# Patient Record
Sex: FEMALE | Race: BLACK OR AFRICAN AMERICAN | NOT HISPANIC OR LATINO
[De-identification: names, ages, dates, MRNs, and addresses within clinical notes are randomized per-mention and may not be internally consistent; named-entity substitution may affect disease eponyms.]

---

## 2014-10-13 RX ORDER — LISINOPRIL 2.5 MG/1
1 TABLET ORAL
Qty: 0 | Refills: 0 | COMMUNITY
Start: 2014-10-13

## 2014-10-13 RX ORDER — CARVEDILOL PHOSPHATE 80 MG/1
2 CAPSULE, EXTENDED RELEASE ORAL
Qty: 0 | Refills: 0 | COMMUNITY
Start: 2014-10-13

## 2017-01-09 ENCOUNTER — APPOINTMENT (OUTPATIENT)
Dept: CARDIOLOGY | Facility: CLINIC | Age: 67
End: 2017-01-09

## 2017-01-09 ENCOUNTER — NON-APPOINTMENT (OUTPATIENT)
Age: 67
End: 2017-01-09

## 2017-01-09 VITALS
HEART RATE: 88 BPM | SYSTOLIC BLOOD PRESSURE: 158 MMHG | OXYGEN SATURATION: 96 % | DIASTOLIC BLOOD PRESSURE: 97 MMHG | HEIGHT: 62 IN | BODY MASS INDEX: 15.83 KG/M2 | WEIGHT: 86 LBS

## 2017-01-10 LAB
ALBUMIN SERPL ELPH-MCNC: 4 G/DL
ALP BLD-CCNC: 62 U/L
ALT SERPL-CCNC: 16 U/L
ANION GAP SERPL CALC-SCNC: 21 MMOL/L
AST SERPL-CCNC: 25 U/L
BILIRUB SERPL-MCNC: 0.6 MG/DL
BUN SERPL-MCNC: 20 MG/DL
CALCIUM SERPL-MCNC: 9.5 MG/DL
CHLORIDE SERPL-SCNC: 100 MMOL/L
CHOLEST SERPL-MCNC: 178 MG/DL
CHOLEST/HDLC SERPL: 3.5 RATIO
CO2 SERPL-SCNC: 28 MMOL/L
CREAT SERPL-MCNC: 1.06 MG/DL
GLUCOSE SERPL-MCNC: 24 MG/DL
HDLC SERPL-MCNC: 51 MG/DL
LDLC SERPL CALC-MCNC: 93 MG/DL
POTASSIUM SERPL-SCNC: 3.4 MMOL/L
PROT SERPL-MCNC: 7.2 G/DL
SODIUM SERPL-SCNC: 149 MMOL/L
TRIGL SERPL-MCNC: 170 MG/DL

## 2017-01-25 ENCOUNTER — OUTPATIENT (OUTPATIENT)
Dept: OUTPATIENT SERVICES | Facility: HOSPITAL | Age: 67
LOS: 1 days | End: 2017-01-25

## 2017-01-25 ENCOUNTER — APPOINTMENT (OUTPATIENT)
Dept: CV DIAGNOSITCS | Facility: HOSPITAL | Age: 67
End: 2017-01-25

## 2017-01-25 DIAGNOSIS — I50.22 CHRONIC SYSTOLIC (CONGESTIVE) HEART FAILURE: ICD-10-CM

## 2017-01-25 DIAGNOSIS — Z95.5 PRESENCE OF CORONARY ANGIOPLASTY IMPLANT AND GRAFT: Chronic | ICD-10-CM

## 2017-01-25 DIAGNOSIS — I34.0 NONRHEUMATIC MITRAL (VALVE) INSUFFICIENCY: ICD-10-CM

## 2017-02-21 ENCOUNTER — RX RENEWAL (OUTPATIENT)
Age: 67
End: 2017-02-21

## 2017-05-08 ENCOUNTER — APPOINTMENT (OUTPATIENT)
Dept: CARDIOLOGY | Facility: CLINIC | Age: 67
End: 2017-05-08

## 2017-08-08 ENCOUNTER — APPOINTMENT (OUTPATIENT)
Dept: CARDIOLOGY | Facility: CLINIC | Age: 67
End: 2017-08-08

## 2017-08-18 ENCOUNTER — RX RENEWAL (OUTPATIENT)
Age: 67
End: 2017-08-18

## 2017-08-25 ENCOUNTER — NON-APPOINTMENT (OUTPATIENT)
Age: 67
End: 2017-08-25

## 2017-08-25 ENCOUNTER — APPOINTMENT (OUTPATIENT)
Dept: CARDIOLOGY | Facility: CLINIC | Age: 67
End: 2017-08-25
Payer: MEDICARE

## 2017-08-25 VITALS
HEIGHT: 62 IN | WEIGHT: 90 LBS | DIASTOLIC BLOOD PRESSURE: 101 MMHG | HEART RATE: 81 BPM | OXYGEN SATURATION: 96 % | RESPIRATION RATE: 14 BRPM | SYSTOLIC BLOOD PRESSURE: 150 MMHG | BODY MASS INDEX: 16.56 KG/M2

## 2017-08-25 VITALS — DIASTOLIC BLOOD PRESSURE: 88 MMHG | SYSTOLIC BLOOD PRESSURE: 140 MMHG

## 2017-08-25 PROCEDURE — 93000 ELECTROCARDIOGRAM COMPLETE: CPT

## 2017-08-25 PROCEDURE — 99215 OFFICE O/P EST HI 40 MIN: CPT

## 2017-08-26 LAB
ALBUMIN SERPL ELPH-MCNC: 4.5 G/DL
ALP BLD-CCNC: 61 U/L
ALT SERPL-CCNC: 26 U/L
ANION GAP SERPL CALC-SCNC: 20 MMOL/L
AST SERPL-CCNC: 27 U/L
BILIRUB SERPL-MCNC: 0.9 MG/DL
BUN SERPL-MCNC: 18 MG/DL
CALCIUM SERPL-MCNC: 10.1 MG/DL
CHLORIDE SERPL-SCNC: 102 MMOL/L
CHOLEST SERPL-MCNC: 169 MG/DL
CHOLEST/HDLC SERPL: 2.7 RATIO
CO2 SERPL-SCNC: 25 MMOL/L
CREAT SERPL-MCNC: 1.32 MG/DL
GLUCOSE SERPL-MCNC: 55 MG/DL
HBA1C MFR BLD HPLC: 5.7 %
HDLC SERPL-MCNC: 62 MG/DL
LDLC SERPL CALC-MCNC: 84 MG/DL
POTASSIUM SERPL-SCNC: 3.7 MMOL/L
PROT SERPL-MCNC: 7.6 G/DL
SODIUM SERPL-SCNC: 147 MMOL/L
TRIGL SERPL-MCNC: 117 MG/DL

## 2017-09-18 ENCOUNTER — RX RENEWAL (OUTPATIENT)
Age: 67
End: 2017-09-18

## 2017-09-19 ENCOUNTER — RX RENEWAL (OUTPATIENT)
Age: 67
End: 2017-09-19

## 2017-09-19 DIAGNOSIS — G47.30 SLEEP APNEA, UNSPECIFIED: ICD-10-CM

## 2017-09-19 DIAGNOSIS — Z82.49 FAMILY HISTORY OF ISCHEMIC HEART DISEASE AND OTHER DISEASES OF THE CIRCULATORY SYSTEM: ICD-10-CM

## 2017-09-19 DIAGNOSIS — Z87.891 PERSONAL HISTORY OF NICOTINE DEPENDENCE: ICD-10-CM

## 2017-09-19 DIAGNOSIS — J45.901 CHRONIC OBSTRUCTIVE PULMONARY DISEASE WITH (ACUTE) EXACERBATION: ICD-10-CM

## 2017-09-19 DIAGNOSIS — J44.1 CHRONIC OBSTRUCTIVE PULMONARY DISEASE WITH (ACUTE) EXACERBATION: ICD-10-CM

## 2017-09-24 ENCOUNTER — RX RENEWAL (OUTPATIENT)
Age: 67
End: 2017-09-24

## 2017-10-11 ENCOUNTER — MEDICATION RENEWAL (OUTPATIENT)
Age: 67
End: 2017-10-11

## 2017-10-11 ENCOUNTER — RX RENEWAL (OUTPATIENT)
Age: 67
End: 2017-10-11

## 2017-10-17 ENCOUNTER — RX RENEWAL (OUTPATIENT)
Age: 67
End: 2017-10-17

## 2017-11-16 ENCOUNTER — APPOINTMENT (OUTPATIENT)
Dept: PULMONOLOGY | Facility: CLINIC | Age: 67
End: 2017-11-16
Payer: MEDICARE

## 2017-11-16 VITALS
SYSTOLIC BLOOD PRESSURE: 112 MMHG | BODY MASS INDEX: 14.72 KG/M2 | OXYGEN SATURATION: 100 % | WEIGHT: 80 LBS | HEIGHT: 62 IN | HEART RATE: 86 BPM | DIASTOLIC BLOOD PRESSURE: 73 MMHG

## 2017-11-16 DIAGNOSIS — R53.83 OTHER FATIGUE: ICD-10-CM

## 2017-11-16 PROCEDURE — 90686 IIV4 VACC NO PRSV 0.5 ML IM: CPT

## 2017-11-16 PROCEDURE — 99214 OFFICE O/P EST MOD 30 MIN: CPT | Mod: 25

## 2017-11-16 PROCEDURE — G0008: CPT

## 2017-11-20 ENCOUNTER — NON-APPOINTMENT (OUTPATIENT)
Age: 67
End: 2017-11-20

## 2017-11-20 ENCOUNTER — APPOINTMENT (OUTPATIENT)
Dept: CARDIOLOGY | Facility: CLINIC | Age: 67
End: 2017-11-20
Payer: MEDICARE

## 2017-11-20 VITALS
WEIGHT: 82 LBS | HEIGHT: 62 IN | BODY MASS INDEX: 15.09 KG/M2 | RESPIRATION RATE: 14 BRPM | DIASTOLIC BLOOD PRESSURE: 67 MMHG | HEART RATE: 104 BPM | SYSTOLIC BLOOD PRESSURE: 89 MMHG | OXYGEN SATURATION: 98 %

## 2017-11-20 PROCEDURE — 99214 OFFICE O/P EST MOD 30 MIN: CPT

## 2017-11-20 PROCEDURE — 93000 ELECTROCARDIOGRAM COMPLETE: CPT

## 2018-01-16 ENCOUNTER — RX RENEWAL (OUTPATIENT)
Age: 68
End: 2018-01-16

## 2018-02-13 ENCOUNTER — RX RENEWAL (OUTPATIENT)
Age: 68
End: 2018-02-13

## 2018-02-22 ENCOUNTER — APPOINTMENT (OUTPATIENT)
Dept: PULMONOLOGY | Facility: CLINIC | Age: 68
End: 2018-02-22
Payer: MEDICARE

## 2018-02-22 VITALS
OXYGEN SATURATION: 97 % | BODY MASS INDEX: 14.17 KG/M2 | HEIGHT: 62 IN | SYSTOLIC BLOOD PRESSURE: 101 MMHG | WEIGHT: 77 LBS | HEART RATE: 92 BPM | DIASTOLIC BLOOD PRESSURE: 68 MMHG

## 2018-02-22 DIAGNOSIS — F32.9 MAJOR DEPRESSIVE DISORDER, SINGLE EPISODE, UNSPECIFIED: ICD-10-CM

## 2018-02-22 PROCEDURE — 99214 OFFICE O/P EST MOD 30 MIN: CPT

## 2018-03-26 ENCOUNTER — NON-APPOINTMENT (OUTPATIENT)
Age: 68
End: 2018-03-26

## 2018-03-26 ENCOUNTER — APPOINTMENT (OUTPATIENT)
Dept: CARDIOLOGY | Facility: CLINIC | Age: 68
End: 2018-03-26
Payer: MEDICARE

## 2018-03-26 VITALS
BODY MASS INDEX: 14.91 KG/M2 | WEIGHT: 81 LBS | HEIGHT: 62 IN | DIASTOLIC BLOOD PRESSURE: 78 MMHG | OXYGEN SATURATION: 96 % | HEART RATE: 92 BPM | SYSTOLIC BLOOD PRESSURE: 114 MMHG

## 2018-03-26 PROCEDURE — 93000 ELECTROCARDIOGRAM COMPLETE: CPT

## 2018-03-26 PROCEDURE — 99214 OFFICE O/P EST MOD 30 MIN: CPT

## 2018-04-11 ENCOUNTER — APPOINTMENT (OUTPATIENT)
Dept: PULMONOLOGY | Facility: CLINIC | Age: 68
End: 2018-04-11

## 2018-04-11 ENCOUNTER — APPOINTMENT (OUTPATIENT)
Dept: FAMILY MEDICINE | Facility: CLINIC | Age: 68
End: 2018-04-11
Payer: MEDICARE

## 2018-04-11 VITALS
WEIGHT: 85 LBS | SYSTOLIC BLOOD PRESSURE: 136 MMHG | HEART RATE: 98 BPM | HEIGHT: 62 IN | DIASTOLIC BLOOD PRESSURE: 91 MMHG | OXYGEN SATURATION: 96 % | BODY MASS INDEX: 15.64 KG/M2 | TEMPERATURE: 98.8 F

## 2018-04-11 DIAGNOSIS — F19.90 OTHER PSYCHOACTIVE SUBSTANCE USE, UNSPECIFIED, UNCOMPLICATED: ICD-10-CM

## 2018-04-11 DIAGNOSIS — Z12.4 ENCOUNTER FOR SCREENING FOR MALIGNANT NEOPLASM OF CERVIX: ICD-10-CM

## 2018-04-11 DIAGNOSIS — Z80.0 FAMILY HISTORY OF MALIGNANT NEOPLASM OF DIGESTIVE ORGANS: ICD-10-CM

## 2018-04-11 DIAGNOSIS — Z12.11 ENCOUNTER FOR SCREENING FOR MALIGNANT NEOPLASM OF COLON: ICD-10-CM

## 2018-04-11 DIAGNOSIS — N28.9 DISORDER OF KIDNEY AND URETER, UNSPECIFIED: ICD-10-CM

## 2018-04-11 DIAGNOSIS — Z11.59 ENCOUNTER FOR SCREENING FOR OTHER VIRAL DISEASES: ICD-10-CM

## 2018-04-11 PROCEDURE — G0009: CPT

## 2018-04-11 PROCEDURE — G0438: CPT

## 2018-04-11 PROCEDURE — 90670 PCV13 VACCINE IM: CPT

## 2018-05-09 ENCOUNTER — RX RENEWAL (OUTPATIENT)
Age: 68
End: 2018-05-09

## 2018-05-22 ENCOUNTER — APPOINTMENT (OUTPATIENT)
Dept: PULMONOLOGY | Facility: CLINIC | Age: 68
End: 2018-05-22
Payer: MEDICARE

## 2018-05-22 ENCOUNTER — RX RENEWAL (OUTPATIENT)
Age: 68
End: 2018-05-22

## 2018-05-22 VITALS
RESPIRATION RATE: 98 BRPM | WEIGHT: 81 LBS | HEIGHT: 62 IN | DIASTOLIC BLOOD PRESSURE: 85 MMHG | BODY MASS INDEX: 14.91 KG/M2 | HEART RATE: 97 BPM | SYSTOLIC BLOOD PRESSURE: 129 MMHG

## 2018-05-22 PROCEDURE — 99214 OFFICE O/P EST MOD 30 MIN: CPT

## 2018-06-13 ENCOUNTER — APPOINTMENT (OUTPATIENT)
Dept: PULMONOLOGY | Facility: CLINIC | Age: 68
End: 2018-06-13
Payer: MEDICARE

## 2018-06-13 PROCEDURE — 95806 SLEEP STUDY UNATT&RESP EFFT: CPT

## 2018-06-18 ENCOUNTER — RESULT REVIEW (OUTPATIENT)
Age: 68
End: 2018-06-18

## 2018-06-22 LAB — HEMOCCULT STL QL IA: NEGATIVE

## 2018-07-30 ENCOUNTER — APPOINTMENT (OUTPATIENT)
Dept: CARDIOLOGY | Facility: CLINIC | Age: 68
End: 2018-07-30

## 2018-07-30 ENCOUNTER — INBOUND DOCUMENT (OUTPATIENT)
Age: 68
End: 2018-07-30

## 2018-08-02 ENCOUNTER — OTHER (OUTPATIENT)
Age: 68
End: 2018-08-02

## 2018-08-07 ENCOUNTER — APPOINTMENT (OUTPATIENT)
Dept: PULMONOLOGY | Facility: CLINIC | Age: 68
End: 2018-08-07
Payer: MEDICARE

## 2018-08-07 VITALS
HEIGHT: 62 IN | HEART RATE: 89 BPM | SYSTOLIC BLOOD PRESSURE: 122 MMHG | WEIGHT: 81 LBS | OXYGEN SATURATION: 98 % | DIASTOLIC BLOOD PRESSURE: 86 MMHG | BODY MASS INDEX: 14.91 KG/M2

## 2018-08-07 DIAGNOSIS — R63.4 ABNORMAL WEIGHT LOSS: ICD-10-CM

## 2018-08-07 PROCEDURE — 99213 OFFICE O/P EST LOW 20 MIN: CPT | Mod: 25

## 2018-08-07 PROCEDURE — 94010 BREATHING CAPACITY TEST: CPT

## 2018-08-07 PROCEDURE — 94729 DIFFUSING CAPACITY: CPT

## 2018-08-07 RX ORDER — IPRATROPIUM BROMIDE AND ALBUTEROL SULFATE 2.5; .5 MG/3ML; MG/3ML
0.5-2.5 (3) SOLUTION RESPIRATORY (INHALATION) 4 TIMES DAILY
Qty: 360 | Refills: 1 | Status: DISCONTINUED | COMMUNITY
Start: 2017-09-19 | End: 2018-08-07

## 2018-08-10 ENCOUNTER — APPOINTMENT (OUTPATIENT)
Dept: CARDIOLOGY | Facility: CLINIC | Age: 68
End: 2018-08-10
Payer: MEDICARE

## 2018-08-10 VITALS
WEIGHT: 82 LBS | OXYGEN SATURATION: 96 % | DIASTOLIC BLOOD PRESSURE: 88 MMHG | HEART RATE: 94 BPM | HEIGHT: 62 IN | BODY MASS INDEX: 15.09 KG/M2 | SYSTOLIC BLOOD PRESSURE: 135 MMHG

## 2018-08-10 PROCEDURE — 93000 ELECTROCARDIOGRAM COMPLETE: CPT

## 2018-08-10 PROCEDURE — 99215 OFFICE O/P EST HI 40 MIN: CPT

## 2018-08-13 ENCOUNTER — APPOINTMENT (OUTPATIENT)
Dept: FAMILY MEDICINE | Facility: CLINIC | Age: 68
End: 2018-08-13
Payer: MEDICARE

## 2018-08-13 VITALS — DIASTOLIC BLOOD PRESSURE: 95 MMHG | SYSTOLIC BLOOD PRESSURE: 130 MMHG

## 2018-08-13 VITALS
DIASTOLIC BLOOD PRESSURE: 103 MMHG | HEART RATE: 102 BPM | HEIGHT: 62 IN | SYSTOLIC BLOOD PRESSURE: 153 MMHG | OXYGEN SATURATION: 96 % | WEIGHT: 82 LBS | BODY MASS INDEX: 15.09 KG/M2

## 2018-08-13 DIAGNOSIS — E55.9 VITAMIN D DEFICIENCY, UNSPECIFIED: ICD-10-CM

## 2018-08-13 PROCEDURE — 99214 OFFICE O/P EST MOD 30 MIN: CPT | Mod: 25

## 2018-08-13 PROCEDURE — 36415 COLL VENOUS BLD VENIPUNCTURE: CPT

## 2018-08-13 NOTE — HISTORY OF PRESENT ILLNESS
[FreeTextEntry1] : hospital f/u, check up [de-identified] : 69 yo F with hx CHF, COPD had a recent CHF and COPD exacerbation. She had f/u with pulm and cardiology. She will get ICD placement. So far she has been doing ok since discharge. Discharge paperwork mentioned pancreatic duct and CBD dilation with GI f/u and MRCP as well as hydronephrosis and full bladder, to f/u with renal ultrasound. Pt denies any abdominal pain/RUQ pain, N/V, or feeling bloated.\par Pt has been on lasix 20mg, causing her to urinate frequently. She does check her weight daily and make sure her legs don't swell up too much.  \par \par Pt reports home BP this morning was 134/78.

## 2018-08-13 NOTE — PHYSICAL EXAM
[No Acute Distress] : no acute distress [Cachectic] : cachexia was observed [No Respiratory Distress] : no respiratory distress  [Clear to Auscultation] : lungs were clear to auscultation bilaterally [Regular Rhythm] : with a regular rhythm [Normal S1, S2] : normal S1 and S2 [Soft] : abdomen soft [Non Tender] : non-tender [Normal Gait] : normal gait [Normal Affect] : the affect was normal [Normal Insight/Judgement] : insight and judgment were intact [de-identified] : tachycardia

## 2018-08-14 LAB
25(OH)D3 SERPL-MCNC: 23.5 NG/ML
ALBUMIN SERPL ELPH-MCNC: 4.3 G/DL
ALP BLD-CCNC: 73 U/L
ALT SERPL-CCNC: 33 U/L
ANION GAP SERPL CALC-SCNC: 17 MMOL/L
AST SERPL-CCNC: 23 U/L
BILIRUB DIRECT SERPL-MCNC: 0.3 MG/DL
BILIRUB INDIRECT SERPL-MCNC: 0.8 MG/DL
BILIRUB SERPL-MCNC: 1.1 MG/DL
BUN SERPL-MCNC: 21 MG/DL
CALCIUM SERPL-MCNC: 9.6 MG/DL
CHLORIDE SERPL-SCNC: 100 MMOL/L
CHOLEST SERPL-MCNC: 187 MG/DL
CHOLEST/HDLC SERPL: 2.5 RATIO
CO2 SERPL-SCNC: 28 MMOL/L
CREAT SERPL-MCNC: 1.2 MG/DL
GLUCOSE SERPL-MCNC: 87 MG/DL
HBA1C MFR BLD HPLC: 6.1 %
HDLC SERPL-MCNC: 76 MG/DL
LDLC SERPL CALC-MCNC: 92 MG/DL
POTASSIUM SERPL-SCNC: 4.2 MMOL/L
PROT SERPL-MCNC: 6.8 G/DL
SODIUM SERPL-SCNC: 145 MMOL/L
TRIGL SERPL-MCNC: 96 MG/DL
TSH SERPL-ACNC: 3.95 UIU/ML

## 2018-08-20 ENCOUNTER — APPOINTMENT (OUTPATIENT)
Dept: ELECTROPHYSIOLOGY | Facility: CLINIC | Age: 68
End: 2018-08-20
Payer: MEDICARE

## 2018-08-20 VITALS
HEART RATE: 96 BPM | RESPIRATION RATE: 20 BRPM | BODY MASS INDEX: 14.54 KG/M2 | SYSTOLIC BLOOD PRESSURE: 124 MMHG | DIASTOLIC BLOOD PRESSURE: 90 MMHG | HEIGHT: 62 IN | WEIGHT: 79 LBS | OXYGEN SATURATION: 99 % | TEMPERATURE: 97.8 F

## 2018-08-20 DIAGNOSIS — I42.8 OTHER CARDIOMYOPATHIES: ICD-10-CM

## 2018-08-20 DIAGNOSIS — I44.7 LEFT BUNDLE-BRANCH BLOCK, UNSPECIFIED: ICD-10-CM

## 2018-08-20 PROCEDURE — 99205 OFFICE O/P NEW HI 60 MIN: CPT

## 2018-08-20 PROCEDURE — 93000 ELECTROCARDIOGRAM COMPLETE: CPT

## 2018-08-20 RX ORDER — MIRTAZAPINE 15 MG/1
15 TABLET, FILM COATED ORAL
Qty: 90 | Refills: 0 | Status: DISCONTINUED | COMMUNITY
Start: 2018-02-22 | End: 2018-08-20

## 2018-08-24 ENCOUNTER — APPOINTMENT (OUTPATIENT)
Dept: CV DIAGNOSITCS | Facility: HOSPITAL | Age: 68
End: 2018-08-24
Payer: MEDICARE

## 2018-08-24 ENCOUNTER — OUTPATIENT (OUTPATIENT)
Dept: OUTPATIENT SERVICES | Facility: HOSPITAL | Age: 68
LOS: 1 days | End: 2018-08-24

## 2018-08-24 DIAGNOSIS — Z95.5 PRESENCE OF CORONARY ANGIOPLASTY IMPLANT AND GRAFT: Chronic | ICD-10-CM

## 2018-08-24 DIAGNOSIS — I50.22 CHRONIC SYSTOLIC (CONGESTIVE) HEART FAILURE: ICD-10-CM

## 2018-08-24 PROCEDURE — 93306 TTE W/DOPPLER COMPLETE: CPT | Mod: 26

## 2018-08-28 ENCOUNTER — NON-APPOINTMENT (OUTPATIENT)
Age: 68
End: 2018-08-28

## 2018-09-17 ENCOUNTER — MESSAGE (OUTPATIENT)
Age: 68
End: 2018-09-17

## 2018-09-17 ENCOUNTER — APPOINTMENT (OUTPATIENT)
Dept: ELECTROPHYSIOLOGY | Facility: CLINIC | Age: 68
End: 2018-09-17
Payer: MEDICARE

## 2018-09-17 PROCEDURE — 36415 COLL VENOUS BLD VENIPUNCTURE: CPT

## 2018-09-18 LAB
ALBUMIN SERPL ELPH-MCNC: 4.3 G/DL
ALP BLD-CCNC: 68 U/L
ALT SERPL-CCNC: 55 U/L
ANION GAP SERPL CALC-SCNC: 16 MMOL/L
AST SERPL-CCNC: 60 U/L
BASOPHILS # BLD AUTO: 0.02 K/UL
BASOPHILS NFR BLD AUTO: 0.5 %
BILIRUB SERPL-MCNC: 0.8 MG/DL
BUN SERPL-MCNC: 16 MG/DL
CALCIUM SERPL-MCNC: 9.7 MG/DL
CHLORIDE SERPL-SCNC: 103 MMOL/L
CO2 SERPL-SCNC: 28 MMOL/L
CREAT SERPL-MCNC: 1.19 MG/DL
EOSINOPHIL # BLD AUTO: 0.05 K/UL
EOSINOPHIL NFR BLD AUTO: 1.3 %
HCT VFR BLD CALC: 43.8 %
HGB BLD-MCNC: 13.8 G/DL
IMM GRANULOCYTES NFR BLD AUTO: 0 %
LYMPHOCYTES # BLD AUTO: 1.6 K/UL
LYMPHOCYTES NFR BLD AUTO: 40 %
MAN DIFF?: NORMAL
MCHC RBC-ENTMCNC: 30.7 PG
MCHC RBC-ENTMCNC: 31.5 GM/DL
MCV RBC AUTO: 97.3 FL
MONOCYTES # BLD AUTO: 0.2 K/UL
MONOCYTES NFR BLD AUTO: 5 %
NEUTROPHILS # BLD AUTO: 2.13 K/UL
NEUTROPHILS NFR BLD AUTO: 53.2 %
PLATELET # BLD AUTO: 239 K/UL
POTASSIUM SERPL-SCNC: 4.5 MMOL/L
PROT SERPL-MCNC: 6.9 G/DL
RBC # BLD: 4.5 M/UL
RBC # FLD: 14.2 %
SODIUM SERPL-SCNC: 148 MMOL/L
WBC # FLD AUTO: 4 K/UL

## 2018-09-25 ENCOUNTER — INPATIENT (INPATIENT)
Facility: HOSPITAL | Age: 68
LOS: 1 days | Discharge: HOME HEALTH | End: 2018-09-27
Attending: STUDENT IN AN ORGANIZED HEALTH CARE EDUCATION/TRAINING PROGRAM | Admitting: STUDENT IN AN ORGANIZED HEALTH CARE EDUCATION/TRAINING PROGRAM
Payer: MEDICARE

## 2018-09-25 VITALS
DIASTOLIC BLOOD PRESSURE: 107 MMHG | SYSTOLIC BLOOD PRESSURE: 159 MMHG | RESPIRATION RATE: 18 BRPM | HEART RATE: 108 BPM | OXYGEN SATURATION: 98 % | TEMPERATURE: 100 F

## 2018-09-25 DIAGNOSIS — I42.8 OTHER CARDIOMYOPATHIES: ICD-10-CM

## 2018-09-25 PROCEDURE — 33225 L VENTRIC PACING LEAD ADD-ON: CPT

## 2018-09-25 PROCEDURE — 71045 X-RAY EXAM CHEST 1 VIEW: CPT | Mod: 26

## 2018-09-25 PROCEDURE — 93010 ELECTROCARDIOGRAM REPORT: CPT | Mod: 76

## 2018-09-25 PROCEDURE — 33249 INSJ/RPLCMT DEFIB W/LEAD(S): CPT

## 2018-09-25 RX ORDER — ACETAMINOPHEN 500 MG
500 TABLET ORAL ONCE
Qty: 0 | Refills: 0 | Status: COMPLETED | OUTPATIENT
Start: 2018-09-25 | End: 2018-09-25

## 2018-09-25 RX ORDER — CARVEDILOL PHOSPHATE 80 MG/1
6.25 CAPSULE, EXTENDED RELEASE ORAL EVERY 12 HOURS
Qty: 0 | Refills: 0 | Status: DISCONTINUED | OUTPATIENT
Start: 2018-09-25 | End: 2018-09-27

## 2018-09-25 RX ORDER — FENTANYL CITRATE 50 UG/ML
25 INJECTION INTRAVENOUS ONCE
Qty: 0 | Refills: 0 | Status: DISCONTINUED | OUTPATIENT
Start: 2018-09-25 | End: 2018-09-25

## 2018-09-25 RX ORDER — LISINOPRIL 2.5 MG/1
20 TABLET ORAL DAILY
Qty: 0 | Refills: 0 | Status: DISCONTINUED | OUTPATIENT
Start: 2018-09-25 | End: 2018-09-27

## 2018-09-25 RX ORDER — ASPIRIN/CALCIUM CARB/MAGNESIUM 324 MG
81 TABLET ORAL DAILY
Qty: 0 | Refills: 0 | Status: DISCONTINUED | OUTPATIENT
Start: 2018-09-25 | End: 2018-09-27

## 2018-09-25 RX ORDER — BUDESONIDE AND FORMOTEROL FUMARATE DIHYDRATE 160; 4.5 UG/1; UG/1
2 AEROSOL RESPIRATORY (INHALATION)
Qty: 0 | Refills: 0 | Status: DISCONTINUED | OUTPATIENT
Start: 2018-09-25 | End: 2018-09-27

## 2018-09-25 RX ORDER — SIMVASTATIN 20 MG/1
20 TABLET, FILM COATED ORAL AT BEDTIME
Qty: 0 | Refills: 0 | Status: DISCONTINUED | OUTPATIENT
Start: 2018-09-25 | End: 2018-09-27

## 2018-09-25 RX ORDER — CEFAZOLIN SODIUM 1 G
1000 VIAL (EA) INJECTION EVERY 8 HOURS
Qty: 0 | Refills: 0 | Status: COMPLETED | OUTPATIENT
Start: 2018-09-25 | End: 2018-09-26

## 2018-09-25 RX ORDER — SODIUM CHLORIDE 9 MG/ML
3 INJECTION INTRAMUSCULAR; INTRAVENOUS; SUBCUTANEOUS EVERY 8 HOURS
Qty: 0 | Refills: 0 | Status: DISCONTINUED | OUTPATIENT
Start: 2018-09-25 | End: 2018-09-27

## 2018-09-25 RX ORDER — ALBUTEROL 90 UG/1
2 AEROSOL, METERED ORAL EVERY 6 HOURS
Qty: 0 | Refills: 0 | Status: DISCONTINUED | OUTPATIENT
Start: 2018-09-25 | End: 2018-09-27

## 2018-09-25 RX ORDER — TIOTROPIUM BROMIDE 18 UG/1
1 CAPSULE ORAL; RESPIRATORY (INHALATION) DAILY
Qty: 0 | Refills: 0 | Status: DISCONTINUED | OUTPATIENT
Start: 2018-09-25 | End: 2018-09-27

## 2018-09-25 RX ORDER — FUROSEMIDE 40 MG
20 TABLET ORAL DAILY
Qty: 0 | Refills: 0 | Status: DISCONTINUED | OUTPATIENT
Start: 2018-09-25 | End: 2018-09-27

## 2018-09-25 RX ORDER — ACETAMINOPHEN 500 MG
650 TABLET ORAL ONCE
Qty: 0 | Refills: 0 | Status: COMPLETED | OUTPATIENT
Start: 2018-09-25 | End: 2018-09-25

## 2018-09-25 RX ADMIN — Medication 500 MILLIGRAM(S): at 20:25

## 2018-09-25 RX ADMIN — Medication 650 MILLIGRAM(S): at 17:09

## 2018-09-25 RX ADMIN — SODIUM CHLORIDE 3 MILLILITER(S): 9 INJECTION INTRAMUSCULAR; INTRAVENOUS; SUBCUTANEOUS at 22:10

## 2018-09-25 RX ADMIN — Medication 100 MILLIGRAM(S): at 17:14

## 2018-09-25 RX ADMIN — LISINOPRIL 20 MILLIGRAM(S): 2.5 TABLET ORAL at 17:12

## 2018-09-25 RX ADMIN — SIMVASTATIN 20 MILLIGRAM(S): 20 TABLET, FILM COATED ORAL at 22:09

## 2018-09-25 RX ADMIN — Medication 81 MILLIGRAM(S): at 12:24

## 2018-09-25 RX ADMIN — Medication 650 MILLIGRAM(S): at 12:23

## 2018-09-25 RX ADMIN — BUDESONIDE AND FORMOTEROL FUMARATE DIHYDRATE 2 PUFF(S): 160; 4.5 AEROSOL RESPIRATORY (INHALATION) at 22:10

## 2018-09-25 RX ADMIN — SODIUM CHLORIDE 3 MILLILITER(S): 9 INJECTION INTRAMUSCULAR; INTRAVENOUS; SUBCUTANEOUS at 17:12

## 2018-09-25 RX ADMIN — FENTANYL CITRATE 25 MICROGRAM(S): 50 INJECTION INTRAVENOUS at 12:58

## 2018-09-25 RX ADMIN — CARVEDILOL PHOSPHATE 6.25 MILLIGRAM(S): 80 CAPSULE, EXTENDED RELEASE ORAL at 20:39

## 2018-09-25 RX ADMIN — FENTANYL CITRATE 25 MICROGRAM(S): 50 INJECTION INTRAVENOUS at 13:15

## 2018-09-25 RX ADMIN — Medication 200 MILLIGRAM(S): at 20:10

## 2018-09-25 NOTE — H&P CARDIOLOGY - HISTORY OF PRESENT ILLNESS
68 year old female with HTN, hyperlipidemia, CARLOTA/COPD, LBBB, chronic systolic CHF EF 22% with stable DOMÍNGUEZ with numerous hospitalizations for acute on chronic CHF exacerbations who presents for BiV ICD implant. 68 year old female with HTN, hyperlipidemia, COPD, LBBB, chronic systolic CHF EF 22% with stable DOMÍNGUEZ with numerous hospitalizations for acute on chronic CHF exacerbations who presents for BiV ICD implant.   **patient denies CARLOTA written on chart - negative sleep study per patient, denies coronary stent previously noted on chart

## 2018-09-25 NOTE — CHART NOTE - NSCHARTNOTEFT_GEN_A_CORE
patient complained of pain at ICD site and prior peripheral IV sticks in left arm. Tylenol PO with no relief, will give Fentanyl 25micrograms IV x1 and monitor, VSS, CXR my read OK with no evidence of pneumothorax. Awaiting tele admission

## 2018-09-25 NOTE — H&P CARDIOLOGY - PMH
CAD (coronary artery disease)    Chronic systolic CHF (congestive heart failure)    COPD (chronic obstructive pulmonary disease)    Heart failure, systolic    HTN (hypertension)    LBBB (left bundle branch block)    CARLOTA (obstructive sleep apnea) CAD (coronary artery disease)    Chronic systolic CHF (congestive heart failure)    COPD (chronic obstructive pulmonary disease)    Heart failure, systolic    HTN (hypertension)    LBBB (left bundle branch block)

## 2018-09-25 NOTE — CHART NOTE - NSCHARTNOTEFT_GEN_A_CORE
Type of Procedure: BiV ICD implant  Licensed independent practitioner: Jone Johnson MD  Assistant: None  Description of procedure:   After informed consent was obtained, the patient was brought to the EP laboratory in the fasting state and prepped and draped in the usual sterile manner.  Ancef 1 gm IV was administered prophylactically.   Local anesthetic was delivered to the left pectoral region and an incision was made in the left deltopectoral groove. The incision was extended inward using blunt dissection and the left cephalic vein was identified. The vein was cannulated and a retaining wire was placed in the IVC under fluoroscopy.  A 9F peel-away sheath was placed in the cephalic vein and a MDT single coil active fixation lead was advanced to the RV apex under fluoroscopic guidance. Ventricular sensing and pacing thresholds were checked and were good.  Pacing at 10V was performed from the ventricular lead without diaphragmatic or intercostal capture. Next a CS lead was advanced through a sheath and placed in the posterolateral LV. Ventricular sensing and pacing thresholds were checked and were good.  Pacing at 10V was performed from the ventricular lead without diaphragmatic or intercostal capture. Finally a MDT lead was positioned in the RAA. Atrial sensing and pacing thresholds were checked and were good.  The leads were sutured to the underlying pectoralis muscle using 2-0 Ethibond suture.  Final pacing and sensing thresholds were checked and were adequate. A subcutaneous pocket was then created using blunt dissection and it was copiously irrigated with a saline solution containing polymixin. The leads were connected to a MDT generator and the entire system was implanted into the pocket.   The subcutaneous tissues were then closed with a layer of interrupted 2-0 vicryl suture and a running 2-0 vicryl stitch.  The skin was closed with dermabond.  The wound was covered with a dry sterile dressing. The patient tolerated the procedure well and left the laboratory in good condition.  Conscious sedation was provided and appropriate monitoring was performed by members of the EP nursing staff.    Findings of procedure: None  Estimated blood loss: <10cc  Specimen removed: N/A  Preoperative Dx: Non-infarct related cardiomyopathy  Postoperative Dx: Non-infarct related cardiomyopathy  Complications: None  Anesthesia type: Conscious sedation

## 2018-09-26 LAB
BUN SERPL-MCNC: 17 MG/DL — SIGNIFICANT CHANGE UP (ref 7–23)
CALCIUM SERPL-MCNC: 8.9 MG/DL — SIGNIFICANT CHANGE UP (ref 8.4–10.5)
CHLORIDE SERPL-SCNC: 100 MMOL/L — SIGNIFICANT CHANGE UP (ref 98–107)
CO2 SERPL-SCNC: 23 MMOL/L — SIGNIFICANT CHANGE UP (ref 22–31)
CREAT SERPL-MCNC: 0.92 MG/DL — SIGNIFICANT CHANGE UP (ref 0.5–1.3)
GLUCOSE SERPL-MCNC: 69 MG/DL — LOW (ref 70–99)
HCT VFR BLD CALC: 36.9 % — SIGNIFICANT CHANGE UP (ref 34.5–45)
HGB BLD-MCNC: 12.1 G/DL — SIGNIFICANT CHANGE UP (ref 11.5–15.5)
MAGNESIUM SERPL-MCNC: 1.9 MG/DL — SIGNIFICANT CHANGE UP (ref 1.6–2.6)
MCHC RBC-ENTMCNC: 30 PG — SIGNIFICANT CHANGE UP (ref 27–34)
MCHC RBC-ENTMCNC: 32.8 % — SIGNIFICANT CHANGE UP (ref 32–36)
MCV RBC AUTO: 91.6 FL — SIGNIFICANT CHANGE UP (ref 80–100)
NRBC # FLD: 0 — SIGNIFICANT CHANGE UP
PLATELET # BLD AUTO: 177 K/UL — SIGNIFICANT CHANGE UP (ref 150–400)
PMV BLD: 9.5 FL — SIGNIFICANT CHANGE UP (ref 7–13)
POTASSIUM SERPL-MCNC: 3.7 MMOL/L — SIGNIFICANT CHANGE UP (ref 3.5–5.3)
POTASSIUM SERPL-SCNC: 3.7 MMOL/L — SIGNIFICANT CHANGE UP (ref 3.5–5.3)
RBC # BLD: 4.03 M/UL — SIGNIFICANT CHANGE UP (ref 3.8–5.2)
RBC # FLD: 13.2 % — SIGNIFICANT CHANGE UP (ref 10.3–14.5)
SODIUM SERPL-SCNC: 141 MMOL/L — SIGNIFICANT CHANGE UP (ref 135–145)
WBC # BLD: 6.56 K/UL — SIGNIFICANT CHANGE UP (ref 3.8–10.5)
WBC # FLD AUTO: 6.56 K/UL — SIGNIFICANT CHANGE UP (ref 3.8–10.5)

## 2018-09-26 RX ORDER — ACETAMINOPHEN 500 MG
650 TABLET ORAL EVERY 6 HOURS
Qty: 0 | Refills: 0 | Status: DISCONTINUED | OUTPATIENT
Start: 2018-09-26 | End: 2018-09-27

## 2018-09-26 RX ORDER — ONDANSETRON 8 MG/1
4 TABLET, FILM COATED ORAL EVERY 4 HOURS
Qty: 0 | Refills: 0 | Status: DISCONTINUED | OUTPATIENT
Start: 2018-09-26 | End: 2018-09-27

## 2018-09-26 RX ORDER — OXYCODONE AND ACETAMINOPHEN 5; 325 MG/1; MG/1
1 TABLET ORAL ONCE
Qty: 0 | Refills: 0 | Status: DISCONTINUED | OUTPATIENT
Start: 2018-09-26 | End: 2018-09-26

## 2018-09-26 RX ORDER — ACETAMINOPHEN 500 MG
500 TABLET ORAL ONCE
Qty: 0 | Refills: 0 | Status: COMPLETED | OUTPATIENT
Start: 2018-09-26 | End: 2018-09-26

## 2018-09-26 RX ADMIN — Medication 81 MILLIGRAM(S): at 12:16

## 2018-09-26 RX ADMIN — SIMVASTATIN 20 MILLIGRAM(S): 20 TABLET, FILM COATED ORAL at 21:31

## 2018-09-26 RX ADMIN — Medication 650 MILLIGRAM(S): at 20:24

## 2018-09-26 RX ADMIN — SODIUM CHLORIDE 3 MILLILITER(S): 9 INJECTION INTRAMUSCULAR; INTRAVENOUS; SUBCUTANEOUS at 06:23

## 2018-09-26 RX ADMIN — BUDESONIDE AND FORMOTEROL FUMARATE DIHYDRATE 2 PUFF(S): 160; 4.5 AEROSOL RESPIRATORY (INHALATION) at 09:15

## 2018-09-26 RX ADMIN — LISINOPRIL 20 MILLIGRAM(S): 2.5 TABLET ORAL at 06:22

## 2018-09-26 RX ADMIN — TIOTROPIUM BROMIDE 1 CAPSULE(S): 18 CAPSULE ORAL; RESPIRATORY (INHALATION) at 09:14

## 2018-09-26 RX ADMIN — SODIUM CHLORIDE 3 MILLILITER(S): 9 INJECTION INTRAMUSCULAR; INTRAVENOUS; SUBCUTANEOUS at 12:17

## 2018-09-26 RX ADMIN — Medication 20 MILLIGRAM(S): at 12:16

## 2018-09-26 RX ADMIN — CARVEDILOL PHOSPHATE 6.25 MILLIGRAM(S): 80 CAPSULE, EXTENDED RELEASE ORAL at 06:22

## 2018-09-26 RX ADMIN — Medication 100 MILLIGRAM(S): at 06:22

## 2018-09-26 RX ADMIN — BUDESONIDE AND FORMOTEROL FUMARATE DIHYDRATE 2 PUFF(S): 160; 4.5 AEROSOL RESPIRATORY (INHALATION) at 21:31

## 2018-09-26 RX ADMIN — Medication 200 MILLIGRAM(S): at 09:15

## 2018-09-26 RX ADMIN — Medication 500 MILLIGRAM(S): at 09:42

## 2018-09-26 RX ADMIN — SODIUM CHLORIDE 3 MILLILITER(S): 9 INJECTION INTRAMUSCULAR; INTRAVENOUS; SUBCUTANEOUS at 21:30

## 2018-09-26 RX ADMIN — Medication 650 MILLIGRAM(S): at 19:54

## 2018-09-26 RX ADMIN — CARVEDILOL PHOSPHATE 6.25 MILLIGRAM(S): 80 CAPSULE, EXTENDED RELEASE ORAL at 17:07

## 2018-09-26 NOTE — PROGRESS NOTE ADULT - SUBJECTIVE AND OBJECTIVE BOX
Patient is a 68y old  Female who presents with a chief complaint of   s/p BIV ICD implantation.  c/o severe wound site pain partially relieved with IV Tylenol.  Had one N/V last night.  Tolerated breakfast.  Denies SOB/CP.   PAST MEDICAL & SURGICAL HISTORY:  LBBB (left bundle branch block)  Chronic systolic CHF (congestive heart failure)  CARLOTA (obstructive sleep apnea)  Heart failure, systolic  HTN (hypertension)  COPD (chronic obstructive pulmonary disease)  CAD (coronary artery disease)  No significant past surgical history  S/P primary angioplasty with coronary stent      MEDICATIONS  (STANDING):  aspirin enteric coated 81 milliGRAM(s) Oral daily  buDESOnide 160 MICROgram(s)/formoterol 4.5 MICROgram(s) Inhaler 2 Puff(s) Inhalation two times a day  carvedilol 6.25 milliGRAM(s) Oral every 12 hours  furosemide    Tablet 20 milliGRAM(s) Oral daily  lisinopril 20 milliGRAM(s) Oral daily  simvastatin 20 milliGRAM(s) Oral at bedtime  sodium chloride 0.9% lock flush 3 milliLiter(s) IV Push every 8 hours  tiotropium 18 MICROgram(s) Capsule 1 Capsule(s) Inhalation daily    MEDICATIONS  (PRN):  ALBUTerol    90 MICROgram(s) HFA Inhaler 2 Puff(s) Inhalation every 6 hours PRN Shortness of Breath and/or Wheezing  ondansetron Injectable 4 milliGRAM(s) IV Push every 4 hours PRN Nausea and/or Vomiting            Vital Signs Last 24 Hrs  T(C): 36.7 (26 Sep 2018 06:21), Max: 37.6 (25 Sep 2018 18:17)  T(F): 98 (26 Sep 2018 06:21), Max: 99.6 (25 Sep 2018 18:17)  HR: 92 (26 Sep 2018 06:21) (90 - 108)  BP: 142/91 (26 Sep 2018 06:21) (142/91 - 159/107)  BP(mean): --  RR: 18 (26 Sep 2018 06:21) (17 - 18)  SpO2: 98% (26 Sep 2018 06:21) (98% - 98%)            INTERPRETATION OF TELEMETRY:  A sensed & BiV pacing at 60 bpm    ECG:  BiV paced         LABS:                        12.1   6.56  )-----------( 177      ( 26 Sep 2018 05:45 )             36.9     09-26    141  |  100  |  17  ----------------------------<  69<L>  3.7   |  23  |  0.92    Ca    8.9      26 Sep 2018 05:45  Mg     1.9     09-26                BNP  RADIOLOGY & ADDITIONAL STUDIES:  Impression: Defibrillator in place. No pneumothorax.      PHYSICAL EXAM:    GENERAL: In no apparent distress, thin looking.   NECK: Supple and normal thyroid.  No JVD or carotid bruit.  Carotid pulse is 2+ bilaterally.  HEART: Regular rate and rhythm; No murmurs, rubs, or gallops. Left side ACW incision site dry intact, mild-mod soft tissue swelling noted with small hematoma  +tender to touch.  PULMONARY: Clear to auscultation and perfusion.  No rales, wheezing, or rhonchi bilaterally.  ABDOMEN: Soft, Nontender, Nondistended; Bowel sounds present  EXTREMITIES:  2+ Peripheral Pulses, No clubbing, cyanosis, or edema  NEUROLOGICAL: Grossly nonfocal

## 2018-09-26 NOTE — PROGRESS NOTE ADULT - ASSESSMENT
68 F with PMH of CAD, COPD, HFrEF, HTN and LBBB admitted for elective BiV ICD implantation yesterday.  s/p BiV ICD.  Post-op patient developed severe wound site pain with mod soft tissue swelling and likely small hematoma.    -Device teaching provided, follow up on Oct 8 at 2:15pm  -pressure dressing applied for 24 hours  -Closely monitor for worsening swelling at ICD site  -Pain management with analgesia  -plan for discharge tomorrow if wound site remains stable and less pain severity

## 2018-09-27 VITALS — DIASTOLIC BLOOD PRESSURE: 82 MMHG | SYSTOLIC BLOOD PRESSURE: 117 MMHG | HEART RATE: 100 BPM

## 2018-09-27 LAB
BUN SERPL-MCNC: 21 MG/DL — SIGNIFICANT CHANGE UP (ref 7–23)
CALCIUM SERPL-MCNC: 8.4 MG/DL — SIGNIFICANT CHANGE UP (ref 8.4–10.5)
CHLORIDE SERPL-SCNC: 99 MMOL/L — SIGNIFICANT CHANGE UP (ref 98–107)
CO2 SERPL-SCNC: 22 MMOL/L — SIGNIFICANT CHANGE UP (ref 22–31)
CREAT SERPL-MCNC: 0.91 MG/DL — SIGNIFICANT CHANGE UP (ref 0.5–1.3)
GLUCOSE SERPL-MCNC: 101 MG/DL — HIGH (ref 70–99)
HCT VFR BLD CALC: 35.7 % — SIGNIFICANT CHANGE UP (ref 34.5–45)
HGB BLD-MCNC: 11.8 G/DL — SIGNIFICANT CHANGE UP (ref 11.5–15.5)
MCHC RBC-ENTMCNC: 30.2 PG — SIGNIFICANT CHANGE UP (ref 27–34)
MCHC RBC-ENTMCNC: 33.1 % — SIGNIFICANT CHANGE UP (ref 32–36)
MCV RBC AUTO: 91.3 FL — SIGNIFICANT CHANGE UP (ref 80–100)
NRBC # FLD: 0 — SIGNIFICANT CHANGE UP
PLATELET # BLD AUTO: 138 K/UL — LOW (ref 150–400)
PMV BLD: 9.4 FL — SIGNIFICANT CHANGE UP (ref 7–13)
POTASSIUM SERPL-MCNC: 3.8 MMOL/L — SIGNIFICANT CHANGE UP (ref 3.5–5.3)
POTASSIUM SERPL-SCNC: 3.8 MMOL/L — SIGNIFICANT CHANGE UP (ref 3.5–5.3)
RBC # BLD: 3.91 M/UL — SIGNIFICANT CHANGE UP (ref 3.8–5.2)
RBC # FLD: 13.2 % — SIGNIFICANT CHANGE UP (ref 10.3–14.5)
SODIUM SERPL-SCNC: 137 MMOL/L — SIGNIFICANT CHANGE UP (ref 135–145)
WBC # BLD: 5.56 K/UL — SIGNIFICANT CHANGE UP (ref 3.8–10.5)
WBC # FLD AUTO: 5.56 K/UL — SIGNIFICANT CHANGE UP (ref 3.8–10.5)

## 2018-09-27 RX ORDER — CARVEDILOL PHOSPHATE 80 MG/1
1 CAPSULE, EXTENDED RELEASE ORAL
Qty: 0 | Refills: 0 | COMMUNITY
Start: 2018-09-27

## 2018-09-27 RX ORDER — LISINOPRIL 2.5 MG/1
1 TABLET ORAL
Qty: 0 | Refills: 0 | DISCHARGE
Start: 2018-09-27

## 2018-09-27 RX ORDER — FUROSEMIDE 40 MG
1 TABLET ORAL
Qty: 0 | Refills: 0 | COMMUNITY

## 2018-09-27 RX ORDER — ACETAMINOPHEN 500 MG
2 TABLET ORAL
Qty: 0 | Refills: 0 | DISCHARGE
Start: 2018-09-27

## 2018-09-27 RX ORDER — CARVEDILOL PHOSPHATE 80 MG/1
1 CAPSULE, EXTENDED RELEASE ORAL
Qty: 0 | Refills: 0 | DISCHARGE
Start: 2018-09-27

## 2018-09-27 RX ORDER — POTASSIUM CHLORIDE 20 MEQ
40 PACKET (EA) ORAL ONCE
Qty: 0 | Refills: 0 | Status: COMPLETED | OUTPATIENT
Start: 2018-09-27 | End: 2018-09-27

## 2018-09-27 RX ORDER — FUROSEMIDE 40 MG
1 TABLET ORAL
Qty: 0 | Refills: 0 | DISCHARGE
Start: 2018-09-27

## 2018-09-27 RX ORDER — SIMVASTATIN 20 MG/1
1 TABLET, FILM COATED ORAL
Qty: 0 | Refills: 0 | COMMUNITY
Start: 2018-09-27

## 2018-09-27 RX ORDER — ASPIRIN/CALCIUM CARB/MAGNESIUM 324 MG
1 TABLET ORAL
Qty: 0 | Refills: 0 | DISCHARGE
Start: 2018-09-27

## 2018-09-27 RX ADMIN — LISINOPRIL 20 MILLIGRAM(S): 2.5 TABLET ORAL at 05:34

## 2018-09-27 RX ADMIN — Medication 20 MILLIGRAM(S): at 05:34

## 2018-09-27 RX ADMIN — SODIUM CHLORIDE 3 MILLILITER(S): 9 INJECTION INTRAMUSCULAR; INTRAVENOUS; SUBCUTANEOUS at 05:33

## 2018-09-27 RX ADMIN — Medication 650 MILLIGRAM(S): at 05:34

## 2018-09-27 RX ADMIN — TIOTROPIUM BROMIDE 1 CAPSULE(S): 18 CAPSULE ORAL; RESPIRATORY (INHALATION) at 08:30

## 2018-09-27 RX ADMIN — Medication 40 MILLIEQUIVALENT(S): at 11:16

## 2018-09-27 RX ADMIN — Medication 650 MILLIGRAM(S): at 06:04

## 2018-09-27 RX ADMIN — Medication 650 MILLIGRAM(S): at 11:16

## 2018-09-27 RX ADMIN — BUDESONIDE AND FORMOTEROL FUMARATE DIHYDRATE 2 PUFF(S): 160; 4.5 AEROSOL RESPIRATORY (INHALATION) at 08:30

## 2018-09-27 RX ADMIN — Medication 81 MILLIGRAM(S): at 11:16

## 2018-09-27 RX ADMIN — SODIUM CHLORIDE 3 MILLILITER(S): 9 INJECTION INTRAMUSCULAR; INTRAVENOUS; SUBCUTANEOUS at 11:16

## 2018-09-27 RX ADMIN — CARVEDILOL PHOSPHATE 6.25 MILLIGRAM(S): 80 CAPSULE, EXTENDED RELEASE ORAL at 17:22

## 2018-09-27 RX ADMIN — CARVEDILOL PHOSPHATE 6.25 MILLIGRAM(S): 80 CAPSULE, EXTENDED RELEASE ORAL at 05:34

## 2018-09-27 RX ADMIN — Medication 650 MILLIGRAM(S): at 11:50

## 2018-09-27 NOTE — DISCHARGE NOTE ADULT - MEDICATION SUMMARY - MEDICATIONS TO TAKE
I will START or STAY ON the medications listed below when I get home from the hospital:    acetaminophen 325 mg oral tablet  -- 2 tab(s) by mouth every 6 hours, As needed, Moderate Pain (4 - 6)  -- Indication: For pain    aspirin 81 mg oral delayed release tablet  -- 1 tab(s) by mouth once a day  -- Indication: For Cad    lisinopril 20 mg oral tablet  -- 1 tab(s) by mouth once a day  -- Indication: For Cad    simvastatin 20 mg oral tablet  -- 1 tab(s) by mouth once a day (at bedtime)  -- Indication: For Cad    carvedilol 6.25 mg oral tablet  -- 1 tab(s) by mouth every 12 hours  -- Indication: For Chf    tiotropium 18 mcg inhalation capsule  -- 1 cap(s) inhaled once a day  -- Indication: For Copd    albuterol 2.5 mg/3 mL (0.083%) inhalation solution  -- 3 milliliter(s) inhaled every 6 hours, As Needed  -- Indication: For Copd    budesonide-formoterol 160 mcg-4.5 mcg/inh inhalation aerosol  -- 160 microgram(s) inhaled 2 times a day  -- Indication: For Copd    ProAir HFA 90 mcg/inh inhalation aerosol  -- 2 puff(s) inhaled 4 times a day, As Needed  -- Indication: For Copd    furosemide 20 mg oral tablet  -- 1 tab(s) by mouth once a day  -- Indication: For Chf

## 2018-09-27 NOTE — DISCHARGE NOTE ADULT - SECONDARY DIAGNOSIS.
HTN (hypertension) COPD (chronic obstructive pulmonary disease) Chronic systolic CHF (congestive heart failure) CAD (coronary artery disease)

## 2018-09-27 NOTE — DISCHARGE NOTE ADULT - PLAN OF CARE
instructions as per EP follow up with EP ______________________________________ To maintain a normal blood pressure to prevent heart attack, stroke and renal failure. Low sodium and fat diet, continue anti-hypertensive medications, and follow up with primary care physician. continue all respiratory medication To relieve and prevent worsening symptoms associated with congestive heart failure, to improve quality of life, and to treat underlying conditions such as coronary heart disease, high blood pressure, or diabetes, and to maintain euvolemia. Low salt diet, fluid restriction to 1500 ml daily, monitor your fluid intake and weight daily, exercise as tolerated 30 minutes daily, and follow up with your physician within 1 to 2 weeks. continue all medication follow up with EP October 8 at 2:15 pm

## 2018-09-27 NOTE — DISCHARGE NOTE ADULT - CARE PLAN
Principal Discharge DX:	Biventricular ICD (implantable cardioverter-defibrillator) in place  Goal:	instructions as per EP  Assessment and plan of treatment:	follow up with EP ______________________________________  Secondary Diagnosis:	HTN (hypertension)  Goal:	To maintain a normal blood pressure to prevent heart attack, stroke and renal failure.  Assessment and plan of treatment:	Low sodium and fat diet, continue anti-hypertensive medications, and follow up with primary care physician.  Secondary Diagnosis:	COPD (chronic obstructive pulmonary disease)  Assessment and plan of treatment:	continue all respiratory medication  Secondary Diagnosis:	Chronic systolic CHF (congestive heart failure)  Goal:	To relieve and prevent worsening symptoms associated with congestive heart failure, to improve quality of life, and to treat underlying conditions such as coronary heart disease, high blood pressure, or diabetes, and to maintain euvolemia.  Assessment and plan of treatment:	Low salt diet, fluid restriction to 1500 ml daily, monitor your fluid intake and weight daily, exercise as tolerated 30 minutes daily, and follow up with your physician within 1 to 2 weeks.  Secondary Diagnosis:	CAD (coronary artery disease)  Assessment and plan of treatment:	continue all medication Principal Discharge DX:	Biventricular ICD (implantable cardioverter-defibrillator) in place  Goal:	instructions as per EP  Assessment and plan of treatment:	follow up with EP October 8 at 2:15 pm  Secondary Diagnosis:	HTN (hypertension)  Goal:	To maintain a normal blood pressure to prevent heart attack, stroke and renal failure.  Assessment and plan of treatment:	Low sodium and fat diet, continue anti-hypertensive medications, and follow up with primary care physician.  Secondary Diagnosis:	COPD (chronic obstructive pulmonary disease)  Assessment and plan of treatment:	continue all respiratory medication  Secondary Diagnosis:	Chronic systolic CHF (congestive heart failure)  Goal:	To relieve and prevent worsening symptoms associated with congestive heart failure, to improve quality of life, and to treat underlying conditions such as coronary heart disease, high blood pressure, or diabetes, and to maintain euvolemia.  Assessment and plan of treatment:	Low salt diet, fluid restriction to 1500 ml daily, monitor your fluid intake and weight daily, exercise as tolerated 30 minutes daily, and follow up with your physician within 1 to 2 weeks.  Secondary Diagnosis:	CAD (coronary artery disease)  Assessment and plan of treatment:	continue all medication

## 2018-09-27 NOTE — CHART NOTE - NSCHARTNOTEFT_GEN_A_CORE
Ms. Jalloh is a pleasant 68 F with PMH of CAD, COPD, HFrEF, HTN and LBBB underwent a successful BiV ICD. Post procedure, reported severe wound site pain along left infraclavicular pocket fossa with small hematoma. Pressure dressing was applied by the EP team and monitored closely.   This morn she appears well, reports some pain but much better since yesterday.   - Advised continue extra strength Tylenol.   - Device teaching reinforced.   -follow up on Oct 8 at 2:15pm   May d/c home today.     Thank you.     Kristie Cast,FNP-C  [id number]

## 2018-09-27 NOTE — DISCHARGE NOTE ADULT - PATIENT PORTAL LINK FT
You can access the [facility], offered by [employer], by registering with the following website: [email or web address]

## 2018-09-27 NOTE — DISCHARGE NOTE ADULT - CARE PROVIDER_API CALL
Jone Johnson), Cardiac Electrophysiology; Cardiology; Internal Medicine  [address] [place] [address]  Phone: [phone]  Fax: [phone]

## 2018-09-27 NOTE — DISCHARGE NOTE ADULT - HOSPITAL COURSE
68 year old female with HTN, hyperlipidemia, COPD, LBBB, chronic systolic CHF EF 22% with stable DOMÍNGUEZ with numerous hospitalizations for acute on chronic CHF exacerbations who presents for BiV ICD implant. 68 year old female with HTN, hyperlipidemia, COPD, LBBB, chronic systolic CHF EF 22% with stable DOMÍNGUEZ with numerous hospitalizations for acute on chronic CHF exacerbations who presents for BiV ICD implant.  Now stable for discharge home on 9/27/18 . Follow up scheduled for 10/8/18 at 2:15 pm 68 year old female with HTN, hyperlipidemia, COPD, LBBB, chronic systolic CHF EF 22% with stable DOMÍNGUEZ with numerous hospitalizations for acute on chronic CHF exacerbations who presents for BiV ICD implant.  Now stable for discharge home on 9/27/18 as discussed with EP . Follow up scheduled for 10/8/18 at 2:15 pm

## 2018-10-08 ENCOUNTER — APPOINTMENT (OUTPATIENT)
Dept: ELECTROPHYSIOLOGY | Facility: CLINIC | Age: 68
End: 2018-10-08
Payer: MEDICARE

## 2018-10-08 PROCEDURE — 99024 POSTOP FOLLOW-UP VISIT: CPT

## 2018-10-26 ENCOUNTER — RX RENEWAL (OUTPATIENT)
Age: 68
End: 2018-10-26

## 2018-11-08 ENCOUNTER — APPOINTMENT (OUTPATIENT)
Dept: PULMONOLOGY | Facility: CLINIC | Age: 68
End: 2018-11-08
Payer: MEDICARE

## 2018-11-08 ENCOUNTER — APPOINTMENT (OUTPATIENT)
Dept: FAMILY MEDICINE | Facility: CLINIC | Age: 68
End: 2018-11-08
Payer: MEDICARE

## 2018-11-08 VITALS
DIASTOLIC BLOOD PRESSURE: 95 MMHG | HEART RATE: 94 BPM | WEIGHT: 83 LBS | HEIGHT: 62 IN | BODY MASS INDEX: 15.27 KG/M2 | SYSTOLIC BLOOD PRESSURE: 156 MMHG | OXYGEN SATURATION: 97 %

## 2018-11-08 PROCEDURE — G0008: CPT

## 2018-11-08 PROCEDURE — 94010 BREATHING CAPACITY TEST: CPT

## 2018-11-08 PROCEDURE — 99214 OFFICE O/P EST MOD 30 MIN: CPT | Mod: 25

## 2018-11-08 PROCEDURE — 90688 IIV4 VACCINE SPLT 0.5 ML IM: CPT

## 2018-11-08 PROCEDURE — 99213 OFFICE O/P EST LOW 20 MIN: CPT | Mod: 25

## 2018-11-08 PROCEDURE — 94729 DIFFUSING CAPACITY: CPT

## 2018-11-08 NOTE — PHYSICAL EXAM
[No Acute Distress] : no acute distress [Cachectic] : cachexia was observed [No Respiratory Distress] : no respiratory distress  [Clear to Auscultation] : lungs were clear to auscultation bilaterally [Normal Rate] : normal rate  [Regular Rhythm] : with a regular rhythm [Normal S1, S2] : normal S1 and S2 [No Murmur] : no murmur heard [Normal Gait] : normal gait [Normal Affect] : the affect was normal [Normal Insight/Judgement] : insight and judgment were intact

## 2018-11-08 NOTE — HISTORY OF PRESENT ILLNESS
[FreeTextEntry1] : chronic conditions, flu vaccine [de-identified] : 69 yo F w/ CAD, CHF, COPD presents for follow up of chronic conditions and flu vaccine. She recently had ICD placed. She is doing well. She still gets DOMÍNGUEZ but she recovers a lot quicker and can do more during the day. She is trying to gain weight. She does eat breakfast and dinner. She does not always eat lunch. She does munch on carrots, celery sticks, nuts. \par \par She was given mammo slip few months ago, she has not had a chance to go. Since ICD placement is very recent, would like to defer until she is healed up. She has not had pap smear in 4 years, has not been keeping up since she is not sexually active anymore. She denies any abnormal bleeding/vaginal spotting. \par \par BP has been high today. She reports compliance with meds but does not always take at the same time. She does wake up later in the day, around 10 majority of the time. She has 2 BP machines at home. She reports BP always high in the morning but in afternoon, it goes down to 115/80. She also reports she was quite upset this morning due to the news of a mass shooting and politics.

## 2018-11-08 NOTE — ASSESSMENT
[FreeTextEntry1] : Defer mammogram until next appointment.\par Pap smear to be done next appointment.\par

## 2018-11-20 ENCOUNTER — RX RENEWAL (OUTPATIENT)
Age: 68
End: 2018-11-20

## 2018-12-17 ENCOUNTER — APPOINTMENT (OUTPATIENT)
Dept: ELECTROPHYSIOLOGY | Facility: CLINIC | Age: 68
End: 2018-12-17
Payer: MEDICARE

## 2018-12-17 ENCOUNTER — NON-APPOINTMENT (OUTPATIENT)
Age: 68
End: 2018-12-17

## 2018-12-17 ENCOUNTER — APPOINTMENT (OUTPATIENT)
Dept: CARDIOLOGY | Facility: CLINIC | Age: 68
End: 2018-12-17
Payer: MEDICARE

## 2018-12-17 VITALS
OXYGEN SATURATION: 95 % | SYSTOLIC BLOOD PRESSURE: 152 MMHG | HEART RATE: 97 BPM | HEIGHT: 62 IN | BODY MASS INDEX: 14.91 KG/M2 | WEIGHT: 81 LBS | DIASTOLIC BLOOD PRESSURE: 113 MMHG

## 2018-12-17 VITALS — DIASTOLIC BLOOD PRESSURE: 106 MMHG | SYSTOLIC BLOOD PRESSURE: 157 MMHG

## 2018-12-17 PROCEDURE — 93284 PRGRMG EVAL IMPLANTABLE DFB: CPT

## 2018-12-17 PROCEDURE — 99214 OFFICE O/P EST MOD 30 MIN: CPT

## 2018-12-17 PROCEDURE — 93000 ELECTROCARDIOGRAM COMPLETE: CPT

## 2018-12-17 PROCEDURE — 93290 INTERROG DEV EVAL ICPMS IP: CPT | Mod: 26

## 2018-12-17 NOTE — PHYSICAL EXAM
[General Appearance - Well Developed] : well developed [Normal Appearance] : normal appearance [Well Groomed] : well groomed [General Appearance - Well Nourished] : well nourished [No Deformities] : no deformities [General Appearance - In No Acute Distress] : no acute distress [Normal Conjunctiva] : the conjunctiva exhibited no abnormalities [Eyelids - No Xanthelasma] : the eyelids demonstrated no xanthelasmas [Normal Oral Mucosa] : normal oral mucosa [No Oral Pallor] : no oral pallor [No Oral Cyanosis] : no oral cyanosis [Normal Jugular Venous A Waves Present] : normal jugular venous A waves present [Normal Jugular Venous V Waves Present] : normal jugular venous V waves present [No Jugular Venous Lovett A Waves] : no jugular venous lovett A waves [Respiration, Rhythm And Depth] : normal respiratory rhythm and effort [Exaggerated Use Of Accessory Muscles For Inspiration] : no accessory muscle use [Auscultation Breath Sounds / Voice Sounds] : lungs were clear to auscultation bilaterally [Heart Rate And Rhythm] : heart rate and rhythm were normal [Heart Sounds] : normal S1 and S2 [Edema] : no peripheral edema present [FreeTextEntry1] : II/VI SM [Abdomen Soft] : soft [Abdomen Tenderness] : non-tender [Abdomen Mass (___ Cm)] : no abdominal mass palpated [Abnormal Walk] : normal gait [Gait - Sufficient For Exercise Testing] : the gait was sufficient for exercise testing [Nail Clubbing] : no clubbing of the fingernails [Cyanosis, Localized] : no localized cyanosis [Petechial Hemorrhages (___cm)] : no petechial hemorrhages [Skin Color & Pigmentation] : normal skin color and pigmentation [] : no rash [No Venous Stasis] : no venous stasis [Skin Lesions] : no skin lesions [No Skin Ulcers] : no skin ulcer [No Xanthoma] : no  xanthoma was observed [Oriented To Time, Place, And Person] : oriented to person, place, and time [Affect] : the affect was normal [Mood] : the mood was normal [No Anxiety] : not feeling anxious

## 2018-12-17 NOTE — DISCUSSION/SUMMARY
[FreeTextEntry1] : 68 year old woman with NICM HTN HLD here for followup. Doing well. ICD\par \par 1. Chronic systolic heart failure- Patient euvolemic on Lasix as needed. On Lisinopril 20 mg daily, Coreg 6.25 mg BID. SP ICD and doing well. Condition is stable. Continue present meds\par 2. HTN- BP stable on above regimen. Continue present meds. \par 3. HLD- On Statin therapy. Condition is stable. Continue present meds\par 4. FU in 3 months

## 2018-12-17 NOTE — HISTORY OF PRESENT ILLNESS
[FreeTextEntry1] : Here for followup. Had ICD checked this week. No other complaints.\par 1. Chronic systolic heart failure- Patient euvolemic on Lasix as needed. On Lisinopril 20 mg daily, Coreg 6.25 mg BID. SP ICD and doing well. Condition is stable. Continue present meds\par 2. HTN- BP stable on above regimen. Continue present meds. \par 3. HLD- On Statin therapy. Condition is stable. Continue present meds

## 2019-01-07 ENCOUNTER — RX RENEWAL (OUTPATIENT)
Age: 69
End: 2019-01-07

## 2019-02-19 ENCOUNTER — APPOINTMENT (OUTPATIENT)
Dept: PULMONOLOGY | Facility: CLINIC | Age: 69
End: 2019-02-19
Payer: MEDICARE

## 2019-02-19 ENCOUNTER — APPOINTMENT (OUTPATIENT)
Dept: FAMILY MEDICINE | Facility: CLINIC | Age: 69
End: 2019-02-19
Payer: MEDICARE

## 2019-02-19 VITALS
BODY MASS INDEX: 14.72 KG/M2 | WEIGHT: 80 LBS | HEART RATE: 92 BPM | OXYGEN SATURATION: 97 % | DIASTOLIC BLOOD PRESSURE: 100 MMHG | HEIGHT: 62 IN | SYSTOLIC BLOOD PRESSURE: 158 MMHG

## 2019-02-19 VITALS
HEART RATE: 95 BPM | BODY MASS INDEX: 14.72 KG/M2 | OXYGEN SATURATION: 94 % | WEIGHT: 80 LBS | HEIGHT: 62 IN | DIASTOLIC BLOOD PRESSURE: 120 MMHG | SYSTOLIC BLOOD PRESSURE: 169 MMHG

## 2019-02-19 VITALS — SYSTOLIC BLOOD PRESSURE: 150 MMHG | DIASTOLIC BLOOD PRESSURE: 110 MMHG

## 2019-02-19 PROCEDURE — 94727 GAS DIL/WSHOT DETER LNG VOL: CPT | Mod: 26

## 2019-02-19 PROCEDURE — 94010 BREATHING CAPACITY TEST: CPT | Mod: 26

## 2019-02-19 PROCEDURE — 36415 COLL VENOUS BLD VENIPUNCTURE: CPT

## 2019-02-19 PROCEDURE — 99214 OFFICE O/P EST MOD 30 MIN: CPT | Mod: 25

## 2019-02-19 PROCEDURE — 99213 OFFICE O/P EST LOW 20 MIN: CPT | Mod: 25

## 2019-02-19 PROCEDURE — 94729 DIFFUSING CAPACITY: CPT | Mod: 26

## 2019-02-19 NOTE — ASSESSMENT
[FreeTextEntry1] : The patient was advised to try to use oral appliance to treat sleep apnea. She has uncontrolled high blood pressure.\par If she does not respond we'll consider treating sleep apnea with CPAP.

## 2019-02-19 NOTE — HISTORY OF PRESENT ILLNESS
[FreeTextEntry1] : f/u HTN, HLD [de-identified] : 68 yo F w/ CHF s/p  AICD, COPD, sleep apnea. She is doing well after ICD placement. Her BP meds were recently changed by her cardiologist, carvedilol was increased to 12.5mg twice daily. She has not been taking her lasix because she does not have any swelling in her legs. She takes daily weights, does not notice large fluctuations in weight. She does note that her BP gets better in the afternoons and evening. She hasn't been sleeping well these days, she wakes up 3-4AM, cannot fall back asleep until 8. She states that in the past, when she had high BP, after taking lasix, she felt better. Sleep was better too. She was on a daily regimen previously, but now on every other day regimen. \par \par She currently denies any headaches, visual changes, CP/SOB.

## 2019-02-19 NOTE — REVIEW OF SYSTEMS
[Dyspnea] : dyspnea [Difficulty Maintaining Sleep] : difficulty maintaining sleep [Negative] : Sleep Disorder [Difficulty Initiating Sleep] : no difficulty falling asleep

## 2019-02-19 NOTE — PHYSICAL EXAM
[No Acute Distress] : no acute distress [Cachectic] : cachexia was observed [No Respiratory Distress] : no respiratory distress  [Clear to Auscultation] : lungs were clear to auscultation bilaterally [Normal Rate] : normal rate  [Regular Rhythm] : with a regular rhythm [Normal S1, S2] : normal S1 and S2 [No Murmur] : no murmur heard [No Edema] : there was no peripheral edema [Normal Gait] : normal gait [Normal Affect] : the affect was normal [Normal Insight/Judgement] : insight and judgment were intact

## 2019-02-19 NOTE — PROCEDURE
[FreeTextEntry1] : Pulmonary function testing reveals a combination of severe obstructive and restrictive ventilatory impairment with marked reduction in DLCO.

## 2019-02-19 NOTE — HISTORY OF PRESENT ILLNESS
[FreeTextEntry1] : Patient with h/o severe COPD and sleep apnea  is here for a follow up.  She has mild CARLOTA and has not been treated.  \par Her BP is consistently high probably due to CARLOTA. She says her BP improves as the day goes.\par She has systolic dysfunction ad ICD placed.\par She has trouble staying asleep. She does not feel rested in AM.

## 2019-03-18 ENCOUNTER — APPOINTMENT (OUTPATIENT)
Dept: ELECTROPHYSIOLOGY | Facility: CLINIC | Age: 69
End: 2019-03-18
Payer: MEDICARE

## 2019-03-18 PROCEDURE — 93296 REM INTERROG EVL PM/IDS: CPT

## 2019-03-18 PROCEDURE — 93295 DEV INTERROG REMOTE 1/2/MLT: CPT

## 2019-03-26 ENCOUNTER — RX RENEWAL (OUTPATIENT)
Age: 69
End: 2019-03-26

## 2019-04-01 ENCOUNTER — OUTPATIENT (OUTPATIENT)
Dept: OUTPATIENT SERVICES | Facility: HOSPITAL | Age: 69
LOS: 1 days | End: 2019-04-01
Payer: MEDICARE

## 2019-04-01 PROCEDURE — G9001: CPT

## 2019-04-18 ENCOUNTER — APPOINTMENT (OUTPATIENT)
Dept: PULMONOLOGY | Facility: CLINIC | Age: 69
End: 2019-04-18
Payer: MEDICARE

## 2019-04-18 VITALS
OXYGEN SATURATION: 95 % | HEART RATE: 94 BPM | BODY MASS INDEX: 14.72 KG/M2 | HEIGHT: 62 IN | SYSTOLIC BLOOD PRESSURE: 167 MMHG | WEIGHT: 80 LBS | DIASTOLIC BLOOD PRESSURE: 109 MMHG

## 2019-04-18 PROCEDURE — 99214 OFFICE O/P EST MOD 30 MIN: CPT | Mod: 25

## 2019-04-18 PROCEDURE — 94010 BREATHING CAPACITY TEST: CPT | Mod: 26

## 2019-04-18 NOTE — HISTORY OF PRESENT ILLNESS
[FreeTextEntry1] : The patient is a 69-year-old lady with history of severe COPD and sleep apnea here for followup. The patient has insomnia in addition to sleep apnea. The patient has been using oral appliance as she cannot tolerate CPAP. She continues to have insomnia. She has been smoking marijuana to help her sleep better.\par The patient has been taking the listed inhalers. Nebulized albuterol helps her  breath better. The patient has no cough. She has dyspnea which is stable.\par The patient has poorly controlled blood pressure for the last several visits. This might be in part related to untreated sleep apnea. The patient has maintained her weight. She is active. She takes care of her elderly mother.

## 2019-04-18 NOTE — PROCEDURE
[FreeTextEntry1] : Pulmonary function testing done revealed severe obstructive ventilatory impairment.

## 2019-04-18 NOTE — REVIEW OF SYSTEMS
[Difficulty Initiating Sleep] : difficulty falling asleep [Dyspnea] : dyspnea [Difficulty Maintaining Sleep] : difficulty maintaining sleep [Negative] : Sleep Disorder

## 2019-04-18 NOTE — ASSESSMENT
[FreeTextEntry1] : The patient has sleep apnea and severe COPD. The sleep apnea is treated with an oral appliance. The patient has uncontrolled high blood pressure. We'll start treating her with hydralazine. We'll continue the current inhalers for COPD.

## 2019-04-18 NOTE — PHYSICAL EXAM
[Normal Conjunctiva] : the conjunctiva exhibited no abnormalities [Eyelids - No Xanthelasma] : the eyelids demonstrated no xanthelasmas [Normal Oropharynx] : normal oropharynx [Neck Cervical Mass (___cm)] : no neck mass was observed [Neck Appearance] : the appearance of the neck was normal [Jugular Venous Distention Increased] : there was no jugular-venous distention [Thyroid Diffuse Enlargement] : the thyroid was not enlarged [Thyroid Nodule] : there were no palpable thyroid nodules [Murmurs] : no murmurs present [Heart Rate And Rhythm] : heart rate and rhythm were normal [Heart Sounds] : normal S1 and S2 [Abdomen Tenderness] : non-tender [Abdomen Soft] : soft [Abdomen Mass (___ Cm)] : no abdominal mass palpated [Gait - Sufficient For Exercise Testing] : the gait was sufficient for exercise testing [Abnormal Walk] : normal gait [Cyanosis, Localized] : no localized cyanosis [Nail Clubbing] : no clubbing of the fingernails [Petechial Hemorrhages (___cm)] : no petechial hemorrhages [Skin Color & Pigmentation] : normal skin color and pigmentation [] : no ischemic changes [No Skin Ulcers] : no skin ulcer [No Venous Stasis] : no venous stasis [Skin Lesions] : no skin lesions [Sensation] : the sensory exam was normal to light touch and pinprick [No Xanthoma] : no  xanthoma was observed [Deep Tendon Reflexes (DTR)] : deep tendon reflexes were 2+ and symmetric [Oriented To Time, Place, And Person] : oriented to person, place, and time [No Focal Deficits] : no focal deficits [Affect] : the affect was normal [Impaired Insight] : insight and judgment were intact

## 2019-04-19 LAB
ALBUMIN SERPL ELPH-MCNC: 4.4 G/DL
ALP BLD-CCNC: 63 U/L
ALT SERPL-CCNC: 62 U/L
ANION GAP SERPL CALC-SCNC: 12 MMOL/L
AST SERPL-CCNC: 40 U/L
BILIRUB SERPL-MCNC: 1 MG/DL
BUN SERPL-MCNC: 13 MG/DL
CALCIUM SERPL-MCNC: 9.2 MG/DL
CHLORIDE SERPL-SCNC: 102 MMOL/L
CHOLEST SERPL-MCNC: 146 MG/DL
CHOLEST/HDLC SERPL: 2.4 RATIO
CO2 SERPL-SCNC: 31 MMOL/L
CREAT SERPL-MCNC: 1 MG/DL
GLUCOSE SERPL-MCNC: 72 MG/DL
HBA1C MFR BLD HPLC: 5.9 %
HDLC SERPL-MCNC: 60 MG/DL
LDLC SERPL CALC-MCNC: 64 MG/DL
POTASSIUM SERPL-SCNC: 4.1 MMOL/L
PROT SERPL-MCNC: 6.4 G/DL
SODIUM SERPL-SCNC: 145 MMOL/L
TRIGL SERPL-MCNC: 109 MG/DL

## 2019-04-25 ENCOUNTER — INPATIENT (INPATIENT)
Facility: HOSPITAL | Age: 69
LOS: 3 days | Discharge: HOME | End: 2019-04-29
Attending: HOSPITALIST | Admitting: HOSPITALIST
Payer: MEDICARE

## 2019-04-25 VITALS
RESPIRATION RATE: 26 BRPM | TEMPERATURE: 98 F | HEART RATE: 92 BPM | OXYGEN SATURATION: 100 % | DIASTOLIC BLOOD PRESSURE: 115 MMHG | SYSTOLIC BLOOD PRESSURE: 171 MMHG

## 2019-04-25 DIAGNOSIS — J44.1 CHRONIC OBSTRUCTIVE PULMONARY DISEASE WITH (ACUTE) EXACERBATION: ICD-10-CM

## 2019-04-25 LAB
ALBUMIN SERPL ELPH-MCNC: 4.1 G/DL — SIGNIFICANT CHANGE UP (ref 3.3–5)
ALP SERPL-CCNC: 61 U/L — SIGNIFICANT CHANGE UP (ref 40–120)
ALT FLD-CCNC: 43 U/L — HIGH (ref 4–33)
ANION GAP SERPL CALC-SCNC: 13 MMO/L — SIGNIFICANT CHANGE UP (ref 7–14)
APTT BLD: 30.6 SEC — SIGNIFICANT CHANGE UP (ref 27.5–36.3)
AST SERPL-CCNC: 45 U/L — HIGH (ref 4–32)
B PERT DNA SPEC QL NAA+PROBE: NOT DETECTED — SIGNIFICANT CHANGE UP
BASE EXCESS BLDV CALC-SCNC: 6.5 MMOL/L — SIGNIFICANT CHANGE UP
BASOPHILS # BLD AUTO: 0.03 K/UL — SIGNIFICANT CHANGE UP (ref 0–0.2)
BASOPHILS NFR BLD AUTO: 0.6 % — SIGNIFICANT CHANGE UP (ref 0–2)
BILIRUB SERPL-MCNC: 1 MG/DL — SIGNIFICANT CHANGE UP (ref 0.2–1.2)
BLOOD GAS VENOUS - CREATININE: 1.2 MG/DL — SIGNIFICANT CHANGE UP (ref 0.5–1.3)
BUN SERPL-MCNC: 17 MG/DL — SIGNIFICANT CHANGE UP (ref 7–23)
C PNEUM DNA SPEC QL NAA+PROBE: NOT DETECTED — SIGNIFICANT CHANGE UP
CALCIUM SERPL-MCNC: 9.6 MG/DL — SIGNIFICANT CHANGE UP (ref 8.4–10.5)
CHLORIDE BLDV-SCNC: 107 MMOL/L — SIGNIFICANT CHANGE UP (ref 96–108)
CHLORIDE SERPL-SCNC: 104 MMOL/L — SIGNIFICANT CHANGE UP (ref 98–107)
CO2 SERPL-SCNC: 25 MMOL/L — SIGNIFICANT CHANGE UP (ref 22–31)
CREAT SERPL-MCNC: 1.33 MG/DL — HIGH (ref 0.5–1.3)
EOSINOPHIL # BLD AUTO: 0.07 K/UL — SIGNIFICANT CHANGE UP (ref 0–0.5)
EOSINOPHIL NFR BLD AUTO: 1.3 % — SIGNIFICANT CHANGE UP (ref 0–6)
FLUAV H1 2009 PAND RNA SPEC QL NAA+PROBE: NOT DETECTED — SIGNIFICANT CHANGE UP
FLUAV H1 RNA SPEC QL NAA+PROBE: NOT DETECTED — SIGNIFICANT CHANGE UP
FLUAV H3 RNA SPEC QL NAA+PROBE: NOT DETECTED — SIGNIFICANT CHANGE UP
FLUAV SUBTYP SPEC NAA+PROBE: NOT DETECTED — SIGNIFICANT CHANGE UP
FLUBV RNA SPEC QL NAA+PROBE: NOT DETECTED — SIGNIFICANT CHANGE UP
GAS PNL BLDV: 140 MMOL/L — SIGNIFICANT CHANGE UP (ref 136–146)
GLUCOSE BLDV-MCNC: 84 — SIGNIFICANT CHANGE UP (ref 70–99)
GLUCOSE SERPL-MCNC: 88 MG/DL — SIGNIFICANT CHANGE UP (ref 70–99)
HADV DNA SPEC QL NAA+PROBE: NOT DETECTED — SIGNIFICANT CHANGE UP
HCO3 BLDV-SCNC: 28 MMOL/L — HIGH (ref 20–27)
HCOV PNL SPEC NAA+PROBE: SIGNIFICANT CHANGE UP
HCT VFR BLD CALC: 43.9 % — SIGNIFICANT CHANGE UP (ref 34.5–45)
HCT VFR BLDV CALC: 44.1 % — SIGNIFICANT CHANGE UP (ref 34.5–45)
HGB BLD-MCNC: 14.1 G/DL — SIGNIFICANT CHANGE UP (ref 11.5–15.5)
HGB BLDV-MCNC: 14.4 G/DL — SIGNIFICANT CHANGE UP (ref 11.5–15.5)
HMPV RNA SPEC QL NAA+PROBE: NOT DETECTED — SIGNIFICANT CHANGE UP
HPIV1 RNA SPEC QL NAA+PROBE: NOT DETECTED — SIGNIFICANT CHANGE UP
HPIV2 RNA SPEC QL NAA+PROBE: NOT DETECTED — SIGNIFICANT CHANGE UP
HPIV3 RNA SPEC QL NAA+PROBE: NOT DETECTED — SIGNIFICANT CHANGE UP
HPIV4 RNA SPEC QL NAA+PROBE: NOT DETECTED — SIGNIFICANT CHANGE UP
IMM GRANULOCYTES NFR BLD AUTO: 0.2 % — SIGNIFICANT CHANGE UP (ref 0–1.5)
INR BLD: 0.99 — SIGNIFICANT CHANGE UP (ref 0.88–1.17)
LACTATE BLDV-MCNC: 1.5 MMOL/L — SIGNIFICANT CHANGE UP (ref 0.5–2)
LYMPHOCYTES # BLD AUTO: 2.41 K/UL — SIGNIFICANT CHANGE UP (ref 1–3.3)
LYMPHOCYTES # BLD AUTO: 44.8 % — HIGH (ref 13–44)
MCHC RBC-ENTMCNC: 30.2 PG — SIGNIFICANT CHANGE UP (ref 27–34)
MCHC RBC-ENTMCNC: 32.1 % — SIGNIFICANT CHANGE UP (ref 32–36)
MCV RBC AUTO: 94 FL — SIGNIFICANT CHANGE UP (ref 80–100)
MONOCYTES # BLD AUTO: 0.37 K/UL — SIGNIFICANT CHANGE UP (ref 0–0.9)
MONOCYTES NFR BLD AUTO: 6.9 % — SIGNIFICANT CHANGE UP (ref 2–14)
NEUTROPHILS # BLD AUTO: 2.49 K/UL — SIGNIFICANT CHANGE UP (ref 1.8–7.4)
NEUTROPHILS NFR BLD AUTO: 46.2 % — SIGNIFICANT CHANGE UP (ref 43–77)
NRBC # FLD: 0 K/UL — SIGNIFICANT CHANGE UP (ref 0–0)
PCO2 BLDV: 64 MMHG — HIGH (ref 41–51)
PH BLDV: 7.32 PH — SIGNIFICANT CHANGE UP (ref 7.32–7.43)
PLATELET # BLD AUTO: 188 K/UL — SIGNIFICANT CHANGE UP (ref 150–400)
PMV BLD: 9.7 FL — SIGNIFICANT CHANGE UP (ref 7–13)
PO2 BLDV: 44 MMHG — HIGH (ref 35–40)
POTASSIUM BLDV-SCNC: 4.3 MMOL/L — SIGNIFICANT CHANGE UP (ref 3.4–4.5)
POTASSIUM SERPL-MCNC: 4.8 MMOL/L — SIGNIFICANT CHANGE UP (ref 3.5–5.3)
POTASSIUM SERPL-SCNC: 4.8 MMOL/L — SIGNIFICANT CHANGE UP (ref 3.5–5.3)
PROT SERPL-MCNC: 6.9 G/DL — SIGNIFICANT CHANGE UP (ref 6–8.3)
PROTHROM AB SERPL-ACNC: 11.3 SEC — SIGNIFICANT CHANGE UP (ref 9.8–13.1)
RBC # BLD: 4.67 M/UL — SIGNIFICANT CHANGE UP (ref 3.8–5.2)
RBC # FLD: 13.5 % — SIGNIFICANT CHANGE UP (ref 10.3–14.5)
RSV RNA SPEC QL NAA+PROBE: NOT DETECTED — SIGNIFICANT CHANGE UP
RV+EV RNA SPEC QL NAA+PROBE: NOT DETECTED — SIGNIFICANT CHANGE UP
SAO2 % BLDV: 72.8 % — SIGNIFICANT CHANGE UP (ref 60–85)
SODIUM SERPL-SCNC: 142 MMOL/L — SIGNIFICANT CHANGE UP (ref 135–145)
TROPONIN T, HIGH SENSITIVITY: 13 NG/L — SIGNIFICANT CHANGE UP (ref ?–14)
TROPONIN T, HIGH SENSITIVITY: 14 NG/L — SIGNIFICANT CHANGE UP (ref ?–14)
WBC # BLD: 5.38 K/UL — SIGNIFICANT CHANGE UP (ref 3.8–10.5)
WBC # FLD AUTO: 5.38 K/UL — SIGNIFICANT CHANGE UP (ref 3.8–10.5)

## 2019-04-25 PROCEDURE — 99223 1ST HOSP IP/OBS HIGH 75: CPT

## 2019-04-25 PROCEDURE — 71045 X-RAY EXAM CHEST 1 VIEW: CPT | Mod: 26

## 2019-04-25 RX ORDER — IPRATROPIUM/ALBUTEROL SULFATE 18-103MCG
3 AEROSOL WITH ADAPTER (GRAM) INHALATION ONCE
Qty: 0 | Refills: 0 | Status: COMPLETED | OUTPATIENT
Start: 2019-04-25 | End: 2019-04-25

## 2019-04-25 RX ORDER — AZITHROMYCIN 500 MG/1
500 TABLET, FILM COATED ORAL ONCE
Qty: 0 | Refills: 0 | Status: COMPLETED | OUTPATIENT
Start: 2019-04-25 | End: 2019-04-25

## 2019-04-25 RX ORDER — ASPIRIN/CALCIUM CARB/MAGNESIUM 324 MG
324 TABLET ORAL ONCE
Qty: 0 | Refills: 0 | Status: COMPLETED | OUTPATIENT
Start: 2019-04-25 | End: 2019-04-25

## 2019-04-25 RX ADMIN — Medication 125 MILLIGRAM(S): at 20:15

## 2019-04-25 RX ADMIN — AZITHROMYCIN 250 MILLIGRAM(S): 500 TABLET, FILM COATED ORAL at 21:31

## 2019-04-25 RX ADMIN — Medication 3 MILLILITER(S): at 20:15

## 2019-04-25 RX ADMIN — Medication 3 MILLILITER(S): at 21:30

## 2019-04-25 RX ADMIN — Medication 324 MILLIGRAM(S): at 21:04

## 2019-04-25 NOTE — ED PROVIDER NOTE - ATTENDING CONTRIBUTION TO CARE
Dr. Del Cid: I have personally performed a face to face bedside history and physical examination of this patient. I have discussed the history, examination, review of systems, assessment and plan of management with the resident. I have reviewed the electronic medical record and amended it to reflect my history, review of systems, physical exam, assessment and plan.    Attending Exam - Dr. Del Cid: GEN: well appearing, NAD  HEENT: +PERRL, EOMI  RESP: diminished lung sounds diffusely, no wheezes, no signs of respiratory distress CV: s1s2 RRR ABD: soft/non tender/non distended  MSK: no deformities / swelling, normal range of motion, spine grossly normal NEURO: alert, non focal exam SKIN: normal color / temperature / condition.

## 2019-04-25 NOTE — ED ADULT NURSE REASSESSMENT NOTE - NS ED NURSE REASSESS COMMENT FT1
Pt on 4L NC and denies any discomfort at this time, saturation at 100%. Pt repositioned for comfort. Call bell within reach. Vital signs reassessed as noted. Pt on 4L NC and denies any discomfort at this time, saturation at 100%. Pt repositioned for comfort. Report given to Dayan on 4 North. Pt awaiting transport. Will continue to monitor.

## 2019-04-25 NOTE — ED PROVIDER NOTE - OBJECTIVE STATEMENT
69F hx of CHF, s/p ICD, HTN and COPD presenting with SOB x 2d. Pt has hx of COPD, and depression with weight loss since the death of her , presenting with increased work of breathing. Otherwise, no chest pain, back pain, cough or fever. No nausea, vomiting, no focal deficits. Also denies GI/ sxs. Pt's on Lasix as needed.

## 2019-04-25 NOTE — ED PROVIDER NOTE - NS ED MD EKG INTERPRETATION 2
3/8/2017    INTENSIVIST PROGRESS NOTE:     Patient seen and evaluated. Admitted for ORIF of right ankle fracture. He has hemophilia A and is admitted to the ICU for observation for bleeding. He currently has no complaints.     Visit Vitals    /78    Pulse 96    Temp 97.9 °F (36.6 °C)    Resp 11    Ht 5' 10\" (1.778 m)    Wt 66.6 kg (146 lb 13.2 oz)    SpO2 97%    BMI 21.07 kg/m2     GEN: NAD  CV: RRR  Lungs: CTA B    Labs reviewed    A/P:  - s/p ORIF of right ankle fracture - continue postop care  - Hemophilia A - monitor for bleeding - factor replacement per hematology  - Will assist with disposition planning when appropriate  Javi Cheung MD pacemaker: good capture, regular rhythm and appropriate intervals.

## 2019-04-25 NOTE — ED ADULT NURSE NOTE - OBJECTIVE STATEMENT
Pt received in room 4, AA&OX4. PMH of COPD, not on at home oxygen, HTN, CHF, s/p ICD in LT Chest, skin healed and intact. Pt c/o SOB which started yesterday but worsened this morning.  Pt states SOB worsens on exertion. Pt denies any CP, abdominal pain, back pain, N/V/D, chills, fever. Pt placed on 2L NC and has a saturation of 100%, breathing unlabored at this time. Lung sounds clear bilaterally.  20 G placed in pts RT forearm by MYRON Carpio. labs collected and sent. MD at bedside. Call bell within reach. Will continue to monitor. Pt received in room 4, AA&OX4. PMH of COPD, not on at home oxygen, HTN, CHF, s/p ICD in LT Chest, skin healed and intact. Pt c/o SOB which started yesterday but worsened this morning.  Pt states SOB worsens on exertion. Pt denies any CP, abdominal pain, back pain, N/V/D, chills, fever. Pt placed on 2L NC and has a saturation of 100%, No use of accessory muscles. Pt states feels more comfortable with nasal canula on. Lung sounds clear bilaterally.  20 G placed in pts RT forearm by MYRON Carpio. labs collected and sent. MD at bedside. Call bell within reach. Will continue to monitor. Pt received in room 4, AA&OX4. PMH of COPD, not on at home oxygen, HTN, CHF, s/p ICD in LT Chest, skin healed and intact. Pt c/o SOB which started yesterday but worsened this morning.  Pt states SOB worsens on exertion. Pt denies any CP, abdominal pain, back pain, N/V/D, chills, fever. Pt placed on 2L NC and has a saturation of 100%, No use of accessory muscles. Pt states feels more comfortable with nasal canula on. Lung sounds clear bilaterally.  20 G placed in pts RT forearm by MYRON Carpio. labs collected and sent. Pt placed on cardiac monitor. MD at bedside. Call bell within reach. Will continue to monitor.

## 2019-04-25 NOTE — PATIENT PROFILE ADULT - NSASFALLATTEMPTOOB_GEN_A_NUR
Patient assessed and resting comfortably with no complaints of pain or other needs voiced. Call light within reach. Will continue to monitor.     no

## 2019-04-25 NOTE — ED PROVIDER NOTE - PMH
CAD (coronary artery disease)    Chronic systolic CHF (congestive heart failure)    COPD (chronic obstructive pulmonary disease)    Heart failure, systolic    HTN (hypertension)    LBBB (left bundle branch block)

## 2019-04-25 NOTE — ED PROVIDER NOTE - PROGRESS NOTE DETAILS
Patient walked to bathroom, became acutely short of breath, repeat ECG done and patient switched from paced rhythm to sinus rhythm with RBBB, after resting, returned to original atrial paced rhythm.  Patient given aspirin, troponin repeated, will now admit on telemetry given ECG changes.
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
## 2019-04-25 NOTE — ED PROVIDER NOTE - CARE PLAN
Principal Discharge DX:	COPD exacerbation  Secondary Diagnosis:	SOB (shortness of breath)  Secondary Diagnosis:	EKG abnormalities

## 2019-04-25 NOTE — ED PROVIDER NOTE - CLINICAL SUMMARY MEDICAL DECISION MAKING FREE TEXT BOX
Hx of COPD/CHF presenting with SOB, no sx of fluid overload, but tachypenic. Sating 97% on RA, afebrile - likely COPD exacebation then CHF . will get basics, cardiac labs, CXR, steroid and nebs, reassess.

## 2019-04-25 NOTE — ED PEDIATRIC NURSE REASSESSMENT NOTE - NS ED NURSE REASSESS COMMENT FT2
Break coverage RN: Report rec'd from Primary RN & assumed care of patient.  Pt had episode of SOB while ambulating to bathroom.  SPo2 found to be 67%.  Pt tachypneic with acc muscle use.   Pt responded to 5 LNC well.  CM shows new cardiac rhythm.  EKG completed.  Sinus tach with new bundle branch.  324 asa, ECG, and troponin repeated. will monitor.

## 2019-04-25 NOTE — ED ADULT TRIAGE NOTE - CHIEF COMPLAINT QUOTE
c/o shortness of breath since yesterday. worse today. hx copd, unk abnormal HR with defibrillator. using accessory muscles to breath. denies chest pain.

## 2019-04-26 DIAGNOSIS — J44.9 CHRONIC OBSTRUCTIVE PULMONARY DISEASE, UNSPECIFIED: ICD-10-CM

## 2019-04-26 DIAGNOSIS — R94.31 ABNORMAL ELECTROCARDIOGRAM [ECG] [EKG]: ICD-10-CM

## 2019-04-26 DIAGNOSIS — I50.22 CHRONIC SYSTOLIC (CONGESTIVE) HEART FAILURE: ICD-10-CM

## 2019-04-26 DIAGNOSIS — Z95.810 PRESENCE OF AUTOMATIC (IMPLANTABLE) CARDIAC DEFIBRILLATOR: Chronic | ICD-10-CM

## 2019-04-26 DIAGNOSIS — Z29.9 ENCOUNTER FOR PROPHYLACTIC MEASURES, UNSPECIFIED: ICD-10-CM

## 2019-04-26 DIAGNOSIS — I27.20 PULMONARY HYPERTENSION, UNSPECIFIED: ICD-10-CM

## 2019-04-26 DIAGNOSIS — R06.02 SHORTNESS OF BREATH: ICD-10-CM

## 2019-04-26 DIAGNOSIS — I10 ESSENTIAL (PRIMARY) HYPERTENSION: ICD-10-CM

## 2019-04-26 DIAGNOSIS — R33.9 RETENTION OF URINE, UNSPECIFIED: ICD-10-CM

## 2019-04-26 DIAGNOSIS — J96.01 ACUTE RESPIRATORY FAILURE WITH HYPOXIA: ICD-10-CM

## 2019-04-26 PROBLEM — G47.30 SLEEP APNEA, UNSPECIFIED: Chronic | Status: ACTIVE | Noted: 2019-04-26

## 2019-04-26 LAB
ALBUMIN SERPL ELPH-MCNC: 4 G/DL — SIGNIFICANT CHANGE UP (ref 3.3–5)
ALP SERPL-CCNC: 58 U/L — SIGNIFICANT CHANGE UP (ref 40–120)
ALT FLD-CCNC: 46 U/L — HIGH (ref 4–33)
ANION GAP SERPL CALC-SCNC: 12 MMO/L — SIGNIFICANT CHANGE UP (ref 7–14)
ANION GAP SERPL CALC-SCNC: 17 MMO/L — HIGH (ref 7–14)
APPEARANCE UR: CLEAR — SIGNIFICANT CHANGE UP
AST SERPL-CCNC: 35 U/L — HIGH (ref 4–32)
BACTERIA # UR AUTO: NEGATIVE — SIGNIFICANT CHANGE UP
BASOPHILS # BLD AUTO: 0.01 K/UL — SIGNIFICANT CHANGE UP (ref 0–0.2)
BASOPHILS # BLD AUTO: 0.01 K/UL — SIGNIFICANT CHANGE UP (ref 0–0.2)
BASOPHILS NFR BLD AUTO: 0.3 % — SIGNIFICANT CHANGE UP (ref 0–2)
BASOPHILS NFR BLD AUTO: 0.3 % — SIGNIFICANT CHANGE UP (ref 0–2)
BILIRUB SERPL-MCNC: 1 MG/DL — SIGNIFICANT CHANGE UP (ref 0.2–1.2)
BILIRUB UR-MCNC: NEGATIVE — SIGNIFICANT CHANGE UP
BLOOD UR QL VISUAL: NEGATIVE — SIGNIFICANT CHANGE UP
BUN SERPL-MCNC: 15 MG/DL — SIGNIFICANT CHANGE UP (ref 7–23)
BUN SERPL-MCNC: 15 MG/DL — SIGNIFICANT CHANGE UP (ref 7–23)
CALCIUM SERPL-MCNC: 9 MG/DL — SIGNIFICANT CHANGE UP (ref 8.4–10.5)
CALCIUM SERPL-MCNC: 9.6 MG/DL — SIGNIFICANT CHANGE UP (ref 8.4–10.5)
CHLORIDE SERPL-SCNC: 101 MMOL/L — SIGNIFICANT CHANGE UP (ref 98–107)
CHLORIDE SERPL-SCNC: 104 MMOL/L — SIGNIFICANT CHANGE UP (ref 98–107)
CHOLEST SERPL-MCNC: 144 MG/DL — SIGNIFICANT CHANGE UP (ref 120–199)
CO2 SERPL-SCNC: 22 MMOL/L — SIGNIFICANT CHANGE UP (ref 22–31)
CO2 SERPL-SCNC: 29 MMOL/L — SIGNIFICANT CHANGE UP (ref 22–31)
COLOR SPEC: SIGNIFICANT CHANGE UP
CREAT ?TM UR-MCNC: 82.9 MG/DL — SIGNIFICANT CHANGE UP
CREAT SERPL-MCNC: 0.94 MG/DL — SIGNIFICANT CHANGE UP (ref 0.5–1.3)
CREAT SERPL-MCNC: 1.02 MG/DL — SIGNIFICANT CHANGE UP (ref 0.5–1.3)
D DIMER BLD IA.RAPID-MCNC: 318 NG/ML — SIGNIFICANT CHANGE UP
EOSINOPHIL # BLD AUTO: 0 K/UL — SIGNIFICANT CHANGE UP (ref 0–0.5)
EOSINOPHIL # BLD AUTO: 0 K/UL — SIGNIFICANT CHANGE UP (ref 0–0.5)
EOSINOPHIL NFR BLD AUTO: 0 % — SIGNIFICANT CHANGE UP (ref 0–6)
EOSINOPHIL NFR BLD AUTO: 0 % — SIGNIFICANT CHANGE UP (ref 0–6)
GLUCOSE SERPL-MCNC: 125 MG/DL — HIGH (ref 70–99)
GLUCOSE SERPL-MCNC: 128 MG/DL — HIGH (ref 70–99)
GLUCOSE UR-MCNC: NEGATIVE — SIGNIFICANT CHANGE UP
HBA1C BLD-MCNC: 5.7 % — HIGH (ref 4–5.6)
HCT VFR BLD CALC: 42.1 % — SIGNIFICANT CHANGE UP (ref 34.5–45)
HCT VFR BLD CALC: 42.2 % — SIGNIFICANT CHANGE UP (ref 34.5–45)
HDLC SERPL-MCNC: 61 MG/DL — SIGNIFICANT CHANGE UP (ref 45–65)
HGB BLD-MCNC: 13.7 G/DL — SIGNIFICANT CHANGE UP (ref 11.5–15.5)
HGB BLD-MCNC: 14 G/DL — SIGNIFICANT CHANGE UP (ref 11.5–15.5)
HYALINE CASTS # UR AUTO: SIGNIFICANT CHANGE UP
IMM GRANULOCYTES NFR BLD AUTO: 0.3 % — SIGNIFICANT CHANGE UP (ref 0–1.5)
IMM GRANULOCYTES NFR BLD AUTO: 0.3 % — SIGNIFICANT CHANGE UP (ref 0–1.5)
KETONES UR-MCNC: NEGATIVE — SIGNIFICANT CHANGE UP
LEUKOCYTE ESTERASE UR-ACNC: NEGATIVE — SIGNIFICANT CHANGE UP
LIPID PNL WITH DIRECT LDL SERPL: 81 MG/DL — SIGNIFICANT CHANGE UP
LYMPHOCYTES # BLD AUTO: 0.74 K/UL — LOW (ref 1–3.3)
LYMPHOCYTES # BLD AUTO: 0.96 K/UL — LOW (ref 1–3.3)
LYMPHOCYTES # BLD AUTO: 20.5 % — SIGNIFICANT CHANGE UP (ref 13–44)
LYMPHOCYTES # BLD AUTO: 26.8 % — SIGNIFICANT CHANGE UP (ref 13–44)
MAGNESIUM SERPL-MCNC: 1.8 MG/DL — SIGNIFICANT CHANGE UP (ref 1.6–2.6)
MAGNESIUM SERPL-MCNC: 1.9 MG/DL — SIGNIFICANT CHANGE UP (ref 1.6–2.6)
MCHC RBC-ENTMCNC: 30.1 PG — SIGNIFICANT CHANGE UP (ref 27–34)
MCHC RBC-ENTMCNC: 30.6 PG — SIGNIFICANT CHANGE UP (ref 27–34)
MCHC RBC-ENTMCNC: 32.5 % — SIGNIFICANT CHANGE UP (ref 32–36)
MCHC RBC-ENTMCNC: 33.2 % — SIGNIFICANT CHANGE UP (ref 32–36)
MCV RBC AUTO: 90.8 FL — SIGNIFICANT CHANGE UP (ref 80–100)
MCV RBC AUTO: 94.2 FL — SIGNIFICANT CHANGE UP (ref 80–100)
MONOCYTES # BLD AUTO: 0.03 K/UL — SIGNIFICANT CHANGE UP (ref 0–0.9)
MONOCYTES # BLD AUTO: 0.04 K/UL — SIGNIFICANT CHANGE UP (ref 0–0.9)
MONOCYTES NFR BLD AUTO: 0.8 % — LOW (ref 2–14)
MONOCYTES NFR BLD AUTO: 1.1 % — LOW (ref 2–14)
NEUTROPHILS # BLD AUTO: 2.57 K/UL — SIGNIFICANT CHANGE UP (ref 1.8–7.4)
NEUTROPHILS # BLD AUTO: 2.81 K/UL — SIGNIFICANT CHANGE UP (ref 1.8–7.4)
NEUTROPHILS NFR BLD AUTO: 71.8 % — SIGNIFICANT CHANGE UP (ref 43–77)
NEUTROPHILS NFR BLD AUTO: 77.8 % — HIGH (ref 43–77)
NITRITE UR-MCNC: NEGATIVE — SIGNIFICANT CHANGE UP
NRBC # FLD: 0 K/UL — SIGNIFICANT CHANGE UP (ref 0–0)
NRBC # FLD: 0.02 K/UL — SIGNIFICANT CHANGE UP (ref 0–0)
NT-PROBNP SERPL-SCNC: 3568 PG/ML — SIGNIFICANT CHANGE UP
PH UR: 6 — SIGNIFICANT CHANGE UP (ref 5–8)
PHOSPHATE SERPL-MCNC: 3.5 MG/DL — SIGNIFICANT CHANGE UP (ref 2.5–4.5)
PLATELET # BLD AUTO: 170 K/UL — SIGNIFICANT CHANGE UP (ref 150–400)
PLATELET # BLD AUTO: 182 K/UL — SIGNIFICANT CHANGE UP (ref 150–400)
PMV BLD: 9.3 FL — SIGNIFICANT CHANGE UP (ref 7–13)
PMV BLD: 9.5 FL — SIGNIFICANT CHANGE UP (ref 7–13)
POTASSIUM SERPL-MCNC: 4.2 MMOL/L — SIGNIFICANT CHANGE UP (ref 3.5–5.3)
POTASSIUM SERPL-MCNC: 4.3 MMOL/L — SIGNIFICANT CHANGE UP (ref 3.5–5.3)
POTASSIUM SERPL-SCNC: 4.2 MMOL/L — SIGNIFICANT CHANGE UP (ref 3.5–5.3)
POTASSIUM SERPL-SCNC: 4.3 MMOL/L — SIGNIFICANT CHANGE UP (ref 3.5–5.3)
PROT SERPL-MCNC: 6.4 G/DL — SIGNIFICANT CHANGE UP (ref 6–8.3)
PROT UR-MCNC: 20 — SIGNIFICANT CHANGE UP
RBC # BLD: 4.47 M/UL — SIGNIFICANT CHANGE UP (ref 3.8–5.2)
RBC # BLD: 4.65 M/UL — SIGNIFICANT CHANGE UP (ref 3.8–5.2)
RBC # FLD: 13.2 % — SIGNIFICANT CHANGE UP (ref 10.3–14.5)
RBC # FLD: 13.2 % — SIGNIFICANT CHANGE UP (ref 10.3–14.5)
RBC CASTS # UR COMP ASSIST: HIGH (ref 0–?)
SODIUM SERPL-SCNC: 142 MMOL/L — SIGNIFICANT CHANGE UP (ref 135–145)
SODIUM SERPL-SCNC: 143 MMOL/L — SIGNIFICANT CHANGE UP (ref 135–145)
SODIUM UR-SCNC: 132 MMOL/L — SIGNIFICANT CHANGE UP
SP GR SPEC: 1.02 — SIGNIFICANT CHANGE UP (ref 1–1.04)
SQUAMOUS # UR AUTO: SIGNIFICANT CHANGE UP
TRIGL SERPL-MCNC: 51 MG/DL — SIGNIFICANT CHANGE UP (ref 10–149)
TSH SERPL-MCNC: 0.69 UIU/ML — SIGNIFICANT CHANGE UP (ref 0.27–4.2)
UROBILINOGEN FLD QL: NORMAL — SIGNIFICANT CHANGE UP
WBC # BLD: 3.58 K/UL — LOW (ref 3.8–10.5)
WBC # BLD: 3.61 K/UL — LOW (ref 3.8–10.5)
WBC # FLD AUTO: 3.58 K/UL — LOW (ref 3.8–10.5)
WBC # FLD AUTO: 3.61 K/UL — LOW (ref 3.8–10.5)
WBC UR QL: SIGNIFICANT CHANGE UP (ref 0–?)

## 2019-04-26 PROCEDURE — 12345: CPT | Mod: NC

## 2019-04-26 PROCEDURE — 99222 1ST HOSP IP/OBS MODERATE 55: CPT

## 2019-04-26 RX ORDER — ALBUTEROL 90 UG/1
2 AEROSOL, METERED ORAL EVERY 6 HOURS
Qty: 0 | Refills: 0 | Status: DISCONTINUED | OUTPATIENT
Start: 2019-04-26 | End: 2019-04-29

## 2019-04-26 RX ORDER — LISINOPRIL 2.5 MG/1
20 TABLET ORAL DAILY
Qty: 0 | Refills: 0 | Status: DISCONTINUED | OUTPATIENT
Start: 2019-04-26 | End: 2019-04-29

## 2019-04-26 RX ORDER — HYDRALAZINE HCL 50 MG
25 TABLET ORAL THREE TIMES A DAY
Qty: 0 | Refills: 0 | Status: DISCONTINUED | OUTPATIENT
Start: 2019-04-26 | End: 2019-04-29

## 2019-04-26 RX ORDER — HYDRALAZINE HCL 50 MG
25 TABLET ORAL ONCE
Qty: 0 | Refills: 0 | Status: COMPLETED | OUTPATIENT
Start: 2019-04-26 | End: 2019-04-26

## 2019-04-26 RX ORDER — SIMVASTATIN 20 MG/1
20 TABLET, FILM COATED ORAL AT BEDTIME
Qty: 0 | Refills: 0 | Status: DISCONTINUED | OUTPATIENT
Start: 2019-04-26 | End: 2019-04-29

## 2019-04-26 RX ORDER — BUDESONIDE AND FORMOTEROL FUMARATE DIHYDRATE 160; 4.5 UG/1; UG/1
2 AEROSOL RESPIRATORY (INHALATION)
Qty: 0 | Refills: 0 | Status: DISCONTINUED | OUTPATIENT
Start: 2019-04-26 | End: 2019-04-29

## 2019-04-26 RX ORDER — TIOTROPIUM BROMIDE 18 UG/1
1 CAPSULE ORAL; RESPIRATORY (INHALATION) DAILY
Qty: 0 | Refills: 0 | Status: DISCONTINUED | OUTPATIENT
Start: 2019-04-26 | End: 2019-04-29

## 2019-04-26 RX ORDER — CARVEDILOL PHOSPHATE 80 MG/1
12.5 CAPSULE, EXTENDED RELEASE ORAL EVERY 12 HOURS
Qty: 0 | Refills: 0 | Status: DISCONTINUED | OUTPATIENT
Start: 2019-04-26 | End: 2019-04-29

## 2019-04-26 RX ORDER — ACETAMINOPHEN 500 MG
650 TABLET ORAL EVERY 6 HOURS
Qty: 0 | Refills: 0 | Status: DISCONTINUED | OUTPATIENT
Start: 2019-04-26 | End: 2019-04-29

## 2019-04-26 RX ORDER — HEPARIN SODIUM 5000 [USP'U]/ML
5000 INJECTION INTRAVENOUS; SUBCUTANEOUS EVERY 12 HOURS
Qty: 0 | Refills: 0 | Status: DISCONTINUED | OUTPATIENT
Start: 2019-04-26 | End: 2019-04-29

## 2019-04-26 RX ORDER — FUROSEMIDE 40 MG
20 TABLET ORAL DAILY
Qty: 0 | Refills: 0 | Status: DISCONTINUED | OUTPATIENT
Start: 2019-04-26 | End: 2019-04-26

## 2019-04-26 RX ORDER — ASPIRIN/CALCIUM CARB/MAGNESIUM 324 MG
81 TABLET ORAL DAILY
Qty: 0 | Refills: 0 | Status: DISCONTINUED | OUTPATIENT
Start: 2019-04-26 | End: 2019-04-29

## 2019-04-26 RX ADMIN — Medication 25 MILLIGRAM(S): at 01:25

## 2019-04-26 RX ADMIN — SIMVASTATIN 20 MILLIGRAM(S): 20 TABLET, FILM COATED ORAL at 21:48

## 2019-04-26 RX ADMIN — TIOTROPIUM BROMIDE 1 CAPSULE(S): 18 CAPSULE ORAL; RESPIRATORY (INHALATION) at 10:11

## 2019-04-26 RX ADMIN — Medication 81 MILLIGRAM(S): at 11:52

## 2019-04-26 RX ADMIN — LISINOPRIL 20 MILLIGRAM(S): 2.5 TABLET ORAL at 21:58

## 2019-04-26 RX ADMIN — Medication 40 MILLIGRAM(S): at 15:59

## 2019-04-26 RX ADMIN — BUDESONIDE AND FORMOTEROL FUMARATE DIHYDRATE 2 PUFF(S): 160; 4.5 AEROSOL RESPIRATORY (INHALATION) at 21:49

## 2019-04-26 RX ADMIN — CARVEDILOL PHOSPHATE 12.5 MILLIGRAM(S): 80 CAPSULE, EXTENDED RELEASE ORAL at 17:09

## 2019-04-26 RX ADMIN — BUDESONIDE AND FORMOTEROL FUMARATE DIHYDRATE 2 PUFF(S): 160; 4.5 AEROSOL RESPIRATORY (INHALATION) at 10:11

## 2019-04-26 RX ADMIN — HEPARIN SODIUM 5000 UNIT(S): 5000 INJECTION INTRAVENOUS; SUBCUTANEOUS at 06:46

## 2019-04-26 RX ADMIN — CARVEDILOL PHOSPHATE 12.5 MILLIGRAM(S): 80 CAPSULE, EXTENDED RELEASE ORAL at 06:46

## 2019-04-26 RX ADMIN — Medication 25 MILLIGRAM(S): at 17:09

## 2019-04-26 RX ADMIN — Medication 25 MILLIGRAM(S): at 10:11

## 2019-04-26 RX ADMIN — Medication 20 MILLIGRAM(S): at 06:46

## 2019-04-26 RX ADMIN — HEPARIN SODIUM 5000 UNIT(S): 5000 INJECTION INTRAVENOUS; SUBCUTANEOUS at 17:09

## 2019-04-26 NOTE — PROGRESS NOTE ADULT - PROBLEM SELECTOR PLAN 5
Continue home inhalers, albuterol prn  -currently on 4L NC, titrate to keep O2 sats 90-95%  -Pulm consult in AM, likely qualifies for home O2

## 2019-04-26 NOTE — H&P ADULT - ASSESSMENT
69F hx of CHF (EF 22%) s/p PPM/ICD ([employer] 9/2018), HTN, CARLOTA not able to tolerate CPAP, severe COPD (not on home O2) presenting with shortness of breath. 69F hx of CHF (EF 22%) s/p PPM/ICD ([employer] 9/2018), HTN, CARLOTA not able to tolerate CPAP, severe COPD (not on home O2) presenting with shortness of breath and acute hypoxic respiratory failure. 69F hx of CHF (EF 22% in 8/2018) s/p PPM/ICD ([employer] 9/2018), R-sided heart failure, HTN, CARLOTA not able to tolerate CPAP, severe COPD (not on home O2), moderate pHTN presenting with shortness of breath and acute hypoxic respiratory failure likely multifactorial 2/2 CHF exacerbation, progression of mod pHTN, progression of COPD.

## 2019-04-26 NOTE — PROGRESS NOTE ADULT - PROBLEM SELECTOR PLAN 6
BP uncontrolled similar to outpatient values 150-170 /   -continue hydralazine 25mg po TID  -coreg 12.5mg po TID  -lasix 20mg po daily

## 2019-04-26 NOTE — H&P ADULT - PROBLEM SELECTOR PLAN 5
BP uncontrolled similar to outpatient values 150-170 /   -continue hydralazine 25mg po TID  -coreg 12.5mg po TID  -lisinopril EF 22% s/p [employer] PPM/ICD (9/2018). Dry weight is 38.1kg  -lisinopril  -obtain standing weight Continue home inhalers, albuterol prn  -currently on 4L NC  -Pulm consult in AM, likely qualifies home O2 Continue home inhalers, albuterol prn  -currently on 4L NC, titrate to keep O2 sats 90-95%  -Pulm consult in AM, likely qualifies for home O2

## 2019-04-26 NOTE — H&P ADULT - PROBLEM SELECTOR PLAN 3
Pt developed transient axis shift in her EKG and new RBBB, trop neg after, electrolytes wnl, and pt was asymptomatic except dyspnea from exertion. Low suspicion ACS. Likely pt with multiple cardiac issues including LBBB and severe CHF  -On tele has been V-paced rate 90s, no ectopy  -monitor on tele  -Maintain K>4, Mg>2 BP uncontrolled similar to outpatient values 150-170 /   -continue hydralazine 25mg po TID  -coreg 12.5mg po TID  -lisinopril BP uncontrolled similar to outpatient values 150-170 /   -continue hydralazine 25mg po TID  -coreg 12.5mg po TID  -lasix 20mg po daily  -followup SCr in AM before starting home lisinopril 20mg po daily Pt with new urinary retention today, denies hx urinary retention. No change in medications that is associated with urinary retention.  -  -straight cath, check UA, UCx, urine lytes  -strict I/Os, monitor next PVR to see if still retaining

## 2019-04-26 NOTE — H&P ADULT - PROBLEM SELECTOR PLAN 7
Pt developed transient axis shift in her EKG and new RBBB, trop neg after, electrolytes wnl, and pt was asymptomatic except dyspnea from exertion. Low suspicion ACS. Likely pt with multiple cardiac issues including LBBB and severe CHF  -On tele has been V-paced rate 90s, no ectopy  -monitor on tele  -Maintain K>4, Mg>2

## 2019-04-26 NOTE — H&P ADULT - NSHPSOCIALHISTORY_GEN_ALL_CORE
Lives in downstairs apt from mother. Ambulates without cane/walker. Quit smoking tobacco 2016, 5 cigarettes/day since age 19. Remote cocaine, +smokes marijuana. No etoh, never IVDU. Lives in downstairs apt from mother. Ambulates without cane/walker. Quit smoking tobacco 2016, 5 cigarettes/day since age 19. Remote cocaine, +smokes marijuana. No etoh use, never IVDU.

## 2019-04-26 NOTE — H&P ADULT - PROBLEM SELECTOR PLAN 1
Pt developed acute shortness of breath and dyspnea on exertion. RVP neg, afebrile, no leukocytosis, low suspicion infectious etiology. Lung exam is clear to auscultation and pt denies cough/sputum production, do not suspect COPD exacerbation. Pt does not appear overloaded on exam and CXR clear, do not suspect CHF exacerbation.  -DDx: progression of COPD Pt developed acute shortness of breath and dyspnea on exertion. RVP neg, afebrile, no leukocytosis, low suspicion infectious etiology. Lung exam is clear to auscultation and pt denies cough/sputum production and pCO2 is at baseline of 60s, do not suspect COPD exacerbation. Pt does not appear overloaded on exam and CXR clear, do not suspect CHF exacerbation. At outpt pulm visit she had SpO2 95% on RA.  -DDx: progression of COPD  -supplemental sat to maintain SpO2 >92%  -f/u D-dimer Pt developed acute shortness of breath and dyspnea on exertion. RVP neg, afebrile, no leukocytosis, low suspicion infectious etiology. Lung exam is clear to auscultation and pt denies cough/sputum production and pCO2 is at baseline of 60s, do not suspect COPD exacerbation. Pt does not appear overloaded on exam and CXR clear, do not suspect CHF exacerbation. At outpt pulm visit she had SpO2 95% on RA.  -DDx: progression of COPD, medication adverse effect  -Hydralazine has reported adverse effect of dyspnea.  -supplemental sat to maintain SpO2 >92%  -f/u D-dimer Pt developed acute shortness of breath and dyspnea on exertion. RVP neg, afebrile, no leukocytosis, low suspicion infectious etiology. Lung exam is clear to auscultation and pt denies cough/sputum production and pCO2 is at baseline of 60s, do not suspect COPD exacerbation. At outpt pulm visit she had SpO2 95% on RA. Pt does not appear overloaded on exam and CXR clear, do not suspect CHF exacerbation. Well's score low 1.5, no sign of DVT on exam.  -DDx: CHF exacerbation, progression of COPD, medication adverse effect  -Hydralazine has reported adverse effect of dyspnea.  -supplemental sat to maintain SpO2 >92%  -PPM interrogation in AM  -f/u D-dimer Pt developed acute shortness of breath and dyspnea on exertion. RVP neg, afebrile, no leukocytosis, low suspicion infectious etiology. Lung exam is clear to auscultation and pt denies cough/sputum production and pCO2 is at baseline of 60s, do not suspect COPD exacerbation. At outpt pulm visit she had SpO2 95% on RA. Pt does not appear overloaded on exam and CXR clear, do not suspect CHF exacerbation however pBNP 3870. Well's score low 1.5, no sign of DVT on exam.  -DDx: CHF exacerbation, progression of COPD, medication adverse effect  -Hydralazine has reported adverse effect of dyspnea.  -supplemental sat to maintain SpO2 >92%  -PPM interrogation in AM  -resume pt's lasix 20mg po daily. Was not taking it for months as legs were not swollen  -f/u D-dimer Pt developed acute shortness of breath and dyspnea on exertion. RVP neg, afebrile, no leukocytosis, low suspicion infectious etiology. Lung exam is clear to auscultation and pt denies cough/sputum production and pCO2 is at baseline of 60s, do not suspect COPD exacerbation. At outpt pulm visit she had SpO2 95% on RA. Pt does not appear overloaded on exam and CXR clear, however pBNP 3870 and she is cachectic likely will not present with severe edema. Well's score low 1.5, no sign of DVT on exam.  -Likely multifactorial 2/2 CHF exacerbation due to medication noncompliance, progression of pulmonary hypertension, progression of COPD  -supplemental sat to maintain SpO2 >92%  -resume pt's lasix 20mg po daily. Was not taking it for months as legs were not swollen  -Cardiology consult in AM, will hold off on IV diuresis until cards recs  -Pulm consult in AM for whether needs home O2 and possible progression of pHTN and COPD  -f/u D-dimer Pt developed acute shortness of breath and dyspnea on exertion. RVP neg, afebrile, no leukocytosis, low suspicion infectious etiology. Lung exam is clear to auscultation and pt denies cough/sputum production and pCO2 is at baseline of 60s, do not suspect COPD exacerbation. Pt does not appear overloaded on exam and CXR clear, however pBNP 3870 and she is cachectic with R-sided heart failure, likely will not present with obvious edema edema. Well's score low 1.5, no sign of DVT on exam.  -Likely multifactorial 2/2 CHF exacerbation due to medication noncompliance, progression of pulmonary hypertension, progression of COPD  -supplemental sat to maintain SpO2 >92%  -resume pt's lasix 20mg po daily. Was not taking it for months as legs were not swollen  -Cardiology consult in AM, will hold off on IV diuresis until cards recs  -Pulm consult in AM for whether needs home O2 and possible progression of pHTN and COPD  -f/u D-dimer Pt developed acute shortness of breath and dyspnea on exertion. RVP neg, afebrile, no leukocytosis, low suspicion for infectious etiology. Lung exam is clear to auscultation and pt denies cough/sputum production and pCO2 is at baseline of 60s, do not suspect COPD exacerbation. Pt does not appear overloaded on exam and CXR clear, however pBNP 3870 and she is cachectic. Respiratory failure can be 2/2 progression of pulm HTN vs. CHF exacerbation vs. progression of COPD. Well's score low 1.5, no sign of DVT on exam.  -Likely multifactorial 2/2 CHF exacerbation due to medication noncompliance, progression of pulmonary hypertension, progression of COPD  -supplemental O2 to maintain SpO2 between 90-95%  -resume pt's lasix 20mg po daily. Was not taking it for months as legs were not swollen  -Cardiology consult in AM, will hold off on IV diuresis until cards recs  -Pulm consult in AM for whether needs home O2 and possible progression of pHTN and COPD

## 2019-04-26 NOTE — H&P ADULT - NSHPREVIEWOFSYSTEMS_GEN_ALL_CORE
CONSTITUTIONAL: No weakness, fevers or chills  EYES/ENT: No visual changes;  No dysphagia  NECK: No pain or stiffness  RESPIRATORY: No cough, wheezing, hemoptysis; No shortness of breath  CARDIOVASCULAR: No chest pain or palpitations; No lower extremity edema  GASTROINTESTINAL: No abdominal or epigastric pain. No nausea, vomiting, or hematemesis; No diarrhea or constipation. No melena or hematochezia.  GENITOURINARY: No dysuria, frequency or hematuria  NEUROLOGICAL: No numbness or weakness  HEMATOLOGY: No easy bleeding, no lymphadenopathy  SKIN: No itching, burning, rashes, or lesions   All other review of systems is negative unless indicated above. CONSTITUTIONAL: No weakness, fevers or chills  EYES/ENT: No visual changes;  No dysphagia  NECK: No pain or stiffness  RESPIRATORY: No cough, wheezing, hemoptysis; +SOB  CARDIOVASCULAR: No chest pain or palpitations; No lower extremity edema  GASTROINTESTINAL: No abdominal or epigastric pain. No nausea, vomiting, or hematemesis; No diarrhea or constipation. No melena or hematochezia.  GENITOURINARY: No dysuria, frequency or hematuria. +urinary retention  NEUROLOGICAL: No numbness or weakness  HEMATOLOGY: No easy bleeding, no lymphadenopathy  SKIN: No itching, burning, rashes, or lesions   All other review of systems is negative unless indicated above. Constitutional: No weakness, fevers, or chills  Eyes: No visual changes, double vision, or eye pain  Ears, Nose, Mouth, Throat: No runny nose, sinus pain, ear pain, tinnitus, sore throat, dysphagia, or odynophagia  Cardiovascular: No chest pain, palpitations, or LE edema  Respiratory: +SOB/DOMÍNGUEZ. No cough, wheezing, or hemoptysis.  Gastrointestinal: No abdominal pain, nausea/vomiting, diarrhea/constipation, hematemesis, BRBPR, or melena  Genitourinary: +Urinary retention. No dysuria, frequency, urgency, or hematuria.  Musculoskeletal: No joint pain, joint swelling, or decreased ROM  Skin: No pruritus or rashes  Neurologic:  No seizures, headache, paresthesias, or numbness  Psychiatric: No depression, anxiety, or agitation  Endocrine: No heat/cold intolerance, mood swings, sweats, polydipsia, or polyuria  Hematologic/lymphatic: No purpura, petechia, or prolonged or excessive bleeding after dental extraction / injury    Positives and pertinent negatives noted and all other systems negative.

## 2019-04-26 NOTE — H&P ADULT - PROBLEM SELECTOR PLAN 6
VTE ppx: HSQ  Diet: low sodium BP uncontrolled similar to outpatient values 150-170 /   -continue hydralazine 25mg po TID  -coreg 12.5mg po TID  -lasix 20mg po daily  -followup SCr in AM before starting home lisinopril 20mg po daily BP uncontrolled similar to outpatient values 150-170 /   -continue hydralazine 25mg po TID  -coreg 12.5mg po TID  -lasix 20mg po daily  -followup SCr in AM before starting home lisinopril 20mg po daily. If LU, will consult cardiology regarding startin Isordil BP uncontrolled similar to outpatient values 150-170 /   -continue hydralazine 25mg po TID  -coreg 12.5mg po TID  -lasix 20mg po daily  -followup SCr in AM before starting home lisinopril 20mg po daily. If LU, will consult Cardiology regarding starting Isordil for afterload reduction since pt already on hydralazine.

## 2019-04-26 NOTE — H&P ADULT - HISTORY OF PRESENT ILLNESS
69F hx of CHF s/p ICD ([employer] 9/2018), HTN, CARLOTA not able to tolerate CPAP, COPD presenting with SOB x 2d. 69F hx of CHF (EF 22%) s/p PPM/ICD ([employer] 9/2018), HTN, CARLOTA not able to tolerate CPAP, severe COPD (not on home O2) presenting with SOB x 2d. Pt woke up 2 days ago with increased work of breathing. Her throat felt very dry. She used an albuterol nebulizer and the feeling of dyspnea went away after a few hours. She woke up yesterday again with shortness of breath. She tried another albuterol nebulizer, but couldn't tolerate it. She is unable to explain why she couldn't finish it stating she hadn't needed the nebulizer in months. She walked to the bathroom and was very short of breath whereas her baseline is able to walk 1.5 blocks before needing to stop. Today in the emergency room, pt noticed she's been trying to urinate all day, but only a trickle comes out and she feels like her bladder is full. She takes lasix 20mg prn when her legs get swollen but she hasn't needed to take that in months. Her usual weight is 84lb. When her COPD acts up she usually has cough with white sputum, no wheezing. Denies F/C, sweats, CP, palpitations, abd pain, N/V/D, melena, hematochezia, dizziness, sore throat, rhinorrhea, dysuria, hematuria. Does not use home O2 and and states she has never been tested whether she needs home O2. Reports she's been taking all her medications. She recently followed up with her pulmonologist 4/18/2019 and was started on hydralazine 25mg TID for BP that were consistently 150-169 /  since 8/2018.    ED vitals T98.1, H100, BP max 171/115, Rmax 26, sat 100% on 4L NC. While ambulating to the bathroom pt became SOB, sat was 67% on nasal cannula (unknown how many LPM). Her repeat EKG went from atrial-sensed paced to paced with a new RBBB. She has history of LBBB. Repeat EKG switched back to paced without RBBB. Repeat hsTrop was 13 from 14 on presentation. Pt's SOB resolved and otherwise had no other symptoms. PVR bladder scan was 440cc. 69F hx of CHF (EF 22%) s/p PPM/ICD ([employer] 9/2018), HTN, CARLOTA not able to tolerate CPAP, severe COPD (not on home O2) presenting with SOB x 2d. Pt woke up 2 days ago with increased work of breathing. Her throat felt very dry. She used an albuterol nebulizer and the feeling of dyspnea went away after a few hours. She woke up yesterday again with shortness of breath. She tried another albuterol nebulizer, but couldn't tolerate it. She is unable to explain why she couldn't finish it stating she hadn't needed the nebulizer in months. She walked to the bathroom and was very short of breath whereas her baseline is able to walk 1.5 blocks before needing to stop. Today in the emergency room, pt noticed she's been trying to urinate all day, but only a trickle comes out and she feels like her bladder is full. She takes lasix 20mg prn when her legs get swollen but she hasn't needed to take that in months. Her usual weight is 84lb. When her COPD acts up she usually has cough with white sputum, no wheezing. Denies F/C, sweats, CP, palpitations, abd pain, N/V/D, melena, hematochezia, dizziness, sore throat, rhinorrhea, dysuria, hematuria. Does not use home O2 and and states she has never been tested whether she needs home O2. Reports she's been taking all her medications. She recently followed up with her pulmonologist 4/18/2019 and was started on hydralazine 25mg TID for BP that were consistently 150-169 /  since 8/2018.    ED vitals T98.1, H100, BP max 171/115, Rmax 26, sat 100% on 4L NC. While ambulating to the bathroom pt became SOB, sat was 67% on nasal cannula (unknown how many LPM). Her repeat EKG went from atrial-sensed V-paced to paced with a new RBBB. She has history of LBBB. Repeat EKG switched back to paced without RBBB. Repeat hsTrop was 13 from 14 on presentation. Pt's SOB resolved and otherwise had no other symptoms. PVR bladder scan was 440cc. 69F hx of CHF (EF 22% in 8/2018) s/p PPM/ICD ([employer] 9/2018), R-sided heart failure, HTN, CARLOTA not able to tolerate CPAP, severe COPD (not on home O2), moderate pHTN presenting with SOB x 2d. Pt woke up 2 days ago with increased work of breathing. Her throat felt very dry. She used an albuterol nebulizer and the feeling of dyspnea went away after a few hours. She woke up yesterday again with shortness of breath. She tried another albuterol nebulizer, but couldn't tolerate it. She is unable to explain why she couldn't finish it stating she hadn't needed the nebulizer in months. She walked to the bathroom and was very short of breath whereas her baseline is able to walk 1.5 blocks before needing to stop. Today in the emergency room, pt noticed she's been trying to urinate all day, but only a trickle comes out and she feels like her bladder is full. She takes lasix 20mg prn when her legs get swollen but she hasn't needed to take that in months. Her usual weight is 84lb. When her COPD acts up she usually has cough with white sputum, no wheezing. Denies F/C, sweats, CP, palpitations, abd pain, N/V/D, melena, hematochezia, dizziness, sore throat, rhinorrhea, dysuria, hematuria. Does not use home O2 and and states she has never been tested whether she needs home O2. Reports she's been taking all her medications. She recently followed up with her pulmonologist 4/18/2019 and was started on hydralazine 25mg TID for BP that were consistently 150-169 /  since 8/2018.    ED vitals T98.1, H100, BP max 171/115, Rmax 26, sat 100% on 4L NC. While ambulating to the bathroom pt became SOB, sat was 67% on nasal cannula (unknown how many LPM). Her repeat EKG went from atrial-sensed V-paced to paced with a new RBBB. She has history of LBBB. Repeat EKG switched back to paced without RBBB. Repeat hsTrop was 13 from 14 on presentation. Pt's SOB resolved and otherwise had no other symptoms. PVR bladder scan was 440cc.

## 2019-04-26 NOTE — H&P ADULT - NSHPPHYSICALEXAM_GEN_ALL_CORE
Vital Signs Last 24 Hrs  T(C): 36.7 (26 Apr 2019 02:33), Max: 36.8 (25 Apr 2019 19:20)  T(F): 98 (26 Apr 2019 02:33), Max: 98.2 (25 Apr 2019 19:20)  HR: 100 (26 Apr 2019 02:33) (89 - 100)  BP: 137/91 (26 Apr 2019 02:33) (137/91 - 171/115)  BP(mean): --  RR: 18 (26 Apr 2019 02:33) (18 - 26)  SpO2: 96% (26 Apr 2019 02:33) (96% - 100%)    GENERAL: No acute distress, +thin  HEAD:  Atraumatic, Normocephalic  ENT: PERRL, conjunctiva and sclera clear, neck supple, no JVD, moist mucosa, posterior oropharynx clear  CHEST/LUNG: Clear to auscultation bilaterally; No wheeze, equal breath sounds bilaterally, occasional respirations use accessory muscles, on 4L NC  HEART: Regular rate and rhythm; No murmurs, rubs, or gallops  ABDOMEN: Soft, nontender, nondistended; Bowel sounds present, no organomegaly, no suprapubic tenderness  BACK: no spinal tenderness, no CVA tenderness  EXTREMITIES:  No clubbing, cyanosis, or edema  PSYCH: Nl behavior, nl affect  NEUROLOGY: AAOx3, non-focal, moves all extremities spontaneously  SKIN: Normal color, No rashes or lesions Vital Signs Last 24 Hrs  T(C): 36.7 (26 Apr 2019 02:33), Max: 36.8 (25 Apr 2019 19:20)  T(F): 98 (26 Apr 2019 02:33), Max: 98.2 (25 Apr 2019 19:20)  HR: 100 (26 Apr 2019 02:33) (89 - 100)  BP: 137/91 (26 Apr 2019 02:33) (137/91 - 171/115)  BP(mean): --  RR: 18 (26 Apr 2019 02:33) (18 - 26)  SpO2: 96% (26 Apr 2019 02:33) (96% - 100%)    GENERAL: No acute distress, +thin  HEAD:  Atraumatic, Normocephalic  ENT: PERRL, conjunctiva and sclera clear, neck supple, no JVD, moist mucosa, posterior oropharynx clear  CHEST/LUNG: Clear to auscultation bilaterally; No wheeze, equal breath sounds bilaterally, occasional respirations use accessory muscles, on 4L NC, left chest PPM  HEART: Regular rate and rhythm; No murmurs, rubs, or gallops  ABDOMEN: Soft, nontender, nondistended; Bowel sounds present, no organomegaly, no suprapubic tenderness  BACK: no spinal tenderness, no CVA tenderness  EXTREMITIES:  No clubbing, cyanosis, or edema  PSYCH: Nl behavior, nl affect  NEUROLOGY: AAOx3, non-focal, moves all extremities spontaneously  SKIN: Normal color, No rashes or lesions Vital Signs Last 24 Hrs  T(C): 36.7 (26 Apr 2019 02:33), Max: 36.8 (25 Apr 2019 19:20)  T(F): 98 (26 Apr 2019 02:33), Max: 98.2 (25 Apr 2019 19:20)  HR: 100 (26 Apr 2019 02:33) (89 - 100)  BP: 137/91 (26 Apr 2019 02:33) (137/91 - 171/115)  BP(mean): --  RR: 18 (26 Apr 2019 02:33) (18 - 26)  SpO2: 96% (26 Apr 2019 02:33) (96% - 100%)    GENERAL: No acute distress, +thin appearing  HEAD: Atraumatic, Normocephalic  EYES: PERRL, conjunctiva and sclera clear  NECK: Supple, no JVD, trachea midline, no LAD  MOUTH: Moist mucosa, posterior oropharynx clear  CHEST/LUNG: Clear to auscultation bilaterally; no wheeze, equal breath sounds bilaterally, occasional respirations with use of accessory muscles, on 4L NC, left chest PPM  HEART: Regular rate and rhythm; +S1S2; No murmurs, rubs, or gallops; No LE edema b/l  ABDOMEN: Soft, nontender, nondistended; Bowel sounds present; No suprapubic tenderness  BACK: No spinal tenderness, no CVA tenderness  EXTREMITIES: Warm to touch; No clubbing, cyanosis, or edema; 2+ peripheral pulses b/l  PSYCH: Nl behavior, nl affect  NEUROLOGY: AAOx3, non-focal, 5/5 motor strength and intact tactile sensation in all extremities  SKIN: Normal color, No rashes or lesions

## 2019-04-26 NOTE — PROGRESS NOTE ADULT - SUBJECTIVE AND OBJECTIVE BOX
Patient is a 69y old  Female who presents with a chief complaint of Dyspnea (2019 13:17)      SUBJECTIVE / OVERNIGHT EVENTS: Improved SOb. NO CP    MEDICATIONS  (STANDING):  aspirin enteric coated 81 milliGRAM(s) Oral daily  buDESOnide 160 MICROgram(s)/formoterol 4.5 MICROgram(s) Inhaler 2 Puff(s) Inhalation two times a day  carvedilol 12.5 milliGRAM(s) Oral every 12 hours  furosemide    Tablet 20 milliGRAM(s) Oral daily  heparin  Injectable 5000 Unit(s) SubCutaneous every 12 hours  hydrALAZINE 25 milliGRAM(s) Oral three times a day  simvastatin 20 milliGRAM(s) Oral at bedtime  tiotropium 18 MICROgram(s) Capsule 1 Capsule(s) Inhalation daily    MEDICATIONS  (PRN):  acetaminophen   Tablet .. 650 milliGRAM(s) Oral every 6 hours PRN Mild Pain (1 - 3), Moderate Pain (4 - 6)  ALBUTerol    90 MICROgram(s) HFA Inhaler 2 Puff(s) Inhalation every 6 hours PRN Shortness of Breath and/or Wheezing      T(C): 36.7 (19 @ 09:30), Max: 36.8 (19 @ 19:20)  HR: 90 (19 @ 09:30) (89 - 100)  BP: 137/95 (19 @ 09:30) (130/90 - 171/115)  RR: 18 (19 @ 09:30) (18 - 26)  SpO2: 100% (19 @ 09:30) (96% - 100%)  CAPILLARY BLOOD GLUCOSE        I&O's Summary    2019 07:  -  2019 07:00  --------------------------------------------------------  IN: 0 mL / OUT: 300 mL / NET: -300 mL    2019 07:  -  2019 13:48  --------------------------------------------------------  IN: 0 mL / OUT: 200 mL / NET: -200 mL        PHYSICAL EXAM:  GENERAL: NAD, mild resp distress  HEAD:  Normocephalic  EYES: EOMI,  conjunctiva and sclera clear  NECK: Supple,   CHEST/LUNG: Clear to auscultation bilaterally; No wheeze  HEART:  s1 s2 + Regular rate and rhythm;   ABDOMEN: Soft, Nontender, Nondistended; Bowel sounds present  EXTREMITIES:   No clubbing, cyanosis  NEURO: AAOx3      LABS:                        14.0   3.58  )-----------( 182      ( 2019 07:55 )             42.2         142  |  101  |  15  ----------------------------<  125<H>  4.3   |  29  |  1.02    Ca    9.6      2019 07:55  Phos  3.5       Mg     1.9         TPro  6.4  /  Alb  4.0  /  TBili  1.0  /  DBili  x   /  AST  35<H>  /  ALT  46<H>  /  AlkPhos  58      PT/INR - ( 2019 19:28 )   PT: 11.3 SEC;   INR: 0.99          PTT - ( 2019 19:28 )  PTT:30.6 SEC      Urinalysis Basic - ( 2019 04:45 )    Color: LIGHT YELLOW / Appearance: CLEAR / S.016 / pH: 6.0  Gluc: NEGATIVE / Ketone: NEGATIVE  / Bili: NEGATIVE / Urobili: NORMAL   Blood: NEGATIVE / Protein: 20 / Nitrite: NEGATIVE   Leuk Esterase: NEGATIVE / RBC: 6-10 / WBC 0-2   Sq Epi: OCC / Non Sq Epi: x / Bacteria: NEGATIVE            Alex Marcus MD  Hospitalist  P/ [phone]

## 2019-04-26 NOTE — H&P ADULT - NSHPLABSRESULTS_GEN_ALL_CORE
14.1   5.38  )-----------( 188      ( 25 Apr 2019 19:28 )             43.9     Hemoglobin: 14.1 g/dL (04-25 @ 19:28)    CBC Full  -  ( 25 Apr 2019 19:28 )  WBC Count : 5.38 K/uL  RBC Count : 4.67 M/uL  Hemoglobin : 14.1 g/dL  Hematocrit : 43.9 %  Platelet Count - Automated : 188 K/uL  Mean Cell Volume : 94.0 fL  Mean Cell Hemoglobin : 30.2 pg  Mean Cell Hemoglobin Concentration : 32.1 %  Auto Neutrophil # : 2.49 K/uL  Auto Lymphocyte # : 2.41 K/uL  Auto Monocyte # : 0.37 K/uL  Auto Eosinophil # : 0.07 K/uL  Auto Basophil # : 0.03 K/uL  Auto Neutrophil % : 46.2 %  Auto Lymphocyte % : 44.8 %  Auto Monocyte % : 6.9 %  Auto Eosinophil % : 1.3 %  Auto Basophil % : 0.6 %    04-25    142  |  104  |  17  ----------------------------<  88  4.8   |  25  |  1.33<H>    Ca    9.6      25 Apr 2019 19:28    TPro  6.9  /  Alb  4.1  /  TBili  1.0  /  DBili  x   /  AST  45<H>  /  ALT  43<H>  /  AlkPhos  61  04-25    Creatinine Trend: 1.33<--  LIVER FUNCTIONS - ( 25 Apr 2019 19:28 )  Alb: 4.1 g/dL / Pro: 6.9 g/dL / ALK PHOS: 61 u/L / ALT: 43 u/L / AST: 45 u/L / GGT: x           PT/INR - ( 25 Apr 2019 19:28 )   PT: 11.3 SEC;   INR: 0.99     PTT - ( 25 Apr 2019 19:28 )  PTT:30.6 SEC    19:28 - VBG - pH: 7.32  | pCO2: 64    | pO2: 44    | Lactate: 1.5      hsTrop 14 --> 13  pBNP 3568      EKG 4/25 18:29: Atrial sensed V-paced rate 90, LBBB, QTc 526  EKG 4/25 20:48: V-paced rate 99, RBBB  EKG 4/25 20:50: V-paced rate 98, LBBB    MICROBIOLOGY:    IMAGING:  < from: Xray Chest 1 View- PORTABLE-Urgent (04.25.19 @ 19:50) >    EXAM:  XR CHEST PORTABLE URGENT 1V        PROCEDURE DATE:  Apr 25 2019   ******PRELIMINARY REPORT******    ******PRELIMINARY REPORT******        INTERPRETATION:  Clear lungs  < end of copied text >        Labs, imaging, EKG personally reviewed 14.1   5.38  )-----------( 188      ( 25 Apr 2019 19:28 )             43.9     Hemoglobin: 14.1 g/dL (04-25 @ 19:28)    CBC Full  -  ( 25 Apr 2019 19:28 )  WBC Count : 5.38 K/uL  RBC Count : 4.67 M/uL  Hemoglobin : 14.1 g/dL  Hematocrit : 43.9 %  Platelet Count - Automated : 188 K/uL  Mean Cell Volume : 94.0 fL  Mean Cell Hemoglobin : 30.2 pg  Mean Cell Hemoglobin Concentration : 32.1 %  Auto Neutrophil # : 2.49 K/uL  Auto Lymphocyte # : 2.41 K/uL  Auto Monocyte # : 0.37 K/uL  Auto Eosinophil # : 0.07 K/uL  Auto Basophil # : 0.03 K/uL  Auto Neutrophil % : 46.2 %  Auto Lymphocyte % : 44.8 %  Auto Monocyte % : 6.9 %  Auto Eosinophil % : 1.3 %  Auto Basophil % : 0.6 %    04-25    142  |  104  |  17  ----------------------------<  88  4.8   |  25  |  1.33<H>    Ca    9.6      25 Apr 2019 19:28    TPro  6.9  /  Alb  4.1  /  TBili  1.0  /  DBili  x   /  AST  45<H>  /  ALT  43<H>  /  AlkPhos  61  04-25    Creatinine Trend: 1.33<--  LIVER FUNCTIONS - ( 25 Apr 2019 19:28 )  Alb: 4.1 g/dL / Pro: 6.9 g/dL / ALK PHOS: 61 u/L / ALT: 43 u/L / AST: 45 u/L / GGT: x           PT/INR - ( 25 Apr 2019 19:28 )   PT: 11.3 SEC;   INR: 0.99     PTT - ( 25 Apr 2019 19:28 )  PTT:30.6 SEC    19:28 - VBG - pH: 7.32  | pCO2: 64    | pO2: 44    | Lactate: 1.5      hsTrop 14 --> 13  pBNP 3568      EKG 4/25 18:29: Atrial sensed V-paced rate 90, LBBB, QTc 526 unchanged from prior  EKG 4/25 20:48: V-paced rate 99, RBBB  EKG 4/25 20:50: V-paced rate 98, LBBB    MICROBIOLOGY:    IMAGING:  < from: Xray Chest 1 View- PORTABLE-Urgent (04.25.19 @ 19:50) >    EXAM:  XR CHEST PORTABLE URGENT 1V        PROCEDURE DATE:  Apr 25 2019   ******PRELIMINARY REPORT******    ******PRELIMINARY REPORT******        INTERPRETATION:  Clear lungs  < end of copied text >        Labs, imaging, EKG personally reviewed 14.1   5.38  )-----------( 188      ( 25 Apr 2019 19:28 )             43.9     Hemoglobin: 14.1 g/dL (04-25 @ 19:28)    CBC Full  -  ( 25 Apr 2019 19:28 )  WBC Count : 5.38 K/uL  RBC Count : 4.67 M/uL  Hemoglobin : 14.1 g/dL  Hematocrit : 43.9 %  Platelet Count - Automated : 188 K/uL  Mean Cell Volume : 94.0 fL  Mean Cell Hemoglobin : 30.2 pg  Mean Cell Hemoglobin Concentration : 32.1 %  Auto Neutrophil # : 2.49 K/uL  Auto Lymphocyte # : 2.41 K/uL  Auto Monocyte # : 0.37 K/uL  Auto Eosinophil # : 0.07 K/uL  Auto Basophil # : 0.03 K/uL  Auto Neutrophil % : 46.2 %  Auto Lymphocyte % : 44.8 %  Auto Monocyte % : 6.9 %  Auto Eosinophil % : 1.3 %  Auto Basophil % : 0.6 %    04-25    142  |  104  |  17  ----------------------------<  88  4.8   |  25  |  1.33<H>    Ca    9.6      25 Apr 2019 19:28    TPro  6.9  /  Alb  4.1  /  TBili  1.0  /  DBili  x   /  AST  45<H>  /  ALT  43<H>  /  AlkPhos  61  04-25    Creatinine Trend: 1.33<--  LIVER FUNCTIONS - ( 25 Apr 2019 19:28 )  Alb: 4.1 g/dL / Pro: 6.9 g/dL / ALK PHOS: 61 u/L / ALT: 43 u/L / AST: 45 u/L / GGT: x           PT/INR - ( 25 Apr 2019 19:28 )   PT: 11.3 SEC;   INR: 0.99     PTT - ( 25 Apr 2019 19:28 )  PTT:30.6 SEC    19:28 - VBG - pH: 7.32  | pCO2: 64    | pO2: 44    | Lactate: 1.5      hsTrop 14 --> 13  pBNP 3568      EKG 4/25 18:29: Atrial sensed V-paced rate 90, LBBB, QTc 526 unchanged from prior  EKG 4/25 20:48: V-paced rate 99, RBBB  EKG 4/25 20:50: V-paced rate 98, LBBB    MICROBIOLOGY:    IMAGING:  < from: Xray Chest 1 View- PORTABLE-Urgent (04.25.19 @ 19:50) >    EXAM:  XR CHEST PORTABLE URGENT 1V        PROCEDURE DATE:  Apr 25 2019   ******PRELIMINARY REPORT******    ******PRELIMINARY REPORT******        INTERPRETATION:  Clear lungs  < end of copied text >    Labs, imaging, EKG personally reviewed

## 2019-04-26 NOTE — CONSULT NOTE ADULT - SUBJECTIVE AND OBJECTIVE BOX
Patient seen and evaluated at bedside    Chief Complaint:    HPI:  69F hx of CHF (EF 22% in 2018) s/p PPM/ICD ([employer] 2018), R-sided heart failure, HTN, CARLOTA not able to tolerate CPAP, severe COPD (not on home O2), moderate pHTN presenting with SOB x 2d. Pt woke up 2 days ago with increased work of breathing. Her throat felt very dry. She used an albuterol nebulizer and the feeling of dyspnea went away after a few hours. She woke up yesterday again with shortness of breath. She tried another albuterol nebulizer, but couldn't tolerate it. She is unable to explain why she couldn't finish it stating she hadn't needed the nebulizer in months. She walked to the bathroom and was very short of breath whereas her baseline is able to walk 1.5 blocks before needing to stop. Today in the emergency room, pt noticed she's been trying to urinate all day, but only a trickle comes out and she feels like her bladder is full. She takes lasix 20mg prn when her legs get swollen but she hasn't needed to take that in months. Her usual weight is 84lb. When her COPD acts up she usually has cough with white sputum, no wheezing. Denies F/C, sweats, CP, palpitations, abd pain, N/V/D, melena, hematochezia, dizziness, sore throat, rhinorrhea, dysuria, hematuria. Does not use home O2 and and states she has never been tested whether she needs home O2. Reports she's been taking all her medications. She recently followed up with her pulmonologist 2019 and was started on hydralazine 25mg TID for BP that were consistently 150-169 /  since 2018.    ED vitals T98.1, H100, BP max 171/115, Rmax 26, sat 100% on 4L NC. While ambulating to the bathroom pt became SOB, sat was 67% on nasal cannula (unknown how many LPM). Her repeat EKG went from atrial-sensed V-paced to paced with a new RBBB. She has history of LBBB. Repeat EKG switched back to paced without RBBB. Repeat hsTrop was 13 from 14 on presentation. Pt's SOB resolved and otherwise had no other symptoms. PVR bladder scan was 440cc. (2019 02:21)      PMH:   Right-sided heart failure  Pulmonary hypertension  Sleep apnea  LBBB (left bundle branch block)  Chronic systolic CHF (congestive heart failure)  CARLOTA (obstructive sleep apnea)  Heart failure, systolic  HTN (hypertension)  COPD (chronic obstructive pulmonary disease)  CAD (coronary artery disease)      PSH:   Presence of cardioverter defibrillator  No significant past surgical history  S/P primary angioplasty with coronary stent      Medications:   acetaminophen   Tablet .. 650 milliGRAM(s) Oral every 6 hours PRN  ALBUTerol    90 MICROgram(s) HFA Inhaler 2 Puff(s) Inhalation every 6 hours PRN  aspirin enteric coated 81 milliGRAM(s) Oral daily  buDESOnide 160 MICROgram(s)/formoterol 4.5 MICROgram(s) Inhaler 2 Puff(s) Inhalation two times a day  carvedilol 12.5 milliGRAM(s) Oral every 12 hours  furosemide    Tablet 20 milliGRAM(s) Oral daily  heparin  Injectable 5000 Unit(s) SubCutaneous every 12 hours  hydrALAZINE 25 milliGRAM(s) Oral three times a day  simvastatin 20 milliGRAM(s) Oral at bedtime  tiotropium 18 MICROgram(s) Capsule 1 Capsule(s) Inhalation daily      Allergies:  No Known Allergies      FAMILY HISTORY:  Family history of colon cancer      Social History:  Smoking History:  Alcohol Use:  Drug Use:    Review of Systems:  REVIEW OF SYSTEMS:  CONSTITUTIONAL: No weakness, fevers or chills  EYES/ENT: No visual changes;  No dysphagia  NECK: No pain or stiffness  RESPIRATORY: No cough, wheezing, hemoptysis; No shortness of breath  CARDIOVASCULAR: No chest pain or palpitations; No lower extremity edema  GASTROINTESTINAL: No abdominal or epigastric pain. No nausea, vomiting, or hematemesis; No diarrhea or constipation. No melena or hematochezia.  BACK: No back pain  GENITOURINARY: No dysuria, frequency or hematuria  NEUROLOGICAL: No numbness or weakness  SKIN: No itching, burning, rashes, or lesions   All other review of systems is negative unless indicated above.    Physical Exam:  T(F): 98.1 (-), Max: 98.2 (-)  HR: 90 (-) (89 - 100)  BP: 137/95 () (130/90 - 171/115)  RR: 18 ()  SpO2: 100% ()  GENERAL: No acute distress, well-developed  HEAD:  Atraumatic, Normocephalic  ENT: EOMI, PERRLA, conjunctiva and sclera clear, Neck supple, No JVD, moist mucosa  CHEST/LUNG: Clear to auscultation bilaterally; No wheeze, equal breath sounds bilaterally   BACK: No spinal tenderness  HEART: Regular rate and rhythm; No murmurs, rubs, or gallops  ABDOMEN: Soft, Nontender, Nondistended; Bowel sounds present  EXTREMITIES:  No clubbing, cyanosis, or edema  PSYCH: Nl behavior, nl affect  NEUROLOGY: AAOx3, non-focal, cranial nerves intact  SKIN: Normal color, No rashes or lesions  LINES:    Cardiovascular Diagnostic Testing:    ECG: Personally reviewed    Echo:    Stress Testing:    Cath:    Interpretation of Telemetry:    Imaging:    Labs: Personally reviewed                        14.0   3.58  )-----------( 182      ( 2019 07:55 )             42.2         142  |  101  |  15  ----------------------------<  125<H>  4.3   |  29  |  1.02    Ca    9.6      2019 07:55  Phos  3.5       Mg     1.9         TPro  6.4  /  Alb  4.0  /  TBili  1.0  /  DBili  x   /  AST  35<H>  /  ALT  46<H>  /  AlkPhos  58      PT/INR - ( 2019 19:28 )   PT: 11.3 SEC;   INR: 0.99          PTT - ( 2019 19:28 )  PTT:30.6 SEC      Serum Pro-Brain Natriuretic Peptide: 3568 pg/mL ( @ 21:02)    Total Cholesterol: 144  LDL: 81  HDL: 61  T    Hemoglobin A1C, Whole Blood: 5.7 % ( @ 07:55)    Thyroid Stimulating Hormone, Serum: 0.69 uIU/mL ( @ 07:55) Patient seen and evaluated at bedside    Chief Complaint:    HPI:  69F hx of CHF (EF 22% in 2018) s/p PPM/ICD ([employer] 2018), R-sided heart failure, HTN, CARLOTA, severe COPD (not on home O2), moderate pHTN acute onset SOB x 2d. No orthopnea or PND (sleeps with 2 pillows), no lower extremity edema. Baseline ET 1.5 blocks, stops 2/2 SOB. Endorses good medication adherence (cardiac meds: ASA 81, simvastatin 20, carvedilol 12.5 BID, hydralazine 25 TID, furosemide 20 PRN_. No recent fever/chills/, CP, palpitations, abd pain, N/V/D, melena, hematochezia, sore throat, rhinorrhea, dysuria, hematuria.     ED vitals T98.1, H100, BP max 171/115, Rmax 26, sat 100% on 4L NC.   EKG atrial-sensed V-paced  hsTrop was 13 from 14 on presentation. P      PMH:   Right-sided heart failure  Pulmonary hypertension  Sleep apnea  LBBB (left bundle branch block)  Chronic systolic CHF (congestive heart failure)  CARLOTA (obstructive sleep apnea)  Heart failure, systolic  HTN (hypertension)  COPD (chronic obstructive pulmonary disease)  CAD (coronary artery disease)      PSH:   Presence of cardioverter defibrillator  No significant past surgical history  S/P primary angioplasty with coronary stent      Medications:   acetaminophen   Tablet .. 650 milliGRAM(s) Oral every 6 hours PRN  ALBUTerol    90 MICROgram(s) HFA Inhaler 2 Puff(s) Inhalation every 6 hours PRN  aspirin enteric coated 81 milliGRAM(s) Oral daily  buDESOnide 160 MICROgram(s)/formoterol 4.5 MICROgram(s) Inhaler 2 Puff(s) Inhalation two times a day  carvedilol 12.5 milliGRAM(s) Oral every 12 hours  furosemide    Tablet 20 milliGRAM(s) Oral daily  heparin  Injectable 5000 Unit(s) SubCutaneous every 12 hours  hydrALAZINE 25 milliGRAM(s) Oral three times a day  simvastatin 20 milliGRAM(s) Oral at bedtime  tiotropium 18 MICROgram(s) Capsule 1 Capsule(s) Inhalation daily      Allergies:  No Known Allergies      FAMILY HISTORY:  Family history of colon cancer      Social History:  Smoking History:  Alcohol Use:  Drug Use:    Review of Systems:  REVIEW OF SYSTEMS:  CONSTITUTIONAL: No weakness, fevers or chills  EYES/ENT: No visual changes;  No dysphagia  NECK: No pain or stiffness  RESPIRATORY: No cough, wheezing, hemoptysis; No shortness of breath  CARDIOVASCULAR: No chest pain or palpitations; No lower extremity edema  GASTROINTESTINAL: No abdominal or epigastric pain. No nausea, vomiting, or hematemesis; No diarrhea or constipation. No melena or hematochezia.  BACK: No back pain  GENITOURINARY: No dysuria, frequency or hematuria  NEUROLOGICAL: No numbness or weakness  SKIN: No itching, burning, rashes, or lesions   All other review of systems is negative unless indicated above.    Physical Exam:  T(F): 98.1 (), Max: 98.2 ()  HR: 90 () (89 - 100)  BP: 137/95 () (130/90 - 171/115)  RR: 18 ()  SpO2: 100% ()  GENERAL: No acute distress, well-developed  HEAD:  Atraumatic, Normocephalic  ENT: EOMI, PERRLA, conjunctiva and sclera clear, Neck supple, No JVD, moist mucosa  CHEST/LUNG: Clear to auscultation bilaterally; No wheeze, equal breath sounds bilaterally   BACK: No spinal tenderness  HEART: Regular rate and rhythm; No murmurs, rubs, or gallops  ABDOMEN: Soft, Nontender, Nondistended; Bowel sounds present  EXTREMITIES:  No clubbing, cyanosis, or edema  PSYCH: Nl behavior, nl affect  NEUROLOGY: AAOx3, non-focal, cranial nerves intact  SKIN: Normal color, No rashes or lesions  LINES:    Cardiovascular Diagnostic Testing:    ECG: Personally reviewed    Echo:    Stress Testing:    Cath:    Interpretation of Telemetry:    Imaging:    Labs: Personally reviewed                        14.0   3.58  )-----------( 182      ( 2019 07:55 )             42.2         142  |  101  |  15  ----------------------------<  125<H>  4.3   |  29  |  1.02    Ca    9.6      2019 07:55  Phos  3.5       Mg     1.9         TPro  6.4  /  Alb  4.0  /  TBili  1.0  /  DBili  x   /  AST  35<H>  /  ALT  46<H>  /  AlkPhos  58      PT/INR - ( 2019 19:28 )   PT: 11.3 SEC;   INR: 0.99          PTT - ( 2019 19:28 )  PTT:30.6 SEC      Serum Pro-Brain Natriuretic Peptide: 3568 pg/mL ( @ 21:02)    Total Cholesterol: 144  LDL: 81  HDL: 61  T    Hemoglobin A1C, Whole Blood: 5.7 % ( @ 07:55)    Thyroid Stimulating Hormone, Serum: 0.69 uIU/mL ( @ 07:55) Patient seen and evaluated at bedside    Chief Complaint:    HPI:  69F hx of CHF (EF 22% in 2018) s/p PPM/ICD ([employer] 2018), R-sided heart failure, HTN, CARLOTA, severe COPD (not on home O2), moderate pHTN acute onset SOB x 2d. No orthopnea or PND (sleeps with 2 pillows), no lower extremity edema. Baseline ET 1.5 blocks, stops 2/2 SOB. Endorses good medication adherence (cardiac meds: ASA 81, simvastatin 20, carvedilol 12.5 BID, hydralazine 25 TID, furosemide 20 PRN_. No recent fever/chills/, CP, palpitations, abd pain, N/V/D, melena, hematochezia, sore throat, rhinorrhea, dysuria, hematuria.     ED vitals T98.1, H100, BP max 171/115, Rmax 26, sat 100% on 4L NC.   EKG atrial-sensed V-paced  hsTrop was 13 from 14 on presentation. P      PMH:   Right-sided heart failure  Pulmonary hypertension  Sleep apnea  LBBB (left bundle branch block)  Chronic systolic CHF (congestive heart failure)  CARLOTA (obstructive sleep apnea)  Heart failure, systolic  HTN (hypertension)  COPD (chronic obstructive pulmonary disease)  CAD (coronary artery disease)      PSH:   Presence of cardioverter defibrillator  No significant past surgical history  S/P primary angioplasty with coronary stent      Medications:   acetaminophen   Tablet .. 650 milliGRAM(s) Oral every 6 hours PRN  ALBUTerol    90 MICROgram(s) HFA Inhaler 2 Puff(s) Inhalation every 6 hours PRN  aspirin enteric coated 81 milliGRAM(s) Oral daily  buDESOnide 160 MICROgram(s)/formoterol 4.5 MICROgram(s) Inhaler 2 Puff(s) Inhalation two times a day  carvedilol 12.5 milliGRAM(s) Oral every 12 hours  furosemide    Tablet 20 milliGRAM(s) Oral daily  heparin  Injectable 5000 Unit(s) SubCutaneous every 12 hours  hydrALAZINE 25 milliGRAM(s) Oral three times a day  simvastatin 20 milliGRAM(s) Oral at bedtime  tiotropium 18 MICROgram(s) Capsule 1 Capsule(s) Inhalation daily      Allergies:  No Known Allergies      FAMILY HISTORY:  Family history of colon cancer      Social History:  Smoking History:  Alcohol Use:  Drug Use:    Review of Systems:  REVIEW OF SYSTEMS:  CONSTITUTIONAL: No weakness, fevers or chills  EYES/ENT: No visual changes;  No dysphagia  NECK: No pain or stiffness  RESPIRATORY: +mild SOB (largely resolved) No cough, wheezing, hemoptysis;   CARDIOVASCULAR: No chest pain or palpitations; No lower extremity edema  GASTROINTESTINAL: No abdominal or epigastric pain. No nausea, vomiting, or hematemesis; No diarrhea or constipation. No melena or hematochezia.  BACK: No back pain  GENITOURINARY: No dysuria, frequency or hematuria  NEUROLOGICAL: No numbness or weakness  SKIN: No itching, burning, rashes, or lesions   All other review of systems is negative unless indicated above.    Physical Exam:  T(F): 98.1 (), Max: 98.2 ()  HR: 90 () (89 - 100)  BP: 137/95 () (130/90 - 171/115)  RR: 18 ()  SpO2: 100% ()    GENERAL: No acute distress, cachetic   HEAD:  Atraumatic, Normocephalic  ENT: EOMI, PERRLA, conjunctiva and sclera clear, Neck supple, No JVD, moist mucosa  CHEST/LUNG: Clear to auscultation bilaterally; No wheeze, equal breath sounds bilaterally   BACK: No spinal tenderness  HEART: Regular rate and rhythm; No murmurs, rubs, or gallops  ABDOMEN: Soft, Nontender, Nondistended; Bowel sounds present  EXTREMITIES:  No clubbing, cyanosis, or edema  PSYCH: Nl behavior, nl affect  NEUROLOGY: AAOx3, non-focal, cranial nerves intact  SKIN: Normal color, No rashes or lesions    LINES:    Cardiovascular Diagnostic Testing:    ECG: Personally reviewed: atrial sensed, V-paced    Echo:    Stress Testing:    Cath:    Interpretation of Telemetry:    Imaging:    Labs: Personally reviewed                        14.0   3.58  )-----------( 182      ( 2019 07:55 )             42.2         142  |  101  |  15  ----------------------------<  125<H>  4.3   |  29  |  1.02    Ca    9.6      2019 07:55  Phos  3.5       Mg     1.9         TPro  6.4  /  Alb  4.0  /  TBili  1.0  /  DBili  x   /  AST  35<H>  /  ALT  46<H>  /  AlkPhos  58      PT/INR - ( 2019 19:28 )   PT: 11.3 SEC;   INR: 0.99          PTT - ( 2019 19:28 )  PTT:30.6 SEC    Serum Pro-Brain Natriuretic Peptide: 3568 pg/mL ( @ 21:02)    Total Cholesterol: 144  LDL: 81  HDL: 61  T    Hemoglobin A1C, Whole Blood: 5.7 % ( @ 07:55)    Thyroid Stimulating Hormone, Serum: 0.69 uIU/mL ( @ 07:55) Patient seen and evaluated at bedside    Chief Complaint:    HPI:  69F hx of CHF (EF 22% in 2018) s/p PPM/ICD ([employer] 2018), R-sided heart failure, HTN, CARLOTA, severe COPD (not on home O2), moderate pHTN acute onset SOB x 2d. No orthopnea or PND (sleeps with 2 pillows), no lower extremity edema. Baseline ET 1.5 blocks, stops 2/2 SOB. Endorses good medication adherence (cardiac meds: ASA 81, simvastatin 20, carvedilol 12.5 BID, hydralazine 25 TID, furosemide 20 PRN). No recent fever/chills/, CP, palpitations, abd pain, N/V/D, melena, hematochezia, sore throat, rhinorrhea, dysuria, hematuria.     ED vitals T98.1, H100, BP max 171/115, Rmax 26, sat 100% on 4L NC.   EKG atrial-sensed V-paced  hsTrop was 13 from 14 on presentation. P      PMH:   Right-sided heart failure  Pulmonary hypertension  Sleep apnea  LBBB (left bundle branch block)  Chronic systolic CHF (congestive heart failure)  CARLOTA (obstructive sleep apnea)  Heart failure, systolic  HTN (hypertension)  COPD (chronic obstructive pulmonary disease)  CAD (coronary artery disease)      PSH:   Presence of cardioverter defibrillator  No significant past surgical history  S/P primary angioplasty with coronary stent      Medications:   acetaminophen   Tablet .. 650 milliGRAM(s) Oral every 6 hours PRN  ALBUTerol    90 MICROgram(s) HFA Inhaler 2 Puff(s) Inhalation every 6 hours PRN  aspirin enteric coated 81 milliGRAM(s) Oral daily  buDESOnide 160 MICROgram(s)/formoterol 4.5 MICROgram(s) Inhaler 2 Puff(s) Inhalation two times a day  carvedilol 12.5 milliGRAM(s) Oral every 12 hours  furosemide    Tablet 20 milliGRAM(s) Oral daily  heparin  Injectable 5000 Unit(s) SubCutaneous every 12 hours  hydrALAZINE 25 milliGRAM(s) Oral three times a day  simvastatin 20 milliGRAM(s) Oral at bedtime  tiotropium 18 MICROgram(s) Capsule 1 Capsule(s) Inhalation daily      Allergies:  No Known Allergies      FAMILY HISTORY:  Family history of colon cancer      Social History:  Smoking History:  Alcohol Use:  Drug Use:    Review of Systems:  REVIEW OF SYSTEMS:  CONSTITUTIONAL: No weakness, fevers or chills  EYES/ENT: No visual changes;  No dysphagia  NECK: No pain or stiffness  RESPIRATORY: +mild SOB (largely resolved) No cough, wheezing, hemoptysis;   CARDIOVASCULAR: No chest pain or palpitations; No lower extremity edema  GASTROINTESTINAL: No abdominal or epigastric pain. No nausea, vomiting, or hematemesis; No diarrhea or constipation. No melena or hematochezia.  BACK: No back pain  GENITOURINARY: No dysuria, frequency or hematuria  NEUROLOGICAL: No numbness or weakness  SKIN: No itching, burning, rashes, or lesions   All other review of systems is negative unless indicated above.    Physical Exam:  T(F): 98.1 (), Max: 98.2 ()  HR: 90 () (89 - 100)  BP: 137/95 () (130/90 - 171/115)  RR: 18 ()  SpO2: 100% ()    GENERAL: No acute distress, cachetic   HEAD:  Atraumatic, Normocephalic  ENT: EOMI, PERRLA, conjunctiva and sclera clear, Neck supple, No JVD, moist mucosa  CHEST/LUNG: Clear to auscultation bilaterally; No wheeze, equal breath sounds bilaterally   BACK: No spinal tenderness  HEART: Regular rate and rhythm; No murmurs, rubs, or gallops  ABDOMEN: Soft, Nontender, Nondistended; Bowel sounds present  EXTREMITIES:  No clubbing, cyanosis, or edema  PSYCH: Nl behavior, nl affect  NEUROLOGY: AAOx3, non-focal, cranial nerves intact  SKIN: Normal color, No rashes or lesions    LINES:    Cardiovascular Diagnostic Testing:    ECG: Personally reviewed: atrial sensed, V-paced    Echo:    Stress Testing:    Cath:    Interpretation of Telemetry:    Imaging:    Labs: Personally reviewed                        14.0   3.58  )-----------( 182      ( 2019 07:55 )             42.2         142  |  101  |  15  ----------------------------<  125<H>  4.3   |  29  |  1.02    Ca    9.6      2019 07:55  Phos  3.5       Mg     1.9         TPro  6.4  /  Alb  4.0  /  TBili  1.0  /  DBili  x   /  AST  35<H>  /  ALT  46<H>  /  AlkPhos  58      PT/INR - ( 2019 19:28 )   PT: 11.3 SEC;   INR: 0.99          PTT - ( 2019 19:28 )  PTT:30.6 SEC    Serum Pro-Brain Natriuretic Peptide: 3568 pg/mL ( @ 21:02)    Total Cholesterol: 144  LDL: 81  HDL: 61  T    Hemoglobin A1C, Whole Blood: 5.7 % ( @ 07:55)    Thyroid Stimulating Hormone, Serum: 0.69 uIU/mL ( @ 07:55) Patient seen and evaluated at bedside    Chief Complaint:    HPI:  69F hx of CHF (EF 22% in 2018) s/p PPM/ICD ([employer] 2018), R-sided heart failure, HTN, CARLOTA, severe COPD (not on home O2), moderate pHTN acute onset SOB x 2d. No orthopnea or PND (sleeps with 2 pillows), no lower extremity edema. Baseline ET 1.5 blocks, stops 2/2 SOB. Endorses good medication adherence (cardiac meds: ASA 81, simvastatin 20, carvedilol 12.5 BID, hydralazine 25 TID, furosemide 20 PRN). No recent fever/chills/, CP, palpitations, abd pain, N/V/D, melena, hematochezia, sore throat, rhinorrhea, dysuria, hematuria.     ED vitals T98.1, H100, BP max 171/115, Rmax 26, sat 100% on 4L NC.   EKG atrial-sensed V-paced  hsTrop was 13 from 14 on presentation. P      PMH:   Right-sided heart failure  Pulmonary hypertension  Sleep apnea  LBBB (left bundle branch block)  Chronic systolic CHF (congestive heart failure)  CARLOTA (obstructive sleep apnea)  Heart failure, systolic  HTN (hypertension)  COPD (chronic obstructive pulmonary disease)  CAD (coronary artery disease)      PSH:   Presence of cardioverter defibrillator  No significant past surgical history  S/P primary angioplasty with coronary stent      Medications:   acetaminophen   Tablet .. 650 milliGRAM(s) Oral every 6 hours PRN  ALBUTerol    90 MICROgram(s) HFA Inhaler 2 Puff(s) Inhalation every 6 hours PRN  aspirin enteric coated 81 milliGRAM(s) Oral daily  buDESOnide 160 MICROgram(s)/formoterol 4.5 MICROgram(s) Inhaler 2 Puff(s) Inhalation two times a day  carvedilol 12.5 milliGRAM(s) Oral every 12 hours  furosemide    Tablet 20 milliGRAM(s) Oral daily  heparin  Injectable 5000 Unit(s) SubCutaneous every 12 hours  hydrALAZINE 25 milliGRAM(s) Oral three times a day  simvastatin 20 milliGRAM(s) Oral at bedtime  tiotropium 18 MICROgram(s) Capsule 1 Capsule(s) Inhalation daily      Allergies:  No Known Allergies      FAMILY HISTORY:  Family history of colon cancer      Social History:  Smoking History:  Alcohol Use:  Drug Use:    Review of Systems:  REVIEW OF SYSTEMS:  CONSTITUTIONAL: No weakness, fevers or chills  EYES/ENT: No visual changes;  No dysphagia  NECK: No pain or stiffness  RESPIRATORY: +mild SOB (largely resolved) No cough, wheezing, hemoptysis;   CARDIOVASCULAR: No chest pain or palpitations; No lower extremity edema  GASTROINTESTINAL: No abdominal or epigastric pain. No nausea, vomiting, or hematemesis; No diarrhea or constipation. No melena or hematochezia.  BACK: No back pain  GENITOURINARY: No dysuria, frequency or hematuria  NEUROLOGICAL: No numbness or weakness  SKIN: No itching, burning, rashes, or lesions   All other review of systems is negative unless indicated above.    Physical Exam:  T(F): 98.1 (-), Max: 98.2 (-)  HR: 90 () (89 - 100)  BP: 137/95 () (130/90 - 171/115)  RR: 18 (-)  SpO2: 100% ()    GENERAL: No acute distress, cachetic   HEAD:  Atraumatic, Normocephalic  ENT: EOMI, PERRLA, conjunctiva and sclera clear, Neck supple, No JVD, moist mucosa  CHEST/LUNG: Clear to auscultation bilaterally; No wheeze, equal breath sounds bilaterally   BACK: No spinal tenderness  HEART: Regular rate and rhythm; No murmurs, rubs, or gallops  ABDOMEN: Soft, Nontender, Nondistended; Bowel sounds present  EXTREMITIES:  No clubbing, cyanosis, or edema  PSYCH: Nl behavior, nl affect  NEUROLOGY: AAOx3, non-focal, cranial nerves intact  SKIN: Normal color, No rashes or lesions    LINES:    Cardiovascular Diagnostic Testing:    ECG: Personally reviewed: atrial sensed, V-paced    Echo:  2018:   DIMENSIONS:  Dimensions:     Normal Values:  LA:     2.6 cm    2.0 - 4.0 cm  Ao:     2.9 cm    2.0 - 3.8 cm  SEPTUM: 0.8 cm    0.6 - 1.2 cm  PWT:    0.9 cm    0.6 - 1.1 cm  LVIDd:  5.0 cm    3.0 - 5.6 cm  LVIDs:  4.5 cm    1.8 - 4.0 cm  Derived Variables:  LVMI: 112 g/m2  RWT: 0.36  Fractional short: 10 %  Ejection Fraction (Teicholtz): 22 %  ------------------------------------------------------------------------  OBSERVATIONS:  Mitral Valve: Tethered mitral valve leaflets with normal  opening. Moderate-severe mitral regurgitation.  Aortic Root: Normal aortic root.  Aortic Valve: Calcified trileaflet aortic valve with normal  opening.  Left Atrium: Normal left atrium.  LA volume index = 34  cc/m2.  Left Ventricle: Severe global left ventricular systolic  dysfunction. Moderate left ventricular enlargement. Mild  concentric left ventricular hypertrophy.  Right Heart: Normal right atrium. Normal right ventricular  size with decreased right ventricular systolic function.  Normal tricuspid valve. Mild-moderate tricuspid  regurgitation. Normal pulmonic valve. Minimal pulmonic  regurgitation.  Pericardium/PleuraNormal pericardium with no pericardial  effusion.  Hemodynamic: Estimated right ventricular systolic pressure  equals 52 mm Hg, assuming right atrial pressure equals 10  mm Hg, consistent with moderate pulmonary hypertension.  ------------------------------------------------------------------------  CONCLUSIONS:  1. Tethered mitral valve leaflets with normal opening.  Moderate-severe mitral regurgitation.  2. Moderate left ventricular enlargement. Mild concentric  left ventricular hypertrophy.  3. Severe global left ventricular systolic dysfunction.  4. Normal right ventricular size with decreased right  ventricular systolic function.  5. Estimated pulmonary artery systolic pressure equals 52  mm Hg, assuming right atrial pressure equals 10  mm Hg,  consistent with moderate pulmonary hypertension.  ------------------------------------------------------------------------  Confirmed on  2018 - 12:54:47 by Sathish Hernandez M.D.  ------------------------------------------------------------------------    Stress Testing:    Cath:    Interpretation of Telemetry:    Imaging:    Labs: Personally reviewed                        14.0   3.58  )-----------( 182      ( 2019 07:55 )             42.2         142  |  101  |  15  ----------------------------<  125<H>  4.3   |  29  |  1.02    Ca    9.6      2019 07:55  Phos  3.5       Mg     1.9         TPro  6.4  /  Alb  4.0  /  TBili  1.0  /  DBili  x   /  AST  35<H>  /  ALT  46<H>  /  AlkPhos  58      PT/INR - ( 2019 19:28 )   PT: 11.3 SEC;   INR: 0.99          PTT - ( 2019 19:28 )  PTT:30.6 SEC    Serum Pro-Brain Natriuretic Peptide: 3568 pg/mL ( @ 21:02)    Total Cholesterol: 144  LDL: 81  HDL: 61  T    Hemoglobin A1C, Whole Blood: 5.7 % ( @ 07:55)    Thyroid Stimulating Hormone, Serum: 0.69 uIU/mL ( @ 07:55) Patient seen and evaluated at bedside    Chief Complaint:    HPI:  69F hx of CHF (EF 22% in 2018) s/p PPM/ICD (Medtronic 2018), R-sided heart failure, HTN, CARLOTA, severe COPD (not on home O2), moderate pHTN acute onset SOB x 2d. No orthopnea or PND (sleeps with 2 pillows), no lower extremity edema. Baseline ET 1.5 blocks, stops 2/2 SOB. Endorses good medication adherence (cardiac meds: ASA 81, simvastatin 20, carvedilol 12.5 BID, hydralazine 25 TID, furosemide 20 PRN). No recent fever/chills/, CP, palpitations, abd pain, N/V/D, melena, hematochezia, sore throat, rhinorrhea, dysuria, hematuria.     ED vitals T98.1, H100, BP max 171/115, Rmax 26, sat 100% on 4L NC.   EKG atrial-sensed V-paced  hsTrop 14 -> 13      PMH:   Right-sided heart failure  Pulmonary hypertension  Sleep apnea  LBBB (left bundle branch block)  Chronic systolic CHF (congestive heart failure)  CARLOTA (obstructive sleep apnea)  Heart failure, systolic  HTN (hypertension)  COPD (chronic obstructive pulmonary disease)  CAD (coronary artery disease)      PSH:   Presence of cardioverter defibrillator  No significant past surgical history  S/P primary angioplasty with coronary stent      Medications:   acetaminophen   Tablet . 650 milliGRAM(s) Oral every 6 hours PRN  ALBUTerol    90 MICROgram(s) HFA Inhaler 2 Puff(s) Inhalation every 6 hours PRN  aspirin enteric coated 81 milliGRAM(s) Oral daily  buDESOnide 160 MICROgram(s)/formoterol 4.5 MICROgram(s) Inhaler 2 Puff(s) Inhalation two times a day  carvedilol 12.5 milliGRAM(s) Oral every 12 hours  furosemide    Tablet 20 milliGRAM(s) Oral daily  heparin  Injectable 5000 Unit(s) SubCutaneous every 12 hours  hydrALAZINE 25 milliGRAM(s) Oral three times a day  simvastatin 20 milliGRAM(s) Oral at bedtime  tiotropium 18 MICROgram(s) Capsule 1 Capsule(s) Inhalation daily      Allergies:  No Known Allergies      FAMILY HISTORY:  Family history of colon cancer      Social History: Lives in downstairs apt from mother. Ambulates without cane/walker  Smoking History: Quit smoking tobacco   Alcohol Use: No EtOH use  Drug Use: Never IVDU      REVIEW OF SYSTEMS:  CONSTITUTIONAL: No weakness, fevers or chills  EYES/ENT: No visual changes;  No dysphagia  NECK: No pain or stiffness  RESPIRATORY: +mild SOB (largely resolved) No cough, wheezing, hemoptysis;   CARDIOVASCULAR: No chest pain or palpitations; No lower extremity edema  GASTROINTESTINAL: No abdominal or epigastric pain. No nausea, vomiting, or hematemesis; No diarrhea or constipation. No melena or hematochezia.  BACK: No back pain  GENITOURINARY: No dysuria, frequency or hematuria  NEUROLOGICAL: No numbness or weakness  SKIN: No itching, burning, rashes, or lesions   All other review of systems is negative unless indicated above.    Physical Exam:  T(F): 98.1 (), Max: 98.2 ()  HR: 90 () (89 - 100)  BP: 137/95 () (130/90 - 171/115)  RR: 18 ()  SpO2: 100% ()    GENERAL: No acute distress, cachetic   HEAD:  Atraumatic, Normocephalic  ENT: EOMI, PERRLA, conjunctiva and sclera clear, Neck supple, No JVD, moist mucosa  CHEST/LUNG: Clear to auscultation bilaterally; No wheeze, equal breath sounds bilaterally   BACK: No spinal tenderness  HEART: Regular rate and rhythm; No murmurs, rubs, or gallops  ABDOMEN: Soft, Nontender, Nondistended; Bowel sounds present  EXTREMITIES:  No clubbing, cyanosis, or edema  PSYCH: Nl behavior, nl affect  NEUROLOGY: AAOx3, non-focal, cranial nerves intact  SKIN: Normal color, No rashes or lesions    LINES:    Cardiovascular Diagnostic Testing:    ECG: Personally reviewed: atrial sensed, V-paced    Echo:  2018:   DIMENSIONS:  Dimensions:     Normal Values:  LA:     2.6 cm    2.0 - 4.0 cm  Ao:     2.9 cm    2.0 - 3.8 cm  SEPTUM: 0.8 cm    0.6 - 1.2 cm  PWT:    0.9 cm    0.6 - 1.1 cm  LVIDd:  5.0 cm    3.0 - 5.6 cm  LVIDs:  4.5 cm    1.8 - 4.0 cm  Derived Variables:  LVMI: 112 g/m2  RWT: 0.36  Fractional short: 10 %  Ejection Fraction (Teicholtz): 22 %  ------------------------------------------------------------------------  OBSERVATIONS:  Mitral Valve: Tethered mitral valve leaflets with normal  opening. Moderate-severe mitral regurgitation.  Aortic Root: Normal aortic root.  Aortic Valve: Calcified trileaflet aortic valve with normal  opening.  Left Atrium: Normal left atrium.  LA volume index = 34  cc/m2.  Left Ventricle: Severe global left ventricular systolic  dysfunction. Moderate left ventricular enlargement. Mild  concentric left ventricular hypertrophy.  Right Heart: Normal right atrium. Normal right ventricular  size with decreased right ventricular systolic function.  Normal tricuspid valve. Mild-moderate tricuspid  regurgitation. Normal pulmonic valve. Minimal pulmonic  regurgitation.  Pericardium/PleuraNormal pericardium with no pericardial  effusion.  Hemodynamic: Estimated right ventricular systolic pressure  equals 52 mm Hg, assuming right atrial pressure equals 10  mm Hg, consistent with moderate pulmonary hypertension.  ------------------------------------------------------------------------  CONCLUSIONS:  1. Tethered mitral valve leaflets with normal opening.  Moderate-severe mitral regurgitation.  2. Moderate left ventricular enlargement. Mild concentric  left ventricular hypertrophy.  3. Severe global left ventricular systolic dysfunction.  4. Normal right ventricular size with decreased right  ventricular systolic function.  5. Estimated pulmonary artery systolic pressure equals 52  mm Hg, assuming right atrial pressure equals 10  mm Hg,  consistent with moderate pulmonary hypertension.  ------------------------------------------------------------------------  Confirmed on  2018 - 12:54:47 by Sathish Hernandez M.D.  ------------------------------------------------------------------------    Stress Testing:    Cath:    Interpretation of Telemetry:    Imaging:    Labs: Personally reviewed                        14.0   3.58  )-----------( 182      ( 2019 07:55 )             42.2         142  |  101  |  15  ----------------------------<  125<H>  4.3   |  29  |  1.02    Ca    9.6      2019 07:55  Phos  3.5       Mg     1.9         TPro  6.4  /  Alb  4.0  /  TBili  1.0  /  DBili  x   /  AST  35<H>  /  ALT  46<H>  /  AlkPhos  58      PT/INR - ( 2019 19:28 )   PT: 11.3 SEC;   INR: 0.99          PTT - ( 2019 19:28 )  PTT:30.6 SEC    Serum Pro-Brain Natriuretic Peptide: 3568 pg/mL ( @ 21:02)    Total Cholesterol: 144  LDL: 81  HDL: 61  T    Hemoglobin A1C, Whole Blood: 5.7 % ( @ 07:55)    Thyroid Stimulating Hormone, Serum: 0.69 uIU/mL ( @ 07:55)

## 2019-04-26 NOTE — CONSULT NOTE ADULT - ASSESSMENT
68 yo woman w/ CHF (22%, 8/2018) s/p PPM/ICD (Medtronic 9/2018), HTN, CARLOTA, COPD (not on home O2), moderate pHTN p/w SOB and acute hypoxic respiratory failure likely in setting of COPD exacerbation     Impression  - Pt currently euvolemic, lungs clear to auscultation, no JVD, no BRYAN, CXR clear lungs  - Low suspicions for HF exacerbation     Recommendations: 70 yo woman w/ CHF (22%, 8/2018) s/p PPM/ICD (Medtronic 9/2018), HTN, CARLOTA, COPD (not on home O2), moderate pHTN p/w SOB and acute hypoxic respiratory failure likely in setting of COPD exacerbation     Impression  - Pt currently euvolemic, lungs clear to auscultation, no JVD, no BRYAN, CXR clear lungs  - Low suspicions for HF exacerbation     Recommendations:   - Now euvolemic, can hold lasix, Cr at baseline  - C/w home carvedilol 12.5 BID, hydralazine 25 TID  - Restart lisinopril 20 for now, however pt would likely benefit from ARNI (can consider as outpatient)   - ASA 81, simvastatin 20  - Monitor UOP, Cr and daily weights  - Strict I/Os

## 2019-04-26 NOTE — H&P ADULT - NSICDXPASTMEDICALHX_GEN_ALL_CORE_FT
PAST MEDICAL HISTORY:  CAD (coronary artery disease)     Chronic systolic CHF (congestive heart failure)     COPD (chronic obstructive pulmonary disease)     Heart failure, systolic     HTN (hypertension)     LBBB (left bundle branch block) PAST MEDICAL HISTORY:  CAD (coronary artery disease)     Chronic systolic CHF (congestive heart failure) EF 22% s/p Medtronic ICD (9/2018)    COPD (chronic obstructive pulmonary disease) not on home O2    HTN (hypertension)     LBBB (left bundle branch block)     Sleep apnea cannot tolerate CPAP PAST MEDICAL HISTORY:  CAD (coronary artery disease)     Chronic systolic CHF (congestive heart failure) EF 22% s/p Medtronic ICD (9/2018)    COPD (chronic obstructive pulmonary disease) not on home O2    HTN (hypertension)     LBBB (left bundle branch block)     Pulmonary hypertension moderate    Sleep apnea cannot tolerate CPAP PAST MEDICAL HISTORY:  CAD (coronary artery disease)     Chronic systolic CHF (congestive heart failure) EF 22% s/p Medtronic ICD (9/2018)    COPD (chronic obstructive pulmonary disease) not on home O2    HTN (hypertension)     LBBB (left bundle branch block)     Pulmonary hypertension moderate    Right-sided heart failure     Sleep apnea cannot tolerate CPAP

## 2019-04-26 NOTE — H&P ADULT - PROBLEM SELECTOR PLAN 2
Pt with new urinary retention today. No change in medications that is associated with urinary retention.  -  -straight cath, check UA, UCx, urine lytes  -strict I/Os, monitor next PVR to see if still retaining EF 22% s/p Medtronic PPM/ICD (9/2018). Dry weight is 38.1kg  -hold lisinopril for now pending SCr  -continue BB, lasix qD  -obtain standing weight  -strict I/Os  -Cardiology consult TTE 8/2018 showed EF 22%, R-sided heart failure, and moderate pHTN s/p Medtronic PPM/ICD (9/2018). Dry weight is 38.1kg  -hold lisinopril for now pending SCr  -continue BB, lasix qD  -obtain standing weight  -strict I/Os  -Cardiology consult TTE 8/2018 showed EF 22%, R-sided heart failure, and moderate pHTN s/p Medtronic PPM/ICD (9/2018). Dry weight is 38.1kg  -hold lisinopril for now pending SCr; can restart lisinopril if SCr remains stable  -continue BB, lasix qD  -obtain standing daily weights  -strict I/Os  -Cardiology consult

## 2019-04-26 NOTE — H&P ADULT - PROBLEM SELECTOR PLAN 4
EF 22% s/p Medtronic PPM/ICD (9/2018). Dry weight is 38.1kg  -lisinopril  -obtain standing weight Pt developed transient axis shift in her EKG and new RBBB, trop neg after, electrolytes wnl, and pt was asymptomatic except dyspnea from exertion. Low suspicion ACS. Likely pt with multiple cardiac issues including LBBB and severe CHF  -On tele has been V-paced rate 90s, no ectopy  -monitor on tele  -Maintain K>4, Mg>2 Moderate pHTN on echo in 8/2018, likely progressed. Not on medication or home O2  -TTE to evaluate progression  -evaluate if qualities for home O2  -Pulm consult in AM for whether needs home O2 and possible progression of pHTN and COPD

## 2019-04-26 NOTE — H&P ADULT - NSICDXPASTSURGICALHX_GEN_ALL_CORE_FT
PAST SURGICAL HISTORY:  No significant past surgical history PAST SURGICAL HISTORY:  Presence of cardioverter defibrillator Medtronic PPM/ICD placed 9/2018

## 2019-04-27 LAB
ALBUMIN SERPL ELPH-MCNC: 3.8 G/DL — SIGNIFICANT CHANGE UP (ref 3.3–5)
ALP SERPL-CCNC: 50 U/L — SIGNIFICANT CHANGE UP (ref 40–120)
ALT FLD-CCNC: 35 U/L — HIGH (ref 4–33)
ANION GAP SERPL CALC-SCNC: 13 MMO/L — SIGNIFICANT CHANGE UP (ref 7–14)
AST SERPL-CCNC: 23 U/L — SIGNIFICANT CHANGE UP (ref 4–32)
BACTERIA UR CULT: SIGNIFICANT CHANGE UP
BILIRUB DIRECT SERPL-MCNC: < 0.2 MG/DL — SIGNIFICANT CHANGE UP (ref 0.1–0.2)
BILIRUB SERPL-MCNC: 0.7 MG/DL — SIGNIFICANT CHANGE UP (ref 0.2–1.2)
BUN SERPL-MCNC: 22 MG/DL — SIGNIFICANT CHANGE UP (ref 7–23)
CALCIUM SERPL-MCNC: 8.9 MG/DL — SIGNIFICANT CHANGE UP (ref 8.4–10.5)
CHLORIDE SERPL-SCNC: 101 MMOL/L — SIGNIFICANT CHANGE UP (ref 98–107)
CO2 SERPL-SCNC: 26 MMOL/L — SIGNIFICANT CHANGE UP (ref 22–31)
CREAT SERPL-MCNC: 0.93 MG/DL — SIGNIFICANT CHANGE UP (ref 0.5–1.3)
GLUCOSE SERPL-MCNC: 114 MG/DL — HIGH (ref 70–99)
HCT VFR BLD CALC: 39.1 % — SIGNIFICANT CHANGE UP (ref 34.5–45)
HGB BLD-MCNC: 12.6 G/DL — SIGNIFICANT CHANGE UP (ref 11.5–15.5)
MAGNESIUM SERPL-MCNC: 1.9 MG/DL — SIGNIFICANT CHANGE UP (ref 1.6–2.6)
MCHC RBC-ENTMCNC: 30.1 PG — SIGNIFICANT CHANGE UP (ref 27–34)
MCHC RBC-ENTMCNC: 32.2 % — SIGNIFICANT CHANGE UP (ref 32–36)
MCV RBC AUTO: 93.5 FL — SIGNIFICANT CHANGE UP (ref 80–100)
NRBC # FLD: 0 K/UL — SIGNIFICANT CHANGE UP (ref 0–0)
PHOSPHATE SERPL-MCNC: 3.7 MG/DL — SIGNIFICANT CHANGE UP (ref 2.5–4.5)
PLATELET # BLD AUTO: 179 K/UL — SIGNIFICANT CHANGE UP (ref 150–400)
PMV BLD: 10 FL — SIGNIFICANT CHANGE UP (ref 7–13)
POTASSIUM SERPL-MCNC: 4.1 MMOL/L — SIGNIFICANT CHANGE UP (ref 3.5–5.3)
POTASSIUM SERPL-SCNC: 4.1 MMOL/L — SIGNIFICANT CHANGE UP (ref 3.5–5.3)
PROT SERPL-MCNC: 5.9 G/DL — LOW (ref 6–8.3)
RBC # BLD: 4.18 M/UL — SIGNIFICANT CHANGE UP (ref 3.8–5.2)
RBC # FLD: 13.7 % — SIGNIFICANT CHANGE UP (ref 10.3–14.5)
SODIUM SERPL-SCNC: 140 MMOL/L — SIGNIFICANT CHANGE UP (ref 135–145)
SPECIMEN SOURCE: SIGNIFICANT CHANGE UP
WBC # BLD: 7.92 K/UL — SIGNIFICANT CHANGE UP (ref 3.8–10.5)
WBC # FLD AUTO: 7.92 K/UL — SIGNIFICANT CHANGE UP (ref 3.8–10.5)

## 2019-04-27 PROCEDURE — 99233 SBSQ HOSP IP/OBS HIGH 50: CPT

## 2019-04-27 PROCEDURE — 93306 TTE W/DOPPLER COMPLETE: CPT | Mod: 26

## 2019-04-27 RX ADMIN — LISINOPRIL 20 MILLIGRAM(S): 2.5 TABLET ORAL at 21:35

## 2019-04-27 RX ADMIN — BUDESONIDE AND FORMOTEROL FUMARATE DIHYDRATE 2 PUFF(S): 160; 4.5 AEROSOL RESPIRATORY (INHALATION) at 12:34

## 2019-04-27 RX ADMIN — SIMVASTATIN 20 MILLIGRAM(S): 20 TABLET, FILM COATED ORAL at 21:36

## 2019-04-27 RX ADMIN — TIOTROPIUM BROMIDE 1 CAPSULE(S): 18 CAPSULE ORAL; RESPIRATORY (INHALATION) at 12:33

## 2019-04-27 RX ADMIN — HEPARIN SODIUM 5000 UNIT(S): 5000 INJECTION INTRAVENOUS; SUBCUTANEOUS at 17:18

## 2019-04-27 RX ADMIN — Medication 40 MILLIGRAM(S): at 05:11

## 2019-04-27 RX ADMIN — Medication 81 MILLIGRAM(S): at 12:33

## 2019-04-27 RX ADMIN — BUDESONIDE AND FORMOTEROL FUMARATE DIHYDRATE 2 PUFF(S): 160; 4.5 AEROSOL RESPIRATORY (INHALATION) at 21:36

## 2019-04-27 RX ADMIN — Medication 25 MILLIGRAM(S): at 00:26

## 2019-04-27 RX ADMIN — Medication 25 MILLIGRAM(S): at 17:18

## 2019-04-27 RX ADMIN — CARVEDILOL PHOSPHATE 12.5 MILLIGRAM(S): 80 CAPSULE, EXTENDED RELEASE ORAL at 17:18

## 2019-04-27 RX ADMIN — HEPARIN SODIUM 5000 UNIT(S): 5000 INJECTION INTRAVENOUS; SUBCUTANEOUS at 05:11

## 2019-04-27 RX ADMIN — CARVEDILOL PHOSPHATE 12.5 MILLIGRAM(S): 80 CAPSULE, EXTENDED RELEASE ORAL at 05:12

## 2019-04-27 NOTE — PROGRESS NOTE ADULT - SUBJECTIVE AND OBJECTIVE BOX
Patient is a 69y old  Female who presents with a chief complaint of Dyspnea (2019 13:17)      Patient seen and examined at bedside still very short of breath requiring oxygen  She was off the oxygen and started to desaturate as per the nursing team  Patient stated previously smoked approximately oh hold his life quit about 3 years ago she takes inhalers at home he denies any fever chills nausea vomiting diarrhea constipation, dizziness or lightheadedness                MEDICATIONS  (STANDING):  aspirin enteric coated 81 milliGRAM(s) Oral daily  buDESOnide 160 MICROgram(s)/formoterol 4.5 MICROgram(s) Inhaler 2 Puff(s) Inhalation two times a day  carvedilol 12.5 milliGRAM(s) Oral every 12 hours  heparin  Injectable 5000 Unit(s) SubCutaneous every 12 hours  hydrALAZINE 25 milliGRAM(s) Oral three times a day  lisinopril 20 milliGRAM(s) Oral daily  methylPREDNISolone sodium succinate Injectable 40 milliGRAM(s) IV Push daily  simvastatin 20 milliGRAM(s) Oral at bedtime  tiotropium 18 MICROgram(s) Capsule 1 Capsule(s) Inhalation daily    MEDICATIONS  (PRN):  acetaminophen   Tablet .. 650 milliGRAM(s) Oral every 6 hours PRN Mild Pain (1 - 3), Moderate Pain (4 - 6)  ALBUTerol    90 MICROgram(s) HFA Inhaler 2 Puff(s) Inhalation every 6 hours PRN Shortness of Breath and/or Wheezing        Vital Signs Last 24 Hrs  T(C): 36.5 (2019 12:32), Max: 36.9 (2019 21:44)  T(F): 97.7 (2019 12:32), Max: 98.5 (2019 21:44)  HR: 81 (2019 12:32) (81 - 102)  BP: 116/78 (2019 12:32) (116/78 - 154/113)  BP(mean): --  RR: 18 (2019 12:32) (18 - 18)  SpO2: 100% (2019 12:32) (99% - 100%)      PHYSICAL EXAM:  GENERAL: NAD, mild resp distress  HEAD:  Normocephalic  EYES: EOMI,  conjunctiva and sclera clear  NECK: Supple,   CHEST/LUNG: Clear to auscultation bilaterally; No wheeze  HEART:  s1 s2 + Regular rate and rhythm;   ABDOMEN: Soft, Nontender, Nondistended; Bowel sounds present  EXTREMITIES:   No clubbing, cyanosis  NEURO: AAOx3      LABS:                                                 12.6   7.92  )-----------( 179      ( 2019 07:45 )             39.1           140  |  101  |  22  ----------------------------<  114<H>  4.1   |  26  |  0.93    Ca    8.9      2019 07:45  Phos  3.7       Mg     1.9         TPro  5.9<L>  /  Alb  3.8  /  TBili  0.7  /  DBili  < 0.2  /  AST  23  /  ALT  35<H>  /  AlkPhos  50        Urinalysis Basic - ( 2019 04:45 )    Color: LIGHT YELLOW / Appearance: CLEAR / S.016 / pH: 6.0  Gluc: NEGATIVE / Ketone: NEGATIVE  / Bili: NEGATIVE / Urobili: NORMAL   Blood: NEGATIVE / Protein: 20 / Nitrite: NEGATIVE   Leuk Esterase: NEGATIVE / RBC: 6-10 / WBC 0-2   Sq Epi: OCC / Non Sq Epi: x / Bacteria: NEGATIVE            Alex Marcus MD  Hospitalist  P/ [phone] Patient is a 69y old  Female who presents with a chief complaint of Dyspnea (2019 13:17)      Patient seen and examined at bedside still very short of breath requiring oxygen  She was off the oxygen and started to desaturate as per the nursing team  Patient stated previously smoked approximately whole her life quit about 3 years ago she takes inhalers at home he denies any fever chills nausea vomiting diarrhea constipation, dizziness or lightheadedness                MEDICATIONS  (STANDING):  aspirin enteric coated 81 milliGRAM(s) Oral daily  buDESOnide 160 MICROgram(s)/formoterol 4.5 MICROgram(s) Inhaler 2 Puff(s) Inhalation two times a day  carvedilol 12.5 milliGRAM(s) Oral every 12 hours  heparin  Injectable 5000 Unit(s) SubCutaneous every 12 hours  hydrALAZINE 25 milliGRAM(s) Oral three times a day  lisinopril 20 milliGRAM(s) Oral daily  methylPREDNISolone sodium succinate Injectable 40 milliGRAM(s) IV Push daily  simvastatin 20 milliGRAM(s) Oral at bedtime  tiotropium 18 MICROgram(s) Capsule 1 Capsule(s) Inhalation daily    MEDICATIONS  (PRN):  acetaminophen   Tablet .. 650 milliGRAM(s) Oral every 6 hours PRN Mild Pain (1 - 3), Moderate Pain (4 - 6)  ALBUTerol    90 MICROgram(s) HFA Inhaler 2 Puff(s) Inhalation every 6 hours PRN Shortness of Breath and/or Wheezing        Vital Signs Last 24 Hrs  T(C): 36.5 (2019 12:32), Max: 36.9 (2019 21:44)  T(F): 97.7 (2019 12:32), Max: 98.5 (2019 21:44)  HR: 81 (2019 12:32) (81 - 102)  BP: 116/78 (2019 12:32) (116/78 - 154/113)  BP(mean): --  RR: 18 (2019 12:32) (18 - 18)  SpO2: 100% (2019 12:32) (99% - 100%)      PHYSICAL EXAM:  GENERAL: NAD, mild resp distress  HEAD:  Normocephalic  EYES: EOMI,  conjunctiva and sclera clear  NECK: Supple,   CHEST/LUNG: Clear to auscultation bilaterally; No wheeze  HEART:  s1 s2 + Regular rate and rhythm;   ABDOMEN: Soft, Nontender, Nondistended; Bowel sounds present  EXTREMITIES:   No clubbing, cyanosis  NEURO: AAOx3      LABS:                                                 12.6   7.92  )-----------( 179      ( 2019 07:45 )             39.1           140  |  101  |  22  ----------------------------<  114<H>  4.1   |  26  |  0.93    Ca    8.9      2019 07:45  Phos  3.7       Mg     1.9         TPro  5.9<L>  /  Alb  3.8  /  TBili  0.7  /  DBili  < 0.2  /  AST  23  /  ALT  35<H>  /  AlkPhos  50        Urinalysis Basic - ( 2019 04:45 )    Color: LIGHT YELLOW / Appearance: CLEAR / S.016 / pH: 6.0  Gluc: NEGATIVE / Ketone: NEGATIVE  / Bili: NEGATIVE / Urobili: NORMAL   Blood: NEGATIVE / Protein: 20 / Nitrite: NEGATIVE   Leuk Esterase: NEGATIVE / RBC: 6-10 / WBC 0-2   Sq Epi: OCC / Non Sq Epi: x / Bacteria: NEGATIVE            Alex Marcus MD  Hospitalist  P/ [phone] Patient is a 69y old  Female who presents with a chief complaint of Dyspnea (2019 13:17)      Patient seen and examined at bedside still very short of breath requiring oxygen  She was off the oxygen and started to desaturate as per the nursing team  Patient stated previously smoked approximately whole her life quit about 3 years ago she takes inhalers at home he denies any fever chills nausea vomiting diarrhea constipation, dizziness or lightheadedness                MEDICATIONS  (STANDING):  aspirin enteric coated 81 milliGRAM(s) Oral daily  buDESOnide 160 MICROgram(s)/formoterol 4.5 MICROgram(s) Inhaler 2 Puff(s) Inhalation two times a day  carvedilol 12.5 milliGRAM(s) Oral every 12 hours  heparin  Injectable 5000 Unit(s) SubCutaneous every 12 hours  hydrALAZINE 25 milliGRAM(s) Oral three times a day  lisinopril 20 milliGRAM(s) Oral daily  methylPREDNISolone sodium succinate Injectable 40 milliGRAM(s) IV Push daily  simvastatin 20 milliGRAM(s) Oral at bedtime  tiotropium 18 MICROgram(s) Capsule 1 Capsule(s) Inhalation daily    MEDICATIONS  (PRN):  acetaminophen   Tablet .. 650 milliGRAM(s) Oral every 6 hours PRN Mild Pain (1 - 3), Moderate Pain (4 - 6)  ALBUTerol    90 MICROgram(s) HFA Inhaler 2 Puff(s) Inhalation every 6 hours PRN Shortness of Breath and/or Wheezing        Vital Signs Last 24 Hrs  T(C): 36.5 (2019 12:32), Max: 36.9 (2019 21:44)  T(F): 97.7 (2019 12:32), Max: 98.5 (2019 21:44)  HR: 81 (2019 12:32) (81 - 102)  BP: 116/78 (2019 12:32) (116/78 - 154/113)  BP(mean): --  RR: 18 (2019 12:32) (18 - 18)  SpO2: 100% (2019 12:32) (99% - 100%)      PHYSICAL EXAM:  GENERAL: NAD, mild resp distress  HEAD:  Normocephalic  EYES: EOMI,  conjunctiva and sclera clear  NECK: Supple,   CHEST/LUNG: Clear to auscultation bilaterally; No wheeze  HEART:  s1 s2 + Regular rate and rhythm;   ABDOMEN: Soft, Nontender, Nondistended; Bowel sounds present  EXTREMITIES:   No clubbing, cyanosis  NEURO: AAOx3      LABS:                                                 12.6   7.92  )-----------( 179      ( 2019 07:45 )             39.1           140  |  101  |  22  ----------------------------<  114<H>  4.1   |  26  |  0.93    Ca    8.9      2019 07:45  Phos  3.7       Mg     1.9         TPro  5.9<L>  /  Alb  3.8  /  TBili  0.7  /  DBili  < 0.2  /  AST  23  /  ALT  35<H>  /  AlkPhos  50        Urinalysis Basic - ( 2019 04:45 )    Color: LIGHT YELLOW / Appearance: CLEAR / S.016 / pH: 6.0  Gluc: NEGATIVE / Ketone: NEGATIVE  / Bili: NEGATIVE / Urobili: NORMAL   Blood: NEGATIVE / Protein: 20 / Nitrite: NEGATIVE   Leuk Esterase: NEGATIVE / RBC: 6-10 / WBC 0-2   Sq Epi: OCC / Non Sq Epi: x / Bacteria: NEGATIVE

## 2019-04-28 LAB
ALBUMIN SERPL ELPH-MCNC: 3.7 G/DL — SIGNIFICANT CHANGE UP (ref 3.3–5)
ALP SERPL-CCNC: 59 U/L — SIGNIFICANT CHANGE UP (ref 40–120)
ALT FLD-CCNC: 39 U/L — HIGH (ref 4–33)
ANION GAP SERPL CALC-SCNC: 14 MMO/L — SIGNIFICANT CHANGE UP (ref 7–14)
AST SERPL-CCNC: 30 U/L — SIGNIFICANT CHANGE UP (ref 4–32)
BILIRUB DIRECT SERPL-MCNC: < 0.2 MG/DL — SIGNIFICANT CHANGE UP (ref 0.1–0.2)
BILIRUB SERPL-MCNC: 0.5 MG/DL — SIGNIFICANT CHANGE UP (ref 0.2–1.2)
BUN SERPL-MCNC: 30 MG/DL — HIGH (ref 7–23)
CALCIUM SERPL-MCNC: 9.2 MG/DL — SIGNIFICANT CHANGE UP (ref 8.4–10.5)
CHLORIDE SERPL-SCNC: 102 MMOL/L — SIGNIFICANT CHANGE UP (ref 98–107)
CO2 SERPL-SCNC: 24 MMOL/L — SIGNIFICANT CHANGE UP (ref 22–31)
CREAT SERPL-MCNC: 1.13 MG/DL — SIGNIFICANT CHANGE UP (ref 0.5–1.3)
GLUCOSE SERPL-MCNC: 75 MG/DL — SIGNIFICANT CHANGE UP (ref 70–99)
HCT VFR BLD CALC: 43.2 % — SIGNIFICANT CHANGE UP (ref 34.5–45)
HCV AB S/CO SERPL IA: 0.07 S/CO — SIGNIFICANT CHANGE UP (ref 0–0.99)
HCV AB SERPL-IMP: SIGNIFICANT CHANGE UP
HGB BLD-MCNC: 13.5 G/DL — SIGNIFICANT CHANGE UP (ref 11.5–15.5)
LG PLATELETS BLD QL AUTO: SLIGHT — SIGNIFICANT CHANGE UP
MAGNESIUM SERPL-MCNC: 2.1 MG/DL — SIGNIFICANT CHANGE UP (ref 1.6–2.6)
MANUAL SMEAR VERIFICATION: SIGNIFICANT CHANGE UP
MCHC RBC-ENTMCNC: 31 PG — SIGNIFICANT CHANGE UP (ref 27–34)
MCHC RBC-ENTMCNC: 31.3 % — LOW (ref 32–36)
MCV RBC AUTO: 99.1 FL — SIGNIFICANT CHANGE UP (ref 80–100)
NRBC # FLD: 0 K/UL — SIGNIFICANT CHANGE UP (ref 0–0)
PHOSPHATE SERPL-MCNC: 3.4 MG/DL — SIGNIFICANT CHANGE UP (ref 2.5–4.5)
PLATELET # BLD AUTO: 124 K/UL — LOW (ref 150–400)
PLATELET COUNT - ESTIMATE: SIGNIFICANT CHANGE UP
PMV BLD: 10.8 FL — SIGNIFICANT CHANGE UP (ref 7–13)
POTASSIUM SERPL-MCNC: 5.3 MMOL/L — SIGNIFICANT CHANGE UP (ref 3.5–5.3)
POTASSIUM SERPL-SCNC: 5.3 MMOL/L — SIGNIFICANT CHANGE UP (ref 3.5–5.3)
PROT SERPL-MCNC: 6 G/DL — SIGNIFICANT CHANGE UP (ref 6–8.3)
RBC # BLD: 4.36 M/UL — SIGNIFICANT CHANGE UP (ref 3.8–5.2)
RBC # FLD: 13.7 % — SIGNIFICANT CHANGE UP (ref 10.3–14.5)
SODIUM SERPL-SCNC: 140 MMOL/L — SIGNIFICANT CHANGE UP (ref 135–145)
WBC # BLD: 10.47 K/UL — SIGNIFICANT CHANGE UP (ref 3.8–10.5)
WBC # FLD AUTO: 10.47 K/UL — SIGNIFICANT CHANGE UP (ref 3.8–10.5)

## 2019-04-28 PROCEDURE — 99233 SBSQ HOSP IP/OBS HIGH 50: CPT

## 2019-04-28 RX ADMIN — Medication 40 MILLIGRAM(S): at 06:01

## 2019-04-28 RX ADMIN — HEPARIN SODIUM 5000 UNIT(S): 5000 INJECTION INTRAVENOUS; SUBCUTANEOUS at 06:04

## 2019-04-28 RX ADMIN — BUDESONIDE AND FORMOTEROL FUMARATE DIHYDRATE 2 PUFF(S): 160; 4.5 AEROSOL RESPIRATORY (INHALATION) at 21:16

## 2019-04-28 RX ADMIN — Medication 25 MILLIGRAM(S): at 01:01

## 2019-04-28 RX ADMIN — Medication 25 MILLIGRAM(S): at 18:18

## 2019-04-28 RX ADMIN — BUDESONIDE AND FORMOTEROL FUMARATE DIHYDRATE 2 PUFF(S): 160; 4.5 AEROSOL RESPIRATORY (INHALATION) at 08:59

## 2019-04-28 RX ADMIN — HEPARIN SODIUM 5000 UNIT(S): 5000 INJECTION INTRAVENOUS; SUBCUTANEOUS at 18:19

## 2019-04-28 RX ADMIN — CARVEDILOL PHOSPHATE 12.5 MILLIGRAM(S): 80 CAPSULE, EXTENDED RELEASE ORAL at 18:18

## 2019-04-28 RX ADMIN — TIOTROPIUM BROMIDE 1 CAPSULE(S): 18 CAPSULE ORAL; RESPIRATORY (INHALATION) at 10:33

## 2019-04-28 RX ADMIN — Medication 25 MILLIGRAM(S): at 08:59

## 2019-04-28 RX ADMIN — CARVEDILOL PHOSPHATE 12.5 MILLIGRAM(S): 80 CAPSULE, EXTENDED RELEASE ORAL at 06:01

## 2019-04-28 RX ADMIN — LISINOPRIL 20 MILLIGRAM(S): 2.5 TABLET ORAL at 21:16

## 2019-04-28 RX ADMIN — SIMVASTATIN 20 MILLIGRAM(S): 20 TABLET, FILM COATED ORAL at 21:16

## 2019-04-28 RX ADMIN — Medication 81 MILLIGRAM(S): at 12:20

## 2019-04-28 NOTE — PROGRESS NOTE ADULT - PROBLEM SELECTOR PLAN 5
Continue home inhalers, albuterol prn  -currently on 4L NC, titrate to keep O2 sats 90-95%  likely qualifies for home O2  Document saturation of oxygen

## 2019-04-28 NOTE — PROGRESS NOTE ADULT - SUBJECTIVE AND OBJECTIVE BOX
Patient is a 69y old  Female who presents with a chief complaint of Dyspnea (26 Apr 2019 13:17)      Patient seen and examined at bedside she was off oxygen  Feeling still short of breath  Echo reviewed showed mild pulmonary hypertension  She stated that she is feeling better  But however breathing get worse when she goes to the bathroom  Denies fever, chills, dizziness, lightheadedness, nausea, vomiting, diarrhea, constipation,.      MEDICATIONS  (STANDING):  aspirin enteric coated 81 milliGRAM(s) Oral daily  buDESOnide 160 MICROgram(s)/formoterol 4.5 MICROgram(s) Inhaler 2 Puff(s) Inhalation two times a day  carvedilol 12.5 milliGRAM(s) Oral every 12 hours  heparin  Injectable 5000 Unit(s) SubCutaneous every 12 hours  hydrALAZINE 25 milliGRAM(s) Oral three times a day  lisinopril 20 milliGRAM(s) Oral daily  methylPREDNISolone sodium succinate Injectable 40 milliGRAM(s) IV Push daily  simvastatin 20 milliGRAM(s) Oral at bedtime  tiotropium 18 MICROgram(s) Capsule 1 Capsule(s) Inhalation daily    MEDICATIONS  (PRN):  acetaminophen   Tablet .. 650 milliGRAM(s) Oral every 6 hours PRN Mild Pain (1 - 3), Moderate Pain (4 - 6)  ALBUTerol    90 MICROgram(s) HFA Inhaler 2 Puff(s) Inhalation every 6 hours PRN Shortness of Breath and/or Wheezing      Vital Signs Last 24 Hrs  T(C): 36.9 (28 Apr 2019 08:57), Max: 36.9 (28 Apr 2019 08:57)  T(F): 98.4 (28 Apr 2019 08:57), Max: 98.4 (28 Apr 2019 08:57)  HR: 76 (28 Apr 2019 08:57) (76 - 93)  BP: 127/84 (28 Apr 2019 08:57) (118/65 - 134/81)  BP(mean): --  RR: 18 (28 Apr 2019 08:57) (18 - 18)  SpO2: 100% (28 Apr 2019 08:57) (97% - 100%)    PHYSICAL EXAM:  GENERAL: NAD, mild resp distress  HEAD:  Normocephalic  EYES: EOMI,  conjunctiva and sclera clear  NECK: Supple,   CHEST/LUNG: Clear to auscultation bilaterally; No wheeze  HEART:  s1 s2 + Regular rate and rhythm;   ABDOMEN: Soft, Nontender, Nondistended; Bowel sounds present  EXTREMITIES:   No clubbing, cyanosis  NEURO: AAOx3      LABS:                                                           13.5   10.47 )-----------( 124      ( 28 Apr 2019 06:10 )             43.2           04-28    140  |  102  |  30<H>  ----------------------------<  75  5.3   |  24  |  1.13    Ca    9.2      28 Apr 2019 06:10  Phos  3.4     04-28  Mg     2.1     04-28    TPro  6.0  /  Alb  3.7  /  TBili  0.5  /  DBili  < 0.2  /  AST  30  /  ALT  39<H>  /  AlkPhos  59  04-28

## 2019-04-29 ENCOUNTER — TRANSCRIPTION ENCOUNTER (OUTPATIENT)
Age: 69
End: 2019-04-29

## 2019-04-29 VITALS
DIASTOLIC BLOOD PRESSURE: 94 MMHG | HEART RATE: 88 BPM | TEMPERATURE: 98 F | OXYGEN SATURATION: 96 % | SYSTOLIC BLOOD PRESSURE: 153 MMHG | RESPIRATION RATE: 18 BRPM

## 2019-04-29 DIAGNOSIS — Z71.89 OTHER SPECIFIED COUNSELING: ICD-10-CM

## 2019-04-29 LAB
ALBUMIN SERPL ELPH-MCNC: 3.6 G/DL — SIGNIFICANT CHANGE UP (ref 3.3–5)
ALP SERPL-CCNC: 57 U/L — SIGNIFICANT CHANGE UP (ref 40–120)
ALT FLD-CCNC: 51 U/L — HIGH (ref 4–33)
ANION GAP SERPL CALC-SCNC: 11 MMO/L — SIGNIFICANT CHANGE UP (ref 7–14)
AST SERPL-CCNC: 43 U/L — HIGH (ref 4–32)
BILIRUB DIRECT SERPL-MCNC: < 0.2 MG/DL — SIGNIFICANT CHANGE UP (ref 0.1–0.2)
BILIRUB SERPL-MCNC: 0.4 MG/DL — SIGNIFICANT CHANGE UP (ref 0.2–1.2)
BUN SERPL-MCNC: 27 MG/DL — HIGH (ref 7–23)
CALCIUM SERPL-MCNC: 8.9 MG/DL — SIGNIFICANT CHANGE UP (ref 8.4–10.5)
CHLORIDE SERPL-SCNC: 99 MMOL/L — SIGNIFICANT CHANGE UP (ref 98–107)
CO2 SERPL-SCNC: 27 MMOL/L — SIGNIFICANT CHANGE UP (ref 22–31)
CREAT SERPL-MCNC: 1.13 MG/DL — SIGNIFICANT CHANGE UP (ref 0.5–1.3)
GLUCOSE SERPL-MCNC: 83 MG/DL — SIGNIFICANT CHANGE UP (ref 70–99)
HCT VFR BLD CALC: 41.1 % — SIGNIFICANT CHANGE UP (ref 34.5–45)
HGB BLD-MCNC: 13.1 G/DL — SIGNIFICANT CHANGE UP (ref 11.5–15.5)
MAGNESIUM SERPL-MCNC: 2.2 MG/DL — SIGNIFICANT CHANGE UP (ref 1.6–2.6)
MCHC RBC-ENTMCNC: 30.8 PG — SIGNIFICANT CHANGE UP (ref 27–34)
MCHC RBC-ENTMCNC: 31.9 % — LOW (ref 32–36)
MCV RBC AUTO: 96.5 FL — SIGNIFICANT CHANGE UP (ref 80–100)
NRBC # FLD: 0 K/UL — SIGNIFICANT CHANGE UP (ref 0–0)
PHOSPHATE SERPL-MCNC: 3.1 MG/DL — SIGNIFICANT CHANGE UP (ref 2.5–4.5)
PLATELET # BLD AUTO: 190 K/UL — SIGNIFICANT CHANGE UP (ref 150–400)
PMV BLD: 10.1 FL — SIGNIFICANT CHANGE UP (ref 7–13)
POTASSIUM SERPL-MCNC: 4.3 MMOL/L — SIGNIFICANT CHANGE UP (ref 3.5–5.3)
POTASSIUM SERPL-SCNC: 4.3 MMOL/L — SIGNIFICANT CHANGE UP (ref 3.5–5.3)
PROT SERPL-MCNC: 5.6 G/DL — LOW (ref 6–8.3)
RBC # BLD: 4.26 M/UL — SIGNIFICANT CHANGE UP (ref 3.8–5.2)
RBC # FLD: 14 % — SIGNIFICANT CHANGE UP (ref 10.3–14.5)
SODIUM SERPL-SCNC: 137 MMOL/L — SIGNIFICANT CHANGE UP (ref 135–145)
WBC # BLD: 8.62 K/UL — SIGNIFICANT CHANGE UP (ref 3.8–10.5)
WBC # FLD AUTO: 8.62 K/UL — SIGNIFICANT CHANGE UP (ref 3.8–10.5)

## 2019-04-29 PROCEDURE — 99239 HOSP IP/OBS DSCHRG MGMT >30: CPT

## 2019-04-29 RX ADMIN — Medication 25 MILLIGRAM(S): at 18:24

## 2019-04-29 RX ADMIN — CARVEDILOL PHOSPHATE 12.5 MILLIGRAM(S): 80 CAPSULE, EXTENDED RELEASE ORAL at 17:55

## 2019-04-29 RX ADMIN — Medication 40 MILLIGRAM(S): at 06:11

## 2019-04-29 RX ADMIN — BUDESONIDE AND FORMOTEROL FUMARATE DIHYDRATE 2 PUFF(S): 160; 4.5 AEROSOL RESPIRATORY (INHALATION) at 09:05

## 2019-04-29 RX ADMIN — Medication 81 MILLIGRAM(S): at 11:28

## 2019-04-29 RX ADMIN — CARVEDILOL PHOSPHATE 12.5 MILLIGRAM(S): 80 CAPSULE, EXTENDED RELEASE ORAL at 06:11

## 2019-04-29 RX ADMIN — Medication 25 MILLIGRAM(S): at 11:28

## 2019-04-29 RX ADMIN — TIOTROPIUM BROMIDE 1 CAPSULE(S): 18 CAPSULE ORAL; RESPIRATORY (INHALATION) at 09:05

## 2019-04-29 RX ADMIN — Medication 25 MILLIGRAM(S): at 02:06

## 2019-04-29 RX ADMIN — HEPARIN SODIUM 5000 UNIT(S): 5000 INJECTION INTRAVENOUS; SUBCUTANEOUS at 06:11

## 2019-04-29 NOTE — PROGRESS NOTE ADULT - PROBLEM SELECTOR PLAN 3
bladder scan prn
PVR 100cc  no signs of retention
Pt with new urinary retention today, denies hx urinary retention. No change in medications that is associated with urinary retention.  -, will repeat bladder scan
bladder scan prn

## 2019-04-29 NOTE — PROGRESS NOTE ADULT - PROBLEM SELECTOR PROBLEM 1
Acute respiratory failure with hypoxia

## 2019-04-29 NOTE — PROGRESS NOTE ADULT - PROBLEM SELECTOR PLAN 2
TTE 8/2018 showed EF 22%, R-sided heart failure, and moderate pHTN s/p Medtronic PPM/ICD (9/2018). Dry weight is 38.1kg  -c/w lisinopril  -continue BB, lasix qD  -obtain standing daily weights  -strict I/Os

## 2019-04-29 NOTE — DISCHARGE NOTE NURSING/CASE MANAGEMENT/SOCIAL WORK - NSDCDPATPORTLINK_GEN_ALL_CORE
You can access the [facility], offered by [facility], by registering with the following website: [email or web address]

## 2019-04-29 NOTE — DISCHARGE NOTE PROVIDER - NSDCCPCAREPLAN_GEN_ALL_CORE_FT
PRINCIPAL DISCHARGE DIAGNOSIS  Diagnosis: COPD exacerbation  Assessment and Plan of Treatment: You were found to have low oxygen on admission and required oxygen supplemenation by a nasal cannula. You were successfully taken off the oxyen. This was likely secondary to a COPD exacerbation for which you were treated with IV steroids. You did not have an infection. Continue all your COPD medications as prescribed.      SECONDARY DISCHARGE DIAGNOSES  Diagnosis: Pulmonary hypertension  Assessment and Plan of Treatment: Your pulmonary hypertension was shown to be mild on your echocardiogram. Follow up routinely with your pulomonologist and cardiologist.    Diagnosis: HTN (hypertension)  Assessment and Plan of Treatment: Continue with Lisinopril, Hydralazine and Coreg ad prescribed.    Diagnosis: Urinary retention  Assessment and Plan of Treatment: Your bladder scan showed you were holding onto urine after voiding. Repeat bladder scans showed this resolved. Keep an eye on your urinary habits. If you notice pain with urination or decreased urinary frequency, contact your physician.    Diagnosis: Chronic systolic CHF (congestive heart failure)  Assessment and Plan of Treatment: Chronic systolic CHF (congestive heart failure) PRINCIPAL DISCHARGE DIAGNOSIS  Diagnosis: COPD exacerbation  Assessment and Plan of Treatment: You were found to have low oxygen on admission and required oxygen supplemenation by a nasal cannula. You were successfully taken off the oxyen. This was likely secondary to a COPD exacerbation for which you were treated with IV steroids. You did not have an infection. Continue all your COPD medications as prescribed.      SECONDARY DISCHARGE DIAGNOSES  Diagnosis: Abnormal liver function tests  Assessment and Plan of Treatment: Your liver enzymes were slightly elevated. We recommend you stop for cholesterol medication simvastatin until you follow up with your primary care physician within one week for repeat bloodwork.    Diagnosis: Pulmonary hypertension  Assessment and Plan of Treatment: Your pulmonary hypertension was shown to be mild on your echocardiogram. Follow up routinely with your pulomonologist and cardiologist.    Diagnosis: HTN (hypertension)  Assessment and Plan of Treatment: Continue with Lisinopril, Hydralazine and Coreg ad prescribed.    Diagnosis: Urinary retention  Assessment and Plan of Treatment: Your bladder scan showed you were holding onto urine after voiding. Repeat bladder scans showed this resolved. Keep an eye on your urinary habits. If you notice pain with urination or decreased urinary frequency, contact your physician.    Diagnosis: Chronic systolic CHF (congestive heart failure)  Assessment and Plan of Treatment: Continue with your lasix as needed. Weigh yourself daily. If you notice an increase in weight >3lbs, shortness of breath or lower extremity swelling, contact your physician. Maintain a low salt diet. PRINCIPAL DISCHARGE DIAGNOSIS  Diagnosis: COPD exacerbation  Assessment and Plan of Treatment: You were found to have low oxygen on admission and required oxygen supplemenation by a nasal cannula. You were successfully taken off the oxyen. This was likely secondary to a COPD exacerbation for which you were treated with IV steroids. You did not have an infection. Continue all your COPD medications as prescribed. Continue with oral prednisone for 5 days.      SECONDARY DISCHARGE DIAGNOSES  Diagnosis: Abnormal liver function tests  Assessment and Plan of Treatment: Your liver enzymes were slightly elevated. We recommend you stop for cholesterol medication simvastatin until you follow up with your primary care physician within one week for repeat bloodwork.    Diagnosis: Pulmonary hypertension  Assessment and Plan of Treatment: Your pulmonary hypertension was shown to be mild on your echocardiogram. Follow up routinely with your pulomonologist and cardiologist.    Diagnosis: HTN (hypertension)  Assessment and Plan of Treatment: Continue with Lisinopril, Hydralazine and Coreg ad prescribed.    Diagnosis: Urinary retention  Assessment and Plan of Treatment: Your bladder scan showed you were holding onto urine after voiding. Repeat bladder scans showed this resolved. Keep an eye on your urinary habits. If you notice pain with urination or decreased urinary frequency, contact your physician.    Diagnosis: Chronic systolic CHF (congestive heart failure)  Assessment and Plan of Treatment: Continue with your lasix as needed. Weigh yourself daily. If you notice an increase in weight >3lbs, shortness of breath or lower extremity swelling, contact your physician. Maintain a low salt diet.

## 2019-04-29 NOTE — DISCHARGE NOTE PROVIDER - HOSPITAL COURSE
HPI: 69F hx of CHF (EF 22% in 8/2018) s/p PPM/ICD ([employer] 9/2018), R-sided heart failure, HTN, CARLOTA not able to tolerate CPAP, severe COPD (not on home O2), moderate pHTN presenting with SOB x 2d. Pt woke up 2 days ago with increased work of breathing. Her throat felt very dry. She used an albuterol nebulizer and the feeling of dyspnea went away after a few hours. She woke up yesterday again with shortness of breath. She tried another albuterol nebulizer, but couldn't tolerate it. She is unable to explain why she couldn't finish it stating she hadn't needed the nebulizer in months. She walked to the bathroom and was very short of breath whereas her baseline is able to walk 1.5 blocks before needing to stop. Today in the emergency room, pt noticed she's been trying to urinate all day, but only a trickle comes out and she feels like her bladder is full. She takes lasix 20mg prn when her legs get swollen but she hasn't needed to take that in months. Her usual weight is 84lb. When her COPD acts up she usually has cough with white sputum, no wheezing. Denies F/C, sweats, CP, palpitations, abd pain, N/V/D, melena, hematochezia, dizziness, sore throat, rhinorrhea, dysuria, hematuria. Does not use home O2 and and states she has never been tested whether she needs home O2. Reports she's been taking all her medications. She recently followed up with her pulmonologist 4/18/2019 and was started on hydralazine 25mg TID for BP that were consistently 150-169 /  since 8/2018.        Hospital Course: Patient admitted to telemetry for hypoxic respiratory failure that was likely secondary to a COPD exacerbation and/or worsening pulmonary HTN. Was treated with IV Solumedrol for COPD exacerbation. RVP neg, afebrile, no leukocytosis, low suspicion for infectious etiology. pCO2 is at baseline of 60s. Cardiology consult called- no evidence of heart failure exacerbation and patient remained euvolemic throughout admission. Patient with urinary retention on admission with >400cc PVR on bladder scans. Repeat bladder scans normal. Patient with moderate pulmonary HTN on 8/2018. Repeat echo with mild pulmonary HTN. Pt developed transient axis shift in her EKG and new RBBB, trop neg after, electrolytes wnl, and pt was asymptomatic except dyspnea from exertion. Low suspicion ACS. Likely pt with multiple cardiac issues including LBBB and severe CHF. Patient with 3-4L O2 by NC requirement on admission however was able to be weaned off. O2 sat with ambulation >96%.         TTE:    1. Moderately dilated left atrium.  LA volume index = 43cc/m2.    2. Severe global left ventricular systolic dysfunction.    3. Right ventricular enlargement with decreased right ventricular systolic function. Device wire is noted in the right heart.    4. Normal tricuspid valve.  Moderate tricuspid regurgitation.    5. Estimated pulmonary artery systolic pressure equals 47mm Hg, assuming right atrial pressure equals 10  mm Hg, consistent with mild pulmonary hypertension.    EF 20-25%        Case discussed with Dr. Marcus on 4/29/19. The patient is medically stable for discharge and has appropriate follow up.

## 2019-04-29 NOTE — DISCHARGE NOTE PROVIDER - NSDCFUADDINST_GEN_ALL_CORE_FT
Follow up with your primary care physician within one week of discharge and your pulmonologist within 2 weeks of discharge. For new or worsening symptoms, please return to the hospital.

## 2019-04-29 NOTE — DISCHARGE NOTE NURSING/CASE MANAGEMENT/SOCIAL WORK - NSDCPEPT PROEDHF_GEN_ALL_CORE
Activities as tolerated/Monitor weight daily/Call primary care provider for follow up after discharge/Report signs and symptoms to primary care provider/Low salt diet

## 2019-04-29 NOTE — DISCHARGE NOTE PROVIDER - CARE PROVIDER_API CALL
Marisela Teran)  Critical Care Medicine; Internal Medicine; Pulmonary Disease  [address] [place] [address]  Phone: [phone]  Fax: [phone]  Follow Up Time:

## 2019-04-29 NOTE — PROGRESS NOTE ADULT - PROBLEM SELECTOR PROBLEM 2
Chronic systolic CHF (congestive heart failure)

## 2019-04-29 NOTE — PROGRESS NOTE ADULT - PROBLEM SELECTOR PLAN 4
Moderate pHTN on echo in 8/2018, likely progressed. Not on medication or home O2  echo  -evaluate if qualities for home O2  -Pulm consult if Cards want pulm involved
Moderate pHTN on echo in 8/2018, likely progressed. Not on medication or home O2  -TTE to evaluate progression  -evaluate if qualities for home O2  -Pulm consult if Cards want pulm involved
repeat echo reviewed ,  -evaluate if qualities for home O2  Pulmonary consult as outpatient
severe pulm htn per TTE  pt to follow up outpt Pul Dr. Palumbo as scheduled 5/16 at 12pm

## 2019-04-29 NOTE — PROGRESS NOTE ADULT - PROBLEM SELECTOR PLAN 1
Shortness of breath Likely due to COPD exacerbation and/or Worsenign severe pulm HTN  RVP neg, afebrile, no leukocytosis, low suspicion for infectious etiology.    pCO2 is at baseline of 60s,   Pt does not appear overloaded on exam and CXR clear,    Well's score low 1.5, no sign of DVT on exam.  -supplemental O2 to maintain SpO2 between 90-95%  -resume pt's lasix 20mg po daily. Was not taking it for months as legs were not swollen  -Cardiology consulted, will hold off on IV diuresis until cards recs  -Will c/w steroids for COPD exacerbation given improvement after steroids
Pt developed acute shortness of breath and dyspnea on exertion. Likely due to COPD exacerbation and/or Worsenign severe pulm HTN  RVP neg, afebrile, no leukocytosis, low suspicion for infectious etiology.    pCO2 is at baseline of 60s,   Pt does not appear overloaded on exam and CXR clear,    Well's score low 1.5, no sign of DVT on exam.  -supplemental O2 to maintain SpO2 between 90-95%  -resume pt's lasix 20mg po daily. Was not taking it for months as legs were not swollen  -Cardiology consulted, will hold off on IV diuresis until cards recs  -Will c/w steroids for COPD exacerbation given improvement after steroids
Shortness of breath Likely due to COPD exacerbation   Improved with steroids, Pt Sta 96% on RA with exertion, no need for home oxygen  RVP neg, afebrile, no leukocytosis, low suspicion for infectious etiology.    pCO2 is at baseline of 60s,   Pt does not appear overloaded on exam and CXR clear,    Well's score low 1.5, no sign of DVT on exam.  -supplemental O2 to maintain SpO2 between 90-95%  -resume pt's lasix 20mg po daily. Was not taking it for months as legs were not swollen
Shortness of breath Likely due to COPD exacerbation   RVP neg, afebrile, no leukocytosis, low suspicion for infectious etiology.    pCO2 is at baseline of 60s,   Pt does not appear overloaded on exam and CXR clear,    Well's score low 1.5, no sign of DVT on exam.  -supplemental O2 to maintain SpO2 between 90-95%  -resume pt's lasix 20mg po daily. Was not taking it for months as legs were not swollen  -Will c/w steroids for COPD exacerbation given improvement after steroids

## 2019-04-29 NOTE — PROGRESS NOTE ADULT - SUBJECTIVE AND OBJECTIVE BOX
Patient is a 69y old  Female who presents with a chief complaint of Dyspnea (2019 13:40)      SUBJECTIVE / OVERNIGHT EVENTS: Improved Dyspnea. No CP    MEDICATIONS  (STANDING):  aspirin enteric coated 81 milliGRAM(s) Oral daily  buDESOnide 160 MICROgram(s)/formoterol 4.5 MICROgram(s) Inhaler 2 Puff(s) Inhalation two times a day  carvedilol 12.5 milliGRAM(s) Oral every 12 hours  heparin  Injectable 5000 Unit(s) SubCutaneous every 12 hours  hydrALAZINE 25 milliGRAM(s) Oral three times a day  lisinopril 20 milliGRAM(s) Oral daily  methylPREDNISolone sodium succinate Injectable 40 milliGRAM(s) IV Push daily  tiotropium 18 MICROgram(s) Capsule 1 Capsule(s) Inhalation daily    MEDICATIONS  (PRN):  acetaminophen   Tablet .. 650 milliGRAM(s) Oral every 6 hours PRN Mild Pain (1 - 3), Moderate Pain (4 - 6)  ALBUTerol    90 MICROgram(s) HFA Inhaler 2 Puff(s) Inhalation every 6 hours PRN Shortness of Breath and/or Wheezing      T(C): 36.7 (19 @ 11:26), Max: 36.9 (19 @ 20:54)  HR: 77 (19 @ 11:26) (74 - 83)  BP: 115/69 (19 @ 11:26) (115/69 - 156/84)  RR: 16 (19 @ 11:26) (16 - 18)  SpO2: 100% (19 @ 11:26) (96% - 100%)  CAPILLARY BLOOD GLUCOSE        I&O's Summary    2019 07:01  -  2019 07:00  --------------------------------------------------------  IN: 330 mL / OUT: 1400 mL / NET: -1070 mL        PHYSICAL EXAM:  GENERAL: NAD,   HEAD:  Normocephalic  EYES: EOMI,  conjunctiva and sclera clear  NECK: Supple,   CHEST/LUNG: Clear to auscultation bilaterally; No wheeze  HEART:  s1 s2 + Regular rate and rhythm;   ABDOMEN: Soft, Nontender, Nondistended; Bowel sounds present  EXTREMITIES:   No clubbing, cyanosis  NEURO: AAOx3      LABS:                        13.1   8.62  )-----------( 190      ( 2019 06:15 )             41.1         137  |  99  |  27<H>  ----------------------------<  83  4.3   |  27  |  1.13    Ca    8.9      2019 06:15  Phos  3.1       Mg     2.2         TPro  5.6<L>  /  Alb  3.6  /  TBili  0.4  /  DBili  < 0.2  /  AST  43<H>  /  ALT  51<H>  /  AlkPhos  57              Alex Marcus MD  Hospitalist  P/ [phone]

## 2019-05-16 ENCOUNTER — APPOINTMENT (OUTPATIENT)
Dept: PULMONOLOGY | Facility: CLINIC | Age: 69
End: 2019-05-16
Payer: MEDICARE

## 2019-05-16 VITALS
DIASTOLIC BLOOD PRESSURE: 124 MMHG | BODY MASS INDEX: 14.72 KG/M2 | SYSTOLIC BLOOD PRESSURE: 182 MMHG | HEART RATE: 102 BPM | OXYGEN SATURATION: 96 % | WEIGHT: 80 LBS | HEIGHT: 62 IN

## 2019-05-16 PROBLEM — I50.810 RIGHT HEART FAILURE, UNSPECIFIED: Chronic | Status: ACTIVE | Noted: 2019-04-26

## 2019-05-16 PROCEDURE — 99214 OFFICE O/P EST MOD 30 MIN: CPT | Mod: 25

## 2019-05-16 PROCEDURE — 94010 BREATHING CAPACITY TEST: CPT | Mod: 59

## 2019-05-16 NOTE — HISTORY OF PRESENT ILLNESS
[FreeTextEntry1] : The patient is a 69-year-old  lady with history of COPD returning for followup after hospitalization. The patient is hospitalized because of severe hypertension and exacerbation of COPD. She was hospitalized for about 5 days and discharged last week. The patient has been on most of the same medications since discharge.\par The patient was diagnosed to have acute exacerbation of COPD and accelerated hypertension, chronic systolic heart heart failure, pulmonary hypertension and abnormal liver enzymes.\par Currently the patient feels well. She has stable dyspnea on exertion. However this morning blood pressure has been high.\par The patient has sleep apnea and does not want to use oral appliance or CPAP.

## 2019-05-16 NOTE — DISCUSSION/SUMMARY
[FreeTextEntry1] : the patient has severe COPD.  She gets much worse every time her blood pressure is out of control. This might be due to congestive heart failure exacerbation during episodes of hypertension. We'll treat her hypertension aggressively. We'll increase the dose of hydralazine. She will return for followup next week.\par Relaxation breathing techniques were shown to the patient. She was advised to practice to several times a day.

## 2019-05-16 NOTE — PHYSICAL EXAM
[General Appearance - Well Developed] : well developed [Normal Appearance] : normal appearance [Well Groomed] : well groomed [General Appearance - Well Nourished] : well nourished [General Appearance - In No Acute Distress] : no acute distress [No Deformities] : no deformities [Normal Conjunctiva] : the conjunctiva exhibited no abnormalities [Eyelids - No Xanthelasma] : the eyelids demonstrated no xanthelasmas [IV] : IV [Normal Oropharynx] : normal oropharynx [Neck Cervical Mass (___cm)] : no neck mass was observed [Neck Appearance] : the appearance of the neck was normal [Thyroid Diffuse Enlargement] : the thyroid was not enlarged [Jugular Venous Distention Increased] : there was no jugular-venous distention [Heart Rate And Rhythm] : heart rate and rhythm were normal [Heart Sounds] : normal S1 and S2 [Thyroid Nodule] : there were no palpable thyroid nodules [Murmurs] : no murmurs present [Respiration, Rhythm And Depth] : normal respiratory rhythm and effort [Exaggerated Use Of Accessory Muscles For Inspiration] : no accessory muscle use [Abdomen Soft] : soft [Auscultation Breath Sounds / Voice Sounds] : lungs were clear to auscultation bilaterally [Abdomen Tenderness] : non-tender [Abdomen Mass (___ Cm)] : no abdominal mass palpated [Abnormal Walk] : normal gait [Gait - Sufficient For Exercise Testing] : the gait was sufficient for exercise testing [Nail Clubbing] : no clubbing of the fingernails [Cyanosis, Localized] : no localized cyanosis [Petechial Hemorrhages (___cm)] : no petechial hemorrhages [] : no rash [Skin Color & Pigmentation] : normal skin color and pigmentation [No Venous Stasis] : no venous stasis [Skin Lesions] : no skin lesions [No Skin Ulcers] : no skin ulcer [Deep Tendon Reflexes (DTR)] : deep tendon reflexes were 2+ and symmetric [No Xanthoma] : no  xanthoma was observed [Oriented To Time, Place, And Person] : oriented to person, place, and time [No Focal Deficits] : no focal deficits [Sensation] : the sensory exam was normal to light touch and pinprick [Impaired Insight] : insight and judgment were intact [Affect] : the affect was normal

## 2019-05-16 NOTE — PROCEDURE
[FreeTextEntry1] : The spirometry shows severe obstructive ventilatory impairment with slight improvement in FEV1 and FVC.

## 2019-05-16 NOTE — REASON FOR VISIT
[Follow-Up - From Hospitalization] : a hospitalization follow-up [COPD] : COPD [Shortness of Breath] : shortness of Breath

## 2019-05-16 NOTE — REVIEW OF SYSTEMS
[Dyspnea] : dyspnea [Arthralgias] : arthralgias [Myalgias] : myalgias [Negative] : Sleep Disorder [Cough] : no cough

## 2019-05-22 ENCOUNTER — APPOINTMENT (OUTPATIENT)
Dept: PULMONOLOGY | Facility: CLINIC | Age: 69
End: 2019-05-22
Payer: MEDICARE

## 2019-05-22 ENCOUNTER — APPOINTMENT (OUTPATIENT)
Dept: FAMILY MEDICINE | Facility: CLINIC | Age: 69
End: 2019-05-22
Payer: MEDICARE

## 2019-05-22 VITALS
OXYGEN SATURATION: 97 % | DIASTOLIC BLOOD PRESSURE: 120 MMHG | WEIGHT: 78 LBS | SYSTOLIC BLOOD PRESSURE: 174 MMHG | HEART RATE: 108 BPM | BODY MASS INDEX: 14.35 KG/M2 | HEIGHT: 62 IN

## 2019-05-22 VITALS — SYSTOLIC BLOOD PRESSURE: 130 MMHG | DIASTOLIC BLOOD PRESSURE: 90 MMHG

## 2019-05-22 VITALS
OXYGEN SATURATION: 97 % | SYSTOLIC BLOOD PRESSURE: 174 MMHG | DIASTOLIC BLOOD PRESSURE: 120 MMHG | HEIGHT: 62 IN | WEIGHT: 78 LBS | HEART RATE: 108 BPM | BODY MASS INDEX: 14.35 KG/M2

## 2019-05-22 PROCEDURE — 99212 OFFICE O/P EST SF 10 MIN: CPT

## 2019-05-22 PROCEDURE — 99214 OFFICE O/P EST MOD 30 MIN: CPT

## 2019-05-22 NOTE — DISCUSSION/SUMMARY
[FreeTextEntry1] : Will increase carvedilol to 25 p.o. b.i.d.\par Return in one week for blood pressure check.

## 2019-05-22 NOTE — HISTORY OF PRESENT ILLNESS
[FreeTextEntry1] : The patient is on hydralazine 50 mg 3 times a day. In addition she is on carvedilol 12.5 b.i.d. and lisinopril 20 once a day.She feels well.

## 2019-05-22 NOTE — HISTORY OF PRESENT ILLNESS
[FreeTextEntry1] : hospital f/u,  [de-identified] : 70 yo F with CAD/CHF s/p AICD, COPD presents for hospital follow up. She was admitted on 4/25-4/30 for COPD exacerbation. She was found to have elevated liver enzymes as well. Zocor was held at discharge.  She saw pulm for follow up last week. Hydralazine was increased to 50mg 3 times a day. She was also taught breathing exercises which the pt does more than 5 times a day. She does feel better when she does the breathing exercises. She has follow up next week with pulm. She has follow up with cardiology mid June. She will be away for 3 weeks in July.

## 2019-05-29 ENCOUNTER — APPOINTMENT (OUTPATIENT)
Dept: PULMONOLOGY | Facility: CLINIC | Age: 69
End: 2019-05-29
Payer: MEDICARE

## 2019-05-29 ENCOUNTER — APPOINTMENT (OUTPATIENT)
Dept: FAMILY MEDICINE | Facility: CLINIC | Age: 69
End: 2019-05-29
Payer: MEDICARE

## 2019-05-29 VITALS
BODY MASS INDEX: 14.35 KG/M2 | DIASTOLIC BLOOD PRESSURE: 130 MMHG | HEART RATE: 108 BPM | HEIGHT: 62 IN | SYSTOLIC BLOOD PRESSURE: 191 MMHG | OXYGEN SATURATION: 95 % | WEIGHT: 78 LBS

## 2019-05-29 DIAGNOSIS — R74.8 ABNORMAL LEVELS OF OTHER SERUM ENZYMES: ICD-10-CM

## 2019-05-29 PROCEDURE — 99213 OFFICE O/P EST LOW 20 MIN: CPT

## 2019-05-29 PROCEDURE — 36415 COLL VENOUS BLD VENIPUNCTURE: CPT

## 2019-05-29 RX ORDER — HYDRALAZINE HYDROCHLORIDE 25 MG/1
25 TABLET ORAL 3 TIMES DAILY
Qty: 270 | Refills: 0 | Status: DISCONTINUED | COMMUNITY
Start: 2019-04-18 | End: 2019-05-29

## 2019-05-29 NOTE — ASSESSMENT
[FreeTextEntry1] : Will increase lisinopril to 40 mg, to take it in AM. Carvedilol to 25 BID  about 12 hours apart.and return in 1 week for BP check.

## 2019-05-29 NOTE — HISTORY OF PRESENT ILLNESS
[FreeTextEntry1] : Patient states that she felt dizzy with hydralazine 50 mg.  So she stopped it.\par She takes carvedilol at 3 PM and 9 PM.\par She takes Lisinopril at 7 PM.

## 2019-05-30 LAB
ALBUMIN SERPL ELPH-MCNC: 4.4 G/DL
ALP BLD-CCNC: 70 U/L
ALT SERPL-CCNC: 17 U/L
ANION GAP SERPL CALC-SCNC: 14 MMOL/L
AST SERPL-CCNC: 19 U/L
BILIRUB SERPL-MCNC: 0.6 MG/DL
BUN SERPL-MCNC: 17 MG/DL
CALCIUM SERPL-MCNC: 9.6 MG/DL
CHLORIDE SERPL-SCNC: 102 MMOL/L
CO2 SERPL-SCNC: 27 MMOL/L
CREAT SERPL-MCNC: 0.93 MG/DL
GLUCOSE SERPL-MCNC: 84 MG/DL
HAV IGM SER QL: NONREACTIVE
HBV CORE IGM SER QL: NONREACTIVE
HBV SURFACE AG SER QL: NONREACTIVE
HCV AB SER QL: NONREACTIVE
HCV S/CO RATIO: 0.09 S/CO
POTASSIUM SERPL-SCNC: 4.6 MMOL/L
PROT SERPL-MCNC: 6.5 G/DL
SODIUM SERPL-SCNC: 143 MMOL/L

## 2019-06-05 ENCOUNTER — APPOINTMENT (OUTPATIENT)
Dept: PULMONOLOGY | Facility: CLINIC | Age: 69
End: 2019-06-05

## 2019-06-18 ENCOUNTER — APPOINTMENT (OUTPATIENT)
Dept: ELECTROPHYSIOLOGY | Facility: CLINIC | Age: 69
End: 2019-06-18
Payer: MEDICARE

## 2019-06-18 ENCOUNTER — NON-APPOINTMENT (OUTPATIENT)
Age: 69
End: 2019-06-18

## 2019-06-18 ENCOUNTER — APPOINTMENT (OUTPATIENT)
Dept: CARDIOLOGY | Facility: CLINIC | Age: 69
End: 2019-06-18
Payer: MEDICARE

## 2019-06-18 VITALS
RESPIRATION RATE: 18 BRPM | SYSTOLIC BLOOD PRESSURE: 174 MMHG | DIASTOLIC BLOOD PRESSURE: 120 MMHG | HEIGHT: 62 IN | BODY MASS INDEX: 14.72 KG/M2 | HEART RATE: 93 BPM | OXYGEN SATURATION: 94 % | WEIGHT: 80 LBS

## 2019-06-18 PROCEDURE — 99214 OFFICE O/P EST MOD 30 MIN: CPT

## 2019-06-18 PROCEDURE — 93290 INTERROG DEV EVAL ICPMS IP: CPT | Mod: 26

## 2019-06-18 PROCEDURE — 93284 PRGRMG EVAL IMPLANTABLE DFB: CPT

## 2019-06-18 PROCEDURE — 93000 ELECTROCARDIOGRAM COMPLETE: CPT

## 2019-06-18 NOTE — PHYSICAL EXAM
[General Appearance - Well Developed] : well developed [Well Groomed] : well groomed [Normal Appearance] : normal appearance [General Appearance - In No Acute Distress] : no acute distress [No Deformities] : no deformities [General Appearance - Well Nourished] : well nourished [Normal Conjunctiva] : the conjunctiva exhibited no abnormalities [Normal Oral Mucosa] : normal oral mucosa [Eyelids - No Xanthelasma] : the eyelids demonstrated no xanthelasmas [No Oral Cyanosis] : no oral cyanosis [No Oral Pallor] : no oral pallor [Normal Jugular Venous V Waves Present] : normal jugular venous V waves present [Normal Jugular Venous A Waves Present] : normal jugular venous A waves present [No Jugular Venous Lovett A Waves] : no jugular venous lovett A waves [Exaggerated Use Of Accessory Muscles For Inspiration] : no accessory muscle use [Respiration, Rhythm And Depth] : normal respiratory rhythm and effort [Auscultation Breath Sounds / Voice Sounds] : lungs were clear to auscultation bilaterally [Heart Rate And Rhythm] : heart rate and rhythm were normal [Heart Sounds] : normal S1 and S2 [Edema] : no peripheral edema present [FreeTextEntry1] : II/VI SM [Abdomen Tenderness] : non-tender [Abdomen Mass (___ Cm)] : no abdominal mass palpated [Abdomen Soft] : soft [Abnormal Walk] : normal gait [Gait - Sufficient For Exercise Testing] : the gait was sufficient for exercise testing [Nail Clubbing] : no clubbing of the fingernails [Cyanosis, Localized] : no localized cyanosis [Petechial Hemorrhages (___cm)] : no petechial hemorrhages [Skin Color & Pigmentation] : normal skin color and pigmentation [No Venous Stasis] : no venous stasis [] : no rash [No Skin Ulcers] : no skin ulcer [Skin Lesions] : no skin lesions [No Xanthoma] : no  xanthoma was observed [Oriented To Time, Place, And Person] : oriented to person, place, and time [Affect] : the affect was normal [Mood] : the mood was normal [No Anxiety] : not feeling anxious

## 2019-06-18 NOTE — HISTORY OF PRESENT ILLNESS
[FreeTextEntry1] : Here for followup. No complaints but BP was very very elevate and PCP and PULM Doctor are titrating up meds\par 1. HTN- Currently on Lisinopril 40 mg BID, Coreg 25 mg BID.\par Had been on hydralazine but was dc'd because patient didn’t feel well\par No diuretic use recently\par Will restart hydralazine 25 mg TID\par No chest pain, dyspnea or edema\par 2. Chronic systolic heart failure- On Above meds, condition is stable. COntinue present meds\par 3. HLD- On Statin therapy, condition is stable.

## 2019-06-18 NOTE — DISCUSSION/SUMMARY
[FreeTextEntry1] : 69 year old woman with NICM, hTN HLD here for followup\par \par 1. HTN- Currently on Lisinopril 40 mg BID, Coreg 25 mg BID.\par Had been on hydralazine but was dc'd because patient didn’t feel well\par No diuretic use recently\par Will restart hydralazine 25 mg TID\par No chest pain, dyspnea or edema\par 2. Chronic systolic heart failure- On Above meds, condition is stable. COntinue present meds\par 3. HLD- On Statin therapy, condition is stable.\par 4. FU in 2 months

## 2019-06-19 ENCOUNTER — RX RENEWAL (OUTPATIENT)
Age: 69
End: 2019-06-19

## 2019-06-19 ENCOUNTER — APPOINTMENT (OUTPATIENT)
Dept: PULMONOLOGY | Facility: CLINIC | Age: 69
End: 2019-06-19
Payer: MEDICARE

## 2019-06-19 ENCOUNTER — APPOINTMENT (OUTPATIENT)
Dept: ELECTROPHYSIOLOGY | Facility: CLINIC | Age: 69
End: 2019-06-19
Payer: MEDICARE

## 2019-06-19 VITALS
SYSTOLIC BLOOD PRESSURE: 169 MMHG | HEIGHT: 62 IN | DIASTOLIC BLOOD PRESSURE: 103 MMHG | WEIGHT: 78 LBS | BODY MASS INDEX: 14.35 KG/M2 | OXYGEN SATURATION: 96 % | HEART RATE: 87 BPM

## 2019-06-19 PROCEDURE — 93295 DEV INTERROG REMOTE 1/2/MLT: CPT

## 2019-06-19 PROCEDURE — 99213 OFFICE O/P EST LOW 20 MIN: CPT

## 2019-06-19 PROCEDURE — 93296 REM INTERROG EVL PM/IDS: CPT

## 2019-06-19 NOTE — PHYSICAL EXAM
[Normal Appearance] : normal appearance [Well Groomed] : well groomed [General Appearance - Well Developed] : well developed [General Appearance - In No Acute Distress] : no acute distress [General Appearance - Well Nourished] : well nourished [No Deformities] : no deformities [Eyelids - No Xanthelasma] : the eyelids demonstrated no xanthelasmas [Normal Oropharynx] : normal oropharynx [Normal Conjunctiva] : the conjunctiva exhibited no abnormalities [Neck Cervical Mass (___cm)] : no neck mass was observed [Jugular Venous Distention Increased] : there was no jugular-venous distention [Neck Appearance] : the appearance of the neck was normal [Heart Rate And Rhythm] : heart rate and rhythm were normal [Thyroid Nodule] : there were no palpable thyroid nodules [Thyroid Diffuse Enlargement] : the thyroid was not enlarged [Heart Sounds] : normal S1 and S2 [Murmurs] : no murmurs present [Respiration, Rhythm And Depth] : normal respiratory rhythm and effort [Exaggerated Use Of Accessory Muscles For Inspiration] : no accessory muscle use [Auscultation Breath Sounds / Voice Sounds] : lungs were clear to auscultation bilaterally [Abdomen Soft] : soft [Abdomen Mass (___ Cm)] : no abdominal mass palpated [Abdomen Tenderness] : non-tender [Gait - Sufficient For Exercise Testing] : the gait was sufficient for exercise testing [Abnormal Walk] : normal gait [Nail Clubbing] : no clubbing of the fingernails [Cyanosis, Localized] : no localized cyanosis [Petechial Hemorrhages (___cm)] : no petechial hemorrhages [No Venous Stasis] : no venous stasis [] : no rash [Skin Color & Pigmentation] : normal skin color and pigmentation [Skin Lesions] : no skin lesions [No Skin Ulcers] : no skin ulcer [No Xanthoma] : no  xanthoma was observed [Deep Tendon Reflexes (DTR)] : deep tendon reflexes were 2+ and symmetric [Sensation] : the sensory exam was normal to light touch and pinprick [No Focal Deficits] : no focal deficits [Oriented To Time, Place, And Person] : oriented to person, place, and time [Impaired Insight] : insight and judgment were intact [Affect] : the affect was normal

## 2019-07-29 ENCOUNTER — INPATIENT (INPATIENT)
Facility: HOSPITAL | Age: 69
LOS: 1 days | Discharge: HOME | End: 2019-07-31
Attending: INTERNAL MEDICINE | Admitting: INTERNAL MEDICINE
Payer: MEDICARE

## 2019-07-29 VITALS
TEMPERATURE: 98 F | SYSTOLIC BLOOD PRESSURE: 141 MMHG | OXYGEN SATURATION: 100 % | RESPIRATION RATE: 17 BRPM | HEART RATE: 96 BPM | DIASTOLIC BLOOD PRESSURE: 94 MMHG

## 2019-07-29 DIAGNOSIS — J44.9 CHRONIC OBSTRUCTIVE PULMONARY DISEASE, UNSPECIFIED: ICD-10-CM

## 2019-07-29 DIAGNOSIS — E87.0 HYPEROSMOLALITY AND HYPERNATREMIA: ICD-10-CM

## 2019-07-29 DIAGNOSIS — I10 ESSENTIAL (PRIMARY) HYPERTENSION: ICD-10-CM

## 2019-07-29 DIAGNOSIS — I27.20 PULMONARY HYPERTENSION, UNSPECIFIED: ICD-10-CM

## 2019-07-29 DIAGNOSIS — Z29.9 ENCOUNTER FOR PROPHYLACTIC MEASURES, UNSPECIFIED: ICD-10-CM

## 2019-07-29 DIAGNOSIS — J44.1 CHRONIC OBSTRUCTIVE PULMONARY DISEASE WITH (ACUTE) EXACERBATION: ICD-10-CM

## 2019-07-29 DIAGNOSIS — Z95.810 PRESENCE OF AUTOMATIC (IMPLANTABLE) CARDIAC DEFIBRILLATOR: Chronic | ICD-10-CM

## 2019-07-29 DIAGNOSIS — I50.22 CHRONIC SYSTOLIC (CONGESTIVE) HEART FAILURE: ICD-10-CM

## 2019-07-29 LAB
ALBUMIN SERPL ELPH-MCNC: 3.6 G/DL — SIGNIFICANT CHANGE UP (ref 3.3–5)
ALP SERPL-CCNC: 61 U/L — SIGNIFICANT CHANGE UP (ref 40–120)
ALT FLD-CCNC: 28 U/L — SIGNIFICANT CHANGE UP (ref 4–33)
ANION GAP SERPL CALC-SCNC: 11 MMO/L — SIGNIFICANT CHANGE UP (ref 7–14)
APTT BLD: 24.4 SEC — LOW (ref 27.5–36.3)
AST SERPL-CCNC: 23 U/L — SIGNIFICANT CHANGE UP (ref 4–32)
BASE EXCESS BLDV CALC-SCNC: 4.4 MMOL/L — SIGNIFICANT CHANGE UP
BASOPHILS # BLD AUTO: 0.02 K/UL — SIGNIFICANT CHANGE UP (ref 0–0.2)
BASOPHILS NFR BLD AUTO: 0.5 % — SIGNIFICANT CHANGE UP (ref 0–2)
BILIRUB SERPL-MCNC: 0.6 MG/DL — SIGNIFICANT CHANGE UP (ref 0.2–1.2)
BLOOD GAS VENOUS - CREATININE: 0.92 MG/DL — SIGNIFICANT CHANGE UP (ref 0.5–1.3)
BLOOD GAS VENOUS - FIO2: 21 — SIGNIFICANT CHANGE UP
BUN SERPL-MCNC: 16 MG/DL — SIGNIFICANT CHANGE UP (ref 7–23)
CALCIUM SERPL-MCNC: 8.9 MG/DL — SIGNIFICANT CHANGE UP (ref 8.4–10.5)
CHLORIDE BLDV-SCNC: 107 MMOL/L — SIGNIFICANT CHANGE UP (ref 96–108)
CHLORIDE SERPL-SCNC: 109 MMOL/L — HIGH (ref 98–107)
CO2 SERPL-SCNC: 26 MMOL/L — SIGNIFICANT CHANGE UP (ref 22–31)
CREAT SERPL-MCNC: 0.97 MG/DL — SIGNIFICANT CHANGE UP (ref 0.5–1.3)
EOSINOPHIL # BLD AUTO: 0.08 K/UL — SIGNIFICANT CHANGE UP (ref 0–0.5)
EOSINOPHIL NFR BLD AUTO: 1.8 % — SIGNIFICANT CHANGE UP (ref 0–6)
GAS PNL BLDV: 145 MMOL/L — SIGNIFICANT CHANGE UP (ref 136–146)
GLUCOSE BLDV-MCNC: 89 MG/DL — SIGNIFICANT CHANGE UP (ref 70–99)
GLUCOSE SERPL-MCNC: 87 MG/DL — SIGNIFICANT CHANGE UP (ref 70–99)
HCO3 BLDV-SCNC: 25 MMOL/L — SIGNIFICANT CHANGE UP (ref 20–27)
HCT VFR BLD CALC: 41.3 % — SIGNIFICANT CHANGE UP (ref 34.5–45)
HCT VFR BLDV CALC: 41.5 % — SIGNIFICANT CHANGE UP (ref 34.5–45)
HGB BLD-MCNC: 13.3 G/DL — SIGNIFICANT CHANGE UP (ref 11.5–15.5)
HGB BLDV-MCNC: 13.5 G/DL — SIGNIFICANT CHANGE UP (ref 11.5–15.5)
IMM GRANULOCYTES NFR BLD AUTO: 0.2 % — SIGNIFICANT CHANGE UP (ref 0–1.5)
INR BLD: 1.09 — SIGNIFICANT CHANGE UP (ref 0.88–1.17)
LACTATE BLDV-MCNC: 1 MMOL/L — SIGNIFICANT CHANGE UP (ref 0.5–2)
LYMPHOCYTES # BLD AUTO: 1.9 K/UL — SIGNIFICANT CHANGE UP (ref 1–3.3)
LYMPHOCYTES # BLD AUTO: 43 % — SIGNIFICANT CHANGE UP (ref 13–44)
MCHC RBC-ENTMCNC: 31.4 PG — SIGNIFICANT CHANGE UP (ref 27–34)
MCHC RBC-ENTMCNC: 32.2 % — SIGNIFICANT CHANGE UP (ref 32–36)
MCV RBC AUTO: 97.4 FL — SIGNIFICANT CHANGE UP (ref 80–100)
MONOCYTES # BLD AUTO: 0.29 K/UL — SIGNIFICANT CHANGE UP (ref 0–0.9)
MONOCYTES NFR BLD AUTO: 6.6 % — SIGNIFICANT CHANGE UP (ref 2–14)
NEUTROPHILS # BLD AUTO: 2.12 K/UL — SIGNIFICANT CHANGE UP (ref 1.8–7.4)
NEUTROPHILS NFR BLD AUTO: 47.9 % — SIGNIFICANT CHANGE UP (ref 43–77)
NRBC # FLD: 0 K/UL — SIGNIFICANT CHANGE UP (ref 0–0)
NT-PROBNP SERPL-SCNC: 1203 PG/ML — SIGNIFICANT CHANGE UP
PCO2 BLDV: 62 MMHG — HIGH (ref 41–51)
PH BLDV: 7.31 PH — LOW (ref 7.32–7.43)
PLATELET # BLD AUTO: 208 K/UL — SIGNIFICANT CHANGE UP (ref 150–400)
PMV BLD: 9.3 FL — SIGNIFICANT CHANGE UP (ref 7–13)
PO2 BLDV: 24 MMHG — LOW (ref 35–40)
POTASSIUM BLDV-SCNC: 3.8 MMOL/L — SIGNIFICANT CHANGE UP (ref 3.4–4.5)
POTASSIUM SERPL-MCNC: 3.8 MMOL/L — SIGNIFICANT CHANGE UP (ref 3.5–5.3)
POTASSIUM SERPL-SCNC: 3.8 MMOL/L — SIGNIFICANT CHANGE UP (ref 3.5–5.3)
PROT SERPL-MCNC: 6 G/DL — SIGNIFICANT CHANGE UP (ref 6–8.3)
PROTHROM AB SERPL-ACNC: 12.1 SEC — SIGNIFICANT CHANGE UP (ref 9.8–13.1)
RBC # BLD: 4.24 M/UL — SIGNIFICANT CHANGE UP (ref 3.8–5.2)
RBC # FLD: 13.4 % — SIGNIFICANT CHANGE UP (ref 10.3–14.5)
SAO2 % BLDV: 33.7 % — LOW (ref 60–85)
SODIUM SERPL-SCNC: 146 MMOL/L — HIGH (ref 135–145)
TROPONIN T, HIGH SENSITIVITY: 14 NG/L — SIGNIFICANT CHANGE UP (ref ?–14)
TROPONIN T, HIGH SENSITIVITY: 16 NG/L — SIGNIFICANT CHANGE UP (ref ?–14)
WBC # BLD: 4.42 K/UL — SIGNIFICANT CHANGE UP (ref 3.8–10.5)
WBC # FLD AUTO: 4.42 K/UL — SIGNIFICANT CHANGE UP (ref 3.8–10.5)

## 2019-07-29 PROCEDURE — 71045 X-RAY EXAM CHEST 1 VIEW: CPT | Mod: 26

## 2019-07-29 PROCEDURE — 99223 1ST HOSP IP/OBS HIGH 75: CPT

## 2019-07-29 RX ORDER — SENNA PLUS 8.6 MG/1
2 TABLET ORAL AT BEDTIME
Refills: 0 | Status: DISCONTINUED | OUTPATIENT
Start: 2019-07-29 | End: 2019-07-31

## 2019-07-29 RX ORDER — CARVEDILOL PHOSPHATE 80 MG/1
25 CAPSULE, EXTENDED RELEASE ORAL EVERY 12 HOURS
Refills: 0 | Status: DISCONTINUED | OUTPATIENT
Start: 2019-07-29 | End: 2019-07-31

## 2019-07-29 RX ORDER — DOCUSATE SODIUM 100 MG
100 CAPSULE ORAL THREE TIMES A DAY
Refills: 0 | Status: DISCONTINUED | OUTPATIENT
Start: 2019-07-29 | End: 2019-07-31

## 2019-07-29 RX ORDER — HYDRALAZINE HCL 50 MG
25 TABLET ORAL THREE TIMES A DAY
Refills: 0 | Status: DISCONTINUED | OUTPATIENT
Start: 2019-07-29 | End: 2019-07-30

## 2019-07-29 RX ORDER — ALBUTEROL 90 UG/1
2 AEROSOL, METERED ORAL EVERY 6 HOURS
Refills: 0 | Status: DISCONTINUED | OUTPATIENT
Start: 2019-07-29 | End: 2019-07-30

## 2019-07-29 RX ORDER — IPRATROPIUM/ALBUTEROL SULFATE 18-103MCG
3 AEROSOL WITH ADAPTER (GRAM) INHALATION ONCE
Refills: 0 | Status: COMPLETED | OUTPATIENT
Start: 2019-07-29 | End: 2019-07-29

## 2019-07-29 RX ORDER — SODIUM CHLORIDE 9 MG/ML
1000 INJECTION, SOLUTION INTRAVENOUS
Refills: 0 | Status: DISCONTINUED | OUTPATIENT
Start: 2019-07-29 | End: 2019-07-30

## 2019-07-29 RX ORDER — LISINOPRIL 2.5 MG/1
40 TABLET ORAL DAILY
Refills: 0 | Status: DISCONTINUED | OUTPATIENT
Start: 2019-07-29 | End: 2019-07-31

## 2019-07-29 RX ORDER — HEPARIN SODIUM 5000 [USP'U]/ML
5000 INJECTION INTRAVENOUS; SUBCUTANEOUS EVERY 8 HOURS
Refills: 0 | Status: DISCONTINUED | OUTPATIENT
Start: 2019-07-29 | End: 2019-07-30

## 2019-07-29 RX ORDER — ALBUTEROL 90 UG/1
2 AEROSOL, METERED ORAL
Qty: 0 | Refills: 0 | DISCHARGE

## 2019-07-29 RX ORDER — TIOTROPIUM BROMIDE 18 UG/1
1 CAPSULE ORAL; RESPIRATORY (INHALATION) DAILY
Refills: 0 | Status: DISCONTINUED | OUTPATIENT
Start: 2019-07-29 | End: 2019-07-30

## 2019-07-29 RX ORDER — BUDESONIDE AND FORMOTEROL FUMARATE DIHYDRATE 160; 4.5 UG/1; UG/1
2 AEROSOL RESPIRATORY (INHALATION)
Refills: 0 | Status: DISCONTINUED | OUTPATIENT
Start: 2019-07-29 | End: 2019-07-31

## 2019-07-29 RX ORDER — IPRATROPIUM/ALBUTEROL SULFATE 18-103MCG
3 AEROSOL WITH ADAPTER (GRAM) INHALATION EVERY 6 HOURS
Refills: 0 | Status: DISCONTINUED | OUTPATIENT
Start: 2019-07-29 | End: 2019-07-30

## 2019-07-29 RX ORDER — ASPIRIN/CALCIUM CARB/MAGNESIUM 324 MG
81 TABLET ORAL DAILY
Refills: 0 | Status: DISCONTINUED | OUTPATIENT
Start: 2019-07-29 | End: 2019-07-31

## 2019-07-29 RX ADMIN — Medication 3 MILLILITER(S): at 18:29

## 2019-07-29 RX ADMIN — SODIUM CHLORIDE 75 MILLILITER(S): 9 INJECTION, SOLUTION INTRAVENOUS at 23:10

## 2019-07-29 RX ADMIN — Medication 125 MILLIGRAM(S): at 18:08

## 2019-07-29 RX ADMIN — Medication 3 MILLILITER(S): at 18:09

## 2019-07-29 RX ADMIN — Medication 25 MILLIGRAM(S): at 23:10

## 2019-07-29 NOTE — H&P ADULT - ASSESSMENT
69F hx of CHF (EF 22% in 8/2018) s/p PPM/ICD (Medtronic 9/2018), R-sided heart failure, HTN, CARLOTA not able to tolerate CPAP, COPD (not on home O2), moderate pHTN p/w SOB for 2 days concerning for COPD exacerbation.
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
## 2019-07-29 NOTE — H&P ADULT - NSHPREVIEWOFSYSTEMS_GEN_ALL_CORE
CONSTITUTIONAL:  No weight loss, fever, chills, weakness or fatigue.  HEENT:  Eyes:  No visual loss, blurred vision, double vision or yellow sclerae. Ears, Nose, Throat:  No hearing loss, sneezing, congestion, runny nose or sore throat.  SKIN:  No rash or itching.  CARDIOVASCULAR:  No chest pain, chest pressure or chest discomfort. No palpitations.  RESPIRATORY: See HPI  GASTROINTESTINAL:  No anorexia, nausea, vomiting or diarrhea. No abdominal pain or blood.  GENITOURINARY:  Denies hematuria, dysuria.   NEUROLOGICAL:  No headache, dizziness, syncope, paralysis, ataxia, numbness or tingling in the extremities. No change in bowel or bladder control.  MUSCULOSKELETAL:  No muscle, back pain, joint pain or stiffness.  HEMATOLOGIC:  No anemia, bleeding or bruising.  LYMPHATICS:  No enlarged nodes.   PSYCHIATRIC:  No history of depression or anxiety.  ENDOCRINOLOGIC:  No reports of sweating, cold or heat intolerance. No polyuria or polydipsia.  ALLERGIES:  No history of asthma, hives, eczema or rhinitis. CONSTITUTIONAL:  No weight loss, fever, chills, weakness or fatigue.  HEENT:  Eyes:  No visual loss, blurred vision, double vision or yellow sclerae. Ears, Nose, Throat:  No hearing loss, sneezing, congestion, runny nose or sore throat.  SKIN:  No rash or itching.  CARDIOVASCULAR:  No chest pain, chest pressure or chest discomfort. No palpitations. No LE edema.  RESPIRATORY: +SOB, +DOMÍNGUEZ. No cough or sputum production.  GASTROINTESTINAL:  No anorexia, nausea, vomiting or diarrhea. No abdominal pain or blood.  GENITOURINARY:  Denies hematuria, dysuria.   NEUROLOGICAL:  No headache, dizziness, syncope, paralysis, ataxia, numbness or tingling in the extremities. No change in bowel or bladder control.  MUSCULOSKELETAL:  No muscle, back pain, joint pain or stiffness.  HEMATOLOGIC:  No anemia, bleeding or bruising.  LYMPHATICS:  No enlarged nodes.   PSYCHIATRIC:  No history of depression or anxiety.  ENDOCRINOLOGIC:  No reports of sweating, cold or heat intolerance. No polyuria or polydipsia.  ALLERGIES:  No history of asthma, hives, eczema or rhinitis.

## 2019-07-29 NOTE — H&P ADULT - NSICDXPASTMEDICALHX_GEN_ALL_CORE_FT
PAST MEDICAL HISTORY:  CAD (coronary artery disease)     Chronic systolic CHF (congestive heart failure) EF 22% s/p Medtronic ICD (9/2018)    COPD (chronic obstructive pulmonary disease) not on home O2    HTN (hypertension)     LBBB (left bundle branch block)     Pulmonary hypertension moderate    Right-sided heart failure     Sleep apnea cannot tolerate CPAP

## 2019-07-29 NOTE — H&P ADULT - PROBLEM SELECTOR PLAN 4
Pt w/ mild pulm HTN noted in previous TTE  - repeat TTE as above  - Consider Pulm c/s or may leave for outpt f/u as currently stable and improved w/ current treatment - C/w home hydralazine 25mg TID  - Pharmacy stated Pt's hydralazine was recently increased to 50mg TID , however Pt did not pick this up.

## 2019-07-29 NOTE — H&P ADULT - NSHPSOURCEINFORD_GEN_ALL_CORE
----- Message from Mali Lal DO sent at 7/13/2018  4:26 PM CDT -----  Please let her know 3 hour GTT was normal.  So she does not have the diagnosis of GDM.  However, since her O'Savage was abnormal it tells me her body is having some difficulties with the sugars in pregnancy.  I would recommend decreasing sugar and carbohydrate intake and adding exercise.  Thanks.   Patient

## 2019-07-29 NOTE — H&P ADULT - PROBLEM SELECTOR PLAN 1
Pt p/w SOB likely 2/2 COPD exacerbation. Pt w/ recent plane travel, however low suspision for PE, Pt not tachycardic, no unilateral LE edema on exam, palpable cord, or calf tenderness. S/p Solumedrol 125mg IVP  - C/w O2 via NC, with plan to wean off as tolerated. Pt not on O2 at home.  - Will hold off on antibiotics as pt does not endorse increased sputum volume, or purulence.   - C/w short course of steroid; total 5 days, Prednisone 40mg 1/4.  - C/w Duonebs prn  - C/w home inhalers (symbicort, ProAir HFA, and Tiotropium)  - Outpt pulm follow up  - TTE to evaluate for worsening pHTN Pt p/w SOB likely 2/2 COPD exacerbation. Pt w/ recent plane travel, however low suspicion for PE, Pt not tachycardic, no unilateral LE edema on exam, palpable cord, or calf tenderness. S/p Solumedrol 125mg IVP  - C/w O2 via NC, with plan to wean off as tolerated. Pt not on O2 at home.  - Will hold off on antibiotics as pt does not endorse increased sputum volume, or purulence.   - C/w short course of steroid; total 5 days, Prednisone 40mg daily  - C/w Duonebs prn  - C/w Symbicort  - Outpt pulm follow up  - TTE to evaluate for worsening pHTN

## 2019-07-29 NOTE — ED ADULT NURSE NOTE - OBJECTIVE STATEMENT
patient arrives a*ox4 to room 12 c/o shortness of breath since friday. patient states she has copd and that this feels like a flare. patient states she just returned on friday from a trip to [place], and has been experiencing worsening sob since landing. pmh copd chf, patient c/o slight chest discomfort, normal sinus per monitor, no ble edema, lungs clear to auscultation. patient appears comfortable and In no distress, speaking in full sentences without difficulty, patient saturating well on room air however requesting 3LNC for comfort. pending further orders @ this time, 20g IVSL placed all pending labs drawn and sent, nad noted, bed in lowest position with call bell in reach, patient updated regarding plan of care will ctm closely

## 2019-07-29 NOTE — ED PROVIDER NOTE - ATTENDING CONTRIBUTION TO CARE
gabriel: hx in part from EMR april 2019: hx of CHF (EF 22% in 8/2018) s/p PPM/ICD (Medtronic 9/2018), R-sided heart failure, HTN, CARLOTA not able to tolerate CPAP, severe COPD (not on home O2), moderate pHTN. admitted to telemetry for hypoxic respiratory failure that was likely secondary to a COPD exacerbation and/or worsening pulmonary HTN. Was treated with IV Solumedrol for COPD exacerbation. echo mild pulmonary hypertension.EF 20-25%.    today: gabriel: hx in part from EMR april 2019: hx of CHF (EF 22% in 8/2018) s/p PPM/ICD (Medtronic 9/2018), R-sided heart failure, HTN, CARLOTA not able to tolerate CPAP, severe COPD (not on home O2), moderate pHTN. admitted to telemetry for hypoxic respiratory failure that was likely secondary to a COPD exacerbation and/or worsening pulmonary HTN. Was treated with IV Solumedrol for COPD exacerbation. echo mild pulmonary hypertension.EF 20-25%.    today: increasing shortness of breath/does past day; diff sleeping at nite due to dyspnea. cause? pt indicates change in weather. compliant to medications.   exam: c/o shortness of breath but appears comfortable. bilateral wheeze. 22 18; no amu.   Pt being treated with steroids and BD therapy; will admit. gabriel: hx in part from EMR april 2019: hx of CHF (EF 22% in 8/2018) s/p PPM/ICD (Medtronic 9/2018), R-sided heart failure, HTN, CARLOTA not able to tolerate CPAP, severe COPD (not on home O2), moderate pHTN. admitted to telemetry for hypoxic respiratory failure that was likely secondary to a COPD exacerbation and/or worsening pulmonary HTN. Was treated with IV Solumedrol for COPD exacerbation. echo mild pulmonary hypertension.EF 20-25%.    today: increasing shortness of breath/does past day; diff sleeping at nite due to dyspnea. cause? pt indicates change in weather. compliant to medications.   exam: c/o shortness of breath but appears comfortable. bilateral wheeze. 22 18; no amu.   Pt being treated with steroids and BD therapy; will admit..

## 2019-07-29 NOTE — H&P ADULT - PROBLEM SELECTOR PLAN 2
Pt w/ HFrEF(EF 25% in 4/27/19 ) s/p PPM/ICD (Medtronic 9/2018)  - Symptoms likely not cardiac in etiology  - C/w home carvedilol  - C/w home lisinopril  - Outpt cards holding statin "as condition is stable" Pt w/ mild hypernatremia to 146. Total fluid deficit ~1.3 L.   - Gentle hydration w/ LR @ 75cc/hr for 12 hrs. Pt w/ mild hypernatremia to 146.   - Encourage oral hydration  - Monitor serum Na

## 2019-07-29 NOTE — H&P ADULT - NSHPPHYSICALEXAM_GEN_ALL_CORE
GENERAL APPEARANCE: NAD.   HEENT:  PERRL, EOMI. External auditory canals normal, hearing grossly intact.  NECK: Neck supple, non-tender without lymphadenopathy, masses or thyromegaly.  CARDIAC: Normal S1 and S2. No S3, S4 or murmurs. Rhythm is regular.  LUNGS: Clear to auscultation and percussion without rales, rhonchi, wheezing or diminished breath sounds.  ABDOMEN: Positive bowel sounds. Soft, nondistended, nontender. No guarding or rebound.   EXTREMITIES: No significant deformity or joint abnormality. No edema. Peripheral pulses intact. No varicosities.  NEUROLOGICAL: CN II-XII intact. Strength and sensation symmetric and intact throughout.   SKIN: Skin normal color, texture and turgor with no lesions or eruptions.  PSYCHIATRIC: AOx3.Normal affect and behavior. GENERAL APPEARANCE: Awake and alert. NAD.   EYES: PERRL, EOMI.   EARS: External auditory canals normal, hearing grossly intact.  NECK: Neck supple, non-tender without lymphadenopathy, masses or thyromegaly.  CARDIAC: Normal S1 and S2. No S3, S4 or murmurs. Rhythm is regular. No LE edema b/l.  LUNGS: Good inspiratory effort. Clear to auscultation and percussion without rales, rhonchi, wheezing or diminished breath sounds.  ABDOMEN: Positive bowel sounds. Soft, nondistended, nontender. No guarding or rebound.   EXTREMITIES: No significant deformity or joint abnormality. No edema. Peripheral pulses intact. No varicosities.  NEUROLOGICAL: A&Ox3. CN II-XII intact. Strength and sensation symmetric and intact throughout.   SKIN: Skin normal color, texture and turgor with no lesions or eruptions.  PSYCHIATRIC: Normal affect and behavior.

## 2019-07-29 NOTE — ED PROVIDER NOTE - PMH
CAD (coronary artery disease)    Chronic systolic CHF (congestive heart failure)  EF 22% s/p Medtronic ICD (9/2018)  COPD (chronic obstructive pulmonary disease)  not on home O2  HTN (hypertension)    LBBB (left bundle branch block)    Pulmonary hypertension  moderate  Right-sided heart failure    Sleep apnea  cannot tolerate CPAP

## 2019-07-29 NOTE — H&P ADULT - NSHPSOCIALHISTORY_GEN_ALL_CORE
Lives in downstairs apt from mother. Ambulates without cane/walker. Quit smoking tobacco 2016, 5 cigarettes/day for about 47 years (since age 19). Remote cocaine, +smokes marijuana. No etoh use, never IVDU.

## 2019-07-29 NOTE — ED PROVIDER NOTE - PHYSICAL EXAMINATION
looks short of breath, using accessory muscles, insp wheezes noted over b/l lung fields, poor air entry   (-) LE edema noted b/l

## 2019-07-29 NOTE — H&P ADULT - PROBLEM SELECTOR PLAN 3
- C/w home hydralazine 25mg TID  - Pharmacy stated Pt's hydralazine was recently increased to 50mg TID , however Pt did not pick this up. Pt w/ HFrEF(EF 25% in 4/27/19 ) s/p PPM/ICD (Medtronic 9/2018)  - Symptoms likely not cardiac in etiology  - C/w home carvedilol  - C/w home lisinopril  - Outpt cards holding statin "as condition is stable"

## 2019-07-29 NOTE — H&P ADULT - NSHPLABSRESULTS_GEN_ALL_CORE
13.3   4.42  )-----------( 208      ( 29 Jul 2019 17:45 )             41.3     Auto Eosinophil # 0.08  / Auto Eosinophil % 1.8   / Auto Neutrophil # 2.12  / Auto Neutrophil % 47.9  / BANDS % x        07-29    146<H>  |  109<H>  |  16  ----------------------------<  87  3.8   |  26  |  0.97    Ca    8.9      29 Jul 2019 17:45  TPro  6.0  /  Alb  3.6  /  TBili  0.6  /  DBili  x   /  AST  23  /  ALT  28  /  AlkPhos  61  07-29    PT/INR - ( 29 Jul 2019 17:45 )   PT: 12.1 SEC;   INR: 1.09       Serum Pro-Brain Natriuretic Peptide: 1203: ACUTE CONGESTIVE HEART FAILURE is UNLIKELY if NT-proBNP  is < 300 pg/mL.    CONSIDER ACUTE CONGESTIVE HEART FAILURE if:    AGE                NT-proBNP RESULT  ---                -------------  < 50 YEARS      >  450 pg/mL  50 - 75 YEARS      >  900 pg/mL  > 75 YEARS      > 1800 pg/mL pg/mL (07.29.19 @ 17:45)       < from: Xray Chest 1 View-PORTABLE IMMEDIATE (07.29.19 @ 18:51) >    ******PRELIMINARY REPORT******    ******PRELIMINARY REPORT******            INTERPRETATION:  the lungs are hyperinflated.  no focal area of   consolidation, pleural effusion, or pneumothorax    < end of copied text > 13.3   4.42  )-----------( 208      ( 29 Jul 2019 17:45 )             41.3     Auto Eosinophil # 0.08  / Auto Eosinophil % 1.8   / Auto Neutrophil # 2.12  / Auto Neutrophil % 47.9  / BANDS % x        07-29    146<H>  |  109<H>  |  16  ----------------------------<  87  3.8   |  26  |  0.97    Ca    8.9      29 Jul 2019 17:45  TPro  6.0  /  Alb  3.6  /  TBili  0.6  /  DBili  x   /  AST  23  /  ALT  28  /  AlkPhos  61  07-29    PT/INR - ( 29 Jul 2019 17:45 )   PT: 12.1 SEC;   INR: 1.09       Serum Pro-Brain Natriuretic Peptide: 1203: ACUTE CONGESTIVE HEART FAILURE is UNLIKELY if NT-proBNP  is < 300 pg/mL.    CONSIDER ACUTE CONGESTIVE HEART FAILURE if:    AGE                NT-proBNP RESULT  ---                -------------  < 50 YEARS      >  450 pg/mL  50 - 75 YEARS      >  900 pg/mL  > 75 YEARS      > 1800 pg/mL pg/mL (07.29.19 @ 17:45)    Imaging personally reviewed.  < from: Xray Chest 1 View-PORTABLE IMMEDIATE (07.29.19 @ 18:51) >    ******PRELIMINARY REPORT******    ******PRELIMINARY REPORT******            INTERPRETATION:  the lungs are hyperinflated.  no focal area of   consolidation, pleural effusion, or pneumothorax    < end of copied text >

## 2019-07-29 NOTE — ED ADULT TRIAGE NOTE - CHIEF COMPLAINT QUOTE
pt c/p SOB, DOMÍNGUEZ and CP. pt had recent plane flight. placed on 4L NC by ems for O2 of 96% on ra. ppm to left chest wall.

## 2019-07-29 NOTE — ED PROVIDER NOTE - OBJECTIVE STATEMENT
Pt is 68 y/o female with PMhx of CHF (sever diastolic dysfunction), HTN, COPD (no hx of intubations), PPM/ICD (Medtronic) here for eval of SOB x 2 days. Pt states she came back from [place] (only 2 hrs flight on which pt was walking),  4 days ago, didn't start feeling sob until today. Reports exertional dyspnea, has been using Symbicort, Proair and Spiriva as advised no relief, no use of beta agonist at home. Pt denies any cp, denies leg pain/edema, denies personal/family hx of coagulopathies, former smoker, has similar sx in the past and was admitted for COPD exacerbation. (-) cough, fever, sick contacts, denies nasal congestion/rhinorrhea, denies hemoptysis. pulm - Marisela Palumbo, pcp - Marisabel Naqvi, cardio - Oliva Bullard.

## 2019-07-29 NOTE — H&P ADULT - PROBLEM SELECTOR PLAN 5
DVT: Hep SQ  PT consult  Diet: DASH  Code: Full Pt w/ mild pulm HTN noted in previous TTE  - repeat TTE as above  - Consider Pulm c/s or may leave for outpt f/u as currently stable and improved w/ current treatment Pt w/ mild pulm HTN noted in previous TTE  - Repeat TTE as above  - Consider Pulm c/s or may leave for outpt f/u as currently stable and improved w/ current treatment

## 2019-07-30 LAB
ALBUMIN SERPL ELPH-MCNC: 4 G/DL — SIGNIFICANT CHANGE UP (ref 3.3–5)
ALBUMIN SERPL ELPH-MCNC: 4 G/DL — SIGNIFICANT CHANGE UP (ref 3.3–5)
ALP SERPL-CCNC: 63 U/L — SIGNIFICANT CHANGE UP (ref 40–120)
ALP SERPL-CCNC: 64 U/L — SIGNIFICANT CHANGE UP (ref 40–120)
ALT FLD-CCNC: 25 U/L — SIGNIFICANT CHANGE UP (ref 4–33)
ALT FLD-CCNC: 27 U/L — SIGNIFICANT CHANGE UP (ref 4–33)
ANION GAP SERPL CALC-SCNC: 10 MMO/L — SIGNIFICANT CHANGE UP (ref 7–14)
ANION GAP SERPL CALC-SCNC: 11 MMO/L — SIGNIFICANT CHANGE UP (ref 7–14)
ANISOCYTOSIS BLD QL: SLIGHT — SIGNIFICANT CHANGE UP
AST SERPL-CCNC: 17 U/L — SIGNIFICANT CHANGE UP (ref 4–32)
AST SERPL-CCNC: 18 U/L — SIGNIFICANT CHANGE UP (ref 4–32)
BASE EXCESS BLDV CALC-SCNC: 3.6 MMOL/L — SIGNIFICANT CHANGE UP
BASOPHILS # BLD AUTO: 0.01 K/UL — SIGNIFICANT CHANGE UP (ref 0–0.2)
BASOPHILS NFR BLD AUTO: 0.3 % — SIGNIFICANT CHANGE UP (ref 0–2)
BASOPHILS NFR SPEC: 0 % — SIGNIFICANT CHANGE UP (ref 0–2)
BILIRUB SERPL-MCNC: 0.5 MG/DL — SIGNIFICANT CHANGE UP (ref 0.2–1.2)
BILIRUB SERPL-MCNC: 0.6 MG/DL — SIGNIFICANT CHANGE UP (ref 0.2–1.2)
BLASTS # FLD: 0 % — SIGNIFICANT CHANGE UP (ref 0–0)
BLOOD GAS VENOUS - CREATININE: 0.89 MG/DL — SIGNIFICANT CHANGE UP (ref 0.5–1.3)
BLOOD GAS VENOUS - FIO2: 21 — SIGNIFICANT CHANGE UP
BUN SERPL-MCNC: 21 MG/DL — SIGNIFICANT CHANGE UP (ref 7–23)
BUN SERPL-MCNC: 21 MG/DL — SIGNIFICANT CHANGE UP (ref 7–23)
CALCIUM SERPL-MCNC: 9.4 MG/DL — SIGNIFICANT CHANGE UP (ref 8.4–10.5)
CALCIUM SERPL-MCNC: 9.5 MG/DL — SIGNIFICANT CHANGE UP (ref 8.4–10.5)
CHLORIDE BLDV-SCNC: 109 MMOL/L — HIGH (ref 96–108)
CHLORIDE SERPL-SCNC: 104 MMOL/L — SIGNIFICANT CHANGE UP (ref 98–107)
CHLORIDE SERPL-SCNC: 104 MMOL/L — SIGNIFICANT CHANGE UP (ref 98–107)
CO2 SERPL-SCNC: 30 MMOL/L — SIGNIFICANT CHANGE UP (ref 22–31)
CO2 SERPL-SCNC: 30 MMOL/L — SIGNIFICANT CHANGE UP (ref 22–31)
CREAT SERPL-MCNC: 0.87 MG/DL — SIGNIFICANT CHANGE UP (ref 0.5–1.3)
CREAT SERPL-MCNC: 0.88 MG/DL — SIGNIFICANT CHANGE UP (ref 0.5–1.3)
EOSINOPHIL # BLD AUTO: 0 K/UL — SIGNIFICANT CHANGE UP (ref 0–0.5)
EOSINOPHIL NFR BLD AUTO: 0 % — SIGNIFICANT CHANGE UP (ref 0–6)
EOSINOPHIL NFR FLD: 0 % — SIGNIFICANT CHANGE UP (ref 0–6)
GAS PNL BLDV: 139 MMOL/L — SIGNIFICANT CHANGE UP (ref 136–146)
GIANT PLATELETS BLD QL SMEAR: PRESENT — SIGNIFICANT CHANGE UP
GLUCOSE BLDV-MCNC: 131 MG/DL — HIGH (ref 70–99)
GLUCOSE SERPL-MCNC: 132 MG/DL — HIGH (ref 70–99)
GLUCOSE SERPL-MCNC: 133 MG/DL — HIGH (ref 70–99)
HCO3 BLDV-SCNC: 25 MMOL/L — SIGNIFICANT CHANGE UP (ref 20–27)
HCT VFR BLD CALC: 41.5 % — SIGNIFICANT CHANGE UP (ref 34.5–45)
HCT VFR BLDV CALC: 43.3 % — SIGNIFICANT CHANGE UP (ref 34.5–45)
HGB BLD-MCNC: 13.4 G/DL — SIGNIFICANT CHANGE UP (ref 11.5–15.5)
HGB BLDV-MCNC: 14.1 G/DL — SIGNIFICANT CHANGE UP (ref 11.5–15.5)
IMM GRANULOCYTES NFR BLD AUTO: 0.7 % — SIGNIFICANT CHANGE UP (ref 0–1.5)
LACTATE BLDV-MCNC: 1.7 MMOL/L — SIGNIFICANT CHANGE UP (ref 0.5–2)
LYMPHOCYTES # BLD AUTO: 0.66 K/UL — LOW (ref 1–3.3)
LYMPHOCYTES # BLD AUTO: 22.8 % — SIGNIFICANT CHANGE UP (ref 13–44)
LYMPHOCYTES NFR SPEC AUTO: 13.1 % — SIGNIFICANT CHANGE UP (ref 13–44)
MACROCYTES BLD QL: SLIGHT — SIGNIFICANT CHANGE UP
MAGNESIUM SERPL-MCNC: 1.9 MG/DL — SIGNIFICANT CHANGE UP (ref 1.6–2.6)
MCHC RBC-ENTMCNC: 30.5 PG — SIGNIFICANT CHANGE UP (ref 27–34)
MCHC RBC-ENTMCNC: 32.3 % — SIGNIFICANT CHANGE UP (ref 32–36)
MCV RBC AUTO: 94.5 FL — SIGNIFICANT CHANGE UP (ref 80–100)
METAMYELOCYTES # FLD: 0 % — SIGNIFICANT CHANGE UP (ref 0–1)
MONOCYTES # BLD AUTO: 0.02 K/UL — SIGNIFICANT CHANGE UP (ref 0–0.9)
MONOCYTES NFR BLD AUTO: 0.7 % — LOW (ref 2–14)
MONOCYTES NFR BLD: 0.9 % — LOW (ref 2–9)
MYELOCYTES NFR BLD: 0 % — SIGNIFICANT CHANGE UP (ref 0–0)
NEUTROPHIL AB SER-ACNC: 77.2 % — HIGH (ref 43–77)
NEUTROPHILS # BLD AUTO: 2.18 K/UL — SIGNIFICANT CHANGE UP (ref 1.8–7.4)
NEUTROPHILS NFR BLD AUTO: 75.5 % — SIGNIFICANT CHANGE UP (ref 43–77)
NEUTS BAND # BLD: 0 % — SIGNIFICANT CHANGE UP (ref 0–6)
NRBC # FLD: 0 K/UL — SIGNIFICANT CHANGE UP (ref 0–0)
NT-PROBNP SERPL-SCNC: 2434 PG/ML — SIGNIFICANT CHANGE UP
OTHER - HEMATOLOGY %: 0 — SIGNIFICANT CHANGE UP
PCO2 BLDV: 66 MMHG — HIGH (ref 41–51)
PH BLDV: 7.28 PH — LOW (ref 7.32–7.43)
PHOSPHATE SERPL-MCNC: 3.8 MG/DL — SIGNIFICANT CHANGE UP (ref 2.5–4.5)
PLATELET # BLD AUTO: 230 K/UL — SIGNIFICANT CHANGE UP (ref 150–400)
PLATELET COUNT - ESTIMATE: NORMAL — SIGNIFICANT CHANGE UP
PMV BLD: 9.3 FL — SIGNIFICANT CHANGE UP (ref 7–13)
PO2 BLDV: 34 MMHG — LOW (ref 35–40)
POLYCHROMASIA BLD QL SMEAR: SIGNIFICANT CHANGE UP
POTASSIUM BLDV-SCNC: 4 MMOL/L — SIGNIFICANT CHANGE UP (ref 3.4–4.5)
POTASSIUM SERPL-MCNC: 4.4 MMOL/L — SIGNIFICANT CHANGE UP (ref 3.5–5.3)
POTASSIUM SERPL-MCNC: 4.5 MMOL/L — SIGNIFICANT CHANGE UP (ref 3.5–5.3)
POTASSIUM SERPL-SCNC: 4.4 MMOL/L — SIGNIFICANT CHANGE UP (ref 3.5–5.3)
POTASSIUM SERPL-SCNC: 4.5 MMOL/L — SIGNIFICANT CHANGE UP (ref 3.5–5.3)
PROMYELOCYTES # FLD: 0 % — SIGNIFICANT CHANGE UP (ref 0–0)
PROT SERPL-MCNC: 6.7 G/DL — SIGNIFICANT CHANGE UP (ref 6–8.3)
PROT SERPL-MCNC: 6.8 G/DL — SIGNIFICANT CHANGE UP (ref 6–8.3)
RBC # BLD: 4.39 M/UL — SIGNIFICANT CHANGE UP (ref 3.8–5.2)
RBC # FLD: 13.1 % — SIGNIFICANT CHANGE UP (ref 10.3–14.5)
SAO2 % BLDV: 55.8 % — LOW (ref 60–85)
SODIUM SERPL-SCNC: 144 MMOL/L — SIGNIFICANT CHANGE UP (ref 135–145)
SODIUM SERPL-SCNC: 145 MMOL/L — SIGNIFICANT CHANGE UP (ref 135–145)
SPHEROCYTES BLD QL SMEAR: SLIGHT — SIGNIFICANT CHANGE UP
TROPONIN T, HIGH SENSITIVITY: 9 NG/L — SIGNIFICANT CHANGE UP (ref ?–14)
VARIANT LYMPHS # BLD: 8.8 % — SIGNIFICANT CHANGE UP
WBC # BLD: 2.89 K/UL — LOW (ref 3.8–10.5)
WBC # FLD AUTO: 2.89 K/UL — LOW (ref 3.8–10.5)

## 2019-07-30 PROCEDURE — 99233 SBSQ HOSP IP/OBS HIGH 50: CPT

## 2019-07-30 RX ORDER — IPRATROPIUM/ALBUTEROL SULFATE 18-103MCG
3 AEROSOL WITH ADAPTER (GRAM) INHALATION EVERY 6 HOURS
Refills: 0 | Status: DISCONTINUED | OUTPATIENT
Start: 2019-07-30 | End: 2019-07-30

## 2019-07-30 RX ORDER — TIOTROPIUM BROMIDE 18 UG/1
1 CAPSULE ORAL; RESPIRATORY (INHALATION) DAILY
Refills: 0 | Status: DISCONTINUED | OUTPATIENT
Start: 2019-07-30 | End: 2019-07-31

## 2019-07-30 RX ORDER — ALBUTEROL 90 UG/1
1 AEROSOL, METERED ORAL EVERY 4 HOURS
Refills: 0 | Status: DISCONTINUED | OUTPATIENT
Start: 2019-07-30 | End: 2019-07-30

## 2019-07-30 RX ORDER — HEPARIN SODIUM 5000 [USP'U]/ML
5000 INJECTION INTRAVENOUS; SUBCUTANEOUS EVERY 12 HOURS
Refills: 0 | Status: DISCONTINUED | OUTPATIENT
Start: 2019-07-30 | End: 2019-07-31

## 2019-07-30 RX ORDER — TIOTROPIUM BROMIDE 18 UG/1
1 CAPSULE ORAL; RESPIRATORY (INHALATION) DAILY
Refills: 0 | Status: DISCONTINUED | OUTPATIENT
Start: 2019-07-30 | End: 2019-07-30

## 2019-07-30 RX ORDER — ALBUTEROL 90 UG/1
1 AEROSOL, METERED ORAL EVERY 4 HOURS
Refills: 0 | Status: DISCONTINUED | OUTPATIENT
Start: 2019-07-30 | End: 2019-07-31

## 2019-07-30 RX ORDER — HYDRALAZINE HCL 50 MG
50 TABLET ORAL THREE TIMES A DAY
Refills: 0 | Status: DISCONTINUED | OUTPATIENT
Start: 2019-07-30 | End: 2019-07-31

## 2019-07-30 RX ADMIN — LISINOPRIL 40 MILLIGRAM(S): 2.5 TABLET ORAL at 06:20

## 2019-07-30 RX ADMIN — Medication 3 MILLILITER(S): at 04:12

## 2019-07-30 RX ADMIN — BUDESONIDE AND FORMOTEROL FUMARATE DIHYDRATE 2 PUFF(S): 160; 4.5 AEROSOL RESPIRATORY (INHALATION) at 11:19

## 2019-07-30 RX ADMIN — ALBUTEROL 1 PUFF(S): 90 AEROSOL, METERED ORAL at 18:07

## 2019-07-30 RX ADMIN — Medication 50 MILLIGRAM(S): at 21:39

## 2019-07-30 RX ADMIN — TIOTROPIUM BROMIDE 1 CAPSULE(S): 18 CAPSULE ORAL; RESPIRATORY (INHALATION) at 18:03

## 2019-07-30 RX ADMIN — Medication 50 MILLIGRAM(S): at 13:47

## 2019-07-30 RX ADMIN — CARVEDILOL PHOSPHATE 25 MILLIGRAM(S): 80 CAPSULE, EXTENDED RELEASE ORAL at 18:02

## 2019-07-30 RX ADMIN — Medication 25 MILLIGRAM(S): at 06:20

## 2019-07-30 RX ADMIN — CARVEDILOL PHOSPHATE 25 MILLIGRAM(S): 80 CAPSULE, EXTENDED RELEASE ORAL at 06:20

## 2019-07-30 RX ADMIN — BUDESONIDE AND FORMOTEROL FUMARATE DIHYDRATE 2 PUFF(S): 160; 4.5 AEROSOL RESPIRATORY (INHALATION) at 21:29

## 2019-07-30 RX ADMIN — Medication 81 MILLIGRAM(S): at 11:19

## 2019-07-30 RX ADMIN — Medication 40 MILLIGRAM(S): at 06:20

## 2019-07-30 RX ADMIN — HEPARIN SODIUM 5000 UNIT(S): 5000 INJECTION INTRAVENOUS; SUBCUTANEOUS at 18:02

## 2019-07-30 NOTE — PROGRESS NOTE ADULT - PROBLEM SELECTOR PLAN 1
Pt p/w SOB likely 2/2 COPD exacerbation. Pt w/ recent plane travel, however low suspicion for PE, Pt not tachycardic, no unilateral LE edema on exam, palpable cord, or calf tenderness. S/p Solumedrol 125mg IVP  - C/w O2 via NC, with plan to wean off as tolerated. Pt not on O2 at home.  - Will hold off on antibiotics as pt does not endorse increased sputum volume, or purulence.   - C/w short course of steroid; total 5 days, Prednisone 40mg daily  - C/w Duonebs prn  - C/w Symbicort  - Outpt pulm follow up  - TTE to evaluate for worsening pHTN Pt p/w SOB likely 2/2 COPD exacerbation. Pt w/ recent plane travel, however low suspicion for PE, Pt not tachycardic, no unilateral LE edema on exam, palpable cord, or calf tenderness. S/p Solumedrol 125mg IVP  - C/w 2 L O2 via NC, with plan to wean off as tolerated. Pt not on O2 at home.  - Will hold off on antibiotics as pt does not endorse increased sputum volume, or purulence.   - C/w short course of steroid; total 5 days, Prednisone 40mg daily (day 1/4)   - C/w Duonebs prn  - C/w Symbicort  - Outpt pulm follow up  - Have pt ambulate w/o oxygen and monitor POX

## 2019-07-30 NOTE — PROGRESS NOTE ADULT - SUBJECTIVE AND OBJECTIVE BOX
Tanya Barreto MD  [facility] CMA   Pager: [phone] / [phone]     Patient is a 69y old  Female who presents with a chief complaint of shortness of breath concerning for COPD exacerbation (29 Jul 2019 21:27)    Subjective: The pt was seen and examined at bedside. No acute events overnight. Pt reports that her shortness of breath is improving. Tanya Barreto MD  [facility] CMA   Pager: [phone] / [phone]     Patient is a 69y old  Female who presents with a chief complaint of shortness of breath concerning for COPD exacerbation (2019 21:27)    Subjective: The pt was seen and examined at bedside. No acute events overnight. Pt reports that her shortness of breath is improving. Pt currently on 2L O2 via NC.     ROS:  CONSTITUTIONAL:  No weight loss, fever, chills, weakness or fatigue.  	HEENT:  Eyes:  No visual loss, blurred vision, double vision or yellow sclerae. Ears, Nose, Throat:  No hearing loss, sneezing, congestion, runny nose or sore throat.  	SKIN:  No rash or itching.  	CARDIOVASCULAR:  No chest pain, chest pressure or chest discomfort. No palpitations. No LE edema.  	RESPIRATORY: +SOB, +DOMÍNGUEZ. No cough or sputum production.  	GASTROINTESTINAL:  No anorexia, nausea, vomiting or diarrhea. No abdominal pain or blood.  	GENITOURINARY:  Denies hematuria, dysuria.   	NEUROLOGICAL:  No headache, dizziness, syncope, paralysis, ataxia, numbness or tingling in the extremities. No change in bowel or bladder control.  	MUSCULOSKELETAL:  No muscle, back pain, joint pain or stiffness.  	HEMATOLOGIC:  No anemia, bleeding or bruising.  	LYMPHATICS:  No enlarged nodes.   	PSYCHIATRIC:  No history of depression or anxiety.  	ENDOCRINOLOGIC:  No reports of sweating, cold or heat intolerance. No polyuria or polydipsia.  ALLERGIES:  No history of asthma, hives, eczema or rhinitis.     MEDICATIONS  (STANDING):  ALBUTerol    90 MICROgram(s) HFA Inhaler 1 Puff(s) Inhalation every 4 hours  aspirin enteric coated 81 milliGRAM(s) Oral daily  buDESOnide 160 MICROgram(s)/formoterol 4.5 MICROgram(s) Inhaler 2 Puff(s) Inhalation two times a day  carvedilol 25 milliGRAM(s) Oral every 12 hours  docusate sodium 100 milliGRAM(s) Oral three times a day  heparin  Injectable 5000 Unit(s) SubCutaneous every 12 hours  hydrALAZINE 50 milliGRAM(s) Oral three times a day  lisinopril 40 milliGRAM(s) Oral daily  predniSONE   Tablet 40 milliGRAM(s) Oral daily  tiotropium 18 MICROgram(s) Capsule 1 Capsule(s) Inhalation daily    MEDICATIONS  (PRN):  senna 2 Tablet(s) Oral at bedtime PRN Constipation    Physical Exam:   Vital Signs Last 24 Hrs  T(C): 36.7 (2019 13:48), Max: 36.8 (2019 22:25)  T(F): 98 (2019 13:48), Max: 98.2 (2019 22:25)  HR: 109 (2019 15:35) (85 - 109)  BP: 146/91 (2019 13:48) (121/77 - 185/98)  BP(mean): --  RR: 26 (2019 15:35) (18 - 26)  SpO2: 96% (2019 15:35) (96% - 100%)    GENERAL APPEARANCE: Awake and alert. NAD. Speaking in full sentences. On 2 L NC   	EYES: PERRL, EOMI.   	EARS: External auditory canals normal, hearing grossly intact.  	NECK: Neck supple, non-tender without lymphadenopathy, masses or thyromegaly.  	CARDIAC: Normal S1 and S2. No S3, S4 or murmurs. Rhythm is regular. No LE edema b/l.  	LUNGS: Good inspiratory effort. Clear to auscultation and percussion without rales, rhonchi. Mild wheezing on R   	ABDOMEN: Positive bowel sounds. Soft, nondistended, nontender. No guarding or rebound.   	EXTREMITIES: No significant deformity or joint abnormality. No edema. Peripheral pulses intact. No varicosities. No calf tenderness   	NEUROLOGICAL: A&Ox3. CN II-XII intact. Strength and sensation symmetric and intact throughout.   	SKIN: Skin normal color, texture and turgor with no lesions or eruptions.  PSYCHIATRIC: Normal affect and behavior.    Labs/Imagin.4   2.89  )-----------( 230      ( 2019 06:00 )             41.5     07-30    144  |  104  |  21  ----------------------------<  133<H>  4.4   |  30  |  0.88    Ca    9.5      2019 06:00  Phos  3.8     07-30  Mg     1.9     07-30    TPro  6.8  /  Alb  4.0  /  TBili  0.6  /  DBili  x   /  AST  17  /  ALT  25  /  AlkPhos  64  07-30    Troponin T, High Sensitivity (19 @ 06:00)    Troponin T, High Sensitivity: 9: ---------------------***PLEASE NOTE***----------------------  Rapid changes upward or downward in high-sensitivity  troponin levels strongly suggest acute myocardial injury.  Hemolysis may falsely lower results. Renal impairment may  increase results.    Normal: <6 - 14 ng/L  Indeterminate: 15 - 51 ng/L  Elevated: >51 ng/L    Please see "[email or web address] on the [employer]  intranet for more information. ng/L    < from: Xray Chest 1 View-PORTABLE IMMEDIATE (19 @ 18:51) >  IMPRESSION:   Clear lungs.   No acute cardiopulmonary process.    < end of copied text >

## 2019-07-30 NOTE — PROGRESS NOTE ADULT - PROBLEM SELECTOR PLAN 4
- C/w home hydralazine 25mg TID  - Pharmacy stated Pt's hydralazine was recently increased to 50mg TID , however Pt did not pick this up. Pt w/ mild pulm HTN noted in previous TTE  - Outpt f/u as currently stable and improved w/ current treatment

## 2019-07-30 NOTE — PROGRESS NOTE ADULT - PROBLEM SELECTOR PLAN 5
Pt w/ mild pulm HTN noted in previous TTE  - Repeat TTE as above  - Consider Pulm c/s or may leave for outpt f/u as currently stable and improved w/ current treatment DVT ppx: Hep SQ  PT consult: home   Diet: DASH  Code: Full

## 2019-07-30 NOTE — PROGRESS NOTE ADULT - PROBLEM SELECTOR PLAN 2
Pt w/ mild hypernatremia to 146.   - Encourage oral hydration  - Monitor serum Na Pt w/ HFrEF(EF 25% in 4/27/19 ) s/p PPM/ICD ([employer]tronic 9/2018)  - Symptoms likely not cardiac in etiology  - C/w home carvedilol  - C/w home lisinopril  - Outpt cards holding statin "as condition is stable"

## 2019-07-30 NOTE — PROGRESS NOTE ADULT - PROBLEM SELECTOR PLAN 3
Pt w/ HFrEF(EF 25% in 4/27/19 ) s/p PPM/ICD ([employer]tronic 9/2018)  - Symptoms likely not cardiac in etiology  - C/w home carvedilol  - C/w home lisinopril  - Outpt cards holding statin "as condition is stable" - Pharmacy stated Pt's hydralazine was recently increased to 50mg TID , however Pt did not pick this up. C/w hydralazine 50 mg TID. BP goal 120/80

## 2019-07-31 ENCOUNTER — TRANSCRIPTION ENCOUNTER (OUTPATIENT)
Age: 69
End: 2019-07-31

## 2019-07-31 VITALS
OXYGEN SATURATION: 98 % | HEART RATE: 90 BPM | SYSTOLIC BLOOD PRESSURE: 147 MMHG | DIASTOLIC BLOOD PRESSURE: 70 MMHG | TEMPERATURE: 97 F | RESPIRATION RATE: 16 BRPM

## 2019-07-31 LAB
ANION GAP SERPL CALC-SCNC: 8 MMO/L — SIGNIFICANT CHANGE UP (ref 7–14)
BUN SERPL-MCNC: 23 MG/DL — SIGNIFICANT CHANGE UP (ref 7–23)
CALCIUM SERPL-MCNC: 9 MG/DL — SIGNIFICANT CHANGE UP (ref 8.4–10.5)
CHLORIDE SERPL-SCNC: 107 MMOL/L — SIGNIFICANT CHANGE UP (ref 98–107)
CO2 SERPL-SCNC: 26 MMOL/L — SIGNIFICANT CHANGE UP (ref 22–31)
CREAT SERPL-MCNC: 0.8 MG/DL — SIGNIFICANT CHANGE UP (ref 0.5–1.3)
GLUCOSE SERPL-MCNC: 105 MG/DL — HIGH (ref 70–99)
HCT VFR BLD CALC: 38.2 % — SIGNIFICANT CHANGE UP (ref 34.5–45)
HGB BLD-MCNC: 12.2 G/DL — SIGNIFICANT CHANGE UP (ref 11.5–15.5)
MAGNESIUM SERPL-MCNC: 1.8 MG/DL — SIGNIFICANT CHANGE UP (ref 1.6–2.6)
MCHC RBC-ENTMCNC: 30.8 PG — SIGNIFICANT CHANGE UP (ref 27–34)
MCHC RBC-ENTMCNC: 31.9 % — LOW (ref 32–36)
MCV RBC AUTO: 96.5 FL — SIGNIFICANT CHANGE UP (ref 80–100)
NRBC # FLD: 0 K/UL — SIGNIFICANT CHANGE UP (ref 0–0)
PHOSPHATE SERPL-MCNC: 3.3 MG/DL — SIGNIFICANT CHANGE UP (ref 2.5–4.5)
PLATELET # BLD AUTO: 203 K/UL — SIGNIFICANT CHANGE UP (ref 150–400)
PMV BLD: 9.8 FL — SIGNIFICANT CHANGE UP (ref 7–13)
POTASSIUM SERPL-MCNC: 4.2 MMOL/L — SIGNIFICANT CHANGE UP (ref 3.5–5.3)
POTASSIUM SERPL-SCNC: 4.2 MMOL/L — SIGNIFICANT CHANGE UP (ref 3.5–5.3)
RBC # BLD: 3.96 M/UL — SIGNIFICANT CHANGE UP (ref 3.8–5.2)
RBC # FLD: 13.3 % — SIGNIFICANT CHANGE UP (ref 10.3–14.5)
SODIUM SERPL-SCNC: 141 MMOL/L — SIGNIFICANT CHANGE UP (ref 135–145)
WBC # BLD: 6.79 K/UL — SIGNIFICANT CHANGE UP (ref 3.8–10.5)
WBC # FLD AUTO: 6.79 K/UL — SIGNIFICANT CHANGE UP (ref 3.8–10.5)

## 2019-07-31 PROCEDURE — 99239 HOSP IP/OBS DSCHRG MGMT >30: CPT

## 2019-07-31 RX ORDER — HYDRALAZINE HCL 50 MG
1 TABLET ORAL
Qty: 0 | Refills: 0 | DISCHARGE
Start: 2019-07-31

## 2019-07-31 RX ORDER — MAGNESIUM SULFATE 500 MG/ML
2 VIAL (ML) INJECTION ONCE
Refills: 0 | Status: COMPLETED | OUTPATIENT
Start: 2019-07-31 | End: 2019-07-31

## 2019-07-31 RX ORDER — HYDRALAZINE HCL 50 MG
1 TABLET ORAL
Qty: 0 | Refills: 0 | DISCHARGE

## 2019-07-31 RX ADMIN — Medication 50 MILLIGRAM(S): at 13:40

## 2019-07-31 RX ADMIN — Medication 81 MILLIGRAM(S): at 13:40

## 2019-07-31 RX ADMIN — BUDESONIDE AND FORMOTEROL FUMARATE DIHYDRATE 2 PUFF(S): 160; 4.5 AEROSOL RESPIRATORY (INHALATION) at 13:40

## 2019-07-31 RX ADMIN — Medication 50 MILLIGRAM(S): at 05:14

## 2019-07-31 RX ADMIN — HEPARIN SODIUM 5000 UNIT(S): 5000 INJECTION INTRAVENOUS; SUBCUTANEOUS at 05:15

## 2019-07-31 RX ADMIN — LISINOPRIL 40 MILLIGRAM(S): 2.5 TABLET ORAL at 05:14

## 2019-07-31 RX ADMIN — Medication 50 GRAM(S): at 13:41

## 2019-07-31 RX ADMIN — Medication 40 MILLIGRAM(S): at 05:15

## 2019-07-31 RX ADMIN — CARVEDILOL PHOSPHATE 25 MILLIGRAM(S): 80 CAPSULE, EXTENDED RELEASE ORAL at 05:14

## 2019-07-31 NOTE — DISCHARGE NOTE PROVIDER - NSDCFUSCHEDAPPT_GEN_ALL_CORE_FT
TJ MELENDEZ ; 08/20/2019 ; [facility] Card[facility] [address] TJ Hennessy ; 09/16/2019 ; [facility] [id number] [facility] ; 09/23/2019 ; [facility] Cardio Electro [address] TJ MELENDEZ ; 08/20/2019 ; [facility] Cardio [address] TJ Hennessy ; 09/16/2019 ; [facility] [address] [facility] ; 09/23/2019 ; [facility] Cardio Electro [address] TJ MELENDEZ ; 08/20/2019 ; [facility] Card[facility] [address] TJ Hennessy ; 09/16/2019 ; [facility] [id number] [facility] ; 09/23/2019 ; [facility] Cardio Electro [address] TJ MELENDEZ ; 08/05/2019 ; [facility] [address] TJ Ramos ; 08/20/2019 ; [facility] Cardio [address] TJ Hennessy ; 09/16/2019 ; [facility] PulmMed [address] TJ Ramos ; 09/23/2019 ; [facility] Cardio [address]

## 2019-07-31 NOTE — PROGRESS NOTE ADULT - PROBLEM SELECTOR PLAN 3
- Pharmacy stated Pt's hydralazine was recently increased to 50mg TID , however Pt did not pick this up. C/w hydralazine 50 mg TID. BP goal 120/80

## 2019-07-31 NOTE — PROGRESS NOTE ADULT - PROBLEM SELECTOR PLAN 1
Pt p/w SOB likely 2/2 COPD exacerbation. Pt w/ recent plane travel, however low suspicion for PE, Pt not tachycardic, no unilateral LE edema on exam, palpable cord, or calf tenderness. S/p Solumedrol 125mg IVP  - C/w 2 L O2 via NC, with plan to wean off as tolerated. Pt not on O2 at home. Pt's POX when ambulating off oxygen was 95-97, tachycardic to 109, reported some dyspnea on exertion   - Will hold off on antibiotics as pt does not endorse increased sputum volume, or purulence.   - C/w short course of steroid; total 5 days, Prednisone 40mg daily (day 2/4)   - C/w Duonebs prn  - C/w Symbicort  - Outpt pulm follow up

## 2019-07-31 NOTE — PROGRESS NOTE ADULT - PROBLEM SELECTOR PLAN 4
Pt w/ mild pulm HTN noted in previous TTE  - Outpt f/u as currently stable and improved w/ current treatment

## 2019-07-31 NOTE — PROGRESS NOTE ADULT - SUBJECTIVE AND OBJECTIVE BOX
Tanya Barreto MD  [facility] CMA   Pager: [phone] / [phone]     Patient is a 69y old  Female who presents with a chief complaint of shortness of breath concerning for COPD exacerbation (2019 09:45)    Subjective: The pt was seen and examined at bedside. No acute events overnight. Pt reports that her shortness of breath is improving. Pt currently on room air.     ROS:  CONSTITUTIONAL:  No weight loss, fever, chills, weakness or fatigue.  	HEENT:  Eyes:  No visual loss, blurred vision, double vision or yellow sclerae. Ears, Nose, Throat:  No hearing loss, sneezing, congestion, runny nose or sore throat.  	SKIN:  No rash or itching.  	CARDIOVASCULAR:  No chest pain, chest pressure or chest discomfort. No palpitations. No LE edema.  	RESPIRATORY: +SOB, +DOMÍNGUEZ. No cough or sputum production.  	GASTROINTESTINAL:  No anorexia, nausea, vomiting or diarrhea. No abdominal pain or blood.  	GENITOURINARY:  Denies hematuria, dysuria.   	NEUROLOGICAL:  No headache, dizziness, syncope, paralysis, ataxia, numbness or tingling in the extremities. No change in bowel or bladder control.  	MUSCULOSKELETAL:  No muscle, back pain, joint pain or stiffness.  	HEMATOLOGIC:  No anemia, bleeding or bruising.  	LYMPHATICS:  No enlarged nodes.   	PSYCHIATRIC:  No history of depression or anxiety.  	ENDOCRINOLOGIC:  No reports of sweating, cold or heat intolerance. No polyuria or polydipsia.  ALLERGIES:  No history of asthma, hives, eczema or rhinitis.     MEDICATIONS  (STANDING):  ALBUTerol    90 MICROgram(s) HFA Inhaler 1 Puff(s) Inhalation every 4 hours  aspirin enteric coated 81 milliGRAM(s) Oral daily  buDESOnide 160 MICROgram(s)/formoterol 4.5 MICROgram(s) Inhaler 2 Puff(s) Inhalation two times a day  carvedilol 25 milliGRAM(s) Oral every 12 hours  docusate sodium 100 milliGRAM(s) Oral three times a day  heparin  Injectable 5000 Unit(s) SubCutaneous every 12 hours  hydrALAZINE 50 milliGRAM(s) Oral three times a day  lisinopril 40 milliGRAM(s) Oral daily  predniSONE   Tablet 40 milliGRAM(s) Oral daily  tiotropium 18 MICROgram(s) Capsule 1 Capsule(s) Inhalation daily    MEDICATIONS  (PRN):  senna 2 Tablet(s) Oral at bedtime PRN Constipation    Physical Exam:   Vital Signs Last 24 Hrs  T(C): 36.4 (2019 05:15), Max: 36.7 (2019 13:48)  T(F): 97.6 (2019 05:15), Max: 98 (2019 13:48)  HR: 72 (2019 05:15) (72 - 109)  BP: 150/96 (2019 05:15) (127/89 - 150/96)  BP(mean): --  RR: 18 (2019 05:15) (18 - 26)  SpO2: 97% (2019 05:15) (96% - 100%)    GENERAL APPEARANCE: Awake and alert. NAD. Speaking in full sentences. On room air    	EYES: PERRL, EOMI.   	EARS: External auditory canals normal, hearing grossly intact.  	NECK: Neck supple, non-tender without lymphadenopathy, masses or thyromegaly.  	CARDIAC: Normal S1 and S2. No S3, S4 or murmurs. Rhythm is regular. No LE edema b/l.  	LUNGS: Good inspiratory effort. Clear to auscultation and percussion without rales, rhonchi. Mild wheezing on R   	ABDOMEN: Positive bowel sounds. Soft, nondistended, nontender. No guarding or rebound.   	EXTREMITIES: No significant deformity or joint abnormality. No edema. Peripheral pulses intact. No varicosities. No calf tenderness   	NEUROLOGICAL: A&Ox3. CN II-XII intact. Strength and sensation symmetric and intact throughout.   	SKIN: Skin normal color, texture and turgor with no lesions or eruptions.  PSYCHIATRIC: Normal affect and behavior.    Labs/Imagin.4   2.89  )-----------( 230      ( 2019 06:00 )             41.5     07-30    144  |  104  |  21  ----------------------------<  133<H>  4.4   |  30  |  0.88    Ca    9.5      2019 06:00  Phos  3.8     07-30  Mg     1.9     07-30    TPro  6.8  /  Alb  4.0  /  TBili  0.6  /  DBili  x   /  AST  17  /  ALT  25  /  AlkPhos  64  07-30    Troponin T, High Sensitivity (07.30.19 @ 06:00)    Troponin T, High Sensitivity: 9: ---------------------***PLEASE NOTE***----------------------  Rapid changes upward or downward in high-sensitivity  troponin levels strongly suggest acute myocardial injury.  Hemolysis may falsely lower results. Renal impairment may  increase results.    Normal: <6 - 14 ng/L  Indeterminate: 15 - 51 ng/L  Elevated: >51 ng/L    Please see "[email or web address]" on the [facility]  intranet for more information. ng/L    < from: Xray Chest 1 View-PORTABLE IMMEDIATE (19 @ 18:51) >  IMPRESSION:   Clear lungs.   No acute cardiopulmonary process.    < end of copied text >

## 2019-07-31 NOTE — DISCHARGE NOTE NURSING/CASE MANAGEMENT/SOCIAL WORK - NSDCDPATPORTLINK_GEN_ALL_CORE
You can access the [facility] Patient Portal, offered by [facility], by registering with the following website: [email or web address]

## 2019-07-31 NOTE — PROGRESS NOTE ADULT - PROBLEM SELECTOR PROBLEM 1
Acute exacerbation of chronic obstructive airways disease
Acute exacerbation of chronic obstructive airways disease

## 2019-07-31 NOTE — PROGRESS NOTE ADULT - PROBLEM SELECTOR PLAN 2
Pt w/ HFrEF(EF 25% in 4/27/19 ) s/p PPM/ICD (Medtronic 9/2018)  - Symptoms likely not cardiac in etiology  - C/w home carvedilol  - C/w home lisinopril  - Outpt cards holding statin "as condition is stable"

## 2019-07-31 NOTE — PROGRESS NOTE ADULT - REASON FOR ADMISSION
shortness of breath concerning for COPD exacerbation
shortness of breath concerning for COPD exacerbation

## 2019-07-31 NOTE — PROGRESS NOTE ADULT - ASSESSMENT
69F hx of CHF (EF 22% in 8/2018) s/p PPM/ICD (Medtronic 9/2018), R-sided heart failure, HTN, CARLOTA not able to tolerate CPAP, COPD (not on home O2), moderate pHTN p/w SOB for 2 days concerning for COPD exacerbation.
69F hx of CHF (EF 22% in 8/2018) s/p PPM/ICD (Medtronic 9/2018), R-sided heart failure, HTN, CARLOTA not able to tolerate CPAP, COPD (not on home O2), moderate pHTN p/w SOB for 2 days concerning for COPD exacerbation.

## 2019-07-31 NOTE — DISCHARGE NOTE PROVIDER - CARE PROVIDER_API CALL
Marisabel Naqvi)  Family Medicine  [address] [place] [address]  Phone: [phone]  Fax: [phone]  Follow Up Time: 1 week Marisabel Naqvi)  Family Medicine  [address] [place] [address]  Phone: [phone]  Fax: [phone]  Follow Up Time: 1-3 days

## 2019-07-31 NOTE — DISCHARGE NOTE PROVIDER - NSDCCPCAREPLAN_GEN_ALL_CORE_FT
PRINCIPAL DISCHARGE DIAGNOSIS  Diagnosis: COPD (chronic obstructive pulmonary disease)  Assessment and Plan of Treatment: You presented with shortness of breath. A chest x-ray did not show any infection. You were treated with Albuterol and the steroid Prednisone. You needed oxygen via nasal cannula initally, PRINCIPAL DISCHARGE DIAGNOSIS  Diagnosis: COPD (chronic obstructive pulmonary disease)  Assessment and Plan of Treatment: You presented with shortness of breath. A chest x-ray did not show any infection. You were treated with Albuterol and the steroid Prednisone. You needed oxygen via nasal cannula initally, but then did well off the oxygen. Please finish your course of Prednisone; you have 2 days left. It is important that you continue to take your Duonebs at home, and follow up with your Primary Medical Doctor in 1 week. PRINCIPAL DISCHARGE DIAGNOSIS  Diagnosis: COPD (chronic obstructive pulmonary disease)  Assessment and Plan of Treatment: You presented with shortness of breath. A chest x-ray did not show any infection. You were treated with Albuterol and the steroid Prednisone. You needed oxygen via nasal cannula initally, but then did well off the oxygen. Please finish your course of Prednisone; you have 2 days left. It is important that you continue to take your Duonebs at home, and follow up with your Primary Medical Doctor Dr. Naqvi on Monday August 5th at 1:15 pm.

## 2019-07-31 NOTE — DISCHARGE NOTE PROVIDER - PROVIDER TOKENS
PROVIDER:[TOKEN:[[id number]],FOLLOWUP:[1 week]] PROVIDER:[TOKEN:[[id number]],FOLLOWUP:[1-3 days]]

## 2019-07-31 NOTE — DISCHARGE NOTE NURSING/CASE MANAGEMENT/SOCIAL WORK - NSDCPNINST_GEN_ALL_CORE
patient is alert and orientedx4, able to make needs known. patient is admitted with COPD exacerbation, improved with respiratory treatment and Oxygen. patient is stable at the time of dc. patient is alert and orientedx4, able to make needs known. patient is admitted with COPD exacerbation, improved with respiratory treatment and Oxygen. patient is saturating in the high 90s off the O2 and ambulating O2 is in the high 90s as well.  patient is stable at the time of dc. no acute distress noted.

## 2019-08-05 ENCOUNTER — APPOINTMENT (OUTPATIENT)
Dept: FAMILY MEDICINE | Facility: CLINIC | Age: 69
End: 2019-08-05
Payer: MEDICARE

## 2019-08-05 VITALS
HEART RATE: 91 BPM | HEIGHT: 62 IN | SYSTOLIC BLOOD PRESSURE: 104 MMHG | DIASTOLIC BLOOD PRESSURE: 64 MMHG | TEMPERATURE: 98.2 F | BODY MASS INDEX: 14.35 KG/M2 | OXYGEN SATURATION: 96 % | WEIGHT: 78 LBS

## 2019-08-05 PROCEDURE — 99496 TRANSJ CARE MGMT HIGH F2F 7D: CPT

## 2019-08-05 RX ORDER — METHYLPREDNISOLONE 4 MG/1
4 TABLET ORAL
Qty: 1 | Refills: 0 | Status: COMPLETED | COMMUNITY
Start: 2019-06-19 | End: 2019-08-05

## 2019-08-05 NOTE — HISTORY OF PRESENT ILLNESS
[Post-hospitalization from ___ Hospital] : Post-hospitalization from [unfilled] Hospital [Admitted on: ___] : The patient was admitted on [unfilled] [Discharged on ___] : discharged on [unfilled] [Discharge Summary] : discharge summary [Med Reconciliation] : medication reconciliation has been completed [Patient Contacted By: ____] : and contacted by [unfilled] [FreeTextEntry2] : 70 yo F with COPD, CAD, CHF, HTN, HLD presents hospital follow up. She just returned from visiting her son in [place]. She notices that every time she goes to [place], she is fine. When she comes back, she started getting SOB. She does report air quality is much better where her son is and there are a lot of pine trees instead of what we have here. She was admitted for COPD exacerbation, was only treated with a short course of steroids. No antibiotics were given. She was breathing much better when she was discharged. She did notice that her legs swelled up when she got home. She took her lasix and now her feet are back to normal and she feels well. She is drinking more water than she used to. She was drinking tea usually.

## 2019-08-20 ENCOUNTER — NON-APPOINTMENT (OUTPATIENT)
Age: 69
End: 2019-08-20

## 2019-08-20 ENCOUNTER — APPOINTMENT (OUTPATIENT)
Dept: CARDIOLOGY | Facility: CLINIC | Age: 69
End: 2019-08-20
Payer: MEDICARE

## 2019-08-20 VITALS
HEART RATE: 90 BPM | HEIGHT: 62 IN | BODY MASS INDEX: 14.35 KG/M2 | RESPIRATION RATE: 16 BRPM | WEIGHT: 78 LBS | OXYGEN SATURATION: 96 % | DIASTOLIC BLOOD PRESSURE: 102 MMHG | SYSTOLIC BLOOD PRESSURE: 171 MMHG

## 2019-08-20 VITALS — SYSTOLIC BLOOD PRESSURE: 150 MMHG | DIASTOLIC BLOOD PRESSURE: 90 MMHG

## 2019-08-20 PROCEDURE — 99214 OFFICE O/P EST MOD 30 MIN: CPT

## 2019-08-20 PROCEDURE — 93000 ELECTROCARDIOGRAM COMPLETE: CPT

## 2019-08-20 NOTE — DISCUSSION/SUMMARY
[FreeTextEntry1] : 69 year old woman with NICM, HTN HLD here for followup\par \par 1. HTN- On lisinopril and Coreg and now back on Hydralazine 50 mg TID.\par Condition is stable. Continue present meds\par 2. Chronic systolic heart failure- On above meds, no edema or signs of fluid overload. Condition stable; continue present meds\par 3. HLD- On statin therapy, condition is stable, continue present meds\par 4. FU in 3 months

## 2019-08-20 NOTE — PHYSICAL EXAM
[General Appearance - Well Developed] : well developed [Normal Appearance] : normal appearance [Well Groomed] : well groomed [General Appearance - Well Nourished] : well nourished [No Deformities] : no deformities [General Appearance - In No Acute Distress] : no acute distress [Normal Conjunctiva] : the conjunctiva exhibited no abnormalities [Eyelids - No Xanthelasma] : the eyelids demonstrated no xanthelasmas [Normal Oral Mucosa] : normal oral mucosa [No Oral Pallor] : no oral pallor [No Oral Cyanosis] : no oral cyanosis [Normal Jugular Venous A Waves Present] : normal jugular venous A waves present [Normal Jugular Venous V Waves Present] : normal jugular venous V waves present [No Jugular Venous Lovett A Waves] : no jugular venous lovett A waves [Exaggerated Use Of Accessory Muscles For Inspiration] : no accessory muscle use [Respiration, Rhythm And Depth] : normal respiratory rhythm and effort [Auscultation Breath Sounds / Voice Sounds] : lungs were clear to auscultation bilaterally [Heart Rate And Rhythm] : heart rate and rhythm were normal [Heart Sounds] : normal S1 and S2 [Edema] : no peripheral edema present [FreeTextEntry1] : II/VI SM [Abdomen Soft] : soft [Abdomen Tenderness] : non-tender [Abdomen Mass (___ Cm)] : no abdominal mass palpated [Abnormal Walk] : normal gait [Gait - Sufficient For Exercise Testing] : the gait was sufficient for exercise testing [Nail Clubbing] : no clubbing of the fingernails [Cyanosis, Localized] : no localized cyanosis [Petechial Hemorrhages (___cm)] : no petechial hemorrhages [Skin Color & Pigmentation] : normal skin color and pigmentation [] : no rash [No Venous Stasis] : no venous stasis [Skin Lesions] : no skin lesions [No Skin Ulcers] : no skin ulcer [No Xanthoma] : no  xanthoma was observed [Oriented To Time, Place, And Person] : oriented to person, place, and time [Affect] : the affect was normal [Mood] : the mood was normal [No Anxiety] : not feeling anxious

## 2019-08-20 NOTE — HISTORY OF PRESENT ILLNESS
[FreeTextEntry1] : Here for followup. No complaints. Recent hospitalization for COPD.\par 1. HTN- On lisinopril and Coreg and now back on Hydralazine 50 mg TID.\par Condition is stable. Continue present meds\par 2. Chronic systolic heart failure- On above meds, no edema or signs of fluid overload. Condition stable; continue present meds\par 3. HLD- On statin therapy, condition is stable, continue present meds

## 2019-09-16 ENCOUNTER — APPOINTMENT (OUTPATIENT)
Dept: FAMILY MEDICINE | Facility: CLINIC | Age: 69
End: 2019-09-16
Payer: MEDICARE

## 2019-09-16 ENCOUNTER — APPOINTMENT (OUTPATIENT)
Dept: PULMONOLOGY | Facility: CLINIC | Age: 69
End: 2019-09-16
Payer: MEDICARE

## 2019-09-16 VITALS
DIASTOLIC BLOOD PRESSURE: 90 MMHG | OXYGEN SATURATION: 95 % | HEART RATE: 93 BPM | WEIGHT: 75 LBS | BODY MASS INDEX: 13.8 KG/M2 | HEIGHT: 62 IN | SYSTOLIC BLOOD PRESSURE: 136 MMHG

## 2019-09-16 VITALS
BODY MASS INDEX: 13.8 KG/M2 | WEIGHT: 75 LBS | DIASTOLIC BLOOD PRESSURE: 90 MMHG | OXYGEN SATURATION: 95 % | HEIGHT: 62 IN | SYSTOLIC BLOOD PRESSURE: 136 MMHG | HEART RATE: 93 BPM

## 2019-09-16 DIAGNOSIS — I50.9 HEART FAILURE, UNSPECIFIED: ICD-10-CM

## 2019-09-16 DIAGNOSIS — Z23 ENCOUNTER FOR IMMUNIZATION: ICD-10-CM

## 2019-09-16 DIAGNOSIS — J45.909 UNSPECIFIED ASTHMA, UNCOMPLICATED: ICD-10-CM

## 2019-09-16 PROCEDURE — G0009: CPT

## 2019-09-16 PROCEDURE — 99213 OFFICE O/P EST LOW 20 MIN: CPT | Mod: 25

## 2019-09-16 PROCEDURE — 94010 BREATHING CAPACITY TEST: CPT

## 2019-09-16 PROCEDURE — 90732 PPSV23 VACC 2 YRS+ SUBQ/IM: CPT

## 2019-09-16 PROCEDURE — 94729 DIFFUSING CAPACITY: CPT

## 2019-09-16 PROCEDURE — 99496 TRANSJ CARE MGMT HIGH F2F 7D: CPT | Mod: 25

## 2019-09-16 RX ORDER — ALBUTEROL SULFATE 90 UG/1
108 (90 BASE) AEROSOL, METERED RESPIRATORY (INHALATION)
Qty: 3 | Refills: 0 | Status: ACTIVE | COMMUNITY
Start: 2019-09-16 | End: 1900-01-01

## 2019-09-16 NOTE — PHYSICAL EXAM
[Normal Appearance] : normal appearance [General Appearance - Well Developed] : well developed [Well Groomed] : well groomed [General Appearance - Well Nourished] : well nourished [No Deformities] : no deformities [General Appearance - In No Acute Distress] : no acute distress [Normal Conjunctiva] : the conjunctiva exhibited no abnormalities [Eyelids - No Xanthelasma] : the eyelids demonstrated no xanthelasmas [Normal Oropharynx] : normal oropharynx [Neck Appearance] : the appearance of the neck was normal [Neck Cervical Mass (___cm)] : no neck mass was observed [Jugular Venous Distention Increased] : there was no jugular-venous distention [Thyroid Diffuse Enlargement] : the thyroid was not enlarged [Thyroid Nodule] : there were no palpable thyroid nodules [Heart Rate And Rhythm] : heart rate and rhythm were normal [Heart Sounds] : normal S1 and S2 [Murmurs] : no murmurs present [Respiration, Rhythm And Depth] : normal respiratory rhythm and effort [Auscultation Breath Sounds / Voice Sounds] : lungs were clear to auscultation bilaterally [Exaggerated Use Of Accessory Muscles For Inspiration] : no accessory muscle use [Abdomen Soft] : soft [Abdomen Tenderness] : non-tender [Abdomen Mass (___ Cm)] : no abdominal mass palpated [Abnormal Walk] : normal gait [Gait - Sufficient For Exercise Testing] : the gait was sufficient for exercise testing [Nail Clubbing] : no clubbing of the fingernails [Cyanosis, Localized] : no localized cyanosis [Petechial Hemorrhages (___cm)] : no petechial hemorrhages [Skin Color & Pigmentation] : normal skin color and pigmentation [] : no rash [No Venous Stasis] : no venous stasis [Skin Lesions] : no skin lesions [No Skin Ulcers] : no skin ulcer [No Xanthoma] : no  xanthoma was observed [Sensation] : the sensory exam was normal to light touch and pinprick [Deep Tendon Reflexes (DTR)] : deep tendon reflexes were 2+ and symmetric [No Focal Deficits] : no focal deficits [Oriented To Time, Place, And Person] : oriented to person, place, and time [Affect] : the affect was normal [Impaired Insight] : insight and judgment were intact

## 2019-09-16 NOTE — PROCEDURE
[FreeTextEntry1] : The pulmonary function testing done is severe obstructive ventilatory impairment with further reduction in both FEV1 and FVC. DLCO is severely reduced.

## 2019-09-16 NOTE — REASON FOR VISIT
[Follow-Up - From Hospitalization] : a hospitalization follow-up [Sleep Apnea] : sleep apnea [COPD] : COPD

## 2019-09-16 NOTE — HISTORY OF PRESENT ILLNESS
[Admitted on: ___] : The patient was admitted on [unfilled] [Post-hospitalization from ___ Hospital] : Post-hospitalization from [unfilled] Hospital [Discharged on ___] : discharged on [unfilled] [Discharge Summary] : discharge summary [Discharge Med List] : discharge medication list [Patient Contacted By: ____] : and contacted by [unfilled] [Med Reconciliation] : medication reconciliation has been completed [FreeTextEntry2] : 70 yo F with hx COPD, CHF, HTN, CARLOTA was admitted to [facility] for CHF/COPD exacerbation. She was given lasix and prednisone. Since hospital discharge, she has been breathing well. She was taking lasix consistently, then she stopped as she didn't need it anymore. She does weight herself daily and did notice weight gain along with lower extremity swelling.

## 2019-09-16 NOTE — HISTORY OF PRESENT ILLNESS
[FreeTextEntry1] : The patient was hospitalized for 6 days and was discharged on September 10th.  She was there with acute exacerbation of COPD. She has been discharged on prednisone to 40 mg a day. Now she is on 30 mg a day. She feels much better and is able to breathe.  She is taking all the listed medications.\par She has sleep apnea.  She does not want to use CPAP.  She sometimes takes Tylenol PM.

## 2019-09-19 ENCOUNTER — RX CHANGE (OUTPATIENT)
Age: 69
End: 2019-09-19

## 2019-09-23 ENCOUNTER — APPOINTMENT (OUTPATIENT)
Dept: ELECTROPHYSIOLOGY | Facility: CLINIC | Age: 69
End: 2019-09-23
Payer: MEDICARE

## 2019-09-23 PROCEDURE — 93295 DEV INTERROG REMOTE 1/2/MLT: CPT

## 2019-09-23 PROCEDURE — 93296 REM INTERROG EVL PM/IDS: CPT

## 2019-10-09 ENCOUNTER — APPOINTMENT (OUTPATIENT)
Dept: FAMILY MEDICINE | Facility: CLINIC | Age: 69
End: 2019-10-09

## 2019-10-17 ENCOUNTER — RX RENEWAL (OUTPATIENT)
Age: 69
End: 2019-10-17

## 2019-10-17 ENCOUNTER — RX CHANGE (OUTPATIENT)
Age: 69
End: 2019-10-17

## 2019-10-25 ENCOUNTER — APPOINTMENT (OUTPATIENT)
Dept: FAMILY MEDICINE | Facility: CLINIC | Age: 69
End: 2019-10-25
Payer: MEDICARE

## 2019-10-25 VITALS
BODY MASS INDEX: 13.98 KG/M2 | SYSTOLIC BLOOD PRESSURE: 103 MMHG | DIASTOLIC BLOOD PRESSURE: 74 MMHG | OXYGEN SATURATION: 95 % | HEART RATE: 91 BPM | WEIGHT: 76 LBS | HEIGHT: 62 IN

## 2019-10-25 VITALS — DIASTOLIC BLOOD PRESSURE: 70 MMHG | SYSTOLIC BLOOD PRESSURE: 120 MMHG

## 2019-10-25 DIAGNOSIS — R73.9 HYPERGLYCEMIA, UNSPECIFIED: ICD-10-CM

## 2019-10-25 DIAGNOSIS — Z12.39 ENCOUNTER FOR OTHER SCREENING FOR MALIGNANT NEOPLASM OF BREAST: ICD-10-CM

## 2019-10-25 DIAGNOSIS — Z23 ENCOUNTER FOR IMMUNIZATION: ICD-10-CM

## 2019-10-25 DIAGNOSIS — Z13.820 ENCOUNTER FOR SCREENING FOR OSTEOPOROSIS: ICD-10-CM

## 2019-10-25 PROCEDURE — 90674 CCIIV4 VAC NO PRSV 0.5 ML IM: CPT

## 2019-10-25 PROCEDURE — 99213 OFFICE O/P EST LOW 20 MIN: CPT | Mod: 25

## 2019-10-25 PROCEDURE — G0008: CPT

## 2019-10-25 PROCEDURE — 36415 COLL VENOUS BLD VENIPUNCTURE: CPT

## 2019-10-25 NOTE — HISTORY OF PRESENT ILLNESS
[FreeTextEntry1] : f/u HLD, pre-diabetes [de-identified] : 70 yo F with CAD, COPD, HLD, pre-diabetes presents for f/u. She is doing well. She has been off simvastatin for awhile now. It was discontinued in the hospital and she has not restarted it yet.

## 2019-10-28 ENCOUNTER — RX RENEWAL (OUTPATIENT)
Age: 69
End: 2019-10-28

## 2019-10-28 LAB
ALBUMIN SERPL ELPH-MCNC: 4.1 G/DL
ALP BLD-CCNC: 49 U/L
ALT SERPL-CCNC: 17 U/L
ANION GAP SERPL CALC-SCNC: 14 MMOL/L
AST SERPL-CCNC: 18 U/L
BILIRUB SERPL-MCNC: 0.6 MG/DL
BUN SERPL-MCNC: 15 MG/DL
CALCIUM SERPL-MCNC: 9.9 MG/DL
CHLORIDE SERPL-SCNC: 106 MMOL/L
CHOLEST SERPL-MCNC: 242 MG/DL
CHOLEST/HDLC SERPL: 4.4 RATIO
CO2 SERPL-SCNC: 29 MMOL/L
CREAT SERPL-MCNC: 0.89 MG/DL
ESTIMATED AVERAGE GLUCOSE: 114 MG/DL
GLUCOSE SERPL-MCNC: 61 MG/DL
HBA1C MFR BLD HPLC: 5.6 %
HDLC SERPL-MCNC: 55 MG/DL
LDLC SERPL CALC-MCNC: 148 MG/DL
POTASSIUM SERPL-SCNC: 4.6 MMOL/L
PROT SERPL-MCNC: 6.2 G/DL
SODIUM SERPL-SCNC: 149 MMOL/L
TRIGL SERPL-MCNC: 193 MG/DL

## 2019-11-21 ENCOUNTER — RX RENEWAL (OUTPATIENT)
Age: 69
End: 2019-11-21

## 2019-11-22 ENCOUNTER — APPOINTMENT (OUTPATIENT)
Dept: CARDIOLOGY | Facility: CLINIC | Age: 69
End: 2019-11-22

## 2019-12-19 ENCOUNTER — RX RENEWAL (OUTPATIENT)
Age: 69
End: 2019-12-19

## 2019-12-19 ENCOUNTER — APPOINTMENT (OUTPATIENT)
Dept: PULMONOLOGY | Facility: CLINIC | Age: 69
End: 2019-12-19
Payer: MEDICARE

## 2019-12-19 VITALS
SYSTOLIC BLOOD PRESSURE: 174 MMHG | HEIGHT: 62 IN | BODY MASS INDEX: 14.17 KG/M2 | WEIGHT: 77 LBS | OXYGEN SATURATION: 97 % | HEART RATE: 89 BPM | DIASTOLIC BLOOD PRESSURE: 90 MMHG

## 2019-12-19 PROCEDURE — 94010 BREATHING CAPACITY TEST: CPT

## 2019-12-19 PROCEDURE — 94729 DIFFUSING CAPACITY: CPT

## 2019-12-19 PROCEDURE — 99213 OFFICE O/P EST LOW 20 MIN: CPT | Mod: 25

## 2019-12-19 NOTE — HISTORY OF PRESENT ILLNESS
[FreeTextEntry1] : The patient has stable dyspnea.  She is taking Spiriva, Symbicort and albuterol inhaler.\par She has dyspnea on mild exertion.

## 2019-12-19 NOTE — PHYSICAL EXAM
[General Appearance - Well Developed] : well developed [Normal Appearance] : normal appearance [Well Groomed] : well groomed [General Appearance - Well Nourished] : well nourished [No Deformities] : no deformities [General Appearance - In No Acute Distress] : no acute distress [Normal Conjunctiva] : the conjunctiva exhibited no abnormalities [Eyelids - No Xanthelasma] : the eyelids demonstrated no xanthelasmas [Normal Oropharynx] : normal oropharynx [Neck Appearance] : the appearance of the neck was normal [Neck Cervical Mass (___cm)] : no neck mass was observed [Jugular Venous Distention Increased] : there was no jugular-venous distention [Thyroid Diffuse Enlargement] : the thyroid was not enlarged [Thyroid Nodule] : there were no palpable thyroid nodules [Heart Rate And Rhythm] : heart rate and rhythm were normal [Heart Sounds] : normal S1 and S2 [Murmurs] : no murmurs present [Respiration, Rhythm And Depth] : normal respiratory rhythm and effort [Exaggerated Use Of Accessory Muscles For Inspiration] : no accessory muscle use [Auscultation Breath Sounds / Voice Sounds] : lungs were clear to auscultation bilaterally [Abdomen Soft] : soft [Abdomen Tenderness] : non-tender [Abdomen Mass (___ Cm)] : no abdominal mass palpated [Abnormal Walk] : normal gait [Gait - Sufficient For Exercise Testing] : the gait was sufficient for exercise testing [Nail Clubbing] : no clubbing of the fingernails [Cyanosis, Localized] : no localized cyanosis [Petechial Hemorrhages (___cm)] : no petechial hemorrhages [Skin Color & Pigmentation] : normal skin color and pigmentation [] : no rash [No Venous Stasis] : no venous stasis [Skin Lesions] : no skin lesions [No Skin Ulcers] : no skin ulcer [No Xanthoma] : no  xanthoma was observed [Deep Tendon Reflexes (DTR)] : deep tendon reflexes were 2+ and symmetric [Sensation] : the sensory exam was normal to light touch and pinprick [No Focal Deficits] : no focal deficits [Impaired Insight] : insight and judgment were intact [Oriented To Time, Place, And Person] : oriented to person, place, and time [Affect] : the affect was normal

## 2019-12-31 ENCOUNTER — RX RENEWAL (OUTPATIENT)
Age: 69
End: 2019-12-31

## 2020-01-01 ENCOUNTER — NON-APPOINTMENT (OUTPATIENT)
Age: 70
End: 2020-01-01

## 2020-01-01 ENCOUNTER — APPOINTMENT (OUTPATIENT)
Dept: PULMONOLOGY | Facility: CLINIC | Age: 70
End: 2020-01-01

## 2020-01-01 ENCOUNTER — APPOINTMENT (OUTPATIENT)
Dept: ELECTROPHYSIOLOGY | Facility: CLINIC | Age: 70
End: 2020-01-01

## 2020-01-01 ENCOUNTER — APPOINTMENT (OUTPATIENT)
Dept: CARDIOLOGY | Facility: CLINIC | Age: 70
End: 2020-01-01
Payer: MEDICARE

## 2020-01-01 ENCOUNTER — TRANSCRIPTION ENCOUNTER (OUTPATIENT)
Age: 70
End: 2020-01-01

## 2020-01-01 ENCOUNTER — APPOINTMENT (OUTPATIENT)
Dept: PULMONOLOGY | Facility: CLINIC | Age: 70
End: 2020-01-01
Payer: MEDICARE

## 2020-01-01 ENCOUNTER — INPATIENT (INPATIENT)
Facility: HOSPITAL | Age: 70
LOS: 3 days | Discharge: HOME HEALTH | End: 2020-11-13
Attending: INTERNAL MEDICINE | Admitting: INTERNAL MEDICINE
Payer: MEDICARE

## 2020-01-01 ENCOUNTER — INPATIENT (INPATIENT)
Facility: HOSPITAL | Age: 70
LOS: 2 days | Discharge: HOME | End: 2020-08-26
Attending: HOSPITALIST | Admitting: HOSPITALIST
Payer: MEDICARE

## 2020-01-01 ENCOUNTER — APPOINTMENT (OUTPATIENT)
Dept: ELECTROPHYSIOLOGY | Facility: CLINIC | Age: 70
End: 2020-01-01
Payer: MEDICARE

## 2020-01-01 ENCOUNTER — APPOINTMENT (OUTPATIENT)
Dept: FAMILY MEDICINE | Facility: CLINIC | Age: 70
End: 2020-01-01
Payer: MEDICARE

## 2020-01-01 ENCOUNTER — EMERGENCY (EMERGENCY)
Facility: HOSPITAL | Age: 70
LOS: 1 days | Discharge: HOME | End: 2020-01-01
Attending: EMERGENCY MEDICINE | Admitting: EMERGENCY MEDICINE
Payer: COMMERCIAL

## 2020-01-01 VITALS
TEMPERATURE: 97.3 F | OXYGEN SATURATION: 93 % | DIASTOLIC BLOOD PRESSURE: 60 MMHG | WEIGHT: 75 LBS | HEIGHT: 62 IN | SYSTOLIC BLOOD PRESSURE: 90 MMHG | HEART RATE: 96 BPM | BODY MASS INDEX: 13.8 KG/M2

## 2020-01-01 VITALS
HEIGHT: 62 IN | OXYGEN SATURATION: 100 % | SYSTOLIC BLOOD PRESSURE: 167 MMHG | HEART RATE: 102 BPM | BODY MASS INDEX: 15.55 KG/M2 | DIASTOLIC BLOOD PRESSURE: 106 MMHG

## 2020-01-01 VITALS
RESPIRATION RATE: 24 BRPM | TEMPERATURE: 99 F | OXYGEN SATURATION: 100 % | DIASTOLIC BLOOD PRESSURE: 100 MMHG | HEIGHT: 63 IN | HEART RATE: 86 BPM | SYSTOLIC BLOOD PRESSURE: 176 MMHG

## 2020-01-01 VITALS
TEMPERATURE: 99.9 F | BODY MASS INDEX: 16.01 KG/M2 | HEART RATE: 106 BPM | HEIGHT: 62 IN | DIASTOLIC BLOOD PRESSURE: 88 MMHG | OXYGEN SATURATION: 89 % | WEIGHT: 87 LBS | SYSTOLIC BLOOD PRESSURE: 165 MMHG

## 2020-01-01 VITALS
HEIGHT: 62 IN | OXYGEN SATURATION: 95 % | HEART RATE: 104 BPM | WEIGHT: 82 LBS | SYSTOLIC BLOOD PRESSURE: 160 MMHG | DIASTOLIC BLOOD PRESSURE: 102 MMHG | BODY MASS INDEX: 15.09 KG/M2

## 2020-01-01 VITALS
SYSTOLIC BLOOD PRESSURE: 165 MMHG | WEIGHT: 87 LBS | OXYGEN SATURATION: 90 % | HEART RATE: 105 BPM | DIASTOLIC BLOOD PRESSURE: 88 MMHG | HEIGHT: 62 IN | TEMPERATURE: 99.9 F | BODY MASS INDEX: 16.01 KG/M2

## 2020-01-01 VITALS
DIASTOLIC BLOOD PRESSURE: 94 MMHG | SYSTOLIC BLOOD PRESSURE: 147 MMHG | HEART RATE: 87 BPM | OXYGEN SATURATION: 100 % | RESPIRATION RATE: 18 BRPM | TEMPERATURE: 99 F

## 2020-01-01 VITALS — SYSTOLIC BLOOD PRESSURE: 145 MMHG | TEMPERATURE: 97.2 F | DIASTOLIC BLOOD PRESSURE: 81 MMHG

## 2020-01-01 VITALS
HEIGHT: 63 IN | HEART RATE: 88 BPM | OXYGEN SATURATION: 100 % | TEMPERATURE: 98 F | RESPIRATION RATE: 22 BRPM | DIASTOLIC BLOOD PRESSURE: 97 MMHG | SYSTOLIC BLOOD PRESSURE: 160 MMHG

## 2020-01-01 VITALS
TEMPERATURE: 98 F | DIASTOLIC BLOOD PRESSURE: 84 MMHG | OXYGEN SATURATION: 100 % | RESPIRATION RATE: 20 BRPM | SYSTOLIC BLOOD PRESSURE: 129 MMHG | HEART RATE: 82 BPM

## 2020-01-01 VITALS
RESPIRATION RATE: 25 BRPM | DIASTOLIC BLOOD PRESSURE: 99 MMHG | OXYGEN SATURATION: 100 % | TEMPERATURE: 99 F | HEART RATE: 84 BPM | SYSTOLIC BLOOD PRESSURE: 154 MMHG

## 2020-01-01 VITALS
TEMPERATURE: 98 F | SYSTOLIC BLOOD PRESSURE: 139 MMHG | RESPIRATION RATE: 18 BRPM | OXYGEN SATURATION: 100 % | DIASTOLIC BLOOD PRESSURE: 89 MMHG | HEART RATE: 97 BPM

## 2020-01-01 VITALS
TEMPERATURE: 98.5 F | DIASTOLIC BLOOD PRESSURE: 100 MMHG | BODY MASS INDEX: 15.36 KG/M2 | WEIGHT: 84 LBS | SYSTOLIC BLOOD PRESSURE: 168 MMHG | RESPIRATION RATE: 16 BRPM | HEART RATE: 91 BPM | OXYGEN SATURATION: 100 %

## 2020-01-01 VITALS
HEART RATE: 96 BPM | TEMPERATURE: 97.3 F | HEIGHT: 62 IN | WEIGHT: 75 LBS | SYSTOLIC BLOOD PRESSURE: 97 MMHG | DIASTOLIC BLOOD PRESSURE: 64 MMHG | OXYGEN SATURATION: 93 % | BODY MASS INDEX: 13.8 KG/M2

## 2020-01-01 VITALS
TEMPERATURE: 97.8 F | HEART RATE: 105 BPM | BODY MASS INDEX: 15.27 KG/M2 | WEIGHT: 83 LBS | OXYGEN SATURATION: 96 % | DIASTOLIC BLOOD PRESSURE: 102 MMHG | SYSTOLIC BLOOD PRESSURE: 149 MMHG | HEIGHT: 62 IN

## 2020-01-01 VITALS — SYSTOLIC BLOOD PRESSURE: 90 MMHG | DIASTOLIC BLOOD PRESSURE: 60 MMHG

## 2020-01-01 VITALS — BODY MASS INDEX: 15.55 KG/M2 | WEIGHT: 85 LBS

## 2020-01-01 VITALS — DIASTOLIC BLOOD PRESSURE: 96 MMHG | SYSTOLIC BLOOD PRESSURE: 160 MMHG

## 2020-01-01 VITALS
SYSTOLIC BLOOD PRESSURE: 116 MMHG | TEMPERATURE: 97.1 F | HEART RATE: 99 BPM | DIASTOLIC BLOOD PRESSURE: 70 MMHG | RESPIRATION RATE: 20 BRPM | OXYGEN SATURATION: 95 % | BODY MASS INDEX: 16.2 KG/M2 | HEIGHT: 62 IN | WEIGHT: 88 LBS

## 2020-01-01 DIAGNOSIS — I95.9 HYPOTENSION, UNSPECIFIED: ICD-10-CM

## 2020-01-01 DIAGNOSIS — R53.81 OTHER MALAISE: ICD-10-CM

## 2020-01-01 DIAGNOSIS — J44.1 CHRONIC OBSTRUCTIVE PULMONARY DISEASE WITH (ACUTE) EXACERBATION: ICD-10-CM

## 2020-01-01 DIAGNOSIS — I34.0 NONRHEUMATIC MITRAL (VALVE) INSUFFICIENCY: ICD-10-CM

## 2020-01-01 DIAGNOSIS — R09.89 OTHER SPECIFIED SYMPTOMS AND SIGNS INVOLVING THE CIRCULATORY AND RESPIRATORY SYSTEMS: ICD-10-CM

## 2020-01-01 DIAGNOSIS — Z95.810 PRESENCE OF AUTOMATIC (IMPLANTABLE) CARDIAC DEFIBRILLATOR: Chronic | ICD-10-CM

## 2020-01-01 DIAGNOSIS — R06.00 DYSPNEA, UNSPECIFIED: ICD-10-CM

## 2020-01-01 DIAGNOSIS — Z71.89 OTHER SPECIFIED COUNSELING: ICD-10-CM

## 2020-01-01 DIAGNOSIS — I50.22 CHRONIC SYSTOLIC (CONGESTIVE) HEART FAILURE: ICD-10-CM

## 2020-01-01 DIAGNOSIS — R06.02 SHORTNESS OF BREATH: ICD-10-CM

## 2020-01-01 DIAGNOSIS — E78.5 HYPERLIPIDEMIA, UNSPECIFIED: ICD-10-CM

## 2020-01-01 DIAGNOSIS — Z51.5 ENCOUNTER FOR PALLIATIVE CARE: ICD-10-CM

## 2020-01-01 DIAGNOSIS — Z00.00 ENCOUNTER FOR GENERAL ADULT MEDICAL EXAMINATION WITHOUT ABNORMAL FINDINGS: ICD-10-CM

## 2020-01-01 DIAGNOSIS — I25.10 ATHEROSCLEROTIC HEART DISEASE OF NATIVE CORONARY ARTERY WITHOUT ANGINA PECTORIS: ICD-10-CM

## 2020-01-01 DIAGNOSIS — I27.20 PULMONARY HYPERTENSION, UNSPECIFIED: ICD-10-CM

## 2020-01-01 DIAGNOSIS — J44.9 CHRONIC OBSTRUCTIVE PULMONARY DISEASE, UNSPECIFIED: ICD-10-CM

## 2020-01-01 DIAGNOSIS — J45.51 SEVERE PERSISTENT ASTHMA WITH (ACUTE) EXACERBATION: ICD-10-CM

## 2020-01-01 DIAGNOSIS — G47.30 SLEEP APNEA, UNSPECIFIED: ICD-10-CM

## 2020-01-01 DIAGNOSIS — I10 ESSENTIAL (PRIMARY) HYPERTENSION: ICD-10-CM

## 2020-01-01 DIAGNOSIS — F43.21 ADJUSTMENT DISORDER WITH DEPRESSED MOOD: ICD-10-CM

## 2020-01-01 LAB
25(OH)D3 SERPL-MCNC: 19.1 NG/ML
ALBUMIN SERPL ELPH-MCNC: 4 G/DL — SIGNIFICANT CHANGE UP (ref 3.3–5)
ALBUMIN SERPL ELPH-MCNC: 4 G/DL — SIGNIFICANT CHANGE UP (ref 3.3–5)
ALBUMIN SERPL ELPH-MCNC: 4.1 G/DL
ALBUMIN SERPL ELPH-MCNC: 4.2 G/DL — SIGNIFICANT CHANGE UP (ref 3.3–5)
ALBUMIN SERPL ELPH-MCNC: 4.3 G/DL — SIGNIFICANT CHANGE UP (ref 3.3–5)
ALBUMIN SERPL ELPH-MCNC: 4.4 G/DL — SIGNIFICANT CHANGE UP (ref 3.3–5)
ALP BLD-CCNC: 52 U/L
ALP SERPL-CCNC: 36 U/L — LOW (ref 40–120)
ALP SERPL-CCNC: 39 U/L — LOW (ref 40–120)
ALP SERPL-CCNC: 45 U/L — SIGNIFICANT CHANGE UP (ref 40–120)
ALP SERPL-CCNC: 47 U/L — SIGNIFICANT CHANGE UP (ref 40–120)
ALP SERPL-CCNC: 48 U/L — SIGNIFICANT CHANGE UP (ref 40–120)
ALT FLD-CCNC: 19 U/L — SIGNIFICANT CHANGE UP (ref 4–33)
ALT FLD-CCNC: 21 U/L — SIGNIFICANT CHANGE UP (ref 4–33)
ALT FLD-CCNC: 26 U/L — SIGNIFICANT CHANGE UP (ref 4–33)
ALT FLD-CCNC: 43 U/L — HIGH (ref 4–33)
ALT FLD-CCNC: 9 U/L — SIGNIFICANT CHANGE UP (ref 4–33)
ALT SERPL-CCNC: 30 U/L
ANION GAP SERPL CALC-SCNC: 11 MMO/L — SIGNIFICANT CHANGE UP (ref 7–14)
ANION GAP SERPL CALC-SCNC: 11 MMO/L — SIGNIFICANT CHANGE UP (ref 7–14)
ANION GAP SERPL CALC-SCNC: 12 MMO/L — SIGNIFICANT CHANGE UP (ref 7–14)
ANION GAP SERPL CALC-SCNC: 14 MMO/L — SIGNIFICANT CHANGE UP (ref 7–14)
ANION GAP SERPL CALC-SCNC: 14 MMOL/L
ANION GAP SERPL CALC-SCNC: 15 MMO/L — HIGH (ref 7–14)
ANION GAP SERPL CALC-SCNC: 7 MMO/L — SIGNIFICANT CHANGE UP (ref 7–14)
APTT BLD: 23 SEC — LOW (ref 27–36.3)
APTT BLD: 25.6 SEC — LOW (ref 27–36.3)
APTT BLD: 27.5 SEC — SIGNIFICANT CHANGE UP (ref 27–36.3)
AST SERPL-CCNC: 13 U/L — SIGNIFICANT CHANGE UP (ref 4–32)
AST SERPL-CCNC: 17 U/L — SIGNIFICANT CHANGE UP (ref 4–32)
AST SERPL-CCNC: 18 U/L
AST SERPL-CCNC: 23 U/L — SIGNIFICANT CHANGE UP (ref 4–32)
AST SERPL-CCNC: 35 U/L — HIGH (ref 4–32)
AST SERPL-CCNC: 41 U/L — HIGH (ref 4–32)
B PERT DNA SPEC QL NAA+PROBE: NOT DETECTED — SIGNIFICANT CHANGE UP
BASE EXCESS BLDA CALC-SCNC: 15.2 MMOL/L — SIGNIFICANT CHANGE UP
BASE EXCESS BLDV CALC-SCNC: 13.4 MMOL/L — SIGNIFICANT CHANGE UP
BASE EXCESS BLDV CALC-SCNC: 18.6 MMOL/L — SIGNIFICANT CHANGE UP
BASOPHILS # BLD AUTO: 0 K/UL — SIGNIFICANT CHANGE UP (ref 0–0.2)
BASOPHILS # BLD AUTO: 0 K/UL — SIGNIFICANT CHANGE UP (ref 0–0.2)
BASOPHILS # BLD AUTO: 0.01 K/UL — SIGNIFICANT CHANGE UP (ref 0–0.2)
BASOPHILS # BLD AUTO: 0.02 K/UL
BASOPHILS # BLD AUTO: 0.03 K/UL — SIGNIFICANT CHANGE UP (ref 0–0.2)
BASOPHILS NFR BLD AUTO: 0 % — SIGNIFICANT CHANGE UP (ref 0–2)
BASOPHILS NFR BLD AUTO: 0 % — SIGNIFICANT CHANGE UP (ref 0–2)
BASOPHILS NFR BLD AUTO: 0.2 %
BASOPHILS NFR BLD AUTO: 0.2 % — SIGNIFICANT CHANGE UP (ref 0–2)
BASOPHILS NFR BLD AUTO: 0.5 % — SIGNIFICANT CHANGE UP (ref 0–2)
BILIRUB SERPL-MCNC: 0.4 MG/DL — SIGNIFICANT CHANGE UP (ref 0.2–1.2)
BILIRUB SERPL-MCNC: 0.6 MG/DL
BILIRUB SERPL-MCNC: 0.7 MG/DL — SIGNIFICANT CHANGE UP (ref 0.2–1.2)
BILIRUB SERPL-MCNC: 0.8 MG/DL — SIGNIFICANT CHANGE UP (ref 0.2–1.2)
BILIRUB SERPL-MCNC: 0.9 MG/DL — SIGNIFICANT CHANGE UP (ref 0.2–1.2)
BILIRUB SERPL-MCNC: 1.1 MG/DL — SIGNIFICANT CHANGE UP (ref 0.2–1.2)
BLOOD GAS ARTERIAL - FIO2: 40 — SIGNIFICANT CHANGE UP
BLOOD GAS VENOUS - CREATININE: 0.76 MG/DL — SIGNIFICANT CHANGE UP (ref 0.5–1.3)
BLOOD GAS VENOUS - CREATININE: 1.21 MG/DL — SIGNIFICANT CHANGE UP (ref 0.5–1.3)
BLOOD GAS VENOUS - FIO2: 15 — SIGNIFICANT CHANGE UP
BLOOD GAS VENOUS - FIO2: 21 — SIGNIFICANT CHANGE UP
BUN SERPL-MCNC: 16 MG/DL — SIGNIFICANT CHANGE UP (ref 7–23)
BUN SERPL-MCNC: 23 MG/DL — SIGNIFICANT CHANGE UP (ref 7–23)
BUN SERPL-MCNC: 27 MG/DL — HIGH (ref 7–23)
BUN SERPL-MCNC: 29 MG/DL — HIGH (ref 7–23)
BUN SERPL-MCNC: 35 MG/DL — HIGH (ref 7–23)
BUN SERPL-MCNC: 36 MG/DL — HIGH (ref 7–23)
BUN SERPL-MCNC: 39 MG/DL — HIGH (ref 7–23)
BUN SERPL-MCNC: 46 MG/DL
BUN SERPL-MCNC: 46 MG/DL — HIGH (ref 7–23)
C PNEUM DNA SPEC QL NAA+PROBE: NOT DETECTED — SIGNIFICANT CHANGE UP
CALCIUM SERPL-MCNC: 8.8 MG/DL — SIGNIFICANT CHANGE UP (ref 8.4–10.5)
CALCIUM SERPL-MCNC: 8.9 MG/DL — SIGNIFICANT CHANGE UP (ref 8.4–10.5)
CALCIUM SERPL-MCNC: 9 MG/DL — SIGNIFICANT CHANGE UP (ref 8.4–10.5)
CALCIUM SERPL-MCNC: 9.3 MG/DL
CALCIUM SERPL-MCNC: 9.3 MG/DL — SIGNIFICANT CHANGE UP (ref 8.4–10.5)
CALCIUM SERPL-MCNC: 9.4 MG/DL — SIGNIFICANT CHANGE UP (ref 8.4–10.5)
CALCIUM SERPL-MCNC: 9.6 MG/DL — SIGNIFICANT CHANGE UP (ref 8.4–10.5)
CALCIUM SERPL-MCNC: 9.7 MG/DL — SIGNIFICANT CHANGE UP (ref 8.4–10.5)
CALCIUM SERPL-MCNC: 9.8 MG/DL — SIGNIFICANT CHANGE UP (ref 8.4–10.5)
CHLORIDE BLDA-SCNC: 92 MMOL/L — LOW (ref 96–108)
CHLORIDE BLDV-SCNC: 102 MMOL/L — SIGNIFICANT CHANGE UP (ref 96–108)
CHLORIDE BLDV-SCNC: 94 MMOL/L — LOW (ref 96–108)
CHLORIDE SERPL-SCNC: 102 MMOL/L — SIGNIFICANT CHANGE UP (ref 98–107)
CHLORIDE SERPL-SCNC: 103 MMOL/L — SIGNIFICANT CHANGE UP (ref 98–107)
CHLORIDE SERPL-SCNC: 103 MMOL/L — SIGNIFICANT CHANGE UP (ref 98–107)
CHLORIDE SERPL-SCNC: 92 MMOL/L — LOW (ref 98–107)
CHLORIDE SERPL-SCNC: 93 MMOL/L — LOW (ref 98–107)
CHLORIDE SERPL-SCNC: 97 MMOL/L
CHLORIDE SERPL-SCNC: 97 MMOL/L — LOW (ref 98–107)
CHLORIDE SERPL-SCNC: 98 MMOL/L — SIGNIFICANT CHANGE UP (ref 98–107)
CHLORIDE SERPL-SCNC: 99 MMOL/L — SIGNIFICANT CHANGE UP (ref 98–107)
CHOLEST SERPL-MCNC: 189 MG/DL
CHOLEST/HDLC SERPL: 2.7 RATIO
CO2 SERPL-SCNC: 27 MMOL/L — SIGNIFICANT CHANGE UP (ref 22–31)
CO2 SERPL-SCNC: 28 MMOL/L — SIGNIFICANT CHANGE UP (ref 22–31)
CO2 SERPL-SCNC: 28 MMOL/L — SIGNIFICANT CHANGE UP (ref 22–31)
CO2 SERPL-SCNC: 33 MMOL/L
CO2 SERPL-SCNC: 34 MMOL/L — HIGH (ref 22–31)
CO2 SERPL-SCNC: 35 MMOL/L — HIGH (ref 22–31)
CO2 SERPL-SCNC: 36 MMOL/L — HIGH (ref 22–31)
CO2 SERPL-SCNC: 36 MMOL/L — HIGH (ref 22–31)
CO2 SERPL-SCNC: 44 MMOL/L — HIGH (ref 22–31)
CREAT SERPL-MCNC: 0.81 MG/DL — SIGNIFICANT CHANGE UP (ref 0.5–1.3)
CREAT SERPL-MCNC: 0.83 MG/DL — SIGNIFICANT CHANGE UP (ref 0.5–1.3)
CREAT SERPL-MCNC: 0.91 MG/DL — SIGNIFICANT CHANGE UP (ref 0.5–1.3)
CREAT SERPL-MCNC: 0.98 MG/DL — SIGNIFICANT CHANGE UP (ref 0.5–1.3)
CREAT SERPL-MCNC: 1.09 MG/DL — SIGNIFICANT CHANGE UP (ref 0.5–1.3)
CREAT SERPL-MCNC: 1.12 MG/DL — SIGNIFICANT CHANGE UP (ref 0.5–1.3)
CREAT SERPL-MCNC: 1.15 MG/DL — SIGNIFICANT CHANGE UP (ref 0.5–1.3)
CREAT SERPL-MCNC: 1.33 MG/DL — HIGH (ref 0.5–1.3)
CREAT SERPL-MCNC: 1.43 MG/DL
CULTURE RESULTS: SIGNIFICANT CHANGE UP
CULTURE RESULTS: SIGNIFICANT CHANGE UP
EOSINOPHIL # BLD AUTO: 0 K/UL — SIGNIFICANT CHANGE UP (ref 0–0.5)
EOSINOPHIL # BLD AUTO: 0 K/UL — SIGNIFICANT CHANGE UP (ref 0–0.5)
EOSINOPHIL # BLD AUTO: 0.01 K/UL
EOSINOPHIL # BLD AUTO: 0.01 K/UL — SIGNIFICANT CHANGE UP (ref 0–0.5)
EOSINOPHIL # BLD AUTO: 0.07 K/UL — SIGNIFICANT CHANGE UP (ref 0–0.5)
EOSINOPHIL NFR BLD AUTO: 0 % — SIGNIFICANT CHANGE UP (ref 0–6)
EOSINOPHIL NFR BLD AUTO: 0 % — SIGNIFICANT CHANGE UP (ref 0–6)
EOSINOPHIL NFR BLD AUTO: 0.1 %
EOSINOPHIL NFR BLD AUTO: 0.2 % — SIGNIFICANT CHANGE UP (ref 0–6)
EOSINOPHIL NFR BLD AUTO: 1.2 % — SIGNIFICANT CHANGE UP (ref 0–6)
FLUAV H1 2009 PAND RNA SPEC QL NAA+PROBE: NOT DETECTED — SIGNIFICANT CHANGE UP
FLUAV H1 RNA SPEC QL NAA+PROBE: NOT DETECTED — SIGNIFICANT CHANGE UP
FLUAV H3 RNA SPEC QL NAA+PROBE: NOT DETECTED — SIGNIFICANT CHANGE UP
FLUAV SUBTYP SPEC NAA+PROBE: NOT DETECTED — SIGNIFICANT CHANGE UP
FLUBV RNA SPEC QL NAA+PROBE: NOT DETECTED — SIGNIFICANT CHANGE UP
GAS PNL BLDV: 140 MMOL/L — SIGNIFICANT CHANGE UP (ref 136–146)
GAS PNL BLDV: 140 MMOL/L — SIGNIFICANT CHANGE UP (ref 136–146)
GLUCOSE BLDA-MCNC: 192 MG/DL — HIGH (ref 70–99)
GLUCOSE BLDV-MCNC: 127 MG/DL — HIGH (ref 70–99)
GLUCOSE BLDV-MCNC: 90 MG/DL — SIGNIFICANT CHANGE UP (ref 70–99)
GLUCOSE SERPL-MCNC: 116 MG/DL — HIGH (ref 70–99)
GLUCOSE SERPL-MCNC: 123 MG/DL — HIGH (ref 70–99)
GLUCOSE SERPL-MCNC: 135 MG/DL — HIGH (ref 70–99)
GLUCOSE SERPL-MCNC: 135 MG/DL — HIGH (ref 70–99)
GLUCOSE SERPL-MCNC: 141 MG/DL — HIGH (ref 70–99)
GLUCOSE SERPL-MCNC: 149 MG/DL — HIGH (ref 70–99)
GLUCOSE SERPL-MCNC: 191 MG/DL — HIGH (ref 70–99)
GLUCOSE SERPL-MCNC: 66 MG/DL
GLUCOSE SERPL-MCNC: 91 MG/DL — SIGNIFICANT CHANGE UP (ref 70–99)
HADV DNA SPEC QL NAA+PROBE: NOT DETECTED — SIGNIFICANT CHANGE UP
HCO3 BLDA-SCNC: 37 MMOL/L — HIGH (ref 22–26)
HCO3 BLDV-SCNC: 34 MMOL/L — HIGH (ref 20–27)
HCO3 BLDV-SCNC: 38 MMOL/L — HIGH (ref 20–27)
HCOV PNL SPEC NAA+PROBE: SIGNIFICANT CHANGE UP
HCT VFR BLD CALC: 34.3 % — LOW (ref 34.5–45)
HCT VFR BLD CALC: 36 % — SIGNIFICANT CHANGE UP (ref 34.5–45)
HCT VFR BLD CALC: 37.6 % — SIGNIFICANT CHANGE UP (ref 34.5–45)
HCT VFR BLD CALC: 38.2 % — SIGNIFICANT CHANGE UP (ref 34.5–45)
HCT VFR BLD CALC: 38.6 % — SIGNIFICANT CHANGE UP (ref 34.5–45)
HCT VFR BLD CALC: 38.8 % — SIGNIFICANT CHANGE UP (ref 34.5–45)
HCT VFR BLD CALC: 38.9 %
HCT VFR BLD CALC: 39.4 % — SIGNIFICANT CHANGE UP (ref 34.5–45)
HCT VFR BLD CALC: 39.8 % — SIGNIFICANT CHANGE UP (ref 34.5–45)
HCT VFR BLD CALC: 43.9 % — SIGNIFICANT CHANGE UP (ref 34.5–45)
HCT VFR BLDA CALC: 37.8 % — SIGNIFICANT CHANGE UP (ref 34.5–46.5)
HCT VFR BLDV CALC: 40 % — SIGNIFICANT CHANGE UP (ref 34.5–45)
HCT VFR BLDV CALC: 42.2 % — SIGNIFICANT CHANGE UP (ref 34.5–45)
HDLC SERPL-MCNC: 70 MG/DL
HGB BLD-MCNC: 11 G/DL — LOW (ref 11.5–15.5)
HGB BLD-MCNC: 11.4 G/DL — LOW (ref 11.5–15.5)
HGB BLD-MCNC: 12 G/DL
HGB BLD-MCNC: 12.2 G/DL — SIGNIFICANT CHANGE UP (ref 11.5–15.5)
HGB BLD-MCNC: 12.3 G/DL — SIGNIFICANT CHANGE UP (ref 11.5–15.5)
HGB BLD-MCNC: 12.4 G/DL — SIGNIFICANT CHANGE UP (ref 11.5–15.5)
HGB BLD-MCNC: 12.6 G/DL — SIGNIFICANT CHANGE UP (ref 11.5–15.5)
HGB BLD-MCNC: 12.6 G/DL — SIGNIFICANT CHANGE UP (ref 11.5–15.5)
HGB BLD-MCNC: 12.8 G/DL — SIGNIFICANT CHANGE UP (ref 11.5–15.5)
HGB BLD-MCNC: 14 G/DL — SIGNIFICANT CHANGE UP (ref 11.5–15.5)
HGB BLDA-MCNC: 12.3 G/DL — SIGNIFICANT CHANGE UP (ref 11.5–15.5)
HGB BLDV-MCNC: 13 G/DL — SIGNIFICANT CHANGE UP (ref 11.5–15.5)
HGB BLDV-MCNC: 13.7 G/DL — SIGNIFICANT CHANGE UP (ref 11.5–15.5)
HMPV RNA SPEC QL NAA+PROBE: NOT DETECTED — SIGNIFICANT CHANGE UP
HPIV1 RNA SPEC QL NAA+PROBE: NOT DETECTED — SIGNIFICANT CHANGE UP
HPIV2 RNA SPEC QL NAA+PROBE: NOT DETECTED — SIGNIFICANT CHANGE UP
HPIV3 RNA SPEC QL NAA+PROBE: NOT DETECTED — SIGNIFICANT CHANGE UP
HPIV4 RNA SPEC QL NAA+PROBE: NOT DETECTED — SIGNIFICANT CHANGE UP
IMM GRANULOCYTES NFR BLD AUTO: 0.5 % — SIGNIFICANT CHANGE UP (ref 0–1.5)
IMM GRANULOCYTES NFR BLD AUTO: 0.7 % — SIGNIFICANT CHANGE UP (ref 0–1.5)
IMM GRANULOCYTES NFR BLD AUTO: 0.7 % — SIGNIFICANT CHANGE UP (ref 0–1.5)
IMM GRANULOCYTES NFR BLD AUTO: 0.9 % — SIGNIFICANT CHANGE UP (ref 0–1.5)
IMM GRANULOCYTES NFR BLD AUTO: 1 %
INR BLD: 1 — SIGNIFICANT CHANGE UP (ref 0.88–1.16)
INR BLD: 1.01 — SIGNIFICANT CHANGE UP (ref 0.88–1.16)
INR BLD: 1.02 — SIGNIFICANT CHANGE UP (ref 0.88–1.16)
LACTATE BLDA-SCNC: 1.3 MMOL/L — SIGNIFICANT CHANGE UP (ref 0.5–2)
LACTATE BLDV-MCNC: 1.4 MMOL/L — SIGNIFICANT CHANGE UP (ref 0.5–2)
LACTATE BLDV-MCNC: 2.2 MMOL/L — HIGH (ref 0.5–2)
LACTATE SERPL-SCNC: 1.4 MMOL/L — SIGNIFICANT CHANGE UP (ref 0.5–2)
LDLC SERPL CALC-MCNC: 96 MG/DL
LYMPHOCYTES # BLD AUTO: 0.73 K/UL — LOW (ref 1–3.3)
LYMPHOCYTES # BLD AUTO: 0.87 K/UL — LOW (ref 1–3.3)
LYMPHOCYTES # BLD AUTO: 1.01 K/UL — SIGNIFICANT CHANGE UP (ref 1–3.3)
LYMPHOCYTES # BLD AUTO: 1.19 K/UL
LYMPHOCYTES # BLD AUTO: 14.2 % — SIGNIFICANT CHANGE UP (ref 13–44)
LYMPHOCYTES # BLD AUTO: 17.9 % — SIGNIFICANT CHANGE UP (ref 13–44)
LYMPHOCYTES # BLD AUTO: 2.54 K/UL — SIGNIFICANT CHANGE UP (ref 1–3.3)
LYMPHOCYTES # BLD AUTO: 44.3 % — HIGH (ref 13–44)
LYMPHOCYTES # BLD AUTO: 9.2 % — LOW (ref 13–44)
LYMPHOCYTES NFR BLD AUTO: 13.1 %
MAGNESIUM SERPL-MCNC: 1.7 MG/DL — SIGNIFICANT CHANGE UP (ref 1.6–2.6)
MAGNESIUM SERPL-MCNC: 1.8 MG/DL — SIGNIFICANT CHANGE UP (ref 1.6–2.6)
MAGNESIUM SERPL-MCNC: 1.8 MG/DL — SIGNIFICANT CHANGE UP (ref 1.6–2.6)
MAGNESIUM SERPL-MCNC: 2.2 MG/DL — SIGNIFICANT CHANGE UP (ref 1.6–2.6)
MAN DIFF?: NORMAL
MCHC RBC-ENTMCNC: 30.8 GM/DL
MCHC RBC-ENTMCNC: 30.8 PG — SIGNIFICANT CHANGE UP (ref 27–34)
MCHC RBC-ENTMCNC: 31 PG — SIGNIFICANT CHANGE UP (ref 27–34)
MCHC RBC-ENTMCNC: 31.3 PG — SIGNIFICANT CHANGE UP (ref 27–34)
MCHC RBC-ENTMCNC: 31.3 PG — SIGNIFICANT CHANGE UP (ref 27–34)
MCHC RBC-ENTMCNC: 31.7 % — LOW (ref 32–36)
MCHC RBC-ENTMCNC: 31.7 % — LOW (ref 32–36)
MCHC RBC-ENTMCNC: 31.9 % — LOW (ref 32–36)
MCHC RBC-ENTMCNC: 31.9 % — LOW (ref 32–36)
MCHC RBC-ENTMCNC: 32 % — SIGNIFICANT CHANGE UP (ref 32–36)
MCHC RBC-ENTMCNC: 32 % — SIGNIFICANT CHANGE UP (ref 32–36)
MCHC RBC-ENTMCNC: 32 PG — SIGNIFICANT CHANGE UP (ref 27–34)
MCHC RBC-ENTMCNC: 32.1 % — SIGNIFICANT CHANGE UP (ref 32–36)
MCHC RBC-ENTMCNC: 32.1 PG — SIGNIFICANT CHANGE UP (ref 27–34)
MCHC RBC-ENTMCNC: 32.2 PG
MCHC RBC-ENTMCNC: 32.2 PG — SIGNIFICANT CHANGE UP (ref 27–34)
MCHC RBC-ENTMCNC: 32.7 % — SIGNIFICANT CHANGE UP (ref 32–36)
MCHC RBC-ENTMCNC: 32.8 PG — SIGNIFICANT CHANGE UP (ref 27–34)
MCHC RBC-ENTMCNC: 32.8 PG — SIGNIFICANT CHANGE UP (ref 27–34)
MCHC RBC-ENTMCNC: 33.2 % — SIGNIFICANT CHANGE UP (ref 32–36)
MCV RBC AUTO: 100.3 FL — HIGH (ref 80–100)
MCV RBC AUTO: 100.3 FL — HIGH (ref 80–100)
MCV RBC AUTO: 100.5 FL — HIGH (ref 80–100)
MCV RBC AUTO: 101 FL — HIGH (ref 80–100)
MCV RBC AUTO: 104.3 FL
MCV RBC AUTO: 96.7 FL — SIGNIFICANT CHANGE UP (ref 80–100)
MCV RBC AUTO: 97 FL — SIGNIFICANT CHANGE UP (ref 80–100)
MCV RBC AUTO: 98 FL — SIGNIFICANT CHANGE UP (ref 80–100)
MCV RBC AUTO: 98.9 FL — SIGNIFICANT CHANGE UP (ref 80–100)
MCV RBC AUTO: 99 FL — SIGNIFICANT CHANGE UP (ref 80–100)
MONOCYTES # BLD AUTO: 0.12 K/UL — SIGNIFICANT CHANGE UP (ref 0–0.9)
MONOCYTES # BLD AUTO: 0.17 K/UL — SIGNIFICANT CHANGE UP (ref 0–0.9)
MONOCYTES # BLD AUTO: 0.4 K/UL
MONOCYTES # BLD AUTO: 0.42 K/UL — SIGNIFICANT CHANGE UP (ref 0–0.9)
MONOCYTES # BLD AUTO: 0.49 K/UL — SIGNIFICANT CHANGE UP (ref 0–0.9)
MONOCYTES NFR BLD AUTO: 2 % — SIGNIFICANT CHANGE UP (ref 2–14)
MONOCYTES NFR BLD AUTO: 3 % — SIGNIFICANT CHANGE UP (ref 2–14)
MONOCYTES NFR BLD AUTO: 4.4 %
MONOCYTES NFR BLD AUTO: 6.2 % — SIGNIFICANT CHANGE UP (ref 2–14)
MONOCYTES NFR BLD AUTO: 7.3 % — SIGNIFICANT CHANGE UP (ref 2–14)
NEUTROPHILS # BLD AUTO: 2.64 K/UL — SIGNIFICANT CHANGE UP (ref 1.8–7.4)
NEUTROPHILS # BLD AUTO: 4.41 K/UL — SIGNIFICANT CHANGE UP (ref 1.8–7.4)
NEUTROPHILS # BLD AUTO: 5.1 K/UL — SIGNIFICANT CHANGE UP (ref 1.8–7.4)
NEUTROPHILS # BLD AUTO: 6.63 K/UL — SIGNIFICANT CHANGE UP (ref 1.8–7.4)
NEUTROPHILS # BLD AUTO: 7.35 K/UL
NEUTROPHILS NFR BLD AUTO: 46 % — SIGNIFICANT CHANGE UP (ref 43–77)
NEUTROPHILS NFR BLD AUTO: 78 % — HIGH (ref 43–77)
NEUTROPHILS NFR BLD AUTO: 81.2 %
NEUTROPHILS NFR BLD AUTO: 83.3 % — HIGH (ref 43–77)
NEUTROPHILS NFR BLD AUTO: 83.7 % — HIGH (ref 43–77)
NRBC # FLD: 0 K/UL — SIGNIFICANT CHANGE UP (ref 0–0)
NT-PROBNP SERPL-SCNC: 216.3 PG/ML — SIGNIFICANT CHANGE UP
NT-PROBNP SERPL-SCNC: 773.9 PG/ML — SIGNIFICANT CHANGE UP
PCO2 BLDA: 66 MMHG — HIGH (ref 32–48)
PCO2 BLDV: 64 MMHG — HIGH (ref 41–51)
PCO2 BLDV: 87 MMHG — CRITICAL HIGH (ref 41–51)
PH BLDA: 7.41 PH — SIGNIFICANT CHANGE UP (ref 7.35–7.45)
PH BLDV: 7.34 PH — SIGNIFICANT CHANGE UP (ref 7.32–7.43)
PH BLDV: 7.4 PH — SIGNIFICANT CHANGE UP (ref 7.32–7.43)
PHOSPHATE SERPL-MCNC: 2.7 MG/DL — SIGNIFICANT CHANGE UP (ref 2.5–4.5)
PHOSPHATE SERPL-MCNC: 2.9 MG/DL — SIGNIFICANT CHANGE UP (ref 2.5–4.5)
PHOSPHATE SERPL-MCNC: 3.6 MG/DL — SIGNIFICANT CHANGE UP (ref 2.5–4.5)
PHOSPHATE SERPL-MCNC: 4.5 MG/DL — SIGNIFICANT CHANGE UP (ref 2.5–4.5)
PLATELET # BLD AUTO: 135 K/UL — LOW (ref 150–400)
PLATELET # BLD AUTO: 136 K/UL
PLATELET # BLD AUTO: 141 K/UL — LOW (ref 150–400)
PLATELET # BLD AUTO: 145 K/UL — LOW (ref 150–400)
PLATELET # BLD AUTO: 148 K/UL — LOW (ref 150–400)
PLATELET # BLD AUTO: 162 K/UL — SIGNIFICANT CHANGE UP (ref 150–400)
PLATELET # BLD AUTO: 194 K/UL — SIGNIFICANT CHANGE UP (ref 150–400)
PLATELET # BLD AUTO: 214 K/UL — SIGNIFICANT CHANGE UP (ref 150–400)
PLATELET # BLD AUTO: 215 K/UL — SIGNIFICANT CHANGE UP (ref 150–400)
PLATELET # BLD AUTO: 219 K/UL — SIGNIFICANT CHANGE UP (ref 150–400)
PMV BLD: 9 FL — SIGNIFICANT CHANGE UP (ref 7–13)
PMV BLD: 9.2 FL — SIGNIFICANT CHANGE UP (ref 7–13)
PMV BLD: 9.3 FL — SIGNIFICANT CHANGE UP (ref 7–13)
PMV BLD: 9.6 FL — SIGNIFICANT CHANGE UP (ref 7–13)
PMV BLD: 9.6 FL — SIGNIFICANT CHANGE UP (ref 7–13)
PMV BLD: 9.9 FL — SIGNIFICANT CHANGE UP (ref 7–13)
PO2 BLDA: 161 MMHG — HIGH (ref 83–108)
PO2 BLDV: 33 MMHG — LOW (ref 35–40)
PO2 BLDV: < 24 MMHG — LOW (ref 35–40)
POTASSIUM BLDA-SCNC: 3 MMOL/L — LOW (ref 3.4–4.5)
POTASSIUM BLDV-SCNC: 3 MMOL/L — LOW (ref 3.4–4.5)
POTASSIUM BLDV-SCNC: 3.4 MMOL/L — SIGNIFICANT CHANGE UP (ref 3.4–4.5)
POTASSIUM SERPL-MCNC: 3.1 MMOL/L — LOW (ref 3.5–5.3)
POTASSIUM SERPL-MCNC: 3.3 MMOL/L — LOW (ref 3.5–5.3)
POTASSIUM SERPL-MCNC: 3.9 MMOL/L — SIGNIFICANT CHANGE UP (ref 3.5–5.3)
POTASSIUM SERPL-MCNC: 4.1 MMOL/L — SIGNIFICANT CHANGE UP (ref 3.5–5.3)
POTASSIUM SERPL-MCNC: 4.2 MMOL/L — SIGNIFICANT CHANGE UP (ref 3.5–5.3)
POTASSIUM SERPL-MCNC: 4.3 MMOL/L — SIGNIFICANT CHANGE UP (ref 3.5–5.3)
POTASSIUM SERPL-MCNC: 4.4 MMOL/L — SIGNIFICANT CHANGE UP (ref 3.5–5.3)
POTASSIUM SERPL-MCNC: 4.4 MMOL/L — SIGNIFICANT CHANGE UP (ref 3.5–5.3)
POTASSIUM SERPL-SCNC: 3.1 MMOL/L — LOW (ref 3.5–5.3)
POTASSIUM SERPL-SCNC: 3.3 MMOL/L — LOW (ref 3.5–5.3)
POTASSIUM SERPL-SCNC: 3.9 MMOL/L — SIGNIFICANT CHANGE UP (ref 3.5–5.3)
POTASSIUM SERPL-SCNC: 4.1 MMOL/L
POTASSIUM SERPL-SCNC: 4.1 MMOL/L — SIGNIFICANT CHANGE UP (ref 3.5–5.3)
POTASSIUM SERPL-SCNC: 4.2 MMOL/L — SIGNIFICANT CHANGE UP (ref 3.5–5.3)
POTASSIUM SERPL-SCNC: 4.3 MMOL/L — SIGNIFICANT CHANGE UP (ref 3.5–5.3)
POTASSIUM SERPL-SCNC: 4.4 MMOL/L — SIGNIFICANT CHANGE UP (ref 3.5–5.3)
POTASSIUM SERPL-SCNC: 4.4 MMOL/L — SIGNIFICANT CHANGE UP (ref 3.5–5.3)
PROT SERPL-MCNC: 5.6 G/DL
PROT SERPL-MCNC: 5.6 G/DL — LOW (ref 6–8.3)
PROT SERPL-MCNC: 5.9 G/DL — LOW (ref 6–8.3)
PROT SERPL-MCNC: 6.2 G/DL — SIGNIFICANT CHANGE UP (ref 6–8.3)
PROT SERPL-MCNC: 6.3 G/DL — SIGNIFICANT CHANGE UP (ref 6–8.3)
PROT SERPL-MCNC: 6.5 G/DL — SIGNIFICANT CHANGE UP (ref 6–8.3)
PROTHROM AB SERPL-ACNC: 11.4 SEC — SIGNIFICANT CHANGE UP (ref 10.6–13.6)
PROTHROM AB SERPL-ACNC: 11.6 SEC — SIGNIFICANT CHANGE UP (ref 10.6–13.6)
PROTHROM AB SERPL-ACNC: 11.6 SEC — SIGNIFICANT CHANGE UP (ref 10.6–13.6)
RBC # BLD: 3.42 M/UL — LOW (ref 3.8–5.2)
RBC # BLD: 3.64 M/UL — LOW (ref 3.8–5.2)
RBC # BLD: 3.73 M/UL
RBC # BLD: 3.75 M/UL — LOW (ref 3.8–5.2)
RBC # BLD: 3.86 M/UL — SIGNIFICANT CHANGE UP (ref 3.8–5.2)
RBC # BLD: 3.9 M/UL — SIGNIFICANT CHANGE UP (ref 3.8–5.2)
RBC # BLD: 3.94 M/UL — SIGNIFICANT CHANGE UP (ref 3.8–5.2)
RBC # BLD: 3.94 M/UL — SIGNIFICANT CHANGE UP (ref 3.8–5.2)
RBC # BLD: 4.02 M/UL — SIGNIFICANT CHANGE UP (ref 3.8–5.2)
RBC # BLD: 4.54 M/UL — SIGNIFICANT CHANGE UP (ref 3.8–5.2)
RBC # FLD: 13.1 % — SIGNIFICANT CHANGE UP (ref 10.3–14.5)
RBC # FLD: 13.2 % — SIGNIFICANT CHANGE UP (ref 10.3–14.5)
RBC # FLD: 13.2 % — SIGNIFICANT CHANGE UP (ref 10.3–14.5)
RBC # FLD: 13.3 % — SIGNIFICANT CHANGE UP (ref 10.3–14.5)
RBC # FLD: 13.3 % — SIGNIFICANT CHANGE UP (ref 10.3–14.5)
RBC # FLD: 13.4 % — SIGNIFICANT CHANGE UP (ref 10.3–14.5)
RBC # FLD: 13.5 % — SIGNIFICANT CHANGE UP (ref 10.3–14.5)
RBC # FLD: 13.9 %
RSV RNA SPEC QL NAA+PROBE: NOT DETECTED — SIGNIFICANT CHANGE UP
RV+EV RNA SPEC QL NAA+PROBE: NOT DETECTED — SIGNIFICANT CHANGE UP
SAO2 % BLDA: 99.1 % — HIGH (ref 95–99)
SAO2 % BLDV: 30.7 % — LOW (ref 60–85)
SAO2 % BLDV: 53.4 % — LOW (ref 60–85)
SARS-COV-2 RNA SPEC QL NAA+PROBE: SIGNIFICANT CHANGE UP
SODIUM BLDA-SCNC: 138 MMOL/L — SIGNIFICANT CHANGE UP (ref 136–146)
SODIUM SERPL-SCNC: 139 MMOL/L — SIGNIFICANT CHANGE UP (ref 135–145)
SODIUM SERPL-SCNC: 142 MMOL/L — SIGNIFICANT CHANGE UP (ref 135–145)
SODIUM SERPL-SCNC: 143 MMOL/L — SIGNIFICANT CHANGE UP (ref 135–145)
SODIUM SERPL-SCNC: 144 MMOL/L
SODIUM SERPL-SCNC: 144 MMOL/L — SIGNIFICANT CHANGE UP (ref 135–145)
SODIUM SERPL-SCNC: 145 MMOL/L — SIGNIFICANT CHANGE UP (ref 135–145)
SODIUM SERPL-SCNC: 145 MMOL/L — SIGNIFICANT CHANGE UP (ref 135–145)
SODIUM SERPL-SCNC: 147 MMOL/L — HIGH (ref 135–145)
SODIUM SERPL-SCNC: 148 MMOL/L — HIGH (ref 135–145)
SPECIMEN SOURCE: SIGNIFICANT CHANGE UP
SPECIMEN SOURCE: SIGNIFICANT CHANGE UP
TRIGL SERPL-MCNC: 113 MG/DL
TROPONIN T, HIGH SENSITIVITY: 18 NG/L — SIGNIFICANT CHANGE UP (ref ?–14)
TROPONIN T, HIGH SENSITIVITY: 24 NG/L — SIGNIFICANT CHANGE UP (ref ?–14)
TROPONIN T, HIGH SENSITIVITY: 25 NG/L — SIGNIFICANT CHANGE UP (ref ?–14)
TROPONIN T, HIGH SENSITIVITY: < 6 NG/L — SIGNIFICANT CHANGE UP (ref ?–14)
TSH SERPL-ACNC: 1.02 UIU/ML
URATE SERPL-MCNC: 8.1 MG/DL — HIGH (ref 2.5–7)
WBC # BLD: 10.47 K/UL — SIGNIFICANT CHANGE UP (ref 3.8–10.5)
WBC # BLD: 11.07 K/UL — HIGH (ref 3.8–10.5)
WBC # BLD: 4.53 K/UL — SIGNIFICANT CHANGE UP (ref 3.8–10.5)
WBC # BLD: 5.65 K/UL — SIGNIFICANT CHANGE UP (ref 3.8–10.5)
WBC # BLD: 5.74 K/UL — SIGNIFICANT CHANGE UP (ref 3.8–10.5)
WBC # BLD: 5.79 K/UL — SIGNIFICANT CHANGE UP (ref 3.8–10.5)
WBC # BLD: 6.12 K/UL — SIGNIFICANT CHANGE UP (ref 3.8–10.5)
WBC # BLD: 7.92 K/UL — SIGNIFICANT CHANGE UP (ref 3.8–10.5)
WBC # BLD: 9.04 K/UL — SIGNIFICANT CHANGE UP (ref 3.8–10.5)
WBC # FLD AUTO: 10.47 K/UL — SIGNIFICANT CHANGE UP (ref 3.8–10.5)
WBC # FLD AUTO: 11.07 K/UL — HIGH (ref 3.8–10.5)
WBC # FLD AUTO: 4.53 K/UL — SIGNIFICANT CHANGE UP (ref 3.8–10.5)
WBC # FLD AUTO: 5.65 K/UL — SIGNIFICANT CHANGE UP (ref 3.8–10.5)
WBC # FLD AUTO: 5.74 K/UL — SIGNIFICANT CHANGE UP (ref 3.8–10.5)
WBC # FLD AUTO: 5.79 K/UL — SIGNIFICANT CHANGE UP (ref 3.8–10.5)
WBC # FLD AUTO: 6.12 K/UL — SIGNIFICANT CHANGE UP (ref 3.8–10.5)
WBC # FLD AUTO: 7.92 K/UL — SIGNIFICANT CHANGE UP (ref 3.8–10.5)
WBC # FLD AUTO: 9.04 K/UL — SIGNIFICANT CHANGE UP (ref 3.8–10.5)
WBC # FLD AUTO: 9.06 K/UL

## 2020-01-01 PROCEDURE — 99291 CRITICAL CARE FIRST HOUR: CPT

## 2020-01-01 PROCEDURE — 99239 HOSP IP/OBS DSCHRG MGMT >30: CPT | Mod: GC

## 2020-01-01 PROCEDURE — 99214 OFFICE O/P EST MOD 30 MIN: CPT

## 2020-01-01 PROCEDURE — 93000 ELECTROCARDIOGRAM COMPLETE: CPT

## 2020-01-01 PROCEDURE — 99215 OFFICE O/P EST HI 40 MIN: CPT

## 2020-01-01 PROCEDURE — 36415 COLL VENOUS BLD VENIPUNCTURE: CPT

## 2020-01-01 PROCEDURE — 99213 OFFICE O/P EST LOW 20 MIN: CPT

## 2020-01-01 PROCEDURE — 99223 1ST HOSP IP/OBS HIGH 75: CPT | Mod: AI,GC

## 2020-01-01 PROCEDURE — 99238 HOSP IP/OBS DSCHRG MGMT 30/<: CPT | Mod: GC

## 2020-01-01 PROCEDURE — 99358 PROLONG SERVICE W/O CONTACT: CPT

## 2020-01-01 PROCEDURE — 99233 SBSQ HOSP IP/OBS HIGH 50: CPT | Mod: GC

## 2020-01-01 PROCEDURE — 99223 1ST HOSP IP/OBS HIGH 75: CPT

## 2020-01-01 PROCEDURE — 99233 SBSQ HOSP IP/OBS HIGH 50: CPT

## 2020-01-01 PROCEDURE — 99072 ADDL SUPL MATRL&STAF TM PHE: CPT

## 2020-01-01 PROCEDURE — ZZZZZ: CPT

## 2020-01-01 PROCEDURE — 71045 X-RAY EXAM CHEST 1 VIEW: CPT | Mod: 26

## 2020-01-01 PROCEDURE — 93295 DEV INTERROG REMOTE 1/2/MLT: CPT

## 2020-01-01 PROCEDURE — 71275 CT ANGIOGRAPHY CHEST: CPT | Mod: 26

## 2020-01-01 PROCEDURE — 99497 ADVNCD CARE PLAN 30 MIN: CPT | Mod: 25

## 2020-01-01 PROCEDURE — 99212 OFFICE O/P EST SF 10 MIN: CPT

## 2020-01-01 PROCEDURE — 93290 INTERROG DEV EVAL ICPMS IP: CPT | Mod: 26

## 2020-01-01 PROCEDURE — 99498 ADVNCD CARE PLAN ADDL 30 MIN: CPT | Mod: 25

## 2020-01-01 PROCEDURE — 99284 EMERGENCY DEPT VISIT MOD MDM: CPT | Mod: 25

## 2020-01-01 PROCEDURE — 93284 PRGRMG EVAL IMPLANTABLE DFB: CPT

## 2020-01-01 PROCEDURE — 93010 ELECTROCARDIOGRAM REPORT: CPT | Mod: NC

## 2020-01-01 PROCEDURE — 93296 REM INTERROG EVL PM/IDS: CPT

## 2020-01-01 PROCEDURE — 99214 OFFICE O/P EST MOD 30 MIN: CPT | Mod: 25

## 2020-01-01 RX ORDER — AZITHROMYCIN 500 MG/1
500 TABLET, FILM COATED ORAL EVERY 24 HOURS
Refills: 0 | Status: DISCONTINUED | OUTPATIENT
Start: 2020-01-01 | End: 2020-01-01

## 2020-01-01 RX ORDER — HEPARIN SODIUM 5000 [USP'U]/ML
5000 INJECTION INTRAVENOUS; SUBCUTANEOUS EVERY 12 HOURS
Refills: 0 | Status: DISCONTINUED | OUTPATIENT
Start: 2020-01-01 | End: 2020-01-01

## 2020-01-01 RX ORDER — PREDNISONE 10 MG/1
10 TABLET ORAL
Qty: 120 | Refills: 2 | Status: DISCONTINUED | COMMUNITY
Start: 2020-03-19 | End: 2020-01-01

## 2020-01-01 RX ORDER — AZITHROMYCIN 500 MG/1
1 TABLET, FILM COATED ORAL
Qty: 1 | Refills: 0
Start: 2020-01-01

## 2020-01-01 RX ORDER — HYDRALAZINE HYDROCHLORIDE 25 MG/1
25 TABLET ORAL 3 TIMES DAILY
Qty: 90 | Refills: 3 | Status: DISCONTINUED | COMMUNITY
Start: 2019-06-18 | End: 2020-01-01

## 2020-01-01 RX ORDER — TIOTROPIUM BROMIDE 18 UG/1
18 CAPSULE ORAL; RESPIRATORY (INHALATION) DAILY
Qty: 90 | Refills: 3 | Status: DISCONTINUED | COMMUNITY
Start: 2017-10-11 | End: 2020-01-01

## 2020-01-01 RX ORDER — PANTOPRAZOLE SODIUM 20 MG/1
1 TABLET, DELAYED RELEASE ORAL
Qty: 30 | Refills: 0
Start: 2020-01-01 | End: 2020-01-01

## 2020-01-01 RX ORDER — ALBUTEROL 90 UG/1
1 AEROSOL, METERED ORAL EVERY 4 HOURS
Refills: 0 | Status: DISCONTINUED | OUTPATIENT
Start: 2020-01-01 | End: 2020-01-01

## 2020-01-01 RX ORDER — HYDRALAZINE HCL 50 MG
50 TABLET ORAL THREE TIMES A DAY
Refills: 0 | Status: DISCONTINUED | OUTPATIENT
Start: 2020-01-01 | End: 2020-01-01

## 2020-01-01 RX ORDER — METHYLPREDNISOLONE 4 MG/1
4 TABLET ORAL
Qty: 1 | Refills: 0 | Status: DISCONTINUED | COMMUNITY
Start: 2020-03-05 | End: 2020-01-01

## 2020-01-01 RX ORDER — IPRATROPIUM/ALBUTEROL SULFATE 18-103MCG
3 AEROSOL WITH ADAPTER (GRAM) INHALATION ONCE
Refills: 0 | Status: COMPLETED | OUTPATIENT
Start: 2020-01-01 | End: 2020-01-01

## 2020-01-01 RX ORDER — CARVEDILOL PHOSPHATE 80 MG/1
12.5 CAPSULE, EXTENDED RELEASE ORAL DAILY
Refills: 0 | Status: DISCONTINUED | OUTPATIENT
Start: 2020-01-01 | End: 2020-01-01

## 2020-01-01 RX ORDER — ALBUTEROL 90 UG/1
2.5 AEROSOL, METERED ORAL ONCE
Refills: 0 | Status: DISCONTINUED | OUTPATIENT
Start: 2020-01-01 | End: 2020-01-01

## 2020-01-01 RX ORDER — BUDESONIDE AND FORMOTEROL FUMARATE DIHYDRATE 160; 4.5 UG/1; UG/1
160-4.5 AEROSOL RESPIRATORY (INHALATION)
Qty: 3 | Refills: 0 | Status: ACTIVE | COMMUNITY
Start: 2017-03-23 | End: 1900-01-01

## 2020-01-01 RX ORDER — FUROSEMIDE 40 MG
1 TABLET ORAL
Qty: 0 | Refills: 0 | DISCHARGE

## 2020-01-01 RX ORDER — ISOSORBIDE MONONITRATE 30 MG/1
30 TABLET, EXTENDED RELEASE ORAL DAILY
Qty: 90 | Refills: 1 | Status: DISCONTINUED | COMMUNITY
Start: 2020-01-07 | End: 2020-01-01

## 2020-01-01 RX ORDER — COLCHICINE 0.6 MG
0.6 TABLET ORAL ONCE
Refills: 0 | Status: DISCONTINUED | OUTPATIENT
Start: 2020-01-01 | End: 2020-01-01

## 2020-01-01 RX ORDER — HYDRALAZINE HCL 50 MG
25 TABLET ORAL DAILY
Refills: 0 | Status: DISCONTINUED | OUTPATIENT
Start: 2020-01-01 | End: 2020-01-01

## 2020-01-01 RX ORDER — LISINOPRIL 40 MG/1
40 TABLET ORAL DAILY
Refills: 0 | Status: DISCONTINUED | COMMUNITY
End: 2020-01-01

## 2020-01-01 RX ORDER — SIMVASTATIN 20 MG/1
1 TABLET, FILM COATED ORAL
Qty: 0 | Refills: 0 | DISCHARGE

## 2020-01-01 RX ORDER — PANTOPRAZOLE SODIUM 20 MG/1
40 TABLET, DELAYED RELEASE ORAL ONCE
Refills: 0 | Status: COMPLETED | OUTPATIENT
Start: 2020-01-01 | End: 2020-01-01

## 2020-01-01 RX ORDER — FUROSEMIDE 40 MG
20 TABLET ORAL DAILY
Refills: 0 | Status: DISCONTINUED | OUTPATIENT
Start: 2020-01-01 | End: 2020-01-01

## 2020-01-01 RX ORDER — CARVEDILOL PHOSPHATE 80 MG/1
25 CAPSULE, EXTENDED RELEASE ORAL EVERY 12 HOURS
Refills: 0 | Status: DISCONTINUED | OUTPATIENT
Start: 2020-01-01 | End: 2020-01-01

## 2020-01-01 RX ORDER — CHLORHEXIDINE GLUCONATE 213 G/1000ML
1 SOLUTION TOPICAL DAILY
Refills: 0 | Status: DISCONTINUED | OUTPATIENT
Start: 2020-01-01 | End: 2020-01-01

## 2020-01-01 RX ORDER — ISOSORBIDE MONONITRATE 60 MG/1
60 TABLET, EXTENDED RELEASE ORAL DAILY
Refills: 0 | Status: DISCONTINUED | OUTPATIENT
Start: 2020-01-01 | End: 2020-01-01

## 2020-01-01 RX ORDER — ASPIRIN/CALCIUM CARB/MAGNESIUM 324 MG
81 TABLET ORAL DAILY
Refills: 0 | Status: DISCONTINUED | OUTPATIENT
Start: 2020-01-01 | End: 2020-01-01

## 2020-01-01 RX ORDER — POTASSIUM CHLORIDE 20 MEQ
40 PACKET (EA) ORAL ONCE
Refills: 0 | Status: COMPLETED | OUTPATIENT
Start: 2020-01-01 | End: 2020-01-01

## 2020-01-01 RX ORDER — SIMVASTATIN 20 MG/1
20 TABLET, FILM COATED ORAL AT BEDTIME
Refills: 0 | Status: DISCONTINUED | OUTPATIENT
Start: 2020-01-01 | End: 2020-01-01

## 2020-01-01 RX ORDER — ALBUTEROL SULFATE 0.63 MG/3ML
0.63 SOLUTION RESPIRATORY (INHALATION) EVERY 6 HOURS
Qty: 360 | Refills: 0 | Status: ACTIVE | COMMUNITY
Start: 2017-09-18 | End: 1900-01-01

## 2020-01-01 RX ORDER — LISINOPRIL 2.5 MG/1
40 TABLET ORAL DAILY
Refills: 0 | Status: DISCONTINUED | OUTPATIENT
Start: 2020-01-01 | End: 2020-01-01

## 2020-01-01 RX ORDER — PANTOPRAZOLE SODIUM 20 MG/1
40 TABLET, DELAYED RELEASE ORAL
Refills: 0 | Status: DISCONTINUED | OUTPATIENT
Start: 2020-01-01 | End: 2020-01-01

## 2020-01-01 RX ORDER — METHYLPREDNISOLONE 4 MG/1
4 TABLET ORAL
Qty: 1 | Refills: 0 | Status: DISCONTINUED | COMMUNITY
End: 2020-01-01

## 2020-01-01 RX ORDER — IPRATROPIUM/ALBUTEROL SULFATE 18-103MCG
3 AEROSOL WITH ADAPTER (GRAM) INHALATION EVERY 6 HOURS
Refills: 0 | Status: DISCONTINUED | OUTPATIENT
Start: 2020-01-01 | End: 2020-01-01

## 2020-01-01 RX ORDER — BUDESONIDE AND FORMOTEROL FUMARATE DIHYDRATE 160; 4.5 UG/1; UG/1
2 AEROSOL RESPIRATORY (INHALATION)
Refills: 0 | Status: DISCONTINUED | OUTPATIENT
Start: 2020-01-01 | End: 2020-01-01

## 2020-01-01 RX ORDER — HYDROCORTISONE 20 MG
125 TABLET ORAL ONCE
Refills: 0 | Status: COMPLETED | OUTPATIENT
Start: 2020-01-01 | End: 2020-01-01

## 2020-01-01 RX ORDER — IPRATROPIUM/ALBUTEROL SULFATE 18-103MCG
9 AEROSOL WITH ADAPTER (GRAM) INHALATION ONCE
Refills: 0 | Status: DISCONTINUED | OUTPATIENT
Start: 2020-01-01 | End: 2020-01-01

## 2020-01-01 RX ORDER — LANOLIN ALCOHOL/MO/W.PET/CERES
3 CREAM (GRAM) TOPICAL ONCE
Refills: 0 | Status: COMPLETED | OUTPATIENT
Start: 2020-01-01 | End: 2020-01-01

## 2020-01-01 RX ORDER — FUROSEMIDE 20 MG/1
20 TABLET ORAL
Qty: 90 | Refills: 3 | Status: DISCONTINUED | COMMUNITY
Start: 2020-03-05 | End: 2020-01-01

## 2020-01-01 RX ORDER — AZITHROMYCIN 500 MG/1
500 TABLET, FILM COATED ORAL ONCE
Refills: 0 | Status: COMPLETED | OUTPATIENT
Start: 2020-01-01 | End: 2020-01-01

## 2020-01-01 RX ORDER — PREDNISONE 10 MG/1
10 TABLET ORAL
Refills: 0 | Status: DISCONTINUED | COMMUNITY
End: 2020-01-01

## 2020-01-01 RX ORDER — ACETAMINOPHEN 500 MG
1000 TABLET ORAL ONCE
Refills: 0 | Status: DISCONTINUED | OUTPATIENT
Start: 2020-01-01 | End: 2020-01-01

## 2020-01-01 RX ORDER — IPRATROPIUM/ALBUTEROL SULFATE 18-103MCG
3 AEROSOL WITH ADAPTER (GRAM) INHALATION
Refills: 0 | Status: DISCONTINUED | OUTPATIENT
Start: 2020-01-01 | End: 2020-01-01

## 2020-01-01 RX ORDER — ISOSORBIDE MONONITRATE 60 MG/1
1 TABLET, EXTENDED RELEASE ORAL
Qty: 0 | Refills: 0 | DISCHARGE

## 2020-01-01 RX ORDER — PANTOPRAZOLE 40 MG/1
40 TABLET, DELAYED RELEASE ORAL DAILY
Qty: 90 | Refills: 1 | Status: ACTIVE | COMMUNITY
Start: 2020-01-01 | End: 1900-01-01

## 2020-01-01 RX ORDER — TIOTROPIUM BROMIDE 18 UG/1
18 CAPSULE ORAL; RESPIRATORY (INHALATION) DAILY
Qty: 90 | Refills: 0 | Status: DISCONTINUED | COMMUNITY
Start: 2020-05-18 | End: 2020-01-01

## 2020-01-01 RX ORDER — TIOTROPIUM BROMIDE 18 UG/1
1 CAPSULE ORAL; RESPIRATORY (INHALATION) DAILY
Refills: 0 | Status: DISCONTINUED | OUTPATIENT
Start: 2020-01-01 | End: 2020-01-01

## 2020-01-01 RX ADMIN — CHLORHEXIDINE GLUCONATE 1 APPLICATION(S): 213 SOLUTION TOPICAL at 13:23

## 2020-01-01 RX ADMIN — Medication 40 MILLIEQUIVALENT(S): at 12:27

## 2020-01-01 RX ADMIN — PANTOPRAZOLE SODIUM 40 MILLIGRAM(S): 20 TABLET, DELAYED RELEASE ORAL at 06:20

## 2020-01-01 RX ADMIN — Medication 20 MILLIGRAM(S): at 06:20

## 2020-01-01 RX ADMIN — Medication 50 MILLIGRAM(S): at 21:42

## 2020-01-01 RX ADMIN — Medication 3 MILLILITER(S): at 22:33

## 2020-01-01 RX ADMIN — CARVEDILOL PHOSPHATE 25 MILLIGRAM(S): 80 CAPSULE, EXTENDED RELEASE ORAL at 18:44

## 2020-01-01 RX ADMIN — Medication 3 MILLILITER(S): at 03:50

## 2020-01-01 RX ADMIN — CARVEDILOL PHOSPHATE 25 MILLIGRAM(S): 80 CAPSULE, EXTENDED RELEASE ORAL at 17:51

## 2020-01-01 RX ADMIN — Medication 3 MILLILITER(S): at 16:30

## 2020-01-01 RX ADMIN — Medication 50 MILLIGRAM(S): at 21:11

## 2020-01-01 RX ADMIN — Medication 50 MILLIGRAM(S): at 21:17

## 2020-01-01 RX ADMIN — Medication 40 MILLIGRAM(S): at 05:19

## 2020-01-01 RX ADMIN — HEPARIN SODIUM 5000 UNIT(S): 5000 INJECTION INTRAVENOUS; SUBCUTANEOUS at 05:02

## 2020-01-01 RX ADMIN — CARVEDILOL PHOSPHATE 25 MILLIGRAM(S): 80 CAPSULE, EXTENDED RELEASE ORAL at 18:20

## 2020-01-01 RX ADMIN — HEPARIN SODIUM 5000 UNIT(S): 5000 INJECTION INTRAVENOUS; SUBCUTANEOUS at 18:30

## 2020-01-01 RX ADMIN — AZITHROMYCIN 255 MILLIGRAM(S): 500 TABLET, FILM COATED ORAL at 05:44

## 2020-01-01 RX ADMIN — Medication 3 MILLILITER(S): at 22:38

## 2020-01-01 RX ADMIN — BUDESONIDE AND FORMOTEROL FUMARATE DIHYDRATE 2 PUFF(S): 160; 4.5 AEROSOL RESPIRATORY (INHALATION) at 10:34

## 2020-01-01 RX ADMIN — Medication 3 MILLILITER(S): at 22:36

## 2020-01-01 RX ADMIN — Medication 81 MILLIGRAM(S): at 11:39

## 2020-01-01 RX ADMIN — Medication 60 MILLIGRAM(S): at 17:51

## 2020-01-01 RX ADMIN — Medication 40 MILLIGRAM(S): at 21:17

## 2020-01-01 RX ADMIN — Medication 50 MILLIGRAM(S): at 14:14

## 2020-01-01 RX ADMIN — CHLORHEXIDINE GLUCONATE 1 APPLICATION(S): 213 SOLUTION TOPICAL at 11:39

## 2020-01-01 RX ADMIN — AZITHROMYCIN 255 MILLIGRAM(S): 500 TABLET, FILM COATED ORAL at 12:57

## 2020-01-01 RX ADMIN — PANTOPRAZOLE SODIUM 40 MILLIGRAM(S): 20 TABLET, DELAYED RELEASE ORAL at 06:37

## 2020-01-01 RX ADMIN — Medication 60 MILLIGRAM(S): at 06:37

## 2020-01-01 RX ADMIN — BUDESONIDE AND FORMOTEROL FUMARATE DIHYDRATE 2 PUFF(S): 160; 4.5 AEROSOL RESPIRATORY (INHALATION) at 21:42

## 2020-01-01 RX ADMIN — Medication 3 MILLILITER(S): at 10:07

## 2020-01-01 RX ADMIN — BUDESONIDE AND FORMOTEROL FUMARATE DIHYDRATE 2 PUFF(S): 160; 4.5 AEROSOL RESPIRATORY (INHALATION) at 21:56

## 2020-01-01 RX ADMIN — BUDESONIDE AND FORMOTEROL FUMARATE DIHYDRATE 2 PUFF(S): 160; 4.5 AEROSOL RESPIRATORY (INHALATION) at 09:55

## 2020-01-01 RX ADMIN — CARVEDILOL PHOSPHATE 25 MILLIGRAM(S): 80 CAPSULE, EXTENDED RELEASE ORAL at 18:29

## 2020-01-01 RX ADMIN — AZITHROMYCIN 255 MILLIGRAM(S): 500 TABLET, FILM COATED ORAL at 06:37

## 2020-01-01 RX ADMIN — Medication 3 MILLILITER(S): at 12:52

## 2020-01-01 RX ADMIN — Medication 40 MILLIGRAM(S): at 05:02

## 2020-01-01 RX ADMIN — CARVEDILOL PHOSPHATE 25 MILLIGRAM(S): 80 CAPSULE, EXTENDED RELEASE ORAL at 17:39

## 2020-01-01 RX ADMIN — PANTOPRAZOLE SODIUM 40 MILLIGRAM(S): 20 TABLET, DELAYED RELEASE ORAL at 06:45

## 2020-01-01 RX ADMIN — Medication 50 MILLIGRAM(S): at 17:50

## 2020-01-01 RX ADMIN — PANTOPRAZOLE SODIUM 40 MILLIGRAM(S): 20 TABLET, DELAYED RELEASE ORAL at 06:43

## 2020-01-01 RX ADMIN — Medication 20 MILLIGRAM(S): at 06:43

## 2020-01-01 RX ADMIN — Medication 40 MILLIGRAM(S): at 21:57

## 2020-01-01 RX ADMIN — AZITHROMYCIN 255 MILLIGRAM(S): 500 TABLET, FILM COATED ORAL at 05:46

## 2020-01-01 RX ADMIN — Medication 40 MILLIGRAM(S): at 21:45

## 2020-01-01 RX ADMIN — Medication 3 MILLILITER(S): at 03:45

## 2020-01-01 RX ADMIN — Medication 40 MILLIGRAM(S): at 13:59

## 2020-01-01 RX ADMIN — Medication 50 MILLIGRAM(S): at 13:47

## 2020-01-01 RX ADMIN — Medication 3 MILLILITER(S): at 17:30

## 2020-01-01 RX ADMIN — Medication 3 MILLILITER(S): at 22:22

## 2020-01-01 RX ADMIN — Medication 40 MILLIGRAM(S): at 06:43

## 2020-01-01 RX ADMIN — Medication 3 MILLILITER(S): at 15:15

## 2020-01-01 RX ADMIN — Medication 3 MILLILITER(S): at 11:03

## 2020-01-01 RX ADMIN — Medication 40 MILLIGRAM(S): at 13:47

## 2020-01-01 RX ADMIN — Medication 40 MILLIGRAM(S): at 15:16

## 2020-01-01 RX ADMIN — HEPARIN SODIUM 5000 UNIT(S): 5000 INJECTION INTRAVENOUS; SUBCUTANEOUS at 06:43

## 2020-01-01 RX ADMIN — PANTOPRAZOLE SODIUM 40 MILLIGRAM(S): 20 TABLET, DELAYED RELEASE ORAL at 14:07

## 2020-01-01 RX ADMIN — Medication 40 MILLIGRAM(S): at 15:00

## 2020-01-01 RX ADMIN — HEPARIN SODIUM 5000 UNIT(S): 5000 INJECTION INTRAVENOUS; SUBCUTANEOUS at 05:45

## 2020-01-01 RX ADMIN — Medication 50 MILLIGRAM(S): at 15:16

## 2020-01-01 RX ADMIN — CARVEDILOL PHOSPHATE 25 MILLIGRAM(S): 80 CAPSULE, EXTENDED RELEASE ORAL at 17:19

## 2020-01-01 RX ADMIN — CARVEDILOL PHOSPHATE 25 MILLIGRAM(S): 80 CAPSULE, EXTENDED RELEASE ORAL at 05:02

## 2020-01-01 RX ADMIN — SIMVASTATIN 20 MILLIGRAM(S): 20 TABLET, FILM COATED ORAL at 21:57

## 2020-01-01 RX ADMIN — Medication 50 MILLIGRAM(S): at 05:02

## 2020-01-01 RX ADMIN — CARVEDILOL PHOSPHATE 25 MILLIGRAM(S): 80 CAPSULE, EXTENDED RELEASE ORAL at 17:38

## 2020-01-01 RX ADMIN — CHLORHEXIDINE GLUCONATE 1 APPLICATION(S): 213 SOLUTION TOPICAL at 11:59

## 2020-01-01 RX ADMIN — Medication 50 MILLIGRAM(S): at 06:20

## 2020-01-01 RX ADMIN — Medication 125 MILLIGRAM(S): at 10:55

## 2020-01-01 RX ADMIN — Medication 81 MILLIGRAM(S): at 11:59

## 2020-01-01 RX ADMIN — SIMVASTATIN 20 MILLIGRAM(S): 20 TABLET, FILM COATED ORAL at 21:42

## 2020-01-01 RX ADMIN — Medication 3 MILLILITER(S): at 10:44

## 2020-01-01 RX ADMIN — HEPARIN SODIUM 5000 UNIT(S): 5000 INJECTION INTRAVENOUS; SUBCUTANEOUS at 06:23

## 2020-01-01 RX ADMIN — Medication 50 MILLIGRAM(S): at 05:44

## 2020-01-01 RX ADMIN — Medication 3 MILLILITER(S): at 11:34

## 2020-01-01 RX ADMIN — Medication 3 MILLILITER(S): at 08:15

## 2020-01-01 RX ADMIN — Medication 3 MILLILITER(S): at 10:05

## 2020-01-01 RX ADMIN — CARVEDILOL PHOSPHATE 25 MILLIGRAM(S): 80 CAPSULE, EXTENDED RELEASE ORAL at 05:19

## 2020-01-01 RX ADMIN — CARVEDILOL PHOSPHATE 25 MILLIGRAM(S): 80 CAPSULE, EXTENDED RELEASE ORAL at 05:45

## 2020-01-01 RX ADMIN — Medication 3 MILLILITER(S): at 15:57

## 2020-01-01 RX ADMIN — LISINOPRIL 40 MILLIGRAM(S): 2.5 TABLET ORAL at 06:20

## 2020-01-01 RX ADMIN — Medication 50 MILLIGRAM(S): at 13:59

## 2020-01-01 RX ADMIN — Medication 3 MILLILITER(S): at 09:26

## 2020-01-01 RX ADMIN — HEPARIN SODIUM 5000 UNIT(S): 5000 INJECTION INTRAVENOUS; SUBCUTANEOUS at 17:36

## 2020-01-01 RX ADMIN — CARVEDILOL PHOSPHATE 25 MILLIGRAM(S): 80 CAPSULE, EXTENDED RELEASE ORAL at 06:43

## 2020-01-01 RX ADMIN — CARVEDILOL PHOSPHATE 25 MILLIGRAM(S): 80 CAPSULE, EXTENDED RELEASE ORAL at 06:39

## 2020-01-01 RX ADMIN — Medication 50 MILLIGRAM(S): at 21:57

## 2020-01-01 RX ADMIN — Medication 3 MILLILITER(S): at 22:19

## 2020-01-01 RX ADMIN — BUDESONIDE AND FORMOTEROL FUMARATE DIHYDRATE 2 PUFF(S): 160; 4.5 AEROSOL RESPIRATORY (INHALATION) at 08:52

## 2020-01-01 RX ADMIN — Medication 81 MILLIGRAM(S): at 11:21

## 2020-01-01 RX ADMIN — ISOSORBIDE MONONITRATE 60 MILLIGRAM(S): 60 TABLET, EXTENDED RELEASE ORAL at 11:42

## 2020-01-01 RX ADMIN — HEPARIN SODIUM 5000 UNIT(S): 5000 INJECTION INTRAVENOUS; SUBCUTANEOUS at 17:19

## 2020-01-01 RX ADMIN — CARVEDILOL PHOSPHATE 25 MILLIGRAM(S): 80 CAPSULE, EXTENDED RELEASE ORAL at 06:20

## 2020-01-01 RX ADMIN — PANTOPRAZOLE SODIUM 40 MILLIGRAM(S): 20 TABLET, DELAYED RELEASE ORAL at 06:25

## 2020-01-01 RX ADMIN — Medication 81 MILLIGRAM(S): at 11:51

## 2020-01-01 RX ADMIN — CHLORHEXIDINE GLUCONATE 1 APPLICATION(S): 213 SOLUTION TOPICAL at 17:39

## 2020-01-01 RX ADMIN — BUDESONIDE AND FORMOTEROL FUMARATE DIHYDRATE 2 PUFF(S): 160; 4.5 AEROSOL RESPIRATORY (INHALATION) at 21:17

## 2020-01-01 RX ADMIN — Medication 50 MILLIGRAM(S): at 05:03

## 2020-01-01 RX ADMIN — Medication 40 MILLIEQUIVALENT(S): at 17:50

## 2020-01-01 RX ADMIN — Medication 3 MILLILITER(S): at 03:49

## 2020-01-01 RX ADMIN — CARVEDILOL PHOSPHATE 25 MILLIGRAM(S): 80 CAPSULE, EXTENDED RELEASE ORAL at 17:52

## 2020-01-01 RX ADMIN — Medication 50 MILLIGRAM(S): at 13:23

## 2020-01-01 RX ADMIN — HEPARIN SODIUM 5000 UNIT(S): 5000 INJECTION INTRAVENOUS; SUBCUTANEOUS at 17:52

## 2020-01-01 RX ADMIN — Medication 81 MILLIGRAM(S): at 11:42

## 2020-01-01 RX ADMIN — Medication 50 MILLIGRAM(S): at 06:43

## 2020-01-01 RX ADMIN — Medication 81 MILLIGRAM(S): at 12:27

## 2020-01-01 RX ADMIN — PANTOPRAZOLE SODIUM 40 MILLIGRAM(S): 20 TABLET, DELAYED RELEASE ORAL at 05:45

## 2020-01-01 RX ADMIN — Medication 3 MILLILITER(S): at 16:23

## 2020-01-01 RX ADMIN — AZITHROMYCIN 255 MILLIGRAM(S): 500 TABLET, FILM COATED ORAL at 06:25

## 2020-01-01 RX ADMIN — Medication 40 MILLIGRAM(S): at 06:20

## 2020-01-01 RX ADMIN — HEPARIN SODIUM 5000 UNIT(S): 5000 INJECTION INTRAVENOUS; SUBCUTANEOUS at 17:38

## 2020-01-01 RX ADMIN — ISOSORBIDE MONONITRATE 60 MILLIGRAM(S): 60 TABLET, EXTENDED RELEASE ORAL at 11:51

## 2020-01-01 RX ADMIN — HEPARIN SODIUM 5000 UNIT(S): 5000 INJECTION INTRAVENOUS; SUBCUTANEOUS at 18:44

## 2020-01-01 RX ADMIN — Medication 50 MILLIGRAM(S): at 21:31

## 2020-01-01 RX ADMIN — Medication 50 MILLIGRAM(S): at 15:27

## 2020-01-01 RX ADMIN — Medication 81 MILLIGRAM(S): at 14:08

## 2020-01-01 RX ADMIN — Medication 3 MILLILITER(S): at 04:25

## 2020-01-01 RX ADMIN — Medication 40 MILLIGRAM(S): at 05:45

## 2020-01-01 RX ADMIN — Medication 50 MILLIGRAM(S): at 05:19

## 2020-01-01 RX ADMIN — SIMVASTATIN 20 MILLIGRAM(S): 20 TABLET, FILM COATED ORAL at 21:17

## 2020-01-01 RX ADMIN — HEPARIN SODIUM 5000 UNIT(S): 5000 INJECTION INTRAVENOUS; SUBCUTANEOUS at 05:19

## 2020-01-01 RX ADMIN — ISOSORBIDE MONONITRATE 60 MILLIGRAM(S): 60 TABLET, EXTENDED RELEASE ORAL at 12:27

## 2020-01-01 RX ADMIN — Medication 3 MILLILITER(S): at 04:23

## 2020-01-01 RX ADMIN — Medication 3 MILLILITER(S): at 08:30

## 2020-01-01 RX ADMIN — Medication 3 MILLILITER(S): at 08:45

## 2020-01-01 RX ADMIN — Medication 50 MILLIGRAM(S): at 06:37

## 2020-01-01 RX ADMIN — ISOSORBIDE MONONITRATE 60 MILLIGRAM(S): 60 TABLET, EXTENDED RELEASE ORAL at 13:59

## 2020-01-01 RX ADMIN — Medication 50 MILLIGRAM(S): at 14:33

## 2020-01-01 RX ADMIN — PANTOPRAZOLE SODIUM 40 MILLIGRAM(S): 20 TABLET, DELAYED RELEASE ORAL at 17:51

## 2020-01-01 RX ADMIN — Medication 20 MILLIGRAM(S): at 18:29

## 2020-01-01 RX ADMIN — Medication 20 MILLIGRAM(S): at 05:19

## 2020-01-01 RX ADMIN — Medication 81 MILLIGRAM(S): at 13:23

## 2020-01-01 RX ADMIN — CARVEDILOL PHOSPHATE 25 MILLIGRAM(S): 80 CAPSULE, EXTENDED RELEASE ORAL at 05:03

## 2020-01-01 RX ADMIN — Medication 125 MILLIGRAM(S): at 07:45

## 2020-01-01 RX ADMIN — Medication 50 MILLIGRAM(S): at 13:46

## 2020-01-01 RX ADMIN — Medication 3 MILLIGRAM(S): at 23:53

## 2020-01-01 RX ADMIN — LISINOPRIL 40 MILLIGRAM(S): 2.5 TABLET ORAL at 06:43

## 2020-01-01 RX ADMIN — LISINOPRIL 40 MILLIGRAM(S): 2.5 TABLET ORAL at 05:19

## 2020-01-01 RX ADMIN — Medication 3 MILLILITER(S): at 10:56

## 2020-01-07 ENCOUNTER — APPOINTMENT (OUTPATIENT)
Dept: CARDIOLOGY | Facility: CLINIC | Age: 70
End: 2020-01-07
Payer: MEDICARE

## 2020-01-07 ENCOUNTER — APPOINTMENT (OUTPATIENT)
Dept: ELECTROPHYSIOLOGY | Facility: CLINIC | Age: 70
End: 2020-01-07
Payer: MEDICARE

## 2020-01-07 VITALS — SYSTOLIC BLOOD PRESSURE: 170 MMHG | DIASTOLIC BLOOD PRESSURE: 107 MMHG

## 2020-01-07 VITALS
SYSTOLIC BLOOD PRESSURE: 192 MMHG | BODY MASS INDEX: 14.08 KG/M2 | DIASTOLIC BLOOD PRESSURE: 130 MMHG | OXYGEN SATURATION: 95 % | HEIGHT: 62 IN | HEART RATE: 83 BPM

## 2020-01-07 VITALS — DIASTOLIC BLOOD PRESSURE: 103 MMHG | SYSTOLIC BLOOD PRESSURE: 165 MMHG

## 2020-01-07 VITALS — HEIGHT: 62 IN | BODY MASS INDEX: 14.17 KG/M2 | WEIGHT: 77 LBS

## 2020-01-07 PROCEDURE — 93000 ELECTROCARDIOGRAM COMPLETE: CPT

## 2020-01-07 PROCEDURE — 99214 OFFICE O/P EST MOD 30 MIN: CPT

## 2020-01-07 PROCEDURE — 93290 INTERROG DEV EVAL ICPMS IP: CPT

## 2020-01-07 PROCEDURE — 93284 PRGRMG EVAL IMPLANTABLE DFB: CPT

## 2020-01-07 NOTE — DISCUSSION/SUMMARY
[FreeTextEntry1] : 69 year old woman with HFrEF HTN HLD here for followup. BP very elevated\par \par 1. HTN- On Lisinopril and Coreg and Hydralazine 50 mg TID, off diuretics but since optival is elevated will readd lasix 40 mg daily, will also imdur 30 mg daily\par 2. Chronic systolic heart failure- On above meds, will readd lasix 40 mg daily for one week and then drop to 20 mg daily\par 3. HLD- On statin therapy

## 2020-01-07 NOTE — PHYSICAL EXAM
[General Appearance - Well Developed] : well developed [Well Groomed] : well groomed [Normal Appearance] : normal appearance [General Appearance - In No Acute Distress] : no acute distress [No Deformities] : no deformities [General Appearance - Well Nourished] : well nourished [Normal Conjunctiva] : the conjunctiva exhibited no abnormalities [Eyelids - No Xanthelasma] : the eyelids demonstrated no xanthelasmas [Normal Oral Mucosa] : normal oral mucosa [No Oral Pallor] : no oral pallor [No Oral Cyanosis] : no oral cyanosis [Normal Jugular Venous A Waves Present] : normal jugular venous A waves present [Normal Jugular Venous V Waves Present] : normal jugular venous V waves present [No Jugular Venous Lovett A Waves] : no jugular venous lovett A waves [Respiration, Rhythm And Depth] : normal respiratory rhythm and effort [Exaggerated Use Of Accessory Muscles For Inspiration] : no accessory muscle use [Auscultation Breath Sounds / Voice Sounds] : lungs were clear to auscultation bilaterally [Heart Rate And Rhythm] : heart rate and rhythm were normal [Heart Sounds] : normal S1 and S2 [Edema] : no peripheral edema present [Abdomen Soft] : soft [Abdomen Tenderness] : non-tender [Abdomen Mass (___ Cm)] : no abdominal mass palpated [Abnormal Walk] : normal gait [Gait - Sufficient For Exercise Testing] : the gait was sufficient for exercise testing [Nail Clubbing] : no clubbing of the fingernails [Cyanosis, Localized] : no localized cyanosis [Petechial Hemorrhages (___cm)] : no petechial hemorrhages [Skin Color & Pigmentation] : normal skin color and pigmentation [] : no rash [No Venous Stasis] : no venous stasis [Skin Lesions] : no skin lesions [No Skin Ulcers] : no skin ulcer [No Xanthoma] : no  xanthoma was observed [Oriented To Time, Place, And Person] : oriented to person, place, and time [Affect] : the affect was normal [Mood] : the mood was normal [No Anxiety] : not feeling anxious [FreeTextEntry1] : II/VI SM

## 2020-01-07 NOTE — HISTORY OF PRESENT ILLNESS
[FreeTextEntry1] : Here for followup. BP is very elevated and had ICD check with increased optival readings. She is under a lot of stress with her brother and is very agitated.\par 1. HTN- On Lisinopril and Coreg and Hydralazine 50 mg TID, off diuretics but since optival is elevated will readd lasix 40 mg daily\par 2. Chronic systolic heart failure- On above meds, will readd lasix 40 mg daily for one week and then drop to 20 mg daily, will also add imdur 30 mg daily\par 3. HLD- On statin therapy

## 2020-01-09 ENCOUNTER — RX RENEWAL (OUTPATIENT)
Age: 70
End: 2020-01-09

## 2020-01-10 ENCOUNTER — RX RENEWAL (OUTPATIENT)
Age: 70
End: 2020-01-10

## 2020-02-04 ENCOUNTER — APPOINTMENT (OUTPATIENT)
Dept: CARDIOLOGY | Facility: CLINIC | Age: 70
End: 2020-02-04
Payer: MEDICARE

## 2020-02-04 VITALS
BODY MASS INDEX: 14.35 KG/M2 | HEIGHT: 62 IN | HEART RATE: 88 BPM | WEIGHT: 78 LBS | SYSTOLIC BLOOD PRESSURE: 155 MMHG | DIASTOLIC BLOOD PRESSURE: 94 MMHG | OXYGEN SATURATION: 91 %

## 2020-02-04 DIAGNOSIS — I50.22 CHRONIC SYSTOLIC (CONGESTIVE) HEART FAILURE: ICD-10-CM

## 2020-02-04 PROCEDURE — 99215 OFFICE O/P EST HI 40 MIN: CPT

## 2020-02-04 PROCEDURE — 93000 ELECTROCARDIOGRAM COMPLETE: CPT

## 2020-02-04 RX ORDER — PREDNISONE 20 MG/1
20 TABLET ORAL
Refills: 0 | Status: DISCONTINUED | COMMUNITY
End: 2020-02-04

## 2020-02-04 NOTE — DISCUSSION/SUMMARY
[FreeTextEntry1] : Ms. Jalloh is a pleasant 69 year old woman with HFrEF, HTN, HLD here for followup. BP remains elevated.\par \par 1. HTN- On Lisinopril and Coreg and Hydralazine 50 mg TID,ON Lasix prn only. \par              Increase imdur 60 mg daily\par 2. Chronic systolic heart failure- S/p BiVICD . \par              BiV pacing >97%. \par             On above meds,Lasix 20 mg prn \par 3.  HLD :  Continue Simvastatin 20mg.\par \par F/u in 4 weeks

## 2020-02-04 NOTE — END OF VISIT
[FreeTextEntry3] : Agree with above assessment and plan\par Patient doing well with increasing her diuretic when needed to twice daily. her optivol reflects this\par Her BP is still elevated, will increase imdur to 60 mg daily\par FU in 6 weeks

## 2020-02-04 NOTE — PHYSICAL EXAM
[General Appearance - Well Developed] : well developed [Normal Appearance] : normal appearance [Well Groomed] : well groomed [General Appearance - Well Nourished] : well nourished [No Deformities] : no deformities [General Appearance - In No Acute Distress] : no acute distress [Normal Conjunctiva] : the conjunctiva exhibited no abnormalities [Normal Oral Mucosa] : normal oral mucosa [No Oral Pallor] : no oral pallor [Eyelids - No Xanthelasma] : the eyelids demonstrated no xanthelasmas [Normal Jugular Venous V Waves Present] : normal jugular venous V waves present [No Oral Cyanosis] : no oral cyanosis [Normal Jugular Venous A Waves Present] : normal jugular venous A waves present [No Jugular Venous Lovett A Waves] : no jugular venous lovett A waves [Exaggerated Use Of Accessory Muscles For Inspiration] : no accessory muscle use [Respiration, Rhythm And Depth] : normal respiratory rhythm and effort [Auscultation Breath Sounds / Voice Sounds] : lungs were clear to auscultation bilaterally [Heart Sounds] : normal S1 and S2 [Heart Rate And Rhythm] : heart rate and rhythm were normal [Edema] : no peripheral edema present [Abdomen Tenderness] : non-tender [Abdomen Soft] : soft [Gait - Sufficient For Exercise Testing] : the gait was sufficient for exercise testing [Abnormal Walk] : normal gait [Abdomen Mass (___ Cm)] : no abdominal mass palpated [Cyanosis, Localized] : no localized cyanosis [Petechial Hemorrhages (___cm)] : no petechial hemorrhages [Nail Clubbing] : no clubbing of the fingernails [Skin Color & Pigmentation] : normal skin color and pigmentation [] : no ischemic changes [No Skin Ulcers] : no skin ulcer [Skin Lesions] : no skin lesions [No Venous Stasis] : no venous stasis [Oriented To Time, Place, And Person] : oriented to person, place, and time [No Xanthoma] : no  xanthoma was observed [Affect] : the affect was normal [Mood] : the mood was normal [No Anxiety] : not feeling anxious [FreeTextEntry1] : II/VI SM [Normal] : normal [Normal Rate] : normal [Rhythm Regular] : regular [Normal S1] : normal S1 [Normal S2] : normal S2

## 2020-02-04 NOTE — HISTORY OF PRESENT ILLNESS
[FreeTextEntry1] : Here for followup. BP is remains elevated ( 155/94) despite compliance with meds and dietary discretion. Last ICD check with increased optival readiings.\par  She is under a lot of stress with her brother and is very agitated.\par  She appears to be SOB ( SaO2 RA -94 - 96%). \par 1. HTN- On Lisinopril and Coreg 25 bid and Hydralazine 50 mg TID, Lasix 20 mg QD prn, Imdur 30 mg.  since optival is elevated, was on lasix 40 mg daily until yesterday. \par Device interrogated today showed optivol is optimized. \par 2. Chronic systolic heart failure- On above meds, lasix 20 mg  prn if ankle swelling.\par 3. HLD- On statin therapy

## 2020-02-10 NOTE — PHYSICAL THERAPY INITIAL EVALUATION ADULT - ASSISTIVE DEVICE FOR TRANSFER: GAIT, REHAB EVAL
Acute Care - Occupational Therapy Treatment Note  [facility]     Patient Name: Ford High  : 1963  MRN: [id number]  Today's Date: 2/10/2020  Onset of Illness/Injury or Date of Surgery: 20  Date of Referral to OT: 20  Referring Physician: DENISHA Bassett    Admit Date: 2020       ICD-10-CM ICD-9-CM   1. Pneumonia of right lower lobe due to infectious organism (CMS/HCC) J18.1 486   2. Decreased activities of daily living (ADL) Z78.9 V49.89     Patient Active Problem List   Diagnosis   • Pneumonia of right lower lobe due to infectious organism (CMS/HCC)   • Type 2 diabetes mellitus with hyperglycemia, with long-term current use of insulin (CMS/HCC)   • Leukocytosis   • Normocytic anemia   • Right-sided chest wall pain     Past Medical History:   Diagnosis Date   • Autonomic neuropathy    • Chronic diarrhea    • Diabetes mellitus (CMS/HCC)    • Diabetic foot ulcer (CMS/HCC)    • Elevated cholesterol    • Lateral epicondylitis, right elbow    • Noncompliance    • Orthostatic hypotension    • Skin infection      Past Surgical History:   Procedure Laterality Date   • TONSILLECTOMY         Therapy Treatment    Rehabilitation Treatment Summary     Row Name 02/10/20 1420             Treatment Time/Intention    Discipline  occupational therapy assistant  -CJ      Document Type  therapy note (daily note)  -      Subjective Information  complains of;weakness;fatigue;pain  -      Mode of Treatment  occupational therapy  -      Patient Effort  poor  -      Existing Precautions/Restrictions  fall pain!  -CJ      Recorded by [CJ] Celso Ruff COTA/L 02/10/20 1527      Row Name 02/10/20 1420             Bed Mobility Assessment/Treatment    Comment (Bed Mobility)  fowlers in bed!  -CJ      Recorded by [CJ] Celso Ruff COTA/L 02/10/20 1527      Row Name 02/10/20 1420             Motor Skills Assessment/Interventions    Additional Documentation  Therapeutic Exercise (Group)  -CJ       Recorded by [CJ] Celso Ruff COTA/L 02/10/20 1527      Row Name 02/10/20 1420             Therapeutic Exercise    Upper Extremity (Therapeutic Exercise)  bicep curl, bilateral;wand exercises  -CJ      Upper Extremity Range of Motion (Therapeutic Exercise)  shoulder flexion/extension, bilateral  -CJ      Weight/Resistance (Therapeutic Exercise)  3 pounds  -CJ      Exercise Type (Therapeutic Exercise)  AROM (active range of motion)  -CJ      Position (Therapeutic Exercise)  seated  -CJ      Sets/Reps (Therapeutic Exercise)  1 set x3 reps!  -CJ      Expected Outcome (Therapeutic Exercise)  facilitate normal movement patterns;improve functional stability;improve functional tolerance, self-care activity;improve motor control;improve neuromuscular control;improve performance, BADLs;improve performance, fine motor coordination skills;improve performance, gait skills;improve performance, transfer skills;improve postural control;increase active range of motion  -CJ      Recorded by [CJ] Celso Ruff COTA/L 02/10/20 1527      Row Name 02/10/20 1420             Positioning and Restraints    Pre-Treatment Position  in bed  -CJ      Post Treatment Position  bed  -CJ      In Bed  fowlers;call light within reach;encouraged to call for assist;side rails up x2  -CJ      Recorded by [CJ] Celso Ruff COTA/L 02/10/20 1527      Row Name 02/10/20 1420             Pain Assessment    Additional Documentation  Pain Scale 2: Word Pre/Post-Treatment (Group)  -CJ      Recorded by [CJ] Celso Ruff COTA/L 02/10/20 1527      Row Name 02/10/20 1420             Pain Scale: Numbers Pre/Post-Treatment    Pain Scale: Numbers, Pretreatment  4/10  -CJ      Pain Scale: Numbers, Post-Treatment  4/10  -      Pain Location - Side  Bilateral  -CJ      Pain Location  abdomen  -      Pain Intervention(s)  Rest  -CJ      Recorded by [CJ] Celso Ruff COTA/L 02/10/20 1527      Row Name 02/10/20 1420             Outcome  Summary/Treatment Plan (OT)    Daily Summary of Progress (OT)  unable to show any progress toward functional goals  -CJ      Barriers to Overall Progress (OT)  Fall/pain!  -CJ      Plan for Continued Treatment (OT)  continue with ot poc!  -CJ      Recorded by [CJ] Celso Ruff COTA/L 02/10/20 1527        User Key  (r) = Recorded By, (t) = Taken By, (c) = Cosigned By    Initials Name Effective Dates Discipline     Celso Ruff COTA/L 08/02/16 -  OT                 OT Recommendation and Plan  Outcome Summary/Treatment Plan (OT)  Daily Summary of Progress (OT): unable to show any progress toward functional goals  Barriers to Overall Progress (OT): Fall/pain!  Plan for Continued Treatment (OT): continue with ot poc!  Daily Summary of Progress (OT): unable to show any progress toward functional goals  Plan of Care Review  Plan of Care Reviewed With: patient  Plan of Care Reviewed With: patient  Outcome Measures     Row Name 02/10/20 1420             How much help from another is currently needed...    Putting on and taking off regular lower body clothing?  3  -CJ      Bathing (including washing, rinsing, and drying)  3  -CJ      Toileting (which includes using toilet bed pan or urinal)  3  -CJ      Putting on and taking off regular upper body clothing  4  -CJ      Taking care of personal grooming (such as brushing teeth)  4  -CJ      Eating meals  4  -CJ      AM-PAC 6 Clicks Score (OT)  21  -         Functional Assessment    Outcome Measure Options  AM-PAC 6 Clicks Daily Activity (OT)  -        User Key  (r) = Recorded By, (t) = Taken By, (c) = Cosigned By    Initials Name Provider Type     Celso Ruff COTA/L Occupational Therapy Assistant           Time Calculation:   Time Calculation- OT     Row Name 02/10/20 1420             Time Calculation- OT    OT Start Time  1420  -      OT Stop Time  1434  -      OT Time Calculation (min)  14 min  -      Total Timed Code Minutes- OT  14  minute(s)  -      TCU Minutes- OT  14 min  -      OT Received On  02/10/20  -      OT Goal Re-Cert Due Date  02/17/20  -        User Key  (r) = Recorded By, (t) = Taken By, (c) = Cosigned By    Initials Name Provider Type    Celso Hess COTA/L Occupational Therapy Assistant        Therapy Charges for Today     Code Description Service Date Service Provider Modifiers Qty    [id number] HC OT THER PROC EA 15 MIN 2/10/2020 Celso Ruff COTA/L GO 1               JOSE Ayers  2/10/2020   no device

## 2020-03-17 ENCOUNTER — APPOINTMENT (OUTPATIENT)
Dept: CARDIOLOGY | Facility: CLINIC | Age: 70
End: 2020-03-17

## 2020-03-19 ENCOUNTER — APPOINTMENT (OUTPATIENT)
Dept: PULMONOLOGY | Facility: CLINIC | Age: 70
End: 2020-03-19

## 2020-04-01 ENCOUNTER — APPOINTMENT (OUTPATIENT)
Dept: FAMILY MEDICINE | Facility: CLINIC | Age: 70
End: 2020-04-01

## 2020-04-09 ENCOUNTER — APPOINTMENT (OUTPATIENT)
Dept: ELECTROPHYSIOLOGY | Facility: CLINIC | Age: 70
End: 2020-04-09
Payer: MEDICARE

## 2020-04-09 PROCEDURE — 93295 DEV INTERROG REMOTE 1/2/MLT: CPT

## 2020-04-09 PROCEDURE — 93296 REM INTERROG EVL PM/IDS: CPT

## 2020-05-12 ENCOUNTER — APPOINTMENT (OUTPATIENT)
Dept: PULMONOLOGY | Facility: CLINIC | Age: 70
End: 2020-05-12
Payer: MEDICARE

## 2020-05-12 PROCEDURE — 99442: CPT

## 2020-08-23 NOTE — H&P ADULT - PROBLEM SELECTOR PLAN 2
Pt with chonic cough with sputum production, although does not endorse recent worsening of cough.   - Nebs as above  - Prednisone 10mg BID Pt with chonic cough with sputum production, although does not endorse recent worsening of cough.   - Nebs as above  - Solu-Medrol IV for now.  - Prednisone 10mg BID once pt's symptoms resolve.

## 2020-08-23 NOTE — H&P ADULT - ATTENDING COMMENTS
Patient seen and evaluated with team in ED.  Agree with above A/P by Dr Zuñiga  71 yo F with a PMH of CHF (EF 20-25% in 4/2019) s/p PPM/ICD HTN, CARLOTA not able to tolerate CPAP, COPD (not on home O2), moderate pHTN, p/w shortness of breath for 2 days. Patient presents with acute onset shortness of breath with chest pain on Saturday making PE a possibility. COPD exacerbation is also in the ddx given presentation but does not have acute exacerbation of cough or wheezing on exam.   COVID19- negative. CTA of chest ord- f/u r/o pe  # COPD Exb Patient given iv solumedrol in Ed and notes some improvement. c/w Solumedrol iv x 2days then transition to pred 10 mg bid  c/w Albuterol nebs. c/w bedesomide  #CARLOTA- patient noted she has never used CPap- and refuses to use  # h/o CHF Ef 22 % . Lungs cta b/l with Nl probnp  #Hypokalemia KK3.1 supplemented  # Htn c/w home medications.  DVT proph- hep sq tid Patient seen and evaluated with team in ED.  Agree with above A/P by Dr Zuñiga  69 yo F with a PMH of CHF (EF 20-25% in 4/2019) s/p PPM/ICD HTN, CARLOTA not able to tolerate CPAP, COPD (not on home O2), moderate pHTN, p/w shortness of breath for 2 days. Patient presents with acute onset shortness of breath with chest pain on Saturday making PE a possibility. COPD exacerbation is also in the ddx given presentation but does not have acute exacerbation of cough or wheezing on exam.   COVID19- negative. CTA of chest ord- f/u r/o pe  # COPD Exb Patient given iv solumedrol in Ed and notes some improvement. c/w Solumedrol iv x 2days then transition to pred 10 mg bid  c/w Albuterol nebs. c/w bedesomide  #CARLOTA- patient noted she has never used CPap- and refuses to use  # h/o CHF Ef 22 % . Lungs cta b/l with Nl probnp  #Hypokalemia KK3.1 supplemented  # Htn c/w home medications.  DVT proph- hep sq tid  Called PCP Dr everett at [phone] vm note should call back during regular bussiness hour. Patient seen and evaluated with team in ED.  Agree with above A/P by Dr Zuñiga  69 yo F with a PMH of CHF (EF 20-25% in 4/2019) s/p PPM/ICD HTN, CARLOTA not able to tolerate CPAP, COPD (not on home O2), moderate pHTN, p/w shortness of breath for 2 days. Patient presents with acute onset shortness of breath with chest pain on Saturday making PE a possibility. COPD exacerbation is also in the ddx given presentation but does not have acute exacerbation of cough or wheezing on exam.   COVID19- negative. CTA of chest ord- f/u r/o pe  # COPD Exb Patient given iv solumedrol in Ed and notes some improvement. c/w Solumedrol iv x 2days then transition to pred 10 mg bid  c/w Albuterol nebs. c/w bedesomide  #CARLOTA- patient noted she has never used CPap- and refuses to use  # h/o CHF Ef 22 % . Lungs cta b/l with Nl probnp, c/w home doses of lasix.  Na 147 - monitor BMP in AM.  #Hypokalemia KK3.1 supplemented  # Htn c/w home medications.  DVT proph- hep sq tid  Called PCP Dr everett at [phone]-  note should call back during regular business hour.  Called brother Alexandru [phone]- left  patient admitted

## 2020-08-23 NOTE — ED PROVIDER NOTE - PROGRESS NOTE DETAILS
Patient reassessed, NAD, non-toxic appearing. reported improved sx. contacted hospitalist who stated pt required first time bipap consult as pt does not have home bipap. pulm consulted, pending eval.  - Khari Guillen D.O. PGY3 eval by pulm, considered appropriate for floors. pt taken off bipap however w/ continued increased WOB w/ sats >95%. will place back on bipap. pt endorsed to hospitalist. explained concern of possible PE however unable to obtain CTA due to pt intolerance of laying flat, rec possible echo for RV strain.   - Khari Guillen D.O. PGY3

## 2020-08-23 NOTE — PATIENT PROFILE ADULT - HARM RISK FACTORS
SENIOR ADMIT NOTE     DATE OF SERVICE: 4/18/18     CHIEF COMPLAINT: chest pain     HISTORY OF PRESENT ILLNESS: 68 year old female with PMHx of CAD (remote s/p 4 stents 10 yrs back), remote DVT (not on current anticoagulation), peripheral neuropathy was brought in from urgent care for evaluation of persistent chest pain.  Intermittent substernal chest pain/pressure radiating to her left shoulder and neck for the past 2 weeks. Worsened and longer duration today. In the ED was given  mg and EKG showed old Q-wave, regular rate and rhythm, no ST segment depressions or elevations. Troponin 0.20. Cardiology was consulted.As per Cardiology recommendation started on nitro drip and heparin drip.Subsequently taken to Cath lab.     PAST MEDICAL HISTORY:  1. CAD s/p stent  2. DVT ( unclear which LE)  3. Neuropathy     PAST SURGICAL HISTORY: Angioplasty    FAMILY HISTORY: Mother-aplastic anemia    SOCIAL HISTORY: Current active smoker 1 PPD and drinks alcohol rarely. Denies illicit drug use.     MEDICATIONS:  1. Gabapentin  2. Ibuprofen  3. Omeprazole     ALLERGIES: No known allergies     REVIEW OF SYSTEMS:  CONSTITUTIONAL:  Denies fevers, chills.  HEENT:  Denies nasal congestion, sore throat.  RESPIRATORY:  + shortness of breath on exertion, denies cough.  CARDIOVASCULAR:+chest pain, palpitations.  ABDOMEN:  Denies nausea, vomiting, abdominal pain.  GENITOURINARY:  Denies dysuria, frequency.  NEUROLOGICAL:  Denies dizziness, tremors, syncope.+headache  PSYCHIATRY: Denies SI/HI, depression, anxiety       PHYSICAL EXAMINATION:  VITAL SIGNS:  Blood pressure 123/73, heart rate 73, afebrile, respiratory rate   of 18, oxygen saturation of 93% on nasal cannula.  CONSTITUTIONAL:  Appears comfortable.  HEENT:  Normocephalic, atraumatic.  RESPIRATORY:  Decreased breath sounds at bases bilaterally.  CARDIOVASCULAR:  Normal rate and rhythm.  No murmurs,  rubs or gallops.  ABDOMEN:  Soft, nontender.  Normal bowel sounds.  EXTREMITIES:  No pedal edema. Vascular changes noted   NEUROLOGIC:  Cranial nerves II-XII grossly intact.  Sensation 5/5 and equal   bilaterally.  Reflexes are 2+.     LABORATORY DATA:       IMAGING STUDIES:     LE VENOUS DUPLEX   Final Result      CT-HEAD W/O   Final Result      1.  Diffuse atrophy and white matter changes.   2.  No acute intracranial hemorrhage or territorial infarct.         CT-CTA CHEST PULMONARY ARTERY W/ RECONS   Final Result      No proximal pulmonary emboli identified. The peripheral branches are not well opacified and cannot accurately be evaluated.   Dependent atelectasis/possible edema with the lungs. No pleural effusions.   Atherosclerotic changes including coronary artery calcifications.   Hepatic steatosis.            DX-CHEST-PORTABLE (1 VIEW)   Final Result      Hypoinflation without other evidence for acute cardiopulmonary disease.      DX-CHEST-2 VIEWS    (Results Pending)     ASSESSMENT AND PLAN:  1. NSTEMI with underlying history of CAD s/p remote stent : - Patient was continued on nitro drip and heparin drip and cardiac cath (NPO with sips of meds), titrated nitroglycerin drip until chest pain free. Cardiology on board. Continue aspirin and started on atorvastatin.  2. Remote history of DVT: work up negative in this admission  3. Polycythemia : likely due to underlying COPD given active longstanding smoking or ANNIE  4. Peripheral neuropathy: continue neurontin   5. Nicotine dependence: counseled and nicotine patches during hospital stay    Core measures have been addressed appropriately.     Please refer to intern's H and P for more details.   yes

## 2020-08-23 NOTE — ED PROVIDER NOTE - NS ED ROS FT
GENERAL: No fever or chills, //             EYES: no change in vision, //             HEENT: no trouble swallowing or speaking, //             CARDIAC: chest pain, //              PULMONARY:  SOB, //             GI: no abdominal pain, no nausea or no vomiting, no diarrhea or constipation, //             : No changes in urination,  //            SKIN: no rashes,  //            NEURO: no headache,  //             MSK: No joint pain otherwise as HPI or negative. ~Khari Guillen DO PGY3

## 2020-08-23 NOTE — ED PROVIDER NOTE - PHYSICAL EXAMINATION
General: speaking full sentences, increased wob, tolerating secretions   HEENT: EOMI, PERRLA, normal mucosa, normal oropharynx, no lesions on the lips or on oral mucosa, normal external ear  Neck: supple, no lymphadenopathy, full range of motion, no nuchal rigidity  CV: RRR, normal S1 and S2 with no murmur, capillary refill less than two seconds  Resp: lungs CTA b/l, good aeration bilaterally, symmetric chest wall   Abd: non-distended, soft, non-tender  : no CVA tenderness  MSK: full range of motion, no cyanosis, no edema, no clubbing, no immobility  Neuro: CN II-XII grossly intact, muscle strength 5/5 in all extremities  Skin: no rashes, skin intact

## 2020-08-23 NOTE — H&P ADULT - NSHPSOCIALHISTORY_GEN_ALL_CORE
does not use alcohol or cigarettes. rarely uses marijuana to sleep. Does not use alcohol or cigarettes. rarely uses marijuana to sleep.

## 2020-08-23 NOTE — H&P ADULT - PROBLEM SELECTOR PLAN 7
Pt states that she had a sleep study done in the past and was diagnosed with CARLOTA. She finds BiPAP very uncomfortable and is noncompliant at home. She states that she had a repeat study that found she does not have CARLOTA.  - encourage BiPAP use qhs

## 2020-08-23 NOTE — H&P ADULT - PROBLEM SELECTOR PLAN 1
Pt with acute onset of SOB. Could represent PE given pt's age and comorbid conditions, as well as sudden onset. CHF exacerbation less likely given POCUS negative for B-lines and unconcerning CXR. Pt is not complaining of new cough or wheezing, making COPD exacerbation less likely. Pt breathing well on 4L NC.   - CXR without obvious signs of CHF exacerbation  - CTA ordered; start AC if PE found  - Pro-  - VBG showing elevated CO2 although this may be chronic for her.  - Continue home furosemide; pt not volume overloaded on exam.   - Spiriva and Symbicort home nebs daily, Proair neb PRN. Pt received doses in ED  - ABG if pt's clinical condition deteriorates Pt with acute onset of SOB. Could represent PE given pt's age and comorbid conditions, as well as sudden onset. CHF exacerbation less likely given POCUS negative for B-lines and unconcerning CXR. Pt is not complaining of new cough or wheezing, making COPD exacerbation less likely. Pt breathing well on 4L NC.   - CXR without obvious signs of CHF exacerbation  - CTA ordered; start AC if PE found  - Pro-  - VBG showing elevated CO2 although this may be chronic for her.  - Continue home furosemide; pt not volume overloaded on exam.   - Spiriva and Symbicort home nebs daily, Proair neb PRN. Pt received doses in ED  - ABG if pt's clinical condition deteriorates  - RVP ordered Pt with acute onset of SOB. Could represent PE given pt's age and comorbid conditions, as well as sudden onset. CHF exacerbation less likely given POCUS negative for B-lines and unconcerning CXR. Pt is not complaining of new cough or wheezing, making COPD exacerbation less likely. Pt breathing well on 4L NC.   - CXR without obvious signs of CHF exacerbation  - CTA ordered; start AC if PE found  - Pro-  - VBG showing elevated CO2 although this may be chronic for her.  - Continue home furosemide; pt not volume overloaded on exam.   - Spiriva and Symbicort home nebs daily, Proair neb PRN. Pt received doses in ED  - ABG if pt's clinical condition deteriorates  - RVP ordered  - Blood cultures pending  - Low suspicion for pneumonia. If symptoms do no improve, consider adding abx

## 2020-08-23 NOTE — ED ADULT NURSE NOTE - NSIMPLEMENTINTERV_GEN_ALL_ED
Implemented All Universal Safety Interventions:  [place] to call system. Call bell, personal items and telephone within reach. Instruct patient to call for assistance. Room bathroom lighting operational. Non-slip footwear when patient is off stretcher. Physically safe environment: no spills, clutter or unnecessary equipment. Stretcher in lowest position, wheels locked, appropriate side rails in place.

## 2020-08-23 NOTE — H&P ADULT - NSHPPHYSICALEXAM_GEN_ALL_CORE
VITALS:   Vital Signs Last 24 Hrs  T(C): 36.1 (23 Aug 2020 08:50), Max: 36.8 (23 Aug 2020 07:03)  T(F): 96.9 (23 Aug 2020 08:50), Max: 98.2 (23 Aug 2020 07:03)  HR: 82 (23 Aug 2020 08:50) (82 - 83)  BP: 110/72 (23 Aug 2020 08:50) (110/72 - 149/86)  BP(mean): --  RR: 22 (23 Aug 2020 08:50) (20 - 27)  SpO2: 100% (23 Aug 2020 08:50) (100% - 100%)    GENERAL: increased WOB but patient states breathing better with NC  EYES: EOMI, PERRLA, conjunctiva and sclera clear  ENT: Moist mucous membranes  NECK: No JVD  CHEST/LUNG: Increase work of breathing; CTAB; No rales, rhonchi, wheezing, or rubs.   HEART: Regular rate and rhythm; No murmurs, rubs, or gallops  ABDOMEN: bowel sounds normal; Soft, nontender, nondistended  EXTREMITIES:  No clubbing, cyanosis, or edema  NERVOUS SYSTEM:  A&Ox3, no focal deficits   SKIN: No rashes or lesions VITALS:   Vital Signs Last 24 Hrs  T(C): 36.1 (23 Aug 2020 08:50), Max: 36.8 (23 Aug 2020 07:03)  T(F): 96.9 (23 Aug 2020 08:50), Max: 98.2 (23 Aug 2020 07:03)  HR: 82 (23 Aug 2020 08:50) (82 - 83)  BP: 110/72 (23 Aug 2020 08:50) (110/72 - 149/86)  BP(mean): --  RR: 22 (23 Aug 2020 08:50) (20 - 27)  SpO2: 100% (23 Aug 2020 08:50) (100% - 100%)    GENERAL: NAD, normal WOB on 4L NC  EYES: EOMI, PERRLA, conjunctiva and sclera clear  ENT: Moist mucous membranes  NECK: No JVD  CHEST/LUNG: CTAB; No rales, rhonchi, wheezing, or rubs.   HEART: Regular rate and rhythm; No murmurs, rubs, or gallops  ABDOMEN: bowel sounds normal; Soft, nontender, nondistended  EXTREMITIES:  No clubbing, cyanosis, or edema  NERVOUS SYSTEM:  A&Ox3, no focal deficits   SKIN: No rashes or lesions

## 2020-08-23 NOTE — ED PROVIDER NOTE - OBJECTIVE STATEMENT
71 yo f pmh CHF (EF 22% in 8/2018) s/p PPM/ICD ([employer] 9/2018), R-sided heart failure, HTN, CARLOTA not able to tolerate CPAP, COPD (not on home O2), moderate pHTN, pw sob. pt reports sx began since friday, exertional, orthopneic. better sitting up. no relief w/ nebs at home. reports similar hx in past. reports one ICU admission, denies hx intubation. reports mild intermittent cp, and occasional dry cough. denies n/v, f/c, ha, sick contacts, hemoptysis.

## 2020-08-23 NOTE — H&P ADULT - ASSESSMENT
71 yo F with a PMH of CHF (EF 20-25% in 4/2019) s/p PPM/ICD HTN, CARLOTA not able to tolerate CPAP, COPD (not on home O2), moderate pHTN, p/w shortness of breath concerning for PE vs COPD exacerbations vs CHF exacerbation. Patient presents with acute onset shortness of breath with chest pain on Saturday making PE a possibility. COPD exacerbation is also in the dx given presentation but does not have acute exacerbation of cough or wheezing on exam. CHF less likely given lack of edema, JVD, no sign of edema on chest xray. 69 yo F with a PMH of CHF (EF 20-25% in 4/2019) s/p PPM/ICD HTN, CARLOTA not able to tolerate CPAP, COPD (not on home O2), moderate pHTN, p/w shortness of breath for 2 days. Patient presents with acute onset shortness of breath with chest pain on Saturday making PE a possibility. COPD exacerbation is also in the ddx given presentation but does not have acute exacerbation of cough or wheezing on exam. CHF less likely given lack of edema, JVD, no sign of edema, negative chest xray, and no B-lines on POCUS.

## 2020-08-23 NOTE — ED PROVIDER NOTE - ATTENDING CONTRIBUTION TO CARE
71yo F former smoker with PMHX CHF s/p AICD/PPM (EF 20% in 2019), HTN, COPD not on home O2, pulmonary HTN, R sided heart failure, CARLOTA, p/w worsening SOBOE with intermittent chest pains x 3 days. No cough, fevers, PE/DVT hx, recent travel, surgeries, or hospitalizations.  Pt with 96% spO2 with EMS    SpO2 high 90s% on room air  RR 30s     General: Patient in moderate respiratory distress, AAO x 3  Skin: Dry and intact  HEENT: Head atraumatic. Oral mucosa moist.   Eyes: Conjunctiva normal  Cardiac: Regular rhythm and rate. No pretibial edema b/l  Respiratory: Lungs clear b/l and symmetric. +respiratory distress.   Gastrointestinal: Abdomen soft, nondistended, nontender  Musculoskeletal: Moves all extremities spontaneously  Neurological: alert and oriented to person, place, and time  Psychiatric: Cooperative    EKG PACED    a/p  concern SOB is 2/2 to PE as no wheezing or cough (less likely COPD) and no body edema, no crackles or B lines on POCUS (less likely CHF)  will place on BIPAP for level of tachypnea/WOB  labs, cxr, will give steroids and nebs to help in breathing and perhaps open lungs enough to auscultate wheezing

## 2020-08-23 NOTE — H&P ADULT - PROBLEM SELECTOR PLAN 6
Trop initially indeterminate, then downtrending to 6.  - Pt's described chest pain is unlikely cardiac in origin. It has now resolved. Trop initially indeterminate, then downtrending to 6.  - Pt's described chest pain is unlikely cardiac in origin. It has now resolved.  - ASA, simvastatin

## 2020-08-23 NOTE — ED ADULT TRIAGE NOTE - CHIEF COMPLAINT QUOTE
Patient complaining of shortness of breath x 2 days. no fever. hx of COPD. patient tachypneic, using accessory muscles

## 2020-08-23 NOTE — H&P ADULT - NSHPLABSRESULTS_GEN_ALL_CORE
12.6   5.74  )-----------( 148      ( 23 Aug 2020 07:15 )             39.8   08-23    147<H>  |  99  |  29<H>  ----------------------------<  91  3.1<L>   |  36<H>  |  1.12    Ca    8.9      23 Aug 2020 07:15    TPro  5.6<L>  /  Alb  4.0  /  TBili  0.8  /  DBili  x   /  AST  23  /  ALT  19  /  AlkPhos  36<L>  08-23      PT/INR - ( 23 Aug 2020 07:15 )   PT: 11.6 SEC;   INR: 1.01       PTT - ( 23 Aug 2020 07:15 )  PTT:25.6 SEC    Chest XRAY: IMPRESSION:  Corrugated oxygen tubing superimposes right hemithorax.    Vague skinfold superimposes peripheral left lung.    Slightly hyperinflated appearing but otherwise clear lungs. No pleural effusions or pneumothorax.    Stable left chest wall biventricular AICD and cardiac and mediastinal silhouettes.    Trachea midline.    Generalized mild osteopenia. 12.6   5.74  )-----------( 148      ( 23 Aug 2020 07:15 )             39.8   08-23    147<H>  |  99  |  29<H>  ----------------------------<  91  3.1<L>   |  36<H>  |  1.12    Ca    8.9      23 Aug 2020 07:15    TPro  5.6<L>  /  Alb  4.0  /  TBili  0.8  /  DBili  x   /  AST  23  /  ALT  19  /  AlkPhos  36<L>  08-23      PT/INR - ( 23 Aug 2020 07:15 )   PT: 11.6 SEC;   INR: 1.01       PTT - ( 23 Aug 2020 07:15 )  PTT:25.6 SEC    Blood Gas Venous Comprehensive (08.23.20 @ 07:15)    Blood Gas Venous - Lactate: 2.2: Please note updated reference range. mmol/L    Blood Gas Venous - Chloride: 102 mmol/L    Blood Gas Venous - Creatinine: 1.21 mg/dL    pH, Venous: 7.40 pH    pCO2, Venous: 64 mmHg    pO2, Venous: < 24 mmHg    HCO3, Venous: 34 mmol/L    Blood Gas Venous - FIO2: 15    Base Excess, Venous: 13.4: REFERENCE RANGE = -3 + 2 mmol/L mmol/L    Oxygen Saturation, Venous: 30.7 %    Blood Gas Venous - Sodium: 140 mmol/L    Blood Gas Venous - Potassium: 3.0 mmol/L    Blood Gas Venous - Glucose: 90 mg/dL    Blood Gas Venous - Hemoglobin: 13.0 g/dL    Blood Gas Venous - Hematocrit: 40.0 %    Serum Pro-Brain Natriuretic Peptide (08.23.20 @ 07:15)    Serum Pro-Brain Natriuretic Peptide: 216.3: ACUTE CONGESTIVE HEART FAILURE is UNLIKELY if NT-proBNP  is < 300 pg/mL.    CONSIDER ACUTE CONGESTIVE HEART FAILURE if:    AGE                NT-proBNP RESULT  ---                -------------  < 50 YEARS      >  450 pg/mL  50 - 75 YEARS      >  900 pg/mL  > 75 YEARS      > 1800 pg/mL pg/mL    Troponin T, High Sensitivity: < 6: ---------------------***PLEASE NOTE***----------------------  Rapid changes upward or downward in high-sensitivity  troponin levels strongly suggest acute myocardial injury.  Hemolysis may falsely lower results. Renal impairment may  increase results.    Normal: <6 - 14 ng/L  Indeterminate: 15 - 51 ng/L  Elevated: >51 ng/L    Please see "[email or web address] on the [employer]  intranet for more information. ng/L (08.23.20 @ 09:00)      Chest XRAY: IMPRESSION:  Corrugated oxygen tubing superimposes right hemithorax.    Vague skinfold superimposes peripheral left lung.    Slightly hyperinflated appearing but otherwise clear lungs. No pleural effusions or pneumothorax.    Stable left chest wall biventricular AICD and cardiac and mediastinal silhouettes.    Trachea midline.    Generalized mild osteopenia.

## 2020-08-23 NOTE — H&P ADULT - PROBLEM SELECTOR PLAN 8
Diet: DASH  DVT PPx: heparin    Transitions of Care Status:  1.  Name of PCP:  2.  PCP Contacted on Admission: [ ] Y    [ ] N    3.  PCP contacted at Discharge: [ ] Y    [ ] N    [ ] N/A  4.  Post-Discharge Appointment Date and Location:  5.  Summary of Handoff given to PCP:

## 2020-08-23 NOTE — H&P ADULT - NSHPREVIEWOFSYSTEMS_GEN_ALL_CORE
REVIEW OF SYSTEMS:    CONSTITUTIONAL: No weakness, fevers or chills  EYES/ENT: No visual changes  NECK: No pain or stiffness  RESPIRATORY: +cough +shortness of breath; no wheezing, hemoptysis  CARDIOVASCULAR: no chest pain or palpitations  GASTROINTESTINAL: No abdominal or epigastric pain. No nausea, vomiting, or hematemesis; No diarrhea or constipation. No melena or hematochezia.  GENITOURINARY: No dysuria, frequency or hematuria  NEUROLOGICAL: No numbness or weakness  SKIN: No itching, rashes

## 2020-08-23 NOTE — H&P ADULT - HISTORY OF PRESENT ILLNESS
71 yo woman with a PMH of CHF (EF 20-25% in 4/2019) s/p PPM/ICD HTN, CARLOTA not able to tolerate CPAP, COPD (not on home O2), moderate pHTN, p/w shortness of breath since Friday (8/21). On Friday morning she had trouble get out of bed, standing or moving around the house to due her daily activities 2/2 fatigue and dyspnea. On Saturday morning, she was states she had chest pain that has now subsided. Patient states she sleeps on her side due to the COPD. However, she did not have to use more pillows. She has a mild cough that produces phlegm. She denies fever, chills, abdominal pain, n/v/d, recent illness or travel, sick contacts, and edema.   Patient states that she has similar episodes in the past but they have subsided since she has been following regularly with Dr. Cathy Bullard. However, due to COVID-19 she has not seen her doctor in about 9 months.      ED Course:   ED Vitals: 98.2F, HR 82-83, -149/84-86, RR 20-27, SPO2 on %  Patient could not tolerate bipap, switched to NC. POCUS w/ predominant A lines in all fields. Duoneb x3 and IV steroids. 71 yo woman with a PMH of CHF (EF 20-25% in 4/2019) s/p PPM/ICD, HTN, CARLOTA not able to tolerate CPAP, COPD (not on home O2), moderate pHTN, p/w shortness of breath since Friday (8/21). On Friday morning she had trouble get out of bed, standing or moving around the house to do her daily activities 2/2 fatigue and dyspnea. On Saturday morning, she states she had chest pain with breathing that has now subsided. Her dyspnea has improved somewhat from Friday but is still bothering her. Patient states she sleeps on her side due to the COPD and typically struggles to lay flat on her back. However, she did not have to use more pillows over the last several days. She has a mild chronic cough that produces phlegm. She denies fever, chills, abdominal pain, n/v/d, recent illness or travel, sick contacts, and edema. On arrival, pt was placed on BiPAP but was not tolerating.     Patient states that she has similar episodes in the past but they have subsided since she has been following regularly with Dr. Cathy Bullard. However, due to COVID-19 she has not seen her doctor in about 9 months.      ED Course:   ED Vitals: 98.2F, HR 82-83, -149/84-86, RR 20-27, SPO2 on 4L %  Patient could not tolerate bipap, switched to NC. POCUS w/ predominant A lines in all fields. Potassium, Duoneb x3 and IV steroids.

## 2020-08-23 NOTE — H&P ADULT - PROBLEM SELECTOR PLAN 5
Pt is well controlled on current regimen.  - c/w home lisinopril, hydralazine, carvedilol, isosorbide as above.  - DASH diet

## 2020-08-23 NOTE — ED PROVIDER NOTE - CLINICAL SUMMARY MEDICAL DECISION MAKING FREE TEXT BOX
maryjo pgy3: 69 yo f w/ multiple medical comorbidities, HF, copd not on home 02 pw progressive cp. pt arrives in mild resp distress, hds. 02 sat in room 96% on RA, placed on bipap for wob. POCUS w/ predominant a lines in all fields. unlikely CHF exacerbation, possible COPD exacerbation vs PE? if pt can tolerate CTA will order otherwise possible echo vs v/q. check labs, cxr, likely tba.

## 2020-08-23 NOTE — ED ADULT NURSE NOTE - OBJECTIVE STATEMENT
Hitesh RN: Patient is a 70-year-old female A&OX4, received to TR A complaining of SOB. Patient with a Phx of COPD (not on home O2), that presents to the ED with worsening SOB. Patient arrives on 4L NC. Patient with use of accessory muscles on assessment on arrival. Patient trial'd off nasal cannula O2 saturation 100% on room air. Patient placed on 3L NC for comfort. Patient placed on the cardiac monitor, EKG in progress. Patient states she is ambulatory on her own at baseline. Patient with a pacemaker to R upper chest wall. Patient with a 20 G IV accessed to R AC. Labs drawn and sent. MD Warner at bedside for assessment. Bed set in lowest position. Safety maintained. Will continue to monitor. Hitesh RN: Patient is a 70-year-old female A&OX4, received to TR A complaining of SOB. Patient with a Phx of COPD (not on home O2), that presents to the ED with worsening SOB. Patient arrives on 4L NC. Patient with use of accessory muscles on assessment on arrival. Patient trial'd off nasal cannula O2 saturation 100% on room air. Patient placed on 3L NC for comfort. Patient placed on the cardiac monitor, EKG in progress. Patient states she is ambulatory on her own at baseline. Patient with a pacemaker to R upper chest wall. Patient with a 20 G IV accessed to R AC by MYRON Albarran. Labs drawn and sent. MD Warner at bedside for assessment. Bed set in lowest position. Safety maintained. Will continue to monitor.

## 2020-08-23 NOTE — H&P ADULT - NSICDXPASTMEDICALHX_GEN_ALL_CORE_FT
PAST MEDICAL HISTORY:  CAD (coronary artery disease)     Chronic systolic CHF (congestive heart failure) EF 20-25% s/p [employer] ICD (9/2018)    COPD (chronic obstructive pulmonary disease) not on home O2    HTN (hypertension)     LBBB (left bundle branch block)     Pulmonary hypertension moderate    Right-sided heart failure     Sleep apnea cannot tolerate CPAP

## 2020-08-23 NOTE — CHART NOTE - NSCHARTNOTEFT_GEN_A_CORE
PCCM FELLOW BRIEF NOTE    Called about new BiPAP. Patient seen and examined. Patient with known HFrEF, COPD, pHTN, CARLOTA not compliant with prior CPAP who presents with SOB and increased WOB placed on bilevel 10/5 in the ED. Patient requesting NIV be removed. She feels more comfortable after being placed on 4-5L NC. She speaks in full sentences but has some increased work of breathing. She does not require ICU admission. Can admit to medicine. Would continue NIV QHS for now and obtain ABG. Would also obtain RVP. Follows with Dr. Garcia for pulmonary.     Jose Henry MD, PGY6  Pulmonary and Critical Care Fellow  [phone]

## 2020-08-23 NOTE — H&P ADULT - PROBLEM SELECTOR PLAN 3
Pt does not appear to be volume overloaded on exam. POCUS and CXR unconcerning for CHF exacerbation.   - Pro-BNP of 216.  - C/w pt's home furosemide 20mg daily  - C/w pt's home carvedilol 25mg BID  - C/w home lisinopril 40mg daily.  - Continue to monitor for signs of overload.

## 2020-08-24 NOTE — PROGRESS NOTE ADULT - SUBJECTIVE AND OBJECTIVE BOX
PROGRESS NOTE:     CONTACT INFO:  Tracey Zuñiga MD   PGY-1  Pager: [phone] [facility]    Patient is a 70y old  Female who presents with a chief complaint of Shortness of breath (23 Aug 2020 11:36)      SUBJECTIVE / OVERNIGHT EVENTS:  No acute events overnight. Patient seen and evaluated at bedside. Reports that she has continued mild SOB with exertion when walking to the bathroom. No fever/chills.  Denies chest pain, palpitations, abdominal pain, nausea/vomiting. Pt did not use CPAP overnight. She is feeling comfortable on 1L NC.     ADDITIONAL REVIEW OF SYSTEMS:    Negative except as above.     MEDICATIONS  (STANDING):  albuterol/ipratropium for Nebulization 3 milliLiter(s) Nebulizer every 6 hours  aspirin enteric coated 81 milliGRAM(s) Oral daily  budesonide 160 MICROgram(s)/formoterol 4.5 MICROgram(s) Inhaler 2 Puff(s) Inhalation two times a day  carvedilol 25 milliGRAM(s) Oral every 12 hours  furosemide    Tablet 20 milliGRAM(s) Oral daily  heparin   Injectable 5000 Unit(s) SubCutaneous every 12 hours  hydrALAZINE 50 milliGRAM(s) Oral three times a day  isosorbide   mononitrate ER Tablet (IMDUR) 60 milliGRAM(s) Oral daily  lisinopril 40 milliGRAM(s) Oral daily  methylPREDNISolone sodium succinate Injectable 40 milliGRAM(s) IV Push every 8 hours  simvastatin 20 milliGRAM(s) Oral at bedtime    MEDICATIONS  (PRN):      CAPILLARY BLOOD GLUCOSE        I&O's Summary      PHYSICAL EXAM:  Vital Signs Last 24 Hrs  T(C): 36.7 (24 Aug 2020 05:08), Max: 36.9 (23 Aug 2020 17:21)  T(F): 98.1 (24 Aug 2020 05:08), Max: 98.4 (23 Aug 2020 17:21)  HR: 95 (24 Aug 2020 05:08) (90 - 95)  BP: 118/67 (24 Aug 2020 05:08) (110/65 - 165/99)  BP(mean): --  RR: 20 (24 Aug 2020 05:08) (18 - 22)  SpO2: 95% (24 Aug 2020 05:08) (95% - 100%)    	GENERAL: NAD, normal WOB on 1L NC  	EYES: EOMI, PERRLA, conjunctiva and sclera clear  	ENT: Moist mucous membranes  	NECK: No JVD  	CHEST/LUNG: CTAB; No rales, rhonchi, wheezing, or rubs.   	HEART: Regular rate and rhythm; No murmurs, rubs, or gallops  	ABDOMEN: bowel sounds normal; Soft, nontender, nondistended  	EXTREMITIES:  No clubbing, cyanosis, or edema  	NERVOUS SYSTEM:  A&Ox3, no focal deficits   SKIN: No rashes or lesions    LABS:                        12.8   6.12  )-----------( 145      ( 24 Aug 2020 05:30 )             38.6     08-24    144  |  102  |  35<H>  ----------------------------<  116<H>  4.1   |  28  |  1.15    Ca    8.8      24 Aug 2020 05:30  Phos  2.7     08-24  Mg     1.8     08-24    TPro  6.2  /  Alb  4.2  /  TBili  0.7  /  DBili  x   /  AST  35<H>  /  ALT  26  /  AlkPhos  45  08-24    PT/INR - ( 23 Aug 2020 07:15 )   PT: 11.6 SEC;   INR: 1.01          PTT - ( 23 Aug 2020 07:15 )  PTT:25.6 SEC    Rapid Respiratory Viral Panel (08.23.20 @ 10:32)    Adenovirus (RapRVP): Not Detected    Influenza A (RapRVP): Not Detected    Influenza AH1 2009 (RapRVP): Not Detected    Influenza AH1 (RapRVP): Not Detected    Influenza AH3 (RapRVP): Not Detected    Influenza B (RapRVP): Not Detected    Parainfluenza 1 (RapRVP): Not Detected    Parainfluenza 2 (RapRVP): Not Detected    Parainfluenza 3 (RapRVP): Not Detected    Parainfluenza 4 (RapRVP): Not Detected    Resp Syncytial Virus (RapRVP): Not Detected    Chlamydia pneumoniae (RapRVP): Not Detected    Mycoplasma pneumoniae (RapRVP): Not Detected    Entero/Rhinovirus (RapRVP): Not Detected    hMPV (RapRVP): Not Detected    Coronavirus (229E,HKU1,NL63,OC43): Not Detected This Respiratory Panel uses polymerase chain reaction (PCR)  to detect for adenovirus; coronavirus (HKU1, NL63, 229E,  OC43); human metapneumovirus (hMPV); human  enterovirus/rhinovirus (Entero/RV); influenza A; influenza  A/H1: influenza A/H3; influenza A/H1-2009; influenza B;  parainfluenza viruses 1,2,3,4; respiratory syncytial virus;  Mycoplasma pneumoniae; and Chlamydophila pneumoniae.    Note: This assay does not detect the novel 2019 coronavirus.  Positive coronavirus results using this assay confirm  infection with the classic human coronaviruses associated  with respiratory infections.        RADIOLOGY & ADDITIONAL TESTS:    < from: CT Angio Chest w/ IV Cont (08.23.20 @ 12:37) >  IMPRESSION:  No pulmonary embolism.    < end of copied text >

## 2020-08-24 NOTE — PROGRESS NOTE ADULT - PROBLEM SELECTOR PLAN 5
Trop initially indeterminate, then downtrending to 6.  - Pt's described chest pain is unlikely cardiac in origin. It has now resolved.  - ASA, simvastatin

## 2020-08-24 NOTE — PROGRESS NOTE ADULT - ATTENDING COMMENTS
Pt seen and examined. Endorses SOB with exertion    Asthma Exacerbation: will add steroids and GI PPx. Nebs RTC. c/w symbicort. Will check SAt on exertion in the AM.    Chronic systolic Heart Failure: c/w lasix po, imdur, coreg, ACEi    Chronic respiratory Failure: due to CARLOTA, pt refusing CPAP Pt seen and examined. Endorses SOB with exertion    COPD Exacerbation: will add steroids and GI PPx. Nebs RTC. c/w symbicort. Will check SAt on exertion in the AM.    Chronic systolic Heart Failure: c/w lasix po, imdur, coreg, ACEi    Chronic respiratory Failure: due to CARLOTA, pt refusing CPAP

## 2020-08-24 NOTE — PROGRESS NOTE ADULT - PROBLEM SELECTOR PLAN 1
Pt with acute onset of SOB. CTA excludes PE. CHF exacerbation less likely given POCUS negative for B-lines and unconcerning CXR. Likely 2/2 COPD exacerbation.   - CXR without obvious signs of CHF exacerbation  - CTA negative for PE  - Pro-  - VBG showing elevated CO2 although this may be chronic for her.  - Continue home furosemide; pt not volume overloaded on exam.   - Duonebs BID and albuterol q6h. Pt received doses in ED  - ABG if pt's clinical condition deteriorates  - RVP negative  - Blood cultures pending  - Low suspicion for pneumonia. If symptoms do no improve, consider adding abx  - Solu-Medrol IV for now.  - Prednisone 10mg BID once pt's symptoms resolve.

## 2020-08-24 NOTE — PROGRESS NOTE ADULT - ASSESSMENT
69 yo F with a PMH of CHF (EF 20-25% in 4/2019) s/p PPM/ICD HTN, CARLOTA not able to tolerate CPAP, COPD (not on home O2), moderate pHTN, p/w shortness of breath for 2 days. Patient presents with acute onset shortness of breath with chest pain. CTA showing no PE. COPD exacerbation is likely. CHF less likely given lack of edema, JVD, no sign of edema, negative chest xray, and no B-lines on POCUS.

## 2020-08-25 NOTE — DISCHARGE NOTE PROVIDER - NSDCCPCAREPLAN_GEN_ALL_CORE_FT
PRINCIPAL DISCHARGE DIAGNOSIS  Diagnosis: COPD exacerbation  Assessment and Plan of Treatment: You came in to the hospital with shortness of breath, likely caused by an exacerbation of your existing COPD. We gave you inhalers to help open up your lungs, as well as IV steroids to help reduce inflammation. Your COPD exacerbation quickly improved and you began feeling better when walking to and from the bathroom. After discharge, we are starting you on a steroid taper. This means that we will be giving your oral prednisone at a higher dose for a few days before you return to taking your usual dose. You should take 40mg a day of prednisone for 4 days after discharge, and after that start taking your usual 10mg. You should also continue to use your inhalers as you normally do at home. PRINCIPAL DISCHARGE DIAGNOSIS  Diagnosis: COPD exacerbation  Assessment and Plan of Treatment: You came in to the hospital with shortness of breath, likely caused by an exacerbation of your existing COPD. We gave you inhalers to help open up your lungs, as well as IV steroids to help reduce inflammation. Your COPD exacerbation quickly improved and you began feeling better when walking to and from the bathroom. After discharge, we are starting you on a steroid taper. This means that we will be giving your oral prednisone at a higher dose for a few days before you return to taking your usual dose. You should take 40mg a day of prednisone for 4 days after discharge, and after that start taking your usual 10mg twice a day. You should also continue to use your inhalers as you normally do at home. PRINCIPAL DISCHARGE DIAGNOSIS  Diagnosis: COPD exacerbation  Assessment and Plan of Treatment: You came in to the hospital with shortness of breath, likely caused by an exacerbation of your existing COPD. We gave you inhalers to help open up your lungs, as well as IV steroids to help reduce inflammation. Your COPD exacerbation quickly improved and you began feeling better when walking to and from the bathroom. After discharge, we are starting you on a steroid taper. This means that we will be giving your oral prednisone at a higher dose for a few days before you return to taking your usual dose. You should take 40mg a day of prednisone for 4 days after discharge starting on 8/26-8/30. Afterthat please return to taking your usual 10mg twice a day. You should also continue to use your inhalers as you normally do at home. Please be sure to followup with your pulmonogist as an oupatietn to monitor your symtoms while in the hospital. PRINCIPAL DISCHARGE DIAGNOSIS  Diagnosis: COPD exacerbation  Assessment and Plan of Treatment: You came in to the hospital with shortness of breath, likely caused by an exacerbation of your existing COPD. We gave you inhalers to help open up your lungs, as well as IV steroids to help reduce inflammation. Your COPD exacerbation quickly improved and you began feeling better when walking to and from the bathroom. After discharge, we are starting you on a steroid taper. This means that we will be giving your oral prednisone at a higher dose for a few days before you return to taking your usual dose. You should take 40mg a day of prednisone for 4 days after discharge starting on 8/26-8/30. Afterthat please return to taking your usual 10mg once a day. You should also continue to use your inhalers as you normally do at home. Please be sure to followup with your pulmonogist as an oupatietn to monitor your symtoms while in the hospital.

## 2020-08-25 NOTE — PROGRESS NOTE ADULT - ATTENDING COMMENTS
Pt seen and examined. Improved SOB on exertion. Pt still with mild resp distress when talking.    COPD Exacerbation: c/w steroids and GI PPx. Nebs RTC. c/w symbicort. Pt Sat >90% with exertion. Pt likely end stage COPD. Close pulm f/u outpt    Chronic systolic Heart Failure: c/w lasix po, imdur, coreg, ACEi    Chronic respiratory Failure: due to CARLOTA, pt refusing CPAP .

## 2020-08-25 NOTE — DISCHARGE NOTE PROVIDER - HOSPITAL COURSE
71 yo woman with a PMH of CHF (EF 20-25% in 4/2019) s/p PPM/ICD, HTN, CARLOTA not able to tolerate CPAP, COPD (not on home O2), moderate pHTN, p/w shortness of breath since Friday (8/21). On Friday morning she had trouble get out of bed, standing or moving around the house to do her daily activities 2/2 fatigue and dyspnea. On Saturday morning, she states she had chest pain with breathing that has now subsided. Her dyspnea has improved somewhat from Friday but is still bothering her. Patient states she sleeps on her side due to the COPD and typically struggles to lay flat on her back. However, she did not have to use more pillows over the last several days. She has a mild chronic cough that produces phlegm. She denies fever, chills, abdominal pain, n/v/d, recent illness or travel, sick contacts, and edema. On arrival, pt was placed on BiPAP but was not tolerating.         Patient states that she has similar episodes in the past but they have subsided since she has been following regularly with Dr. Cathy Bullard. However, due to COVID-19 she has not seen her doctor in about 9 months.          ED Course:     ED Vitals: 98.2F, HR 82-83, -149/84-86, RR 20-27, SPO2 on 4L %    Patient could not tolerate bipap, switched to NC. POCUS w/ predominant A lines in all fields. Potassium, Duoneb x3 and IV steroids.         CTA was negative for PE.        Pt was started on standing duoneb, symbicort, and solumedrol IV. Her shortness of breath on exertion significantly improved, and O2 sat on ambulation was 92-97%. She was discharged on a PO prednisone taper. 71 yo woman with a PMH of CHF (EF 20-25% in 4/2019) s/p PPM/ICD, HTN, CARLOTA not able to tolerate CPAP, COPD (not on home O2), moderate pHTN, p/w shortness of breath since Friday (8/21). On Friday morning she had trouble get out of bed, standing or moving around the house to do her daily activities 2/2 fatigue and dyspnea. On Saturday morning, she states she had chest pain with breathing that has now subsided. Her dyspnea has improved somewhat from Friday but is still bothering her. Patient states she sleeps on her side due to the COPD and typically struggles to lay flat on her back. However, she did not have to use more pillows over the last several days. She has a mild chronic cough that produces phlegm. She denies fever, chills, abdominal pain, n/v/d, recent illness or travel, sick contacts, and edema. On arrival, pt was placed on BiPAP but was not tolerating.         Patient states that she has similar episodes in the past but they have subsided since she has been following regularly with Dr. Cathy Bullard. However, due to COVID-19 she has not seen her doctor in about 9 months.          ED Course:     ED Vitals: 98.2F, HR 82-83, -149/84-86, RR 20-27, SPO2 on 4L %    Patient could not tolerate bipap, switched to NC. POCUS w/ predominant A lines in all fields. Potassium, Duoneb x3 and IV steroids.         CTA was negative for PE. Pt was admitted for COPD exacerbation        Pt was started on standing duoneb, symbicort, and solumedrol IV. Her shortness of breath on exertion significantly improved, and O2 sat on ambulation was 92-97%. She was discharged on a PO prednisone taper.

## 2020-08-25 NOTE — DISCHARGE NOTE PROVIDER - CARE PROVIDERS DIRECT ADDRESSES
[email or web address]

## 2020-08-25 NOTE — PROGRESS NOTE ADULT - PROBLEM SELECTOR PLAN 1
Pt with acute onset of SOB. CTA excludes PE. CHF exacerbation less likely given POCUS negative for B-lines and unconcerning CXR. Likely 2/2 COPD exacerbation.   - CXR without obvious signs of CHF exacerbation  - CTA negative for PE  - Pro-  - VBG showing elevated CO2 although this may be chronic for her.  - Continue home furosemide; pt not volume overloaded on exam.   - Duonebs BID and albuterol q6h. Pt received doses in ED  - ABG if pt's clinical condition deteriorates  - RVP negative  - Blood cultures pending  - Low suspicion for pneumonia. If symptoms do no improve, consider adding abx  - Solu-Medrol IV for now.  - Prednisone taper on discharge, to taper down to prednisone 10mg BID. Called pt's PCP office; pt was prescribed prednisone on March 18, 2020, and last filled her script on August 18.  - Ambulation with pulse oximetry prior to discharge. Pt with acute onset of SOB. CTA excludes PE. CHF exacerbation less likely given POCUS negative for B-lines and unconcerning CXR. Likely 2/2 COPD exacerbation.   - CXR without obvious signs of CHF exacerbation  - CTA negative for PE  - Pro-  - VBG showing elevated CO2 although this may be chronic for her.  - Continue home furosemide; pt not volume overloaded on exam.   - Duonebs BID and albuterol q6h. Pt received doses in ED  - ABG if pt's clinical condition deteriorates  - RVP negative  - Blood cultures negative  - Solu-Medrol IV for now.  - Prednisone taper on discharge, to taper down to prednisone 10mg BID. Called pt's PCP office; pt was prescribed prednisone on March 18, 2020, and last filled her script on August 18.  - Ambulation with pulse oximetry prior to discharge.

## 2020-08-25 NOTE — PHYSICAL THERAPY INITIAL EVALUATION ADULT - PERTINENT HX OF CURRENT PROBLEM, REHAB EVAL
71 y/o Female with a PMHx of CHF (EF 20-25% in 4/2019) s/p PPM/ICD HTN, CARLOTA not able to tolerate CPAP, COPD (not on home O2), moderate pHTN, p/w shortness of breath for 2 days

## 2020-08-25 NOTE — PROGRESS NOTE ADULT - SUBJECTIVE AND OBJECTIVE BOX
PROGRESS NOTE:     CONTACT INFO:  Tracey Zuñiga MD   PGY-1  Pager: [phone] [facility]    Patient is a 70y old  Female who presents with a chief complaint of Shortness of breath (24 Aug 2020 12:18)      SUBJECTIVE / OVERNIGHT EVENTS:  No acute events overnight. Patient seen and evaluated at bedside. Reports that her SOB with exertion has improved and she is now able to walk to and from the bathroom without stopping.  No fever/chills.  Denies SOB at rest, chest pain, palpitations, abdominal pain, nausea/vomiting.     ADDITIONAL REVIEW OF SYSTEMS:    Negative except as above.     MEDICATIONS  (STANDING):  albuterol/ipratropium for Nebulization 3 milliLiter(s) Nebulizer every 6 hours  aspirin enteric coated 81 milliGRAM(s) Oral daily  budesonide 160 MICROgram(s)/formoterol 4.5 MICROgram(s) Inhaler 2 Puff(s) Inhalation two times a day  carvedilol 25 milliGRAM(s) Oral every 12 hours  furosemide    Tablet 20 milliGRAM(s) Oral daily  heparin   Injectable 5000 Unit(s) SubCutaneous every 12 hours  hydrALAZINE 50 milliGRAM(s) Oral three times a day  isosorbide   mononitrate ER Tablet (IMDUR) 60 milliGRAM(s) Oral daily  lisinopril 40 milliGRAM(s) Oral daily  methylPREDNISolone sodium succinate Injectable 40 milliGRAM(s) IV Push every 8 hours  pantoprazole    Tablet 40 milliGRAM(s) Oral before breakfast  simvastatin 20 milliGRAM(s) Oral at bedtime    MEDICATIONS  (PRN):      CAPILLARY BLOOD GLUCOSE        I&O's Summary      PHYSICAL EXAM:  Vital Signs Last 24 Hrs  T(C): 36.9 (25 Aug 2020 06:11), Max: 36.9 (24 Aug 2020 12:47)  T(F): 98.4 (25 Aug 2020 06:11), Max: 98.4 (24 Aug 2020 12:47)  HR: 84 (25 Aug 2020 10:05) (84 - 90)  BP: 143/81 (25 Aug 2020 06:11) (111/75 - 143/81)  BP(mean): --  RR: 18 (25 Aug 2020 06:11) (18 - 18)  SpO2: 97% (25 Aug 2020 10:05) (96% - 100%)    GENERAL: NAD, normal WOB on 2L NC  	EYES: EOMI, PERRLA, conjunctiva and sclera clear  	ENT: Moist mucous membranes  	NECK: No JVD  	CHEST/LUNG: CTAB; No rales, rhonchi, wheezing, or rubs.   	HEART: Regular rate and rhythm; No murmurs, rubs, or gallops  	ABDOMEN: bowel sounds normal; Soft, nontender, nondistended  	EXTREMITIES:  No clubbing, cyanosis, or edema  	NERVOUS SYSTEM:  A&Ox3, no focal deficits   SKIN: No rashes or lesions    LABS:                        12.4   10.47 )-----------( 135      ( 25 Aug 2020 07:11 )             38.8     08-25    145  |  103  |  36<H>  ----------------------------<  135<H>  4.3   |  28  |  1.09    Ca    9.0      25 Aug 2020 07:11  Phos  2.7     08-24  Mg     1.8     08-24    TPro  5.9<L>  /  Alb  4.0  /  TBili  0.4  /  DBili  x   /  AST  17  /  ALT  21  /  AlkPhos  48  08-25              Culture - Blood (collected 23 Aug 2020 12:22)  Source: .Blood Blood-Venous  Preliminary Report (24 Aug 2020 13:02):    No growth to date.    Culture - Blood (collected 23 Aug 2020 12:22)  Source: .Blood Blood-Peripheral  Preliminary Report (24 Aug 2020 13:02):    No growth to date.

## 2020-08-25 NOTE — DISCHARGE NOTE PROVIDER - CARE PROVIDER_API CALL
Marisela Teran  CRITICAL CARE MEDICINE  [address] [place] [address]  Phone: [phone]  Fax: [phone]  Follow Up Time: Marisela Teran  CRITICAL CARE MEDICINE  [address] [place] [address]  Phone: [phone]  Fax: [phone]  Established Patient  Scheduled Appointment: 09/01/2020 02:30 PM

## 2020-08-25 NOTE — DISCHARGE NOTE PROVIDER - NSDCMRMEDTOKEN_GEN_ALL_CORE_FT
albuterol 2.5 mg/3 mL (0.083%) inhalation solution: 3 milliliter(s) inhaled every 6 hours, As Needed  aspirin 81 mg oral delayed release tablet: 1 tab(s) orally once a day  carvedilol 25 mg oral tablet: 1 tab(s) orally 2 times a day  furosemide 20 mg oral tablet: 1 tab(s) orally once a day  hydrALAZINE 50 mg oral tablet: 1 tab(s) orally 3 times a day  isosorbide mononitrate 60 mg oral tablet, extended release: 1 tab(s) orally once a day (in the morning)  lisinopril 40 mg oral tablet: 1 tab(s) orally once a day  pantoprazole 40 mg oral delayed release tablet: 1 tab(s) orally once a day (before a meal)  predniSONE 10 mg oral tablet: 1 tab(s) orally once a day   predniSONE 20 mg oral tablet: 1 tab(s) orally 2 times a day   simvastatin 20 mg oral tablet: 1 tab(s) orally once a day (at bedtime)  Symbicort 160 mcg-4.5 mcg/inh inhalation aerosol: 2 puff(s) inhaled 2 times a day  tiotropium 18 mcg inhalation capsule: 1 cap(s) inhaled once a day albuterol 2.5 mg/3 mL (0.083%) inhalation solution: 3 milliliter(s) inhaled every 6 hours, As Needed  aspirin 81 mg oral delayed release tablet: 1 tab(s) orally once a day  carvedilol 25 mg oral tablet: 1 tab(s) orally 2 times a day  furosemide 20 mg oral tablet: 1 tab(s) orally once a day  hydrALAZINE 50 mg oral tablet: 1 tab(s) orally 3 times a day  isosorbide mononitrate 60 mg oral tablet, extended release: 1 tab(s) orally once a day (in the morning)  lisinopril 40 mg oral tablet: 1 tab(s) orally once a day  pantoprazole 40 mg oral delayed release tablet: 1 tab(s) orally once a day (before a meal)  predniSONE 10 mg oral tablet: 1 tab(s) orally 2 times a day   predniSONE 20 mg oral tablet: 1 tab(s) orally 2 times a day   simvastatin 20 mg oral tablet: 1 tab(s) orally once a day (at bedtime)  Symbicort 160 mcg-4.5 mcg/inh inhalation aerosol: 2 puff(s) inhaled 2 times a day  tiotropium 18 mcg inhalation capsule: 1 cap(s) inhaled once a day

## 2020-08-25 NOTE — DISCHARGE NOTE PROVIDER - NSDCFUSCHEDAPPT_GEN_ALL_CORE_FT
TJ MELENDEZ S ; 10/07/2020 ; [facility] [address]  TJ MELENDEZ ; 10/08/2020 ; [facility] [address] TJ MELENDEZ S ; 10/07/2020 ; [facility] [address]  TJ MELENDEZ ; 10/08/2020 ; [facility] [address] TJ MELENDEZ S ; 10/07/2020 ; \[facility]"" [address] [address]  TJ MELENDEZ ; 10/08/2020 ; \[facility]\"" [address] [address] TJ MELENDEZ ; 09/01/2020 ; [facility] [id number] [facility]  TJ MELENDEZ ; 10/07/2020 ; NP [facility] [address]  TJ MELENDEZ ; 10/08/2020 ; [facility] [address] TJ MELENDEZ ; 09/01/2020 ; [facility] [id number] [facility]  TJ MELENDEZ ; 10/07/2020 ; NP [address] [address]  TJ MELENDEZ ; 10/08/2020 ; [facility] [address] TJ MELENDEZ ; 09/01/2020 ; [facility] Pulm[facility] [id number] [facility]  TJ MELENDEZ ; 10/07/2020 ; NP [address] [address]  TJ MELENDEZ ; 10/08/2020 ; [facility] [address]

## 2020-08-25 NOTE — DISCHARGE NOTE PROVIDER - PROVIDER TOKENS
PROVIDER:[TOKEN:[[id number]]] PROVIDER:[TOKEN:[[id number]],SCHEDULEDAPPT:[09/01/2020],SCHEDULEDAPPTTIME:[02:30 PM],ESTABLISHEDPATIENT:[T]]

## 2020-08-25 NOTE — PROGRESS NOTE ADULT - ASSESSMENT
71 yo F with a PMH of CHF (EF 20-25% in 4/2019) s/p PPM/ICD HTN, CARLOTA not able to tolerate CPAP, COPD (not on home O2), moderate pHTN, p/w shortness of breath for 2 days. Patient presents with acute onset shortness of breath with chest pain. CTA showing no PE. COPD exacerbation is likely. CHF less likely given lack of edema, JVD, no sign of edema, negative chest xray, and no B-lines on POCUS.

## 2020-08-26 PROBLEM — I50.22 CHRONIC SYSTOLIC (CONGESTIVE) HEART FAILURE: Chronic | Status: ACTIVE | Noted: 2018-09-25

## 2020-08-26 NOTE — PROGRESS NOTE ADULT - ATTENDING COMMENTS
Pt seen and examined. Improved SOB on exertion and at rest. Pt with improved resp distress on exam     COPD Exacerbation: c/w steroids 40mg for total 5 days and she will ocntinue to with 10mg BID dosing at home with GI PPx. Nebs RTC. c/w symbicort. Pt Sat >90% with exertion. Pt likely end stage COPD. Close pulm f/u outpt 9/1/20 at 2:30PM    Chronic systolic Heart Failure: c/w lasix po, imdur, coreg, ACEi    Chronic respiratory Failure: due to CARLOTA, pt refusing CPAP . Outpt pulm fu    Plan for discharge, discussed Pulm follow up with pt

## 2020-08-26 NOTE — PROGRESS NOTE ADULT - PROBLEM SELECTOR PLAN 1
Pt with acute onset of SOB. CTA excludes PE. CHF exacerbation less likely given POCUS negative for B-lines and unconcerning CXR. Likely 2/2 COPD exacerbation.   - CXR without obvious signs of CHF exacerbation  - CTA negative for PE  - Pro-  - VBG showing elevated CO2 although this may be chronic for her.  - Continue home furosemide; pt not volume overloaded on exam.   - Duonebs BID and albuterol q6h. Pt received doses in ED  - ABG if pt's clinical condition deteriorates  - RVP negative  - Blood cultures negative  - Solu-Medrol IV for now.  - Prednisone taper on discharge, to taper down to prednisone 10mg qd. Called pt's PCP office; pt was prescribed prednisone on March 18, 2020, and last filled her script on August 18.  - Ambulation with pulse oximetry prior to discharge.

## 2020-08-26 NOTE — DISCHARGE NOTE NURSING/CASE MANAGEMENT/SOCIAL WORK - NSDCDMETYPESERV_GEN_ALL_CORE_FT
PLEASE FOLLOW-UP WITH SURGICAL COMPANY re: Rollator (walker with seat). CURRENTLY COMPANY IS AWAITING INSURANCE AUTHORIZATION

## 2020-08-26 NOTE — DISCHARGE NOTE NURSING/CASE MANAGEMENT/SOCIAL WORK - PATIENT PORTAL LINK FT
You can access the FollowMyHealth Patient Portal offered by [facility] by registering at the following website: [email or web address]. By joining [facility]’s FollowMyHealth portal, you will also be able to view your health information using other applications (apps) compatible with our system.

## 2020-08-26 NOTE — PROGRESS NOTE ADULT - SUBJECTIVE AND OBJECTIVE BOX
PROGRESS NOTE:     CONTACT INFO:  Tracey Zuñiga MD   PGY-1  Pager: [phone] [facility]    Patient is a 70y old  Female who presents with a chief complaint of Shortness of breath (25 Aug 2020 12:42)      SUBJECTIVE / OVERNIGHT EVENTS:  No acute events overnight. Patient seen and evaluated at bedside. Reports significant improvement in her SOB when walking. Currently on 1L NC intermittently for comfort. No fever/chills.  Denies chest pain, palpitations, abdominal pain, nausea/vomiting    ADDITIONAL REVIEW OF SYSTEMS:    Negative except as above.     MEDICATIONS  (STANDING):  albuterol/ipratropium for Nebulization 3 milliLiter(s) Nebulizer every 6 hours  aspirin enteric coated 81 milliGRAM(s) Oral daily  budesonide 160 MICROgram(s)/formoterol 4.5 MICROgram(s) Inhaler 2 Puff(s) Inhalation two times a day  carvedilol 25 milliGRAM(s) Oral every 12 hours  furosemide    Tablet 20 milliGRAM(s) Oral daily  heparin   Injectable 5000 Unit(s) SubCutaneous every 12 hours  hydrALAZINE 50 milliGRAM(s) Oral three times a day  isosorbide   mononitrate ER Tablet (IMDUR) 60 milliGRAM(s) Oral daily  lisinopril 40 milliGRAM(s) Oral daily  methylPREDNISolone sodium succinate Injectable 40 milliGRAM(s) IV Push every 8 hours  pantoprazole    Tablet 40 milliGRAM(s) Oral before breakfast  simvastatin 20 milliGRAM(s) Oral at bedtime    MEDICATIONS  (PRN):      CAPILLARY BLOOD GLUCOSE        I&O's Summary      PHYSICAL EXAM:  Vital Signs Last 24 Hrs  T(C): 37.1 (26 Aug 2020 06:38), Max: 37.1 (25 Aug 2020 21:38)  T(F): 98.8 (26 Aug 2020 06:38), Max: 98.8 (26 Aug 2020 06:38)  HR: 87 (26 Aug 2020 06:38) (79 - 99)  BP: 142/87 (26 Aug 2020 06:38) (114/71 - 142/87)  BP(mean): --  RR: 18 (26 Aug 2020 06:38) (16 - 18)  SpO2: 100% (26 Aug 2020 06:38) (98% - 100%)    GENERAL: NAD, normal WOB on 1L NC  	EYES: EOMI, PERRLA, conjunctiva and sclera clear  	ENT: Moist mucous membranes  	NECK: No JVD  	CHEST/LUNG: CTAB; No rales, rhonchi, wheezing, or rubs.   	HEART: Regular rate and rhythm; No murmurs, rubs, or gallops  	ABDOMEN: bowel sounds normal; Soft, nontender, nondistended  	EXTREMITIES:  No clubbing, cyanosis, or edema  	NERVOUS SYSTEM:  A&Ox3, no focal deficits   SKIN: No rashes or lesions    LABS:                        12.3   11.07 )-----------( 141      ( 26 Aug 2020 07:00 )             37.6     08-26    145  |  103  |  46<H>  ----------------------------<  141<H>  4.4   |  27  |  1.33<H>    Ca    9.3      26 Aug 2020 07:00    TPro  5.9<L>  /  Alb  4.0  /  TBili  0.4  /  DBili  x   /  AST  17  /  ALT  21  /  AlkPhos  48  08-25              Culture - Blood (collected 23 Aug 2020 12:22)  Source: .Blood Blood-Venous  Preliminary Report (24 Aug 2020 13:02):    No growth to date.    Culture - Blood (collected 23 Aug 2020 12:22)  Source: .Blood Blood-Peripheral  Preliminary Report (24 Aug 2020 13:02):    No growth to date.

## 2020-08-28 NOTE — ED PROVIDER NOTE - PHYSICAL EXAMINATION
GENERAL: non-toxic appearing, in NAD  HEAD: atraumatic, normocephalic  EYES: vision grossly intact, no conjunctivitis or discharge  EARS: hearing grossly intact  NOSE: no nasal discharge, epistaxis   CARDIAC: RRR, normal S1S2,  no appreciable murmurs, no cyanosis, cap refill < 2 seconds  PULM: tachypneic, satting well on RA, lung sounds diminished in all lung fields  GI: abdomen nondistended, soft, nontender, no guarding or rebound tenderness, no palpable masses  NEURO: awake and alert, follow commands, normal speech, PERRLA, EOMI, no focal motor or sensory deficits  MSK: spine appears normal, no joint swelling or erythema, no gross deformities of extremities  EXT: no peripheral edema, calf tenderness, redness or swelling  SKIN: warm, dry, and intact, no rashes  PSYCH: appropriate mood and affect

## 2020-08-28 NOTE — ED PROVIDER NOTE - NS ED ROS FT
GENERAL: no fever, chills, fatigue, weight loss, night sweats  HEENT: no eye pain, discharge, conjunctivitis, ear pain, hearing loss, rhinorrhea, congestion, throat pain  CARDIAC: no chest pain, palpitations, lightheadedness, syncope  PULM: + dyspnea  GI: no abdominal pain, nausea, vomiting, diarrhea, constipation, melena, hematochezia  : no urinary dysuria, frequency, incontinence, hematuria  NEURO: no headache, changes in vision, motor weakness, sensory changes  MSK: no joint pain, joint swelling, myalgias  SKIN: no rashes  HEME: no active bleeding, excessive bruising

## 2020-08-28 NOTE — ED PROVIDER NOTE - CLINICAL SUMMARY MEDICAL DECISION MAKING FREE TEXT BOX
70F with hx of CHF (EF 20-25% in 4/2019) s/p PPM/ICD, HTN, CARLOTA not able to tolerate CPAP, COPD (not on home O2), moderate pHTN presenting with new onset dyspnea that started today. On PE, afebrile, tachypneic, satting well on RA, lung sounds diminished in all lung fields. Will check labs, EKG, cardiac enzymes. Will start with a CXR, reassess need for CTA to look for PE. Low concern for PE at this time, given normal CTA a few days ago. Other diagnoses to consider are ACS, CHF vs COPD exacerbation, pneumonia. Will likely require re-admission.

## 2020-08-28 NOTE — ED ADULT TRIAGE NOTE - CHIEF COMPLAINT QUOTE
C/o sob since yesterday, pt with h/o COPD, HTN, AICD. Pt tachypneic in triage. Pt states she was seen here Wed for same and required bipap. Pt placed on 3L 02 NC. Charge RN made aware.

## 2020-08-28 NOTE — ED ADULT NURSE NOTE - NS ED NOTE ABUSE SUSPICION NEGLECT YN
[de-identified] : She elected to receive a steroid injection to both knees today, and she tolerated them well. follow up 6 months
No

## 2020-08-28 NOTE — PROVIDER CONTACT NOTE (OTHER) - ASSESSMENT
Arranged [facility] [phone]. P/U 12.30. Trip# [id number]. MAS Auth# [phone]. Pt returning home.

## 2020-08-28 NOTE — ED PROVIDER NOTE - PROGRESS NOTE DETAILS
Tong: labs and CXR reassuring. vitals stable. Satting well on RA. Patient in no respiratory distress. Again, had a normal CTA a few days ago. No reason to suspect clinically significant PE or infection given normal vitals. Safe for discharge at this time.

## 2020-08-28 NOTE — ED ADULT NURSE NOTE - OBJECTIVE STATEMENT
Pt awake, alert and oriented x 4 with cardiac PMH and PMH COPD recently discharged from inpatient 2 days ago.   Pt presents with shortness of breath worse with exertion, denies chest pain, denies fever, n/v.   Pt reports diarrhea today.   No pain/burning with urination.   Tachypnea noted and labored breathing.  Pt on 2L nasal cannula for comfort in ER.   No O2 at home.   placed on cardiac monitor and pulse ox.   Dr. Caldwell at bedside.   IV placed and blood work sent.   will continue to monitor closely.  denies headache or dizziness, no skin breakdown noted.

## 2020-08-28 NOTE — ED ADULT NURSE NOTE - PMH
CAD (coronary artery disease)    Chronic systolic CHF (congestive heart failure)  EF 20-25% s/p [employer] ICD (9/2018)  COPD (chronic obstructive pulmonary disease)  not on home O2  HTN (hypertension)    LBBB (left bundle branch block)    Pulmonary hypertension  moderate  Right-sided heart failure    Sleep apnea  cannot tolerate CPAP

## 2020-08-28 NOTE — ED PROVIDER NOTE - ATTENDING CONTRIBUTION TO CARE
I have seen and examined the patient on the patient´s visit date. I have reviewed the note written by Uzair Caldwell MD  on that visit day. I have supervised and participated as necessary in the performance of procedures indicated for patient management and was available at all phases of the patient´s visit when needed. We discussed the history, physical exam findings, management plan, and  medical decision making. I have made my additions, exceptions, and revisions within the chart and I agree with H and P as documented in its entirety. The data and my interpretation of any data collected from labs, interventions and imaging appear below as well as my independent medical decision making and considerations    The patient is a 70y Female who has a past medical and surgery history of COPD c/b cor pulmonale/pulm htn not on home O2 CARLOTA not on bipap, sCHF/CAD/AICD LBBB HTN PTED with sudden onset SOB at rest as desribed    Vital Signs Stable  PE: as described; my additions and exceptions are noted in the chart  DATA:   EKG: PPM@74  Lab Results:  CFH030 BUN/cr 39/.98 (forward failure)   IMAGING:CXR=Clear Lungs   MDM:  IMP (IRISK): ACS vs COPD and CHF; CTA negative for PE chf less likely with normal BNP (value today might be from age female gender plus cor pulmonale) and clear cxr  reassess    Management (Plan):  reassess  despite cor pulmonale pt looks remarkably well and would probably not benefit from readmission given recent d/c if labs/imaging normal I feel it acceptable to d/c with close discharge and strict instructions to return

## 2020-08-28 NOTE — ED PROVIDER NOTE - NSFOLLOWUPINSTRUCTIONS_ED_ALL_ED_FT
Your diagnosis: shortness of breath    Discharge instructions:    - Please follow up with your Primary Care Doctor.      - Be sure to return to the ED if you develop new or worsening symptoms. Specific signs and symptoms to be vigilant of: fever or chills, chest pain, difficulty breathing, palpitations, severe coughing, blood in the sputum.

## 2020-08-28 NOTE — ED PROVIDER NOTE - OBJECTIVE STATEMENT
70F with hx of CHF (EF 20-25% in 4/2019) s/p PPM/ICD, HTN, CARLOTA not able to tolerate CPAP, COPD (not on home O2), moderate pHTN presenting with new onset dyspnea that started today. Symptoms started suddenly while at rest. Worse with exertion. No associated chest pain, fever, cough. No back or abdominal pain. No n/v/d. No unilateral leg pain/swelling/redness. Was recently admitted for a COPD exacerbation. Was put on steroids. Does not use O2 at home. States she has never felt this way before.

## 2020-09-08 PROBLEM — I95.9 HYPOTENSION: Status: ACTIVE | Noted: 2020-01-01

## 2020-09-08 NOTE — HISTORY OF PRESENT ILLNESS
[de-identified] : 71 yo F with COPD, CHF, HTN, HLD\par \par \par Hospital f/u [facility] for COPD exacerbation. CBC done in hospital WNL.\par [facility] 8/23-8/25 -- neg CTA, treated with prednisone. Went back to [facility] ER 8/31, neg CXR, discharged. Pt having worsening DOMÍNGUEZ, even at rest. \par \par CHF-- EF 20-25% 4/2019. Has appt with cardiologist next month.\par \par HTN-- BP yesterday and today, has been low 90/60, even at home. Has been feeling dizzy/lightheaded. \par \par Tachypnea-- pt has pulm eval next-- will likely require O2 support.

## 2020-09-08 NOTE — DISCUSSION/SUMMARY
[FreeTextEntry1] : The patient was asked to stop most of her antihypertensives. She will remain on carvedilol which she will take once a day. She will call me in 2 days with readings of her blood pressure taken at home. At which time her BP meds will be readjusted.\par We'll order home oxygen.\par We'll check the blood work for CBC and comp.\par The patient appears to be in acute on chronic respiratory failure, possibly mild right heart failure as well.

## 2020-09-08 NOTE — HISTORY OF PRESENT ILLNESS
[Former] : former [TextBox_4] : Patient is a 70-year-old lady with severe COPD in respiratory failure here with increasing shortness of breath. She has been on oral prednisone of 10 mg a day since February. The patient had been admitted to the hospital twice in the last 2 weeks with increased shortness of breath. She was given prednisone and discharged.\par The patient is on multiple antihypertensives. Her blood pressure today is 90 systolic. This is the first time she has hypotension. The patient is exhausted after walking a few steps. She is often dyspneic at rest. She sleeps poorly. She has extreme fatigue. She states that she felt much better on oxygen which was in the hospital.\par She continues to take the prescribed Spiriva and Symbicort regularly.

## 2020-09-08 NOTE — PHYSICAL EXAM
[Cachectic] : cachexia was observed [Tachypnea] : tachypnea was noted [Distress] : respiratory distress was noted [Clear Bilaterally] : the lungs were clear to auscultation bilaterally [Normal] : affect was normal and insight and judgment were intact [No Edema] : there was no peripheral edema

## 2020-09-08 NOTE — PROCEDURE
[FreeTextEntry1] : The patient had a resting saturation of 91-93%. On walking a few steps which is less than 50 feet she desaturated. Her saturation went down to 83%.It came up to 93% with oxygen. She became extremely dyspneic on walking a few steps.

## 2020-09-08 NOTE — PHYSICAL EXAM
[No Acute Distress] : no acute distress [Normal Appearance] : normal appearance [Normal Oropharynx] : normal oropharynx [Normal Rate/Rhythm] : normal rate/rhythm [No Neck Mass] : no neck mass [Normal S1, S2] : normal s1, s2 [No Resp Distress] : no resp distress [No Murmurs] : no murmurs [Clear to Auscultation Bilaterally] : clear to auscultation bilaterally [Benign] : benign [No Abnormalities] : no abnormalities [No Clubbing] : no clubbing [Normal Gait] : normal gait [No Cyanosis] : no cyanosis [FROM] : FROM [No Edema] : no edema [No Focal Deficits] : no focal deficits [Normal Color/ Pigmentation] : normal color/ pigmentation [Oriented x3] : oriented x3 [Normal Affect] : normal affect [TextBox_2] : Distress on minimal exertion.She appears cushingoid.

## 2020-09-08 NOTE — REASON FOR VISIT
[Follow-Up - From Hospitalization] : a follow-up visit after a recent hospitalization [COPD] : COPD [Shortness of Breath] : shortness of breath

## 2020-09-14 NOTE — DISCUSSION/SUMMARY
[FreeTextEntry1] : Restart Carvedilol 25 mg twice a day.\par She will get home oxygen. Return in 3 weeks.\par

## 2020-09-28 PROBLEM — I27.20 PULMONARY HYPERTENSION: Status: ACTIVE | Noted: 2020-01-01

## 2020-10-02 NOTE — HISTORY OF PRESENT ILLNESS
[FreeTextEntry1] : Here for followup. Last seen in February. She was seen by PCP who lowered a lot of her cardiac medications due to dizziness.\par #HTN- On Lisinopril 20 mg daily , Coreg 12.5 mg daily, Hydralazine 25 mg TID , Imdur 30 mg daily and now off Lasix. \par -will first increase her Coreg back up to 25 mg BID\par -Will hold off on increasing back the lisinopril and hydralazine and Imdur\par #Chronic systolic heart failure- On Above meds. patient appears euvolemic. BP is elevated. Her Cr is elevated to 1.43 from 0.98 so will hold off on use of entresto for now\par #HLD on statin therapy

## 2020-10-02 NOTE — DISCUSSION/SUMMARY
[FreeTextEntry1] : #HTN- On Lisinopril 20 mg daily , Coreg 12.5 mg daily, Hydralazine 25 mg TID , Imdur 30 mg daily and now off Lasix. \par -will first increase her Coreg back up to 25 mg BID\par -Will hold off on increasing back the lisinopril and hydralazine and Imdur\par #Chronic systolic heart failure- On Above meds. patient appears euvolemic. BP is elevated. Her Cr is elevated to 1.43 from 0.98 so will hold off on use of entresto for now\par #HLD on statin therapy

## 2020-10-05 NOTE — PHYSICAL EXAM
[No Acute Distress] : no acute distress [Normal Oropharynx] : normal oropharynx [Normal Appearance] : normal appearance [No Neck Mass] : no neck mass [Normal Rate/Rhythm] : normal rate/rhythm [Normal S1, S2] : normal s1, s2 [No Murmurs] : no murmurs [No Resp Distress] : no resp distress [Clear to Auscultation Bilaterally] : clear to auscultation bilaterally [No Abnormalities] : no abnormalities [Benign] : benign [Normal Gait] : normal gait [No Clubbing] : no clubbing [No Cyanosis] : no cyanosis [No Edema] : no edema [FROM] : FROM [Normal Color/ Pigmentation] : normal color/ pigmentation [No Focal Deficits] : no focal deficits [Oriented x3] : oriented x3 [Normal Affect] : normal affect [TextBox_68] : Decreased air entry bilaterally

## 2020-10-05 NOTE — DISCUSSION/SUMMARY
[FreeTextEntry1] : The patient was advised to increase her Hydralazine to 25 mg 3 times a day.\par Continue home oxygen 24 hours a day.\par Continue the current use of inhalers.\par Prednisone at 10 mg a day

## 2020-10-05 NOTE — HISTORY OF PRESENT ILLNESS
[TextBox_4] : The patient Severe COPD, severe shortness of breath and respiratory failure returns for followup. The patient has been on oxygen. She feels much better with the use of home oxygen. She has been using oxygen 24 hours a day. Her blood pressure is high today. She is on 25 mg carvedilol twice a day. She takes hydralazine 25 mg once a day. The patient eats his salt diet like potato chips etc.\par She states that after hospitalization she was weighing 70 pounds. She is breathing better since oxygen and has gained weight.

## 2020-10-30 NOTE — DISCUSSION/SUMMARY
[FreeTextEntry1] : 70 year old woman with history of HFrEF, HTN HLD here for followup. \par \par #HTN- On Lisinopril 20 mg daily , Coreg 25 mg twice daily, Hydralazine 25 mg TID , and now off Lasix. Will increase her hydralazine to 50 mg TID and readd Imdur to 30 mg daily. \par #Chronic systolic heart failure- On Above meds. patient appears euvolemic.\par #HLD on statin therapy\par #FU in 1 month

## 2020-10-30 NOTE — HISTORY OF PRESENT ILLNESS
[FreeTextEntry1] : Here for followup. Seen 1 month ago. Slowly increasing her medications again. Last visit we increased her Coreg to 25 mg BID. Her BP is very elevated today. \par #HTN- On Lisinopril 20 mg daily , Coreg 25 mg twice daily, Hydralazine 25 mg TID , and now off Lasix. Will increase her hydralazine to 50 mg TID and readd Imdur to 30 mg daily. \par #Chronic systolic heart failure- On Above meds. patient appears euvolemic.\par #HLD on statin therapy

## 2020-10-30 NOTE — PHYSICAL EXAM
[General Appearance - Well Developed] : well developed [Normal Appearance] : normal appearance [Well Groomed] : well groomed [General Appearance - Well Nourished] : well nourished [No Deformities] : no deformities [General Appearance - In No Acute Distress] : no acute distress [Normal Conjunctiva] : the conjunctiva exhibited no abnormalities [Eyelids - No Xanthelasma] : the eyelids demonstrated no xanthelasmas [Normal Oral Mucosa] : normal oral mucosa [No Oral Pallor] : no oral pallor [No Oral Cyanosis] : no oral cyanosis [Normal Jugular Venous A Waves Present] : normal jugular venous A waves present [Normal Jugular Venous V Waves Present] : normal jugular venous V waves present [No Jugular Venous Lovett A Waves] : no jugular venous lovett A waves [Respiration, Rhythm And Depth] : normal respiratory rhythm and effort [Exaggerated Use Of Accessory Muscles For Inspiration] : no accessory muscle use [Auscultation Breath Sounds / Voice Sounds] : lungs were clear to auscultation bilaterally [Heart Rate And Rhythm] : heart rate and rhythm were normal [Heart Sounds] : normal S1 and S2 [Edema] : no peripheral edema present [Abdomen Soft] : soft [Abdomen Tenderness] : non-tender [Abdomen Mass (___ Cm)] : no abdominal mass palpated [Abnormal Walk] : normal gait [Gait - Sufficient For Exercise Testing] : the gait was sufficient for exercise testing [Nail Clubbing] : no clubbing of the fingernails [Cyanosis, Localized] : no localized cyanosis [Petechial Hemorrhages (___cm)] : no petechial hemorrhages [Skin Color & Pigmentation] : normal skin color and pigmentation [] : no rash [No Venous Stasis] : no venous stasis [Skin Lesions] : no skin lesions [No Skin Ulcers] : no skin ulcer [No Xanthoma] : no  xanthoma was observed [Oriented To Time, Place, And Person] : oriented to person, place, and time [Affect] : the affect was normal [Mood] : the mood was normal [No Anxiety] : not feeling anxious [FreeTextEntry1] : II/VI SM

## 2020-11-09 NOTE — ED PROVIDER NOTE - PROGRESS NOTE DETAILS
patient noted to be hypercapneic elevated from baseline on blood gas, bipap initiated, patient tolerating well, micu consulted micu determined no icu needed at this time but will benefit from rcu, patient informed of results and agreeable to plan.

## 2020-11-09 NOTE — ED PROVIDER NOTE - CLINICAL SUMMARY MEDICAL DECISION MAKING FREE TEXT BOX
Patient presents to the ED with shortness of breath x 6 days, initially with b/l le edema now resolved after taking lasix for last few days, on home o2. Mildly tachypneic, maintains saturation at 100% even on RA, no edema noted, a-line predom on pocus bedside US, diminished bs, plan for labs, cxr, duonebs/steroids, discussed option of bipap with patient who declines as she has refused it in the past, cant tolerate. If no improvement in wob after nebs/steroids-will do HFNC and further establish GOC. Patient presents to the ED with shortness of breath x 6 days, initially with b/l le edema now resolved after taking lasix for last few days, on home o2. Mildly tachypneic, maintains saturation at 100% even on RA, no edema noted, a-line predom on pocus bedside US, diminished bs, plan for labs, cxr, duonebs/steroids, discussed option of bipap with patient who declines as she has refused it in the past, cant tolerate. If no improvement in wob after nebs/steroids-will reassess bipap option with patient.

## 2020-11-09 NOTE — ED PROVIDER NOTE - OBJECTIVE STATEMENT
70f presents to the ED with sob. Patient  has h/o chf with aicd/pacemaker, copd on home o2 (2l). States since 6d ago began to feel more sob. Had stopped her lasix last month at instruction of her dr (dr. mcmahan), noted increased b/l le edema and worsening sob so restarted lasix 5 d ago. Edema improved but sob remained. Had some sneezing, and l. side nonradiating chest discomfort yesterday which resolved after tylenol no increased cough, no fevers, no chills, no ha, no vomiting, no diarrhea, no dysuria.

## 2020-11-09 NOTE — CONSULT NOTE ADULT - ASSESSMENT
70 yoF PMH HFrEF (EF 20-25% s/p PPM/ICD placement), COPD (not on home O2, last hospitalization for exacerbation August 2020), moderate pulm HTN, HTN, ?CARLOTA not on home CPAP presenting with COPD exacerbation. 70 yoF PMH HFrEF (EF 20-25% s/p PPM/ICD placement), COPD (not on home O2, last hospitalization for exacerbation August 2020), moderate pulm HTN, HTN, ?CARLOTA not on home CPAP presenting with hypercapnic respiratory failure in setting of COPD exacerbation. Currently on BIPAP tolerating well. Pt hypercapnic at baseline (discharge pCO2 60), mental status and respiratory distress improved on Bipap. Hemodynamically stable and talking in full sentences.     #Recommendations:  - c/w duonebs, steroids  - start appropriate abx (azithro/CTX)  - replete K (currently 3.3) with goal K 4 and Mg 2  - decrease BP with prn hydralazine  - c/w bipap for now- pt tolerating well  - once off bipap restart home medications  - does not require ICU level care, would be appropriate for RCU.    Sandy Angel PGY2  Discussed with Dr. Campa

## 2020-11-09 NOTE — ED ADULT NURSE REASSESSMENT NOTE - NS ED NURSE REASSESS COMMENT FT1
pt placed on Bipap on a setting of 10/5. pt tolerating bipap well. pt able to speak in full sentences. respirations even and unlabored. sating 100%. sinus rhythm on monitor. will continue to monitor.

## 2020-11-09 NOTE — ED ADULT NURSE NOTE - OBJECTIVE STATEMENT
pt received in rm 6 AAO x 3. pt with hx of CHF on 2L NC at home oxygen and AICD. pt reports worsening sob x 5 days. pt states she was taking lasix and was told to d/c as per MD orders and started having b/l LE edema and worsening SOB. pt also states she was restarted on lasix 5 days ago however still feels SOB. pt denies chest pain, n/v/d, fevers, chills, cough, sick contacts, recent travel. respirations even and labored. pt able to speak in full sentences. pacemaker noted to left chest wall. no edema noted to b/l LE. pt remains on 2L NC. sinus rhythm on monitor. 20g iv placed to left ac. will continue to monitor.

## 2020-11-09 NOTE — H&P ADULT - ASSESSMENT
70 yoF PMH HFrEF (EF 20-25% s/p PPM/ICD placement), COPD (on home O2, since last hospitalization for exacerbation August 2020), moderate pulm HTN, HTN, ?CARLOTA (not on home CPAP) who presented with SOB for 4 days. Was not able to manage progressive SOB at home with prn inhalers so came to ED. Was taken off of lasix about 1 month ago, and developed progressive LE and abdominal swelling over the past monght. Last weeks also started to develop progressive SOB, which prompted her to come to the ED today. Follows closely with pulmonologist Dr. Teran. Denies orthopnea/PND. No recent illnesses or travel. Non smoker (former smoker). Denies fevers, chills, mucus production, sinus congestion, chest pain, nausea, vomiting, abdominal pain, dysuria, hematuria, melena, headaches, dizziness.     Acute on chronic hypercapnic respiratory failure in setting of COPD exacerbation:  Presented with progressive SOB in the setting of stopping diuretics. However on exam, euvolemic, no LE edema, CXR with clear, hyperinflated lungs. RVP negative. Covid negative. Wheezing bilaterally. States that her edema has resolved. ABG with elevated pCO2, improved with BIPAP.   - Duonebs  - IV steroids  - Azx for 5 day course.     HTN (hypertension)  Well controlled on current regimen.  - c/w home hydralazine, carvedilol, isosorbide  - DASH diet once no longer NPO    Chronic systolic CHF   Does not appear to be volume overloaded on exam. POCUS and CXR not consistent with CHF exacerbation.   - C/w pt's home carvedilol 12.5 daily, Hydral 25mg daily, ASA 81mg daily  - Continue to monitor for signs of overload.    no 70 yoF PMH HFrEF (EF 20-25% s/p PPM/ICD placement), COPD (on home O2, since last hospitalization for exacerbation August 2020), moderate pulm HTN, HTN, ?CARLOTA (not on home CPAP) who presented with SOB for 4 days admitted for acute on chronic hypercapnic respiratory failure in the setting of COPD exacerbation.     Acute on chronic hypercapnic respiratory failure in setting of COPD exacerbation:  Presented with progressive SOB in the setting of stopping diuretics. However on exam, euvolemic, no LE edema, CXR with clear, hyperinflated lungs. RVP negative. Covid negative. Wheezing bilaterally. States that her edema has resolved. ABG with elevated pCO2, improved with BIPAP.   - Duonebs  - IV steroids  - Azx for 5 day course.   - reports non-compliance in the past with BIPAP at home  - Continue bipap for now, transition to NC when more stable.     HTN (hypertension)  Well controlled on current regimen.  - c/w home hydralazine, carvedilol, isosorbide once off BIPAP  - DASH diet once no longer NPO    Chronic systolic CHF   Does not appear to be volume overloaded on exam. POCUS and CXR not consistent with CHF exacerbation.   - C/w pt's home carvedilol 12.5 daily, Hydral 25mg daily, ASA 81mg daily  - Continue to monitor for signs of overload.

## 2020-11-09 NOTE — H&P ADULT - HISTORY OF PRESENT ILLNESS
70 yoF PMH HFrEF (EF 20-25% s/p PPM/ICD placement), COPD (on home O2, since last hospitalization for exacerbation August 2020), moderate pulm HTN, HTN, ?CARLOTA (not on home CPAP) who presented with SOB for 4 days. Was not able to manage progressive SOB at home with prn inhalers so came to ED. Was taken off of lasix about 1 month ago, and developed progressive LE and abdominal swelling over the past monght. Last weeks also started to develop progressive SOB, which prompted her to come to the ED today. Follows closely with pulmonologist Dr. Teran. Denies orthopnea/PND. No recent illnesses or travel. Non smoker (former smoker). Denies fevers, chills, mucus production, sinus congestion, chest pain, nausea, vomiting, abdominal pain, dysuria, hematuria, melena, headaches, dizziness.

## 2020-11-09 NOTE — ED PROVIDER NOTE - NS ED ROS FT
ROS:  GENERAL: No fever, no chills  EYES: no change in vision  HEENT: no trouble swallowing, no trouble speaking  CARDIAC: no chest pain  PULMONARY: no cough, + shortness of breath now improved  GI: no abdominal pain, no nausea, no vomiting, no diarrhea, no constipation  : No dysuria, no frequency, no change in appearance, or odor of urine  SKIN: no rashes  NEURO: no headache, no weakness  MSK: +b/l le edema now resolved

## 2020-11-09 NOTE — H&P ADULT - NSHPSOCIALHISTORY_GEN_ALL_CORE
Lives with her mom. Works for "[employer]", occasional ETOH (minimally, socially), former smoker ([age] y/o), minimal MJ and cocaine use in younger years. has a walker in the house, but does not use it. on home O2.

## 2020-11-09 NOTE — H&P ADULT - NSHPLABSRESULTS_GEN_ALL_CORE
11-09    143  |  92<L>  |  16  ----------------------------<  135<H>  3.3<L>   |  44<H>  |  0.81    Ca    9.8      09 Nov 2020 10:32    TPro  6.3  /  Alb  4.4  /  TBili  0.9  /  DBili  x   /  AST  13  /  ALT  9   /  AlkPhos  39<L>  11-09    CBC Full  -  ( 09 Nov 2020 10:32 )  WBC Count : 5.65 K/uL  RBC Count : 4.02 M/uL  Hemoglobin : 12.6 g/dL  Hematocrit : 39.4 %  Platelet Count - Automated : 215 K/uL  Mean Cell Volume : 98.0 fL  Mean Cell Hemoglobin : 31.3 pg  Mean Cell Hemoglobin Concentration : 32.0 %  Auto Neutrophil # : 4.41 K/uL  Auto Lymphocyte # : 1.01 K/uL  Auto Monocyte # : 0.17 K/uL  Auto Eosinophil # : 0.01 K/uL  Auto Basophil # : 0.01 K/uL  Auto Neutrophil % : 78.0 %  Auto Lymphocyte % : 17.9 %  Auto Monocyte % : 3.0 %  Auto Eosinophil % : 0.2 %  Auto Basophil % : 0.2 %    Creatinine Trend: 0.81<--  ABG - ( 09 Nov 2020 14:01 )  pH, Arterial: 7.41  pH, Blood: x     /  pCO2: 66    /  pO2: 161   / HCO3: 37    / Base Excess: 15.2  /  SaO2: 99.1

## 2020-11-09 NOTE — ED ADULT NURSE REASSESSMENT NOTE - NS ED NURSE REASSESS COMMENT FT1
as per tele tech pt noted to have 6 runs of v-tach. pt denies sob, chest pain, n/v/d, fevers, chills. pt appears in NAd at this time. pt remains on bipap, tolerating bipap well. MD saleh made aware. no interventions at this time as per md orders.

## 2020-11-09 NOTE — ED PROVIDER NOTE - PHYSICAL EXAMINATION
GEN - AO3, conversational, cachectic  HEAD - NC/AT   EYES- PERRL, EOMI  ENT: Airway patent, mmm, Oral cavity and pharynx normal. No inflammation, swelling, exudate, or lesions.    NECK: Neck supple, non-tender without lymphadenopathy, no masses.  PULMONARY - diminished bs, +tachypnea  CARDIAC -s1s2, RRR, no M,G,R, +left side aicd  ABDOMEN - +BS, ND, NT, soft, no guarding, no rebound, no masses   BACK - no CVA tenderness, Normal  spine   EXTREMITIES - FROM, no edema, 2+ dp pulses  SKIN - no rash or bruising   NEUROLOGIC - alert, speech clear, no focal deficits  PSYCH -nl mood/affect, nl insight. GEN - AO3, conversational, cachectic  HEAD - NC/AT   EYES- PERRL, EOMI  ENT: Airway patent, mmm, Oral cavity and pharynx normal. No inflammation, swelling, exudate, or lesions.    NECK: Neck supple, non-tender without lymphadenopathy, no masses.  PULMONARY - diminished bs b/l, +tachypnea  CARDIAC -s1s2, RRR, no M,G,R, +left side aicd  ABDOMEN - +BS, ND, NT, soft, no guarding, no rebound, no masses   BACK - no CVA tenderness, Normal  spine   EXTREMITIES - FROM, no edema, 2+ dp pulses  SKIN - no rash or bruising   NEUROLOGIC - alert, speech clear, no focal deficits  PSYCH -nl mood/affect, nl insight.

## 2020-11-09 NOTE — CONSULT NOTE ADULT - SUBJECTIVE AND OBJECTIVE BOX
CHIEF COMPLAINT: SOB    HPI:  70 yoF PMH HFrEF (EF 20-25% s/p PPM/ICD placement), COPD (not on home O2, last hospitalization for exacerbation August 2020), moderate pulm HTN, HTN, ?CARLOTA not on home CPAP presenting with SOB for 4 days. Was not able to manage at home with prn inhalers so came to ED. Follows closely with pulmonologist.     PAST MEDICAL & SURGICAL HISTORY:  Right-sided heart failure    Pulmonary hypertension  moderate    Sleep apnea  cannot tolerate CPAP    LBBB (left bundle branch block)    Chronic systolic CHF (congestive heart failure)  EF 20-25% s/p Med[employer]onic ICD (9/2018)    HTN (hypertension)    COPD (chronic obstructive pulmonary disease)  not on home O2    CAD (coronary artery disease)    Presence of cardioverter defibrillator  Medtronic PPM/ICD placed 9/2018        FAMILY HISTORY:  Family history of colon cancer        SOCIAL HISTORY:  Smoking:quit smoking 2016, denies other toxic habits.     Allergies    No Known Allergies    Intolerances        HOME MEDICATIONS:    REVIEW OF SYSTEMS:  Constitutional:   Eyes:  ENT:  CV:  Resp:  GI:  :  MSK:  Integumentary:  Neurological:  Psychiatric:  Endocrine:  Hematologic/Lymphatic:  Allergic/Immunologic:  [ ] All other systems negative  [ ] Unable to assess ROS because ________    OBJECTIVE:  ICU Vital Signs Last 24 Hrs  T(C): 36.9 (09 Nov 2020 09:49), Max: 36.9 (09 Nov 2020 09:49)  T(F): 98.5 (09 Nov 2020 09:49), Max: 98.5 (09 Nov 2020 09:49)  HR: 89 (09 Nov 2020 12:33) (82 - 89)  BP: 140/94 (09 Nov 2020 12:33) (140/94 - 160/97)  BP(mean): --  ABP: --  ABP(mean): --  RR: 19 (09 Nov 2020 12:33) (19 - 22)  SpO2: 100% (09 Nov 2020 12:33) (100% - 100%)        CAPILLARY BLOOD GLUCOSE          PHYSICAL EXAM:  General:   HEENT:   Lymph Nodes:  Neck:   Respiratory:   Cardiovascular:   Abdomen:   Extremities:   Skin:   Neurological:  Psychiatry:    HOSPITAL MEDICATIONS:  MEDICATIONS  (STANDING):  albuterol/ipratropium for Nebulization 3 milliLiter(s) Nebulizer every 20 minutes  azithromycin  IVPB 500 milliGRAM(s) IV Intermittent once    MEDICATIONS  (PRN):      LABS:                        12.6   5.65  )-----------( 215      ( 09 Nov 2020 10:32 )             39.4     11-09    143  |  92<L>  |  16  ----------------------------<  135<H>  3.3<L>   |  44<H>  |  0.81    Ca    9.8      09 Nov 2020 10:32    TPro  6.3  /  Alb  4.4  /  TBili  0.9  /  DBili  x   /  AST  13  /  ALT  9   /  AlkPhos  39<L>  11-09    PT/INR - ( 09 Nov 2020 10:36 )   PT: 11.4 SEC;   INR: 1.00          PTT - ( 09 Nov 2020 10:36 )  PTT:27.5 SEC      Venous Blood Gas:  11-09 @ 10:36  7.34/87/33/38/53.4  VBG Lactate: 1.4      MICROBIOLOGY:     RADIOLOGY:  [ ] Reviewed and interpreted by me    EKG: CHIEF COMPLAINT: SOB    HPI:  70 yoF PMH HFrEF (EF 20-25% s/p PPM/ICD placement), COPD (not on home O2, last hospitalization for exacerbation August 2020), moderate pulm HTN, HTN, ?CARLOTA not on home CPAP presenting with SOB for 4 days. Was not able to manage at home with prn inhalers so came to ED. Follows closely with pulmonologist. Denies orthopnea/PND. No recent illnesses or travel.    PAST MEDICAL & SURGICAL HISTORY:  Right-sided heart failure    Pulmonary hypertension  moderate    Sleep apnea  cannot tolerate CPAP    LBBB (left bundle branch block)    Chronic systolic CHF (congestive heart failure)  EF 20-25% s/p Medtronic ICD (9/2018)    HTN (hypertension)    COPD (chronic obstructive pulmonary disease)  not on home O2    CAD (coronary artery disease)    Presence of cardioverter defibrillator  Medtronic PPM/ICD placed 9/2018        FAMILY HISTORY:  Family history of colon cancer        SOCIAL HISTORY:  Smoking:quit smoking 2016, denies other toxic habits. Used to work in environmental services    Allergies    No Known Allergies    Intolerances        HOME MEDICATIONS:    REVIEW OF SYSTEMS:    CONSTITUTIONAL: No weight loss, fatigue, fevers  HEENT: No HA, blurry vision, sore throat, changes in taste/smell, no neck pain/stiffness  RESPIRATORY: No cough, wheezing, hemoptysis; + shortness of breath  CARDIOVASCULAR: No chest pain or palpitations, No swelling in LE  GASTROINTESTINAL: No abdominal pain. No nausea, vomiting, or hematemesis; No diarrhea or constipation. No melena or hematochezia.  GENITOURINARY: No dysuria, frequency or hematuria  NEUROLOGICAL: No numbness/tingling, no weakness  SKIN: No itching, rashes      OBJECTIVE:  ICU Vital Signs Last 24 Hrs  T(C): 36.9 (09 Nov 2020 09:49), Max: 36.9 (09 Nov 2020 09:49)  T(F): 98.5 (09 Nov 2020 09:49), Max: 98.5 (09 Nov 2020 09:49)  HR: 89 (09 Nov 2020 12:33) (82 - 89)  BP: 140/94 (09 Nov 2020 12:33) (140/94 - 160/97)  BP(mean): --  ABP: --  ABP(mean): --  RR: 19 (09 Nov 2020 12:33) (19 - 22)  SpO2: 100% (09 Nov 2020 12:33) (100% - 100%)        CAPILLARY BLOOD GLUCOSE          PHYSICAL EXAM:  General: On BIPAP laying in bed comfortably  HEENT: PERRLA, BIPAP in place  Lymph Nodes: none palpable  Respiratory: bibasilar crackles  Cardiovascular: RRR, S1S2, no murmurs  Abdomen: Soft, nontender, nondistended  Extremities: moving all extremities, good pulses, very trace LE edema  Skin: no rashes  Neurological: Intact, no focal deficits  Psychiatry: Normal affect    HOSPITAL MEDICATIONS:  MEDICATIONS  (STANDING):  albuterol/ipratropium for Nebulization 3 milliLiter(s) Nebulizer every 20 minutes  azithromycin  IVPB 500 milliGRAM(s) IV Intermittent once    MEDICATIONS  (PRN):      LABS:                        12.6   5.65  )-----------( 215      ( 09 Nov 2020 10:32 )             39.4     11-09    143  |  92<L>  |  16  ----------------------------<  135<H>  3.3<L>   |  44<H>  |  0.81    Ca    9.8      09 Nov 2020 10:32    TPro  6.3  /  Alb  4.4  /  TBili  0.9  /  DBili  x   /  AST  13  /  ALT  9   /  AlkPhos  39<L>  11-09    PT/INR - ( 09 Nov 2020 10:36 )   PT: 11.4 SEC;   INR: 1.00          PTT - ( 09 Nov 2020 10:36 )  PTT:27.5 SEC      Venous Blood Gas:  11-09 @ 10:36  7.34/87/33/38/53.4  VBG Lactate: 1.4      MICROBIOLOGY:     RADIOLOGY:  [ ] Reviewed and interpreted by me    EKG:

## 2020-11-09 NOTE — ED ADULT TRIAGE NOTE - CHIEF COMPLAINT QUOTE
Pt c/o allyson LE edema, abd bloating, and SOB x5 days.  Lasix stopped 1 month ago.  Hx COPD.  O2 2L NC ATC at home.

## 2020-11-10 NOTE — PROGRESS NOTE ADULT - ASSESSMENT
70 yoF PMH HFrEF (EF 20-25% s/p PPM/ICD placement), COPD (on home O2, since last hospitalization for exacerbation August 2020), moderate pulm HTN, HTN, ?CARLOTA (not on home CPAP) who presented with SOB for 4 days admitted for acute on chronic hypercapnic respiratory failure in the setting of COPD exacerbation.     # Acute on chronic hypercapnic respiratory failure in setting of COPD exacerbation:  - However on exam, euvolemic, no LE edema, CXR with clear, hyperinflated lungs.  - RVP negative. Covid negative.   - ABG improving 87>66  - Tolerated BIPAP well overnight   - c/w Duonebs and Azx for 5 day course, and BIPAP qhs or as needed  - s/p IV steroids, will start with PO prednisone tomorrow  - Overnight Pulse ox tonight    # HTN  - Well controlled on current regimen.  - c/w home hydralazine, carvedilol, isosorbide   - DASH diet   - BP check as per routine    # Chronic systolic CHF   Does not appear to be volume overloaded on exam.   - POCUS and CXR not consistent with CHF exacerbation.   - c/w pt's home carvedilol 25 mg daily, Hydralazine 50 TID, and ASA 81 mg daily.  - Continue to monitor for signs of overload.     # DVT ppx  - c/w Heparin sq    # Dispo  - PT recs home with no PT  - d/c Planning for tomorrow

## 2020-11-10 NOTE — PROGRESS NOTE ADULT - SUBJECTIVE AND OBJECTIVE BOX
CHIEF COMPLAINT: Patient is a 70y old  Female who presents with a chief complaint of COPD exacerbation (09 Nov 2020 16:11)    Interval Events:    REVIEW OF SYSTEMS:  Constitutional:   Eyes:  ENT:  CV:  Resp:  GI:  :  MSK:  Integumentary:  Neurological:  Psychiatric:  Endocrine:  Hematologic/Lymphatic:  Allergic/Immunologic:  [ ] All other systems negative  [ ] Unable to assess ROS because ________    OBJECTIVE:  ICU Vital Signs Last 24 Hrs  T(C): 36.6 (10 Nov 2020 06:35), Max: 36.9 (09 Nov 2020 09:49)  T(F): 97.8 (10 Nov 2020 06:35), Max: 98.5 (09 Nov 2020 09:49)  HR: 85 (10 Nov 2020 06:52) (81 - 98)  BP: 156/96 (10 Nov 2020 06:35) (107/68 - 167/97)  BP(mean): --  ABP: --  ABP(mean): --  RR: 18 (10 Nov 2020 06:35) (12 - 22)  SpO2: 100% (10 Nov 2020 06:52) (93% - 100%)        11-09 @ 07:01  -  11-10 @ 07:00  --------------------------------------------------------  IN: 250 mL / OUT: 0 mL / NET: 250 mL      CAPILLARY BLOOD GLUCOSE          PHYSICAL EXAM:  General:   HEENT:   Lymph Nodes:  Neck:   Respiratory:   Cardiovascular:   Abdomen:   Extremities:   Skin:   Neurological:  Psychiatry:    HOSPITAL MEDICATIONS:  MEDICATIONS  (STANDING):  ALBUTerol    90 MICROgram(s) HFA Inhaler 1 Puff(s) Inhalation every 4 hours  albuterol/ipratropium for Nebulization 3 milliLiter(s) Nebulizer every 20 minutes  albuterol/ipratropium for Nebulization 3 milliLiter(s) Nebulizer every 6 hours  aspirin enteric coated 81 milliGRAM(s) Oral daily  azithromycin  IVPB 500 milliGRAM(s) IV Intermittent every 24 hours  carvedilol 25 milliGRAM(s) Oral every 12 hours  heparin   Injectable 5000 Unit(s) SubCutaneous every 12 hours  hydrALAZINE 50 milliGRAM(s) Oral three times a day  methylPREDNISolone sodium succinate Injectable 60 milliGRAM(s) IV Push two times a day  pantoprazole    Tablet 40 milliGRAM(s) Oral before breakfast    MEDICATIONS  (PRN):      LABS:                        11.4   5.79  )-----------( 219      ( 10 Nov 2020 07:00 )             36.0     11-10    139  |  93<L>  |  23  ----------------------------<  191<H>  3.9   |  35<H>  |  0.83    Ca    9.7      10 Nov 2020 07:00  Phos  3.6     11-10  Mg     1.7     11-10    TPro  6.3  /  Alb  4.4  /  TBili  0.9  /  DBili  x   /  AST  13  /  ALT  9   /  AlkPhos  39<L>  11-09    PT/INR - ( 09 Nov 2020 10:36 )   PT: 11.4 SEC;   INR: 1.00          PTT - ( 09 Nov 2020 10:36 )  PTT:27.5 SEC    Arterial Blood Gas:  11-09 @ 14:01  7.41/66/161/37/99.1/15.2  ABG lactate: 1.3    Venous Blood Gas:  11-09 @ 10:36  7.34/87/33/38/53.4  VBG Lactate: 1.4      MICROBIOLOGY:     RADIOLOGY:  [ ] Reviewed and interpreted by me    PULMONARY FUNCTION TESTS:    EKG: CHIEF COMPLAINT: Patient is a 70y old  Female who presents with a chief complaint of COPD exacerbation (09 Nov 2020 16:11)    Interval Events: None reported overnight. Pt tolerated BIPAP well overnight. She reports feeling well, much better today. Pt saturating well on room air, NC prn. Will start PO prednisone tomorrow, and obtain overnight pulse oximetry. No new complaints/concerns at this time.     REVIEW OF SYSTEMS:    [x] All other systems negative      OBJECTIVE:  ICU Vital Signs Last 24 Hrs  T(C): 36.6 (10 Nov 2020 06:35), Max: 36.9 (09 Nov 2020 09:49)  T(F): 97.8 (10 Nov 2020 06:35), Max: 98.5 (09 Nov 2020 09:49)  HR: 85 (10 Nov 2020 06:52) (81 - 98)  BP: 156/96 (10 Nov 2020 06:35) (107/68 - 167/97)  BP(mean): --  ABP: --  ABP(mean): --  RR: 18 (10 Nov 2020 06:35) (12 - 22)  SpO2: 100% (10 Nov 2020 06:52) (93% - 100%)        11-09 @ 07:01  -  11-10 @ 07:00  --------------------------------------------------------  IN: 250 mL / OUT: 0 mL / NET: 250 mL      CAPILLARY BLOOD GLUCOSE    PHYSICAL EXAM  General: well appearing, nad  Neck: supple, no JVD   Respiratory: CTA b/l, decreased air entry b/l, no w/r/r appreciated. normal respiratory effort  Cardiovascular: +s1, s2  Abdomen: +BS, soft, nt/nd, no peritoneal signs noted  Extremities: non pedal edema, no calf tenderness  Skin: as per rn assessment sheet  Neurological: non focal   Psychiatry: Appropriate mood and affect    HOSPITAL MEDICATIONS:  MEDICATIONS  (STANDING):  ALBUTerol    90 MICROgram(s) HFA Inhaler 1 Puff(s) Inhalation every 4 hours  albuterol/ipratropium for Nebulization 3 milliLiter(s) Nebulizer every 20 minutes  albuterol/ipratropium for Nebulization 3 milliLiter(s) Nebulizer every 6 hours  aspirin enteric coated 81 milliGRAM(s) Oral daily  azithromycin  IVPB 500 milliGRAM(s) IV Intermittent every 24 hours  carvedilol 25 milliGRAM(s) Oral every 12 hours  heparin   Injectable 5000 Unit(s) SubCutaneous every 12 hours  hydrALAZINE 50 milliGRAM(s) Oral three times a day  methylPREDNISolone sodium succinate Injectable 60 milliGRAM(s) IV Push two times a day  pantoprazole    Tablet 40 milliGRAM(s) Oral before breakfast    MEDICATIONS  (PRN):      LABS:                        11.4   5.79  )-----------( 219      ( 10 Nov 2020 07:00 )             36.0     11-10    139  |  93<L>  |  23  ----------------------------<  191<H>  3.9   |  35<H>  |  0.83    Ca    9.7      10 Nov 2020 07:00  Phos  3.6     11-10  Mg     1.7     11-10    TPro  6.3  /  Alb  4.4  /  TBili  0.9  /  DBili  x   /  AST  13  /  ALT  9   /  AlkPhos  39<L>  11-09    PT/INR - ( 09 Nov 2020 10:36 )   PT: 11.4 SEC;   INR: 1.00          PTT - ( 09 Nov 2020 10:36 )  PTT:27.5 SEC    Arterial Blood Gas:  11-09 @ 14:01  7.41/66/161/37/99.1/15.2  ABG lactate: 1.3    Venous Blood Gas:  11-09 @ 10:36  7.34/87/33/38/53.4  VBG Lactate: 1.4      MICROBIOLOGY:     RADIOLOGY:  [ ] Reviewed and interpreted by me    PULMONARY FUNCTION TESTS:    EKG:

## 2020-11-10 NOTE — PHYSICAL THERAPY INITIAL EVALUATION ADULT - CRITERIA FOR SKILLED THERAPEUTIC INTERVENTIONS
functional limitations in following categories/anticipated discharge recommendation/risk reduction/prevention/impairments found/predicted duration of therapy intervention/anticipated equipment needs at discharge/rehab potential/therapy frequency

## 2020-11-11 NOTE — PROGRESS NOTE ADULT - ATTENDING COMMENTS
chronic respiratory failure with hypercapnea and hypoxia secondary to very severe COPD  ABG 7.41/66/161/37  Significant desaturation <88% on overnight pulseoximetry on 2lpm 02  Good clinical response to bilevel.  Patient would benefit from nocturnal bilevel at home to improve hypercapnea, respiratory mechanics and reduce admissions chronic respiratory failure with hypercapnea and hypoxia secondary to very severe COPD  ABG 7.41/66/161/37  Significant desaturation <88% on overnight pulseoximetry on 2lpm 02  Good clinical response to bilevel.  Patient would benefit from nocturnal bilevel at home to improve hypercapnea, respiratory mechanics and reduce admissions    Prednisone - taper by 10mg every 3 days  palliative care evaluation, GOC discussion prior to d/c

## 2020-11-11 NOTE — PROGRESS NOTE ADULT - SUBJECTIVE AND OBJECTIVE BOX
CHIEF COMPLAINT: Patient is a 70y old  Female who presents with a chief complaint of COPD exacerbation (10 Nov 2020 09:11)      Interval Events: Pt seen and evaluated by team at bedside     REVIEW OF SYSTEMS:  Constitutional:   Eyes:  ENT:  CV:  Resp:  GI:  :  MSK:  Integumentary:  Neurological:  Psychiatric:  Endocrine:  Hematologic/Lymphatic:  Allergic/Immunologic:  [ ] All other systems negative      OBJECTIVE:  ICU Vital Signs Last 24 Hrs  T(C): 36.6 (11 Nov 2020 04:56), Max: 36.8 (10 Nov 2020 14:31)  T(F): 97.9 (11 Nov 2020 04:56), Max: 98.2 (10 Nov 2020 14:31)  HR: 89 (11 Nov 2020 04:56) (79 - 97)  BP: 153/88 (11 Nov 2020 04:56) (148/92 - 161/95)  BP(mean): --  ABP: --  ABP(mean): --  RR: 18 (11 Nov 2020 04:56) (18 - 20)  SpO2: 100% (11 Nov 2020 04:56) (94% - 100%)        11-10 @ 07:01  -  11-11 @ 07:00  --------------------------------------------------------  IN: 250 mL / OUT: 0 mL / NET: 250 mL      CAPILLARY BLOOD GLUCOSE            HOSPITAL MEDICATIONS:  MEDICATIONS  (STANDING):  ALBUTerol    90 MICROgram(s) HFA Inhaler 1 Puff(s) Inhalation every 4 hours  albuterol/ipratropium for Nebulization 3 milliLiter(s) Nebulizer every 20 minutes  albuterol/ipratropium for Nebulization 3 milliLiter(s) Nebulizer every 6 hours  aspirin enteric coated 81 milliGRAM(s) Oral daily  azithromycin  IVPB 500 milliGRAM(s) IV Intermittent every 24 hours  carvedilol 25 milliGRAM(s) Oral every 12 hours  chlorhexidine 4% Liquid 1 Application(s) Topical daily  heparin   Injectable 5000 Unit(s) SubCutaneous every 12 hours  hydrALAZINE 50 milliGRAM(s) Oral three times a day  pantoprazole    Tablet 40 milliGRAM(s) Oral before breakfast  predniSONE   Tablet 40 milliGRAM(s) Oral daily    MEDICATIONS  (PRN):      LABS:                        11.0   9.04  )-----------( 194      ( 11 Nov 2020 05:50 )             34.3     11-10    139  |  93<L>  |  23  ----------------------------<  191<H>  3.9   |  35<H>  |  0.83    Ca    9.7      10 Nov 2020 07:00  Phos  3.6     11-10  Mg     1.7     11-10    TPro  6.3  /  Alb  4.4  /  TBili  0.9  /  DBili  x   /  AST  13  /  ALT  9   /  AlkPhos  39<L>  11-09    PT/INR - ( 09 Nov 2020 10:36 )   PT: 11.4 SEC;   INR: 1.00          PTT - ( 09 Nov 2020 10:36 )  PTT:27.5 SEC    Arterial Blood Gas:  11-09 @ 14:01  7.41/66/161/37/99.1/15.2  ABG lactate: 1.3    Venous Blood Gas:  11-09 @ 10:36  7.34/87/33/38/53.4  VBG Lactate: 1.4      MICROBIOLOGY:     RADIOLOGY:  [ ] Reviewed and interpreted by me    PULMONARY FUNCTION TESTS:    EKG: CHIEF COMPLAINT: Patient is a 70y old  Female who presents with a chief complaint of COPD exacerbation (10 Nov 2020 09:11)      Interval Events: Pt seen and evaluated by team at bedside     REVIEW OF SYSTEMS:  Constitutional: Feeling well, no pain   CV: Denies   Resp: Denies   GI: Denies  [x ] All other systems negative      OBJECTIVE:  ICU Vital Signs Last 24 Hrs  T(C): 36.6 (11 Nov 2020 04:56), Max: 36.8 (10 Nov 2020 14:31)  T(F): 97.9 (11 Nov 2020 04:56), Max: 98.2 (10 Nov 2020 14:31)  HR: 89 (11 Nov 2020 04:56) (79 - 97)  BP: 153/88 (11 Nov 2020 04:56) (148/92 - 161/95)  BP(mean): --  ABP: --  ABP(mean): --  RR: 18 (11 Nov 2020 04:56) (18 - 20)  SpO2: 100% (11 Nov 2020 04:56) (94% - 100%)        11-10 @ 07:01  -  11-11 @ 07:00  --------------------------------------------------------  IN: 250 mL / OUT: 0 mL / NET: 250 mL      CAPILLARY BLOOD GLUCOSE            HOSPITAL MEDICATIONS:  MEDICATIONS  (STANDING):  ALBUTerol    90 MICROgram(s) HFA Inhaler 1 Puff(s) Inhalation every 4 hours  albuterol/ipratropium for Nebulization 3 milliLiter(s) Nebulizer every 20 minutes  albuterol/ipratropium for Nebulization 3 milliLiter(s) Nebulizer every 6 hours  aspirin enteric coated 81 milliGRAM(s) Oral daily  azithromycin  IVPB 500 milliGRAM(s) IV Intermittent every 24 hours  carvedilol 25 milliGRAM(s) Oral every 12 hours  chlorhexidine 4% Liquid 1 Application(s) Topical daily  heparin   Injectable 5000 Unit(s) SubCutaneous every 12 hours  hydrALAZINE 50 milliGRAM(s) Oral three times a day  pantoprazole    Tablet 40 milliGRAM(s) Oral before breakfast  predniSONE   Tablet 40 milliGRAM(s) Oral daily    MEDICATIONS  (PRN):      LABS:                        11.0   9.04  )-----------( 194      ( 11 Nov 2020 05:50 )             34.3     11-10    139  |  93<L>  |  23  ----------------------------<  191<H>  3.9   |  35<H>  |  0.83    Ca    9.7      10 Nov 2020 07:00  Phos  3.6     11-10  Mg     1.7     11-10    TPro  6.3  /  Alb  4.4  /  TBili  0.9  /  DBili  x   /  AST  13  /  ALT  9   /  AlkPhos  39<L>  11-09    PT/INR - ( 09 Nov 2020 10:36 )   PT: 11.4 SEC;   INR: 1.00          PTT - ( 09 Nov 2020 10:36 )  PTT:27.5 SEC    Arterial Blood Gas:  11-09 @ 14:01  7.41/66/161/37/99.1/15.2  ABG lactate: 1.3    Venous Blood Gas:  11-09 @ 10:36  7.34/87/33/38/53.4  VBG Lactate: 1.4      MICROBIOLOGY:     RADIOLOGY:  [ ] Reviewed and interpreted by me    PULMONARY FUNCTION TESTS:    EKG:

## 2020-11-11 NOTE — DISCHARGE NOTE PROVIDER - CARE PROVIDERS DIRECT ADDRESSES
,[email or web address]

## 2020-11-11 NOTE — CONSULT NOTE ADULT - CONVERSATION DETAILS
1) Mona had a good understanding of her current condition. She is grieving and very regretful that she did not quit smoking soon enough    2) She shared that her grandchildren were the ones to convince her to stop smoking 2-3 years ago. She also says that it is them that makes her want to live "many, many more years"    3) Her   at [facility] on the same floor 2 years ago ([facility] was the CV unit) 2 days before thanksgiving. So this time of year is very hard on her.     4) She said she will name Maribel and her DIL as the HCP's. HCP forms provided and explained to her. She said she would fill it up and give it to us before she leaves    5) She also confirmed she is DNR, DNI, no feeding tube. She said she saw her  in the CV unit before he  and she did not want any of those interventions    6) She is agreeable to a follow-up with Palliative Care clinic

## 2020-11-11 NOTE — DIETITIAN INITIAL EVALUATION ADULT. - OTHER INFO
Patient reports good appetite/PO intake in-house. Reports tolerating diet. No GI distress (nausea/vomiting/diarrhea/constipation) noted at this time. Last BM 10/10 per flow sheets documentation. Patient endorses gradual weight loss prior to admission 2/2 COPD. Reports UBW 52.3kg in 2012. Patient reports lowest weight was in 8/2020 @ 31.8kg. Since then patient reports weight gain due to increasing PO intake: current admit weight 37.5kg (11/09).     Provided diet education regarding heart failure nutrition therapy for those who are underweight. Discussed low sodium diet modifications and including high calorie/protein foods in diet. Written materials left with patient. Patient verbalized understanding. Patient amenable to Ensure Enlive supplementation in-house to optimize energy/protein intake.

## 2020-11-11 NOTE — DISCHARGE NOTE PROVIDER - CARE PROVIDER_API CALL
Marisela Teran  CRITICAL CARE MEDICINE  [address] [place] [address]  Phone: [phone]  Fax: [phone]  Follow Up Time:    Oli Valdez  MEDICINE - PULMONARY MEDICINE  [address] [place] [address], [place] [address]  Phone: [phone]  Fax: [phone]  Follow Up Time:    Marisela Teran  CRITICAL CARE MEDICINE  [address] [place] [address]  Phone: [phone]  Fax: [phone]  Follow Up Time:

## 2020-11-11 NOTE — CONSULT NOTE ADULT - PROBLEM SELECTOR RECOMMENDATION 6
.  # Code status: DNR, DNI, NO FEEDING TUBE  # HCP/ Surrogate: DRAKE (PATIENT TO COMPLETE HCP FORM)  # Estimated prognosis: WEEKS TO MONTS (MORE OF MONTHS)  # Hospice eligible: YES- PATIENT NEEDS A LONGITUDINAL RELATIONSHIP WITH [facility]    11/11  > [facility] filled in placed copies in the chart  > HCP to be completed and given to RCU. Discussed with RCU team.    Pls refer the patient to our outpatient Palliative Care clinic after discharge:    Tara Kelley MD  Phone: [phone]  [address] [place] [address], [address] [place] [address]    Total face to face time discussing goals of care, advance care planning, code status and hospice = 46 mins (11:00 to 11:46)

## 2020-11-11 NOTE — CONSULT NOTE ADULT - PROBLEM SELECTOR RECOMMENDATION 3
GI referral pended for approval    .  > She has anticipatory grief due to her condition. She is acknowledging this is going to take her life.  > She feels a lot of regret at not smoking soon enough  > Additionally, her loss history is significant as her   2 years ago in the same unit at [facility]. She was very emotional talking about end of life issues.  > No role for antidepressants for now

## 2020-11-11 NOTE — DISCHARGE NOTE PROVIDER - NSDCMRMEDTOKEN_GEN_ALL_CORE_FT
albuterol 2.5 mg/3 mL (0.083%) inhalation solution: 3 milliliter(s) inhaled every 6 hours, As Needed  aspirin 81 mg oral delayed release tablet: 1 tab(s) orally once a day  carvedilol 25 mg oral tablet: 1 tab(s) orally 2 times a day  furosemide 20 mg oral tablet: 1 tab(s) orally once a day  hydrALAZINE 50 mg oral tablet: 1 tab(s) orally 3 times a day  isosorbide mononitrate 60 mg oral tablet, extended release: 1 tab(s) orally once a day (in the morning)  lisinopril 40 mg oral tablet: 1 tab(s) orally once a day  pantoprazole 40 mg oral delayed release tablet: 1 tab(s) orally once a day (before a meal)  predniSONE 10 mg oral tablet: 1 tab(s) orally once a day   Symbicort 160 mcg-4.5 mcg/inh inhalation aerosol: 2 puff(s) inhaled 2 times a day  tiotropium 18 mcg inhalation capsule: 1 cap(s) inhaled once a day   albuterol 2.5 mg/3 mL (0.083%) inhalation solution: 3 milliliter(s) inhaled every 6 hours, As Needed  aspirin 81 mg oral delayed release tablet: 1 tab(s) orally once a day  carvedilol 25 mg oral tablet: 1 tab(s) orally 2 times a day  furosemide 20 mg oral tablet: 1 tab(s) orally once a day  hydrALAZINE 50 mg oral tablet: 1 tab(s) orally 3 times a day  pantoprazole 40 mg oral delayed release tablet: 1 tab(s) orally once a day (before a meal)  predniSONE 10 mg oral tablet: 1 tab(s) orally once a day   Symbicort 160 mcg-4.5 mcg/inh inhalation aerosol: 2 puff(s) inhaled 2 times a day  tiotropium 18 mcg inhalation capsule: 1 cap(s) inhaled once a day   albuterol 2.5 mg/3 mL (0.083%) inhalation solution: 3 milliliter(s) inhaled every 6 hours, As Needed  aspirin 81 mg oral delayed release tablet: 1 tab(s) orally once a day  carvedilol 25 mg oral tablet: 1 tab(s) orally 2 times a day  hydrALAZINE 50 mg oral tablet: 1 tab(s) orally 3 times a day  pantoprazole 40 mg oral delayed release tablet: 1 tab(s) orally once a day (before a meal)  predniSONE 10 mg oral tablet: 3 tabs x 3 days starting 11/14  2 tabs x 3 days   1 tab x 3 days finishing 11/22  Symbicort 160 mcg-4.5 mcg/inh inhalation aerosol: 2 puff(s) inhaled 2 times a day  tiotropium 18 mcg inhalation capsule: 1 cap(s) inhaled once a day  Zithromax 500 mg oral tablet: 1 tab(s) orally once a day (last dose 11/14)

## 2020-11-11 NOTE — DISCHARGE NOTE PROVIDER - NSDCFUSCHEDAPPT_GEN_ALL_CORE_FT
TJ MELENDEZ ; 12/04/2020 ; [facility] [address]  TJ MELENDEZ ; 01/07/2021 ; [facility] [id number] [facility] TJ MELENDEZ S ; 11/18/2020 ; [facility] [address]  TJ MELENDEZ S ; 12/04/2020 ; [facility] [address]  TJ MELENDEZ ; 01/07/2021 ; [facility] [id number] [facility]

## 2020-11-11 NOTE — CONSULT NOTE ADULT - CONSULT REASON
Palliative Medicine was consulted for complex medical decision making related to goals of care discussions

## 2020-11-11 NOTE — DIETITIAN INITIAL EVALUATION ADULT. - PERTINENT LABORATORY DATA
11-11 Na 142 mmol/L Glu 149 mg/dL<H> K+ 4.2 mmol/L Cr 0.91 mg/dL BUN 27 mg/dL<H> Phos 2.9 mg/dL Hgb 11.0 g/dL<L> Hct 34.3 %<L> Glucose, Serum: 149 mg/dL <H>

## 2020-11-11 NOTE — DIETITIAN INITIAL EVALUATION ADULT. - SIGNS/SYMPTOMS
severe muscle mass depletion, severe subcutaneous fat loss As evidenced by increased energy expenditure 2/2 increased work of breathing, COPD

## 2020-11-11 NOTE — DIETITIAN INITIAL EVALUATION ADULT. - ORAL INTAKE PTA/DIET HISTORY
Patient reports good appetite/PO intake prior to admission. Patient reports since 8/2020, she increased PO intake in order to promote weight gain. Endorses consuming 2 main meals (breakfast and dinner) along with snacks throughout the day. Reports following low sodium diet: uses Mrs[employer] Dash for seasoning, mainly cooks meals at home and purchases reduced sodium foods.

## 2020-11-11 NOTE — CONSULT NOTE ADULT - PROBLEM SELECTOR RECOMMENDATION 7
Thank you for allowing us to participate in your patient's care. We will continue to follow with you. Please page [phone] or contact us via Microsoft Teams for any q's or c's.     Cedric Rondon  Palliative Medicine

## 2020-11-11 NOTE — DISCHARGE NOTE PROVIDER - NSDCCPCAREPLAN_GEN_ALL_CORE_FT
PRINCIPAL DISCHARGE DIAGNOSIS  Diagnosis: Shortness of breath  Assessment and Plan of Treatment:       SECONDARY DISCHARGE DIAGNOSES  Diagnosis: Pulmonary hypertension  Assessment and Plan of Treatment: Follow up  with Dr. Teran    Diagnosis: Chronic HFrEF (heart failure with reduced ejection fraction)  Assessment and Plan of Treatment: Continue with carvedilol   Continue with hydralizine   Continue regimen from hospital. Follow heart healthy diet. Monitor weight. If you develop severe lower extremity swelling and shortness of breath please seek medial attention. Follow up with your PCP and cardiologist for further evaluation and managment. Please call to make an appointment.      Diagnosis: HTN (hypertension)  Assessment and Plan of Treatment: Continue blood pressure medication regimen as directed. Monitor for any visual changes, headaches or dizziness.  Monitor blood pressure regularly.  Follow up with your PCP for further management for high blood pressure.       PRINCIPAL DISCHARGE DIAGNOSIS  Diagnosis: Acute on chronic respiratory failure with hypercapnia  Assessment and Plan of Treatment: Duonebs as needed   Finish course of steroids as directed   Finish azithromycin as directed   Use your bipap machine every night and during day when napping   Settings 10/5 Back up rate 14 Fi02 30%      SECONDARY DISCHARGE DIAGNOSES  Diagnosis: Pulmonary hypertension  Assessment and Plan of Treatment: Follow up  with Dr. Teran    Diagnosis: Chronic HFrEF (heart failure with reduced ejection fraction)  Assessment and Plan of Treatment: Continue with carvedilol   Continue with hydralizine   Continue regimen from hospital. Follow heart healthy diet. Monitor weight. If you develop severe lower extremity swelling and shortness of breath please seek medial attention. Follow up with your PCP and cardiologist for further evaluation and managment. Please call to make an appointment.      Diagnosis: HTN (hypertension)  Assessment and Plan of Treatment: Continue blood pressure medication regimen as directed. Monitor for any visual changes, headaches or dizziness.  Monitor blood pressure regularly.  Follow up with your PCP for further management for high blood pressure.  Dash low sodium low cholesterol diet        PRINCIPAL DISCHARGE DIAGNOSIS  Diagnosis: Acute on chronic respiratory failure with hypercapnia  Assessment and Plan of Treatment: Duonebs as needed   Finish course of steroids as directed   Finish azithromycin as directed   Use your bipap machine every night and during day when napping   Settings 10/5 Back up rate 14 Fi02 30%      SECONDARY DISCHARGE DIAGNOSES  Diagnosis: Pulmonary hypertension  Assessment and Plan of Treatment: Follow up  with Dr. Teran    Diagnosis: Chronic HFrEF (heart failure with reduced ejection fraction)  Assessment and Plan of Treatment: Continue with carvedilol   Continue with hydralizine   Continue regimen from hospital. Follow heart healthy diet. Monitor weight. If you develop severe lower extremity swelling and shortness of breath please seek medial attention. Follow up with your PCP and cardiologist for further evaluation and managment. Please call to make an appointment.      Diagnosis: HTN (hypertension)  Assessment and Plan of Treatment: Continue blood pressure medication regimen as directed. Monitor for any visual changes, headaches or dizziness.  Monitor blood pressure regularly.  Follow up with your PCP for further management for high blood pressure.  Dash low sodium low cholesterol diet       Diagnosis: Palliative care encounter  Assessment and Plan of Treatment:   Pt can fu with pallicative care MD Tara Kelley MD  Phone: [phone]  [address] [place] [address], [address] [place] [address]     PRINCIPAL DISCHARGE DIAGNOSIS  Diagnosis: Acute on chronic respiratory failure with hypercapnia  Assessment and Plan of Treatment: You were noted with breathing issues second to COPD exacerbation. Continue on Duonebs as needed. Finish course of steroids as directed through 11/22/2020. Complete Azithromycin as directed on 11/14. Continue your home inhalers and nebulizers as directed. Please continue your home O2 and use your BiPAP machine every night and during the day when napping.   Please follow up with your Pulmonologist, Dr. Teran or [facility] Pulmonologist,  _______ on _______.      SECONDARY DISCHARGE DIAGNOSES  Diagnosis: Palliative care encounter  Assessment and Plan of Treatment: You met with Palliative Care during your hospitalization. You can follow up with Pallicative Care in office with:   Tara Kelley MD  Phone: [phone]  [address] [place] [address], [address]    Diagnosis: Chronic HFrEF (heart failure with reduced ejection fraction)  Assessment and Plan of Treatment: Continue with your home medications as directed. Continue holding your Lasix and follow up with your Cardiologist outpatient for continued monitoring and management.    Diagnosis: HTN (hypertension)  Assessment and Plan of Treatment: Continue blood pressure medication regimen as directed. Hold your Imdu and Lisinopril. Continue Hydralazine and Coreg. for now as your blood pressure remained stable during hospitalization off it. Follow up with your primary care physician within 1-2 weeks from discharge for continued monitoring and management.     PRINCIPAL DISCHARGE DIAGNOSIS  Diagnosis: Acute on chronic respiratory failure with hypercapnia  Assessment and Plan of Treatment: You were noted with breathing issues second to COPD exacerbation. Continue on Duonebs as needed. Finish course of steroids as directed through 11/22/2020. Complete Azithromycin as directed on 11/14. Continue your home inhalers and nebulizers as directed. Please continue your home O2 and use your BiPAP machine every night and during the day when napping.   Please follow up with [facility] Pulmonologist, Dr. Valdez on 11/18/2020 @ 0900AM. Please call [phone] when you are in the parking lot so they can check you in.      SECONDARY DISCHARGE DIAGNOSES  Diagnosis: Palliative care encounter  Assessment and Plan of Treatment: You met with Palliative Care during your hospitalization. You can follow up with Pallicative Care in office with:   Tara Kelley MD  Phone: [phone]  [address] [place] [address], [address] [place] [address]    Diagnosis: Chronic HFrEF (heart failure with reduced ejection fraction)  Assessment and Plan of Treatment: Continue with your home medications as directed. Continue holding your Lasix and follow up with your Cardiologist outpatient for continued monitoring and management.    Diagnosis: HTN (hypertension)  Assessment and Plan of Treatment: Continue blood pressure medication regimen as directed. Hold your Imdu and Lisinopril. Continue Hydralazine and Coreg. for now as your blood pressure remained stable during hospitalization off it. Follow up with your primary care physician within 1-2 weeks from discharge for continued monitoring and management.     PRINCIPAL DISCHARGE DIAGNOSIS  Diagnosis: Acute on chronic respiratory failure with hypercapnia  Assessment and Plan of Treatment: You were noted with breathing issues second to COPD exacerbation. Continue on Duonebs as needed. Finish course of steroids as directed through 11/22/2020. Complete Azithromycin as directed on 11/14. Continue your home inhalers and nebulizers as directed. Please continue your home O2 and use your BiPAP machine every night and during the day when napping. Please follow up with your Pulmonologist Dr. Teran in 1 week from discharge.      SECONDARY DISCHARGE DIAGNOSES  Diagnosis: Palliative care encounter  Assessment and Plan of Treatment: You met with Palliative Care during your hospitalization. You can follow up with Pallicative Care in office with:   Tara Kelley MD  Phone: [phone]  [address] [place] [address], [address]    Diagnosis: Chronic HFrEF (heart failure with reduced ejection fraction)  Assessment and Plan of Treatment: Continue with your home medications as directed. Continue holding your Lasix and follow up with your Cardiologist outpatient for continued monitoring and management.    Diagnosis: HTN (hypertension)  Assessment and Plan of Treatment: Continue blood pressure medication regimen as directed. Hold your Imdu and Lisinopril. Continue Hydralazine and Coreg. for now as your blood pressure remained stable during hospitalization off it. Follow up with your primary care physician within 1-2 weeks from discharge for continued monitoring and management.

## 2020-11-11 NOTE — DISCHARGE NOTE PROVIDER - PROVIDER TOKENS
PROVIDER:[TOKEN:[[id number]]] PROVIDER:[TOKEN:[[id number]]] PROVIDER:[TOKEN:[[id number]]]

## 2020-11-11 NOTE — PROGRESS NOTE ADULT - ASSESSMENT
70 yoF PMH HFrEF (EF 20-25% s/p PPM/ICD placement), COPD (on home O2, since last hospitalization for exacerbation August 2020), moderate pulm HTN, HTN, ?CARLOTA (not on home CPAP) who presented with SOB for 4 days admitted for acute on chronic hypercapnic respiratory failure in the setting of COPD exacerbation.     # Acute on chronic hypercapnic respiratory failure in setting of COPD exacerbation:  - However on exam, euvolemic, no LE edema, CXR with clear, hyperinflated lungs.  - RVP negative. Covid negative.   - ABG improving 87>66  - Tolerated BIPAP well overnight   - c/w Duonebs and Azx for 5 day course, and BIPAP qhs or as needed  - s/p IV steroids, will start with PO prednisone tomorrow  - Overnight Pulse ox tonight    # HTN  - Well controlled on current regimen.  - c/w home hydralazine, carvedilol, isosorbide   - DASH diet   - BP check as per routine    # Chronic systolic CHF   Does not appear to be volume overloaded on exam.   - POCUS and CXR not consistent with CHF exacerbation.   - c/w pt's home carvedilol 25 mg daily, Hydralazine 50 TID, and ASA 81 mg daily.  - Continue to monitor for signs of overload.     # DVT ppx  - c/w Heparin sq    # Dispo  - PT recs home with no PT  - d/c Planning for tomorrow    70 yoF PMH HFrEF (EF 20-25% s/p PPM/ICD placement), COPD (on home O2, since last hospitalization for exacerbation August 2020), moderate pulm HTN, HTN, ?CARLOTA (not on home CPAP) who presented with SOB for 4 days admitted for acute on chronic hypercapnic respiratory failure in the setting of COPD exacerbation.     # Acute on chronic hypercapnic respiratory failure in setting of COPD exacerbation:  - However on exam, euvolemic, no LE edema, CXR with clear, hyperinflated lungs.  - RVP negative. Covid negative.   - ABG improving 87>66  - Tolerated BIPAP well overnight   - c/w Duonebs and Azx for 5 day course, and BIPAP qhs or as needed  - s/p IV steroids, will start with PO prednisone tomorrow  - Overnight Pulse ox done and qualifies for home bipap awaiting insurance approval   - Spoke with palliative HCP signed -pt will fu with OP palliative details on dc instructions     # HTN  - Well controlled on current regimen.  - c/w home hydralazine, carvedilol, isosorbide   - DASH diet   - BP check as per routine    # Chronic systolic CHF   Does not appear to be volume overloaded on exam.   - POCUS and CXR not consistent with CHF exacerbation.   - c/w pt's home carvedilol 25 mg daily, Hydralazine 50 TID, and ASA 81 mg daily.  - Continue to monitor for signs of overload.     # DVT ppx  - c/w Heparin sq    # Dispo  - PT recs home with no PT  - d/c Planning when bipap is delivered /approved by insurance

## 2020-11-11 NOTE — CONSULT NOTE ADULT - SUBJECTIVE AND OBJECTIVE BOX
HPI:  70 yoF PMH HFrEF (EF 20-25% s/p PPM/ICD placement), COPD (on home O2, since last hospitalization for exacerbation 2020), moderate pulm HTN, HTN, ?CARLOTA (not on home CPAP) who is admitted for COPD exacerbation. Palliative Medicine was consulted for complex medical decision making related to goals of care discussions    =======================================================  2020    - Chart reviewed. Patient seen and examined. Discussed with primary team.  - The patient currently has not uncontrolled symptoms. Back at baseline O2 via NC w/ no distress.  - I discussed the patient's diagnoses, prognosis, code status, HCP and talked about Palliative Care with her. Pls see [facility] note for more info. In summary:    1) HCP: Maribel (son) and her DIL  2) Code: DNR, DNI, No feeding tube  3) She wants to live as long as she can for her grandchildren (her grandchildren were the ones who convinced her to stop smoking)  4) She is open to following up at our Palliative Care office      Pls refer the patient to our outpatient Palliative Care clinic after discharge:    Tara Kelley MD  Phone: [phone]  [address] [place] [address], [address] [place] [address]      =======================================================  ----- SYMPTOM ASSESSMENT:   [ ]Unable to obtain due to poor mentation: information obtained from team/ contact    PAIN ASSESSMENT: DENIED  Site-   Onset-   Character-   Radiation-   Associated symptoms-   Timing and duration-   Exacerbating factors  Severity-     Effect on QOL-   PAIN AD Score: 0    Dyspnea:  Yes [x ] No [ ] - [x ]Mild [ ]Moderate [ ]Severe  Anxiety:    Yes [ ] No [ ] - [ ]Mild [ ]Moderate [ ]Severe  Fatigue:    Yes [ ] No [ ] - [ ]Mild [ ]Moderate [ ]Severe  Nausea:    Yes [ ] No [ ] - [ ]Mild [ ]Moderate [ ]Severe                         Loss of appetite: Yes [ ] No [ ] - [ ]Mild [ ]Moderate [ ]Severe             Constipation:  Yes [ ] No [ ] - [ ]Mild [ ]Moderate [ ]Severe  Grief: Yes [ ] No [ ]     [X ]All other review of systems negative, including: weakness, cough, colds, blurry vision, headaches, dysuria, pruritus, palpitations, muscle cramps, easy bruising, epistaxis, rashes    =======================================================  ----- PERTINENT PMH/ SXH/ FHX  Right-sided heart failure    Pulmonary hypertension    Sleep apnea    LBBB (left bundle branch block)    Chronic systolic CHF (congestive heart failure)    CARLOTA (obstructive sleep apnea)    Heart failure, systolic    HTN (hypertension)    COPD (chronic obstructive pulmonary disease)    CAD (coronary artery disease)      Presence of cardioverter defibrillator    No significant past surgical history    S/P primary angioplasty with coronary stent      FAMILY HISTORY:  Family history of colon cancer        ----- SOCIAL HISTORY:   [ ] Unable to elicit  Significant other/partner:  -  AT [facility] 2-3 YEARS AGO AROUND THANKSGIVING  Children: YES  Druze/Spirituality:  Substance hx: Yes[ ]  No [ ]   Tobacco hx:  Yes [ ] No [ ]   Alcohol hx: Yes [ ] No [ ]   Home Opioid hx:  [ ] I-Stop Reference No:  Living Situation: [ X]Home  [ ]Long term care  [ ]Rehab [ ]Other  PATIENT LIVES AT HOME. HER SON, MARIBEL, AND HER 3 GRANDCHILDREN LIVE IN [place]. THEY ARE APPARENTLY BUILDING A NEW HOME THERE AND ARE INVITING THE PATIENT TO MOVE TO [place] WHEN IT IS COMPLETED      ----- ADVANCE DIRECTIVES:    DNR  MOLST  [ ]  Living Will  [ ]   DECISION MAKER(s):  [ ] Health Care Proxy(s)  [ ] Surrogate(s)  [ ] Guardian           Name(s): Phone Number(s):  MARIBEL MELENDEZ (SON) @ [phone]    ----- BASELINE (I)ADL(s) (prior to admission):  [place]: [ ]Total  [ ] Moderate [ ]Dependent  Palliative Performance Status Version 2: 50%    =======================================================  -----MEDICATIONS AND ALLERGIES:  Allergies    No Known Allergies    Intolerances    MEDICATIONS  (STANDING):  ALBUTerol    90 MICROgram(s) HFA Inhaler 1 Puff(s) Inhalation every 4 hours  albuterol/ipratropium for Nebulization 3 milliLiter(s) Nebulizer every 20 minutes  albuterol/ipratropium for Nebulization 3 milliLiter(s) Nebulizer every 6 hours  aspirin enteric coated 81 milliGRAM(s) Oral daily  azithromycin  IVPB 500 milliGRAM(s) IV Intermittent every 24 hours  carvedilol 25 milliGRAM(s) Oral every 12 hours  chlorhexidine 4% Liquid 1 Application(s) Topical daily  heparin   Injectable 5000 Unit(s) SubCutaneous every 12 hours  hydrALAZINE 50 milliGRAM(s) Oral three times a day  pantoprazole    Tablet 40 milliGRAM(s) Oral before breakfast  predniSONE   Tablet 40 milliGRAM(s) Oral daily    MEDICATIONS  (PRN):      =======================================================  ----- PHYSICAL EXAM:  Vital Signs Last 24 Hrs  T(C): 36.6 (2020 04:56), Max: 36.8 (10 Nov 2020 14:31)  T(F): 97.9 (2020 04:56), Max: 98.2 (10 Nov 2020 14:31)  HR: 82 (2020 10:43) (79 - 97)  BP: 153/88 (2020 04:56) (148/92 - 161/95)  BP(mean): --  RR: 18 (2020 04:56) (18 - 20)  SpO2: 99% (2020 10:43) (94% - 100%) I&O's Summary    10 Nov 2020 07:01  -  2020 07:00  --------------------------------------------------------  IN: 250 mL / OUT: 0 mL / NET: 250 mL    GEN: Awake, alert, NAD  HEAD: Normocephalic and atraumatic, ON O2 VIA NC  EYES: Anicteric sclerae, EOM's grossly intact  NECK: No JVD, no thyromegaly  PULM: Comfortable work of breathing, clear BS  CV: Pulses 2+ symmetric bilaterally, regular rate and rhythm  ABD: Not distended, soft, non-tender,   EXT: No edema, No deformities  PSYCH: Appropriate mood and affect, no suicidal ideations elicited  NEURO: No facial asymmetry, no tremors, no observed movement disorders  SKIN: No rashes or lesions on exposed skin, No jaundice    =======================================================  ----- LABS:                        11.0   9.04  )-----------( 194      ( 2020 05:50 )             34.3       142  |  97<L>  |  27<H>  ----------------------------<  149<H>  4.2   |  34<H>  |  0.91    Ca    9.6      2020 05:50  Phos  2.9       Mg     1.8               -----RADIOLOGY & ADDITIONAL STUDIES:    PROTEIN CALORIE MALNUTRITION PRESENT: [ ] Yes [ ] No  [ ] PPSV2 < or = to 30% [ ] significant weight loss  [ ] poor nutritional intake [ ] catabolic state [ ] anasarca     Albumin, Serum: 4.4 g/dL (20 @ 10:32)      REFERRALS:   [ ]Chaplaincy  [ ] Hospice  [ ]Child Life  [ ]Social Work  [ ]Case management [ ]Holistic Therapy   Goals of Care Discussion Document:     ************************************************************************  PALLIATIVE MEDICINE COORDINATION OF CARE DOCUMENTATION  [x] Inpatient Consult  [ ] Other:  ************************************************************************  MEDICATION REVIEW:  --- Pls refer to current medicatons in the body of this note  ISTOP REFERENCE: [id number]  --- PRN usage: NO PRNS  ------------------------------------------------------------------------  COORDINATION OF CARE:  --- Palliative Care consulted for: [facility]  --- Patient assessed:   --- Patient previously seen by Palliative Care service: NO  ADVANCE CARE PLANNING  --- Code status: DNR, DNI  --- MOLST reviewed in chart: YES  --- HCP/ Surrogate: MARIBEL (SON)  --- GOC document found in Alpha: NONE  --- HCP/ Living will/ Other advanced directives in Alpha: NONE  ------------------------------------------------------------------------  CARE PROVIDER DOCUMENTATION:  --- MORRIS/ATUL notes: Discharge today via ambulance 6pm  --- PT recs: Home, no skilled needs  --- Sp/Sw recs: NA  --- Nutrition recs: Severe protein calorie malnutrition  PLAN OF CARE  --- Known admissions in past year: 2  --- Current admit date:   --- LOS: 2  --- LACE score: 12  --- Current dispo plan: home  ------------------------------------------------------------------------  --- Chart reviewed: 30 Minutes [including time used to gather, review and transfer data to this note]  --- Start: 11:46  --- End: 12:16  Prolonged services rendered, as part of this patient's care provided by Palliative Medicine, include: i.chart review for provider and ancillary service documentation, ii.pertinent diagnostics including laboratory and imaging studies,iii. medication review including PRN use, iv. admission history including previous palliative care encounters and GOC notes, v.advance care planning documents including HCP and MOLST forms in Alpha. Part of Palliative Medicine extended evaluation and management also involves coordination of care with our IDT, the primary and consulting debora, and unit /SW and Hospice if eligible. Recommendations based on the information gathered and discussed are outline in the AP of our notes.    ************************************************************************

## 2020-11-11 NOTE — DISCHARGE NOTE PROVIDER - HOSPITAL COURSE
70 yoF PMH HFrEF (EF 20-25% s/p PPM/ICD placement), COPD (on home O2, since last hospitalization for exacerbation August 2020), moderate pulm HTN, HTN, ?CARLOTA (not on home CPAP) who presented with SOB for 4 days. Was not able to manage progressive SOB at home with prn inhalers so came to ED. Was taken off of lasix about 1 month ago, and developed progressive LE and abdominal swelling over the past monght. Last weeks also started to develop progressive SOB, which prompted her to come to the ED today. Follows closely with pulmonologist Dr. Teran. Denies orthopnea/PND. No recent illnesses or travel. Non smoker (former smoker). Denies fevers, chills, mucus production, sinus congestion, chest pain, nausea, vomiting, abdominal pain, dysuria, hematuria, melena, headaches, dizziness.     Acute on chronic hypercapnic respiratory failure in setting of COPD exacerbation:  Presented with progressive SOB in the setting of stopping diuretics. However on exam, euvolemic, no LE edema, CXR with clear, hyperinflated lungs. RVP negative. Covid negative. Wheezing bilaterally. States that her edema has resolved. ABG with elevated pCO2, improved with BIPAP.   - Duonebs  - IV steroids  - Azx for 5 day course.   - reports non-compliance in the past with BIPAP at home  - Continue bipap for now, transition to NC when more stable.   - Overnight pulse ox to eval for home Bipap    HTN (hypertension)  Well controlled on current regimen.  - c/w home hydralazine, carvedilol, isosorbide once off BIPAP  - DASH diet once no longer NPO    Chronic systolic CHF   Does not appear to be volume overloaded on exam. POCUS and CXR not consistent with CHF exacerbation.   - .5  - C/w pt's home carvedilol 12.5 daily, Hydral 25mg daily, ASA 81mg daily  - Continue to monitor for signs of overload.    70 yoF PMH HFrEF (EF 20-25% s/p PPM/ICD placement), COPD (on home O2, since last hospitalization for exacerbation August 2020), moderate pulm HTN, HTN, ?CARLOTA (not on home CPAP) who presented with SOB for 4 days. Was not able to manage progressive SOB at home with prn inhalers so came to ED. Was taken off of lasix about 1 month ago, and developed progressive LE and abdominal swelling over the past monght. Last weeks also started to develop progressive SOB, which prompted her to come to the ED today. Follows closely with pulmonologist Dr. Teran. Denies orthopnea/PND. No recent illnesses or travel. Non smoker (former smoker). Denies fevers, chills, mucus production, sinus congestion, chest pain, nausea, vomiting, abdominal pain, dysuria, hematuria, melena, headaches, dizziness.     Acute on chronic hypercapnic respiratory failure in setting of COPD exacerbation:  Presented with progressive SOB in the setting of stopping diuretics. However on exam, euvolemic, no LE edema, CXR with clear, hyperinflated lungs. RVP negative. Covid negative. Wheezing bilaterally. States that her edema has resolved. ABG with elevated pCO2, improved with BIPAP.   - Duonebs  - IV steroids  - Azx for 5 day course.   - reports non-compliance in the past with BIPAP at home  - Continue bipap for now, transition to NC when more stable.   - Overnight pulse ox to eval for home Bipap  - Pt qualifies for BIPAP and will be sent home with same       HTN (hypertension)  Well controlled on current regimen.  - c/w home hydralazine, carvedilol, isosorbide once off BIPAP  - DASH diet once no longer NPO    Chronic systolic CHF   Does not appear to be volume overloaded on exam. POCUS and CXR not consistent with CHF exacerbation.   - .5  - C/w pt's home carvedilol 12.5 daily, Hydral 25mg daily, ASA 81mg daily  - Continue to monitor for signs of overload.     Pt seen and evaluated by Dr. Lisker on November , 2020 and cleared for AL home   Dispo 71 YO Female with PMH of HFrEF 20-25% s/p PPM/ICD placement, CARLOTA not on home CPAP, COPD on home O2 (last hospitalization for exacerbation August 2020), Moderate PulmHTN and HTN who presented with SOB for 4 days. Patient reports she was taken off of Lasix about 1 month ago, and developed progressive LE and abdominal swelling over the past month. Last week she started to develop progressive SOB, unrelieved by home inhlaers, which prompted her to come to the ED.     In ED, patient noted with acute on chronic hypercapnic respiratory failure and placed on BIPAP. CXR negative for infection or fluid overload. RVP and COVID negative. Wheezing noted and started on Solumedrol and Duonebs. ProBNP only 270.5 and appeared euvolemic. Admitted to RCU.     In RCU, patient weaned off BiPAP to home NC QD and BiPAP QHS. Solumedrol transitioned to Prednisone Taper on 11/11. Azithromycin continued from 11/10 to presumed 11/14 for 5 day course. Patient seen by PT and noted no home PT needs. Case discussed with CM and qualifies for home BIPAP. Patient seen by Palliative care and wishes for DNR/ DNI. MOLST filled out and placed in chart.     On _____, case discussed with Dr. Lisker and patient is medically cleared and stable for discharge home. 69 YO Female with PMH of HFrEF 20-25% s/p PPM/ICD placement, CARLOTA not on home CPAP, COPD on home O2 (last hospitalization for exacerbation August 2020), Moderate PulmHTN and HTN who presented with SOB for 4 days. Patient reports she was taken off of Lasix about 1 month ago, and developed progressive LE and abdominal swelling over the past month. Last week she started to develop progressive SOB, unrelieved by home inhlaers, which prompted her to come to the ED.     In ED, patient noted with acute on chronic hypercapnic respiratory failure and placed on BIPAP. CXR negative for infection or fluid overload. RVP and COVID negative. Wheezing noted and started on Solumedrol and Duonebs. ProBNP only 270.5 and appeared euvolemic. Admitted to RCU.     In RCU, patient weaned off BiPAP to home NC QD and BiPAP QHS. Solumedrol transitioned to Prednisone Taper on 11/11. Azithromycin continued from 11/10 to presumed 11/14 for 5 day course. Patient seen by PT and noted no home PT needs. Case discussed with CM and qualifies for home BIPAP. Patient seen by Palliative care and wishes for DNR/ DNI. MOLST filled out and placed in chart.     On 11/13, case discussed with Dr. Lisker and patient is medically cleared and stable for discharge home.

## 2020-11-11 NOTE — DIETITIAN INITIAL EVALUATION ADULT. - PERTINENT MEDS FT
azithromycin  IVPB  carvedilol  heparin   Injectable  hydrALAZINE  pantoprazole    Tablet  predniSONE   Tablet

## 2020-11-11 NOTE — CONSULT NOTE ADULT - ASSESSMENT
70 yoF PMH HFrEF (EF 20-25% s/p PPM/ICD placement), COPD (on home O2, since last hospitalization for exacerbation August 2020), moderate pulm HTN, HTN, ?CARLOTA (not on home CPAP) who is admitted for COPD exacerbation. Palliative Medicine was consulted for complex medical decision making related to goals of care discussions

## 2020-11-12 NOTE — PROGRESS NOTE ADULT - ASSESSMENT
69 YO F with PMHx of HFrEF 20-25 s/p PPM/ ICD placement, CARLOTA/ COPD (on home O2, no home CPAP, and last COPD excerabation in 8/2020), moderate PulMHTN and HTN p/w SOB and LE progressive edema. Found with acute on chronic hypercapnic respiratory failure in setting of COPD exacerbation. 71 YO F with PMHx of HFrEF 20-25 s/p PPM/ ICD placement, CARLOTA/ COPD (on home O2, no home CPAP, and last COPD excerabation in 8/2020), moderate PulMHTN and HTN p/w SOB and LE progressive edema. Found with acute on chronic hypercapnic respiratory failure in setting of COPD exacerbation.     # Acute on Chronic Hypercapnic Respiratory Failure in setting of COPD exacerbation  - Euvolemic on presentation with CXR clear.   - RVP and COVID negative  - Duonebs and IV Solumedrol started. Transitioned to Prednisone taper 11/11.   - Continued on Zithromax from 11/10-11/14 for anti-inflammatory property.   - Continued on BIPAP weaned to NC QD/ BIPAP QHS with improvement in hypercarbia.   - Qualifies for BIPAP at home.     # HTN (hypertension)  - Well controlled on current regimen.  - c/w home Hydralazine and Carvedilol. Holding home Isordil.     # Chronic Systolic Heart Failure   - Does not appear to be volume overloaded on exam. POCUS and CXR not consistent with CHF exacerbation.   - proBNP 270.5 and unlikely HF exacarbation.   - Continue with home medications as above with ASA 81mg PO QD.     # DVT PPX with HSQ   # DISPO - Home with no home PT. 71 YO F with PMHx of HFrEF 20-25 s/p PPM/ ICD placement, Former Smoker, CARLOTA/ COPD (on home O2, no home CPAP, and last COPD excerabation in 8/2020), moderate PulMHTN and HTN p/w SOB and LE progressive edema. Found with acute on chronic hypercapnic respiratory failure in setting of COPD exacerbation.     # Acute on Chronic Hypercapnic Respiratory Failure in setting of COPD exacerbation  - Euvolemic on presentation with CXR clear.   - RVP and COVID negative  - Duonebs and IV Solumedrol started. Transitioned to Prednisone taper 11/11.   - Continued on Zithromax from 11/10-11/14 for anti-inflammatory property.   - Continued on BIPAP weaned to NC QD/ BIPAP QHS with improvement in hypercarbia.   - Qualifies for BIPAP at home.     # HTN (hypertension)  - Well controlled on current regimen.  - c/w home Hydralazine and Carvedilol. Holding home Isordil.     # Chronic Systolic Heart Failure   - Does not appear to be volume overloaded on exam. POCUS and CXR not consistent with CHF exacerbation.   - proBNP 270.5 and unlikely HF exacarbation.   - Continue with home medications as above with ASA 81mg PO QD.     # Palliative   - Seen by Palliative care on 11/11 and wishes for DNR/ DNI. MOLST filled out and placed in chart. Patient to follow up with Palliative care (Tara Kelley MD [phone], [address] [facility], [address] [place] [address]) outpatient as she is noted with lots of regret for not quitting smoking earlier and wishes.     # DVT PPX with HSQ   # DISPO - Home with no home PT.

## 2020-11-12 NOTE — PROGRESS NOTE ADULT - ATTENDING COMMENTS
as above  stable for d/c, awaiting approval of bipap  she will f/u with dr. mcmahan next week  palliative care consult greatly appreciated -- GOC addressed, and will further f/u with palliative as outpt

## 2020-11-12 NOTE — PROGRESS NOTE ADULT - SUBJECTIVE AND OBJECTIVE BOX
CHIEF COMPLAINT: Patient is a 70y old  Female who presents with a chief complaint of COPD exacerbation (11 Nov 2020 11:54)    SUBJECTIVE: DISPO planning.     [ ] All other systems negative  [ ] Unable to assess ROS because ________    OBJECTIVE:  ICU Vital Signs Last 24 Hrs  T(C): 36.4 (12 Nov 2020 05:00), Max: 36.8 (11 Nov 2020 14:13)  T(F): 97.6 (12 Nov 2020 05:00), Max: 98.3 (11 Nov 2020 14:13)  HR: 79 (12 Nov 2020 05:00) (77 - 96)  BP: 156/96 (12 Nov 2020 05:00) (141/77 - 162/90)  BP(mean): --  ABP: --  ABP(mean): --  RR: 18 (12 Nov 2020 05:00) (18 - 18)  SpO2: 100% (12 Nov 2020 05:00) (99% - 100%)    CAPILLARY BLOOD GLUCOSE    PHYSICAL EXAM:  General:   HEENT:   Lymph Nodes:  Neck:   Respiratory:   Cardiovascular:   Abdomen:   Extremities:   Skin:   Neurological:  Psychiatry:    HOSPITAL MEDICATIONS:  MEDICATIONS  (STANDING):  ALBUTerol    90 MICROgram(s) HFA Inhaler 1 Puff(s) Inhalation every 4 hours  albuterol/ipratropium for Nebulization 3 milliLiter(s) Nebulizer every 20 minutes  albuterol/ipratropium for Nebulization 3 milliLiter(s) Nebulizer every 6 hours  aspirin enteric coated 81 milliGRAM(s) Oral daily  azithromycin  IVPB 500 milliGRAM(s) IV Intermittent every 24 hours  carvedilol 25 milliGRAM(s) Oral every 12 hours  chlorhexidine 4% Liquid 1 Application(s) Topical daily  heparin   Injectable 5000 Unit(s) SubCutaneous every 12 hours  hydrALAZINE 50 milliGRAM(s) Oral three times a day  pantoprazole    Tablet 40 milliGRAM(s) Oral before breakfast  predniSONE   Tablet 40 milliGRAM(s) Oral daily    MEDICATIONS  (PRN):    LABS:                        11.0   9.04  )-----------( 194      ( 11 Nov 2020 05:50 )             34.3     11-11    142  |  97<L>  |  27<H>  ----------------------------<  149<H>  4.2   |  34<H>  |  0.91    Ca    9.6      11 Nov 2020 05:50  Phos  2.9     11-11  Mg     1.8     11-11             CHIEF COMPLAINT: Patient is a 70y old  Female who presents with a chief complaint of COPD exacerbation (11 Nov 2020 11:54)    SUBJECTIVE: DISPO planning. Seen by bedside with no complaints.     OBJECTIVE:  ICU Vital Signs Last 24 Hrs  T(C): 36.4 (12 Nov 2020 05:00), Max: 36.8 (11 Nov 2020 14:13)  T(F): 97.6 (12 Nov 2020 05:00), Max: 98.3 (11 Nov 2020 14:13)  HR: 79 (12 Nov 2020 05:00) (77 - 96)  BP: 156/96 (12 Nov 2020 05:00) (141/77 - 162/90)  BP(mean): --  ABP: --  ABP(mean): --  RR: 18 (12 Nov 2020 05:00) (18 - 18)  SpO2: 100% (12 Nov 2020 05:00) (99% - 100%)    CAPILLARY BLOOD GLUCOSE    PHYSICAL EXAM:  General: NAD   Cards: S1/S2, no murmurs   Pulm: CTA bilaterally. No wheezes.   Abdomen: Soft, NTND. BS (+)   Extremities: No pedal edema. ELIZABETH of BL upper and lower extremities.  Neurology: AOx3 with no focal neurological deficits     HOSPITAL MEDICATIONS:  MEDICATIONS  (STANDING):  ALBUTerol    90 MICROgram(s) HFA Inhaler 1 Puff(s) Inhalation every 4 hours  albuterol/ipratropium for Nebulization 3 milliLiter(s) Nebulizer every 20 minutes  albuterol/ipratropium for Nebulization 3 milliLiter(s) Nebulizer every 6 hours  aspirin enteric coated 81 milliGRAM(s) Oral daily  azithromycin  IVPB 500 milliGRAM(s) IV Intermittent every 24 hours  carvedilol 25 milliGRAM(s) Oral every 12 hours  chlorhexidine 4% Liquid 1 Application(s) Topical daily  heparin   Injectable 5000 Unit(s) SubCutaneous every 12 hours  hydrALAZINE 50 milliGRAM(s) Oral three times a day  pantoprazole    Tablet 40 milliGRAM(s) Oral before breakfast  predniSONE   Tablet 40 milliGRAM(s) Oral daily    MEDICATIONS  (PRN):    LABS:                        11.0   9.04  )-----------( 194      ( 11 Nov 2020 05:50 )             34.3     11-11    142  |  97<L>  |  27<H>  ----------------------------<  149<H>  4.2   |  34<H>  |  0.91    Ca    9.6      11 Nov 2020 05:50  Phos  2.9     11-11  Mg     1.8     11-11

## 2020-11-12 NOTE — PROVIDER CONTACT NOTE (OTHER) - ASSESSMENT
Pt refusing to wear BIPAP, unable to sleep past few nights. Pt educated on importance of wearing it overnight. Pt continues to refuse.

## 2020-11-12 NOTE — PROGRESS NOTE ADULT - NUTRITIONAL ASSESSMENT
This patient has been assessed with a concern for Malnutrition and has been determined to have a diagnosis/diagnoses of Severe protein-calorie malnutrition and Underweight/BMI < 19.    This patient is being managed with:   Diet DASH/TLC-  Sodium & Cholesterol Restricted  Entered: Nov 9 2020  5:17PM    The following pending diet order is being considered for treatment of Severe protein-calorie malnutrition and Underweight/BMI < 19:  Diet Regular-  Low Sodium  Supplement Feeding Modality:  Oral  Ensure Enlive Cans or Servings Per Day:  1       Frequency:  Daily  Entered: Nov 11 2020 11:14AM

## 2020-11-13 NOTE — CHART NOTE - NSCHARTNOTEFT_GEN_A_CORE
Notified by RN that patient refuses to wear BIPAP overnight. Patient seen at bedside. Patient is alert and oriented x3. Explained the importance of wearing BIPAP. Patient initially refused to wear BIPAP despite encouragement and explanation. Patient reports that she wants to get a good sleep prior to discharge. Discussed with patient risks/ benefits. Patient verbalized understanding. Patient agreed to take melatonin and wear BIPAP.

## 2020-11-13 NOTE — DISCHARGE NOTE NURSING/CASE MANAGEMENT/SOCIAL WORK - NSSCTYPOFSERV_GEN_ALL_CORE
Visiting Nurse Services. The 1st Visiting Nurse Visit is for Saturday 11/14/20. The Nurse will call to arrange the Visit time.

## 2020-11-13 NOTE — DISCHARGE NOTE NURSING/CASE MANAGEMENT/SOCIAL WORK - PATIENT PORTAL LINK FT
You can access the FollowMyHealth Patient Portal offered by [facility] by registering at the following website: [email or web address]. By joining "[employer]’s FollowMyHealth portal, you will also be able to view your health information using other applications (apps) compatible with our system.

## 2020-11-13 NOTE — PROGRESS NOTE ADULT - SUBJECTIVE AND OBJECTIVE BOX
CHIEF COMPLAINT: Patient is a 70y old  Female who presents with a chief complaint of COPD exacerbation (12 Nov 2020 09:21)    SUBJECTIVE: No interval events overnight. Plan for DC today.     [ ] All other systems negative  [ ] Unable to assess ROS because ________    OBJECTIVE:  ICU Vital Signs Last 24 Hrs  T(C): 36.6 (13 Nov 2020 05:42), Max: 36.9 (12 Nov 2020 21:10)  T(F): 97.8 (13 Nov 2020 05:42), Max: 98.4 (12 Nov 2020 21:10)  HR: 78 (13 Nov 2020 07:49) (73 - 97)  BP: 149/88 (13 Nov 2020 05:42) (134/82 - 162/88)  BP(mean): --  ABP: --  ABP(mean): --  RR: 18 (13 Nov 2020 05:42) (18 - 19)  SpO2: 99% (13 Nov 2020 07:49) (99% - 100%)    CAPILLARY BLOOD GLUCOSE    PHYSICAL EXAM:  General:   HEENT:   Lymph Nodes:  Neck:   Respiratory:   Cardiovascular:   Abdomen:   Extremities:   Skin:   Neurological:  Psychiatry:    HOSPITAL MEDICATIONS:  MEDICATIONS  (STANDING):  ALBUTerol    90 MICROgram(s) HFA Inhaler 1 Puff(s) Inhalation every 4 hours  albuterol/ipratropium for Nebulization 3 milliLiter(s) Nebulizer every 20 minutes  albuterol/ipratropium for Nebulization 3 milliLiter(s) Nebulizer every 6 hours  aspirin enteric coated 81 milliGRAM(s) Oral daily  azithromycin  IVPB 500 milliGRAM(s) IV Intermittent every 24 hours  carvedilol 25 milliGRAM(s) Oral every 12 hours  chlorhexidine 4% Liquid 1 Application(s) Topical daily  heparin   Injectable 5000 Unit(s) SubCutaneous every 12 hours  hydrALAZINE 50 milliGRAM(s) Oral three times a day  pantoprazole    Tablet 40 milliGRAM(s) Oral before breakfast    MEDICATIONS  (PRN):    LABS:                 CHIEF COMPLAINT: Patient is a 70y old  Female who presents with a chief complaint of COPD exacerbation (12 Nov 2020 09:21)    SUBJECTIVE: No interval events overnight. Plan for DC today. Seen by bedside during AM rounds and denies SOB, CP, HA, or abdominal pain. Ambulates. Discussed importance of BIPAP QHS and expressed comprehension. States, "I'll wear it as much as I can, because I dont want to be hospitalized again."    OBJECTIVE:  ICU Vital Signs Last 24 Hrs  T(C): 36.6 (13 Nov 2020 05:42), Max: 36.9 (12 Nov 2020 21:10)  T(F): 97.8 (13 Nov 2020 05:42), Max: 98.4 (12 Nov 2020 21:10)  HR: 78 (13 Nov 2020 07:49) (73 - 97)  BP: 149/88 (13 Nov 2020 05:42) (134/82 - 162/88)  BP(mean): --  ABP: --  ABP(mean): --  RR: 18 (13 Nov 2020 05:42) (18 - 19)  SpO2: 99% (13 Nov 2020 07:49) (99% - 100%)    CAPILLARY BLOOD GLUCOSE    PHYSICAL EXAM:  General: NAD   Cards: S1/S2, no murmurs   Pulm: CTA bilaterally. No wheezes.   Abdomen: Soft, NTND. BS (+)   Extremities: No pedal edema. ELIZABETH of BL upper and lower extremities.  Neurology: AOx3 with no focal neurological deficits     HOSPITAL MEDICATIONS:  MEDICATIONS  (STANDING):  ALBUTerol    90 MICROgram(s) HFA Inhaler 1 Puff(s) Inhalation every 4 hours  albuterol/ipratropium for Nebulization 3 milliLiter(s) Nebulizer every 20 minutes  albuterol/ipratropium for Nebulization 3 milliLiter(s) Nebulizer every 6 hours  aspirin enteric coated 81 milliGRAM(s) Oral daily  azithromycin  IVPB 500 milliGRAM(s) IV Intermittent every 24 hours  carvedilol 25 milliGRAM(s) Oral every 12 hours  chlorhexidine 4% Liquid 1 Application(s) Topical daily  heparin   Injectable 5000 Unit(s) SubCutaneous every 12 hours  hydrALAZINE 50 milliGRAM(s) Oral three times a day  pantoprazole    Tablet 40 milliGRAM(s) Oral before breakfast    MEDICATIONS  (PRN):    LABS:

## 2020-11-13 NOTE — PROGRESS NOTE ADULT - ASSESSMENT
71 YO F with PMHx of HFrEF 20-25 s/p PPM/ ICD placement, Former Smoker, CARLOTA/ COPD (on home O2, no home CPAP, and last COPD excerabation in 8/2020), moderate PulMHTN and HTN p/w SOB and LE progressive edema. Found with acute on chronic hypercapnic respiratory failure in setting of COPD exacerbation.     # Acute on Chronic Hypercapnic Respiratory Failure in setting of COPD exacerbation  - Euvolemic on presentation with CXR clear.   - RVP and COVID negative  - Duonebs and IV Solumedrol started. Transitioned to Prednisone taper 11/11.   - Continued on Zithromax from 11/10-11/14 for anti-inflammatory property.   - Continued on BIPAP weaned to NC QD/ BIPAP QHS with improvement in hypercarbia.   - Qualifies for BIPAP at home.     # HTN (hypertension)  - Well controlled on current regimen.  - c/w home Hydralazine and Carvedilol. Holding home Isordil.     # Chronic Systolic Heart Failure   - Does not appear to be volume overloaded on exam. POCUS and CXR not consistent with CHF exacerbation.   - proBNP 270.5 and unlikely HF exacarbation.   - Continue with home medications as above with ASA 81mg PO QD.     # Palliative   - Seen by Palliative care on 11/11 and wishes for DNR/ DNI. MOLST filled out and placed in chart. Patient to follow up with Palliative care (Tara Kelley MD [phone], [address] [facility], [address] [place] [address]) outpatient as she is noted with lots of regret for not quitting smoking earlier and wishes.     # DVT PPX with HSQ   # DISPO - Home with no home PT. 69 YO F with PMHx of HFrEF 20-25 s/p PPM/ ICD placement, Former Smoker, CARLOTA/ COPD (on home O2, no home CPAP, and last COPD excerabation in 8/2020), moderate PulMHTN and HTN p/w SOB and LE progressive edema. Found with acute on chronic hypercapnic respiratory failure in setting of COPD exacerbation.     # Acute on Chronic Hypercapnic Respiratory Failure in setting of COPD exacerbation  - Euvolemic on presentation with CXR clear.   - RVP and COVID negative  - Duonebs and IV Solumedrol started. Transitioned to Prednisone taper 11/11-11/22   - Continued on Zithromax from 11/10-11/14 for anti-inflammatory property.   - Continued on BIPAP weaned to NC QD/ BIPAP QHS with improvement in hypercarbia.   - Qualifies for BIPAP at home.   - Continue on Nebs PRN and Symbicort BID at home.     # HTN (hypertension)  - Well controlled on current regimen.  - c/w home Hydralazine and Carvedilol. Holding home Isordil.     # Chronic Systolic Heart Failure   - Does not appear to be volume overloaded on exam. POCUS and CXR not consistent with CHF exacerbation.   - proBNP 270.5 and unlikely HF exacarbation.   - Continue with home medications as above with ASA 81mg PO QD.     # Palliative   - Seen by Palliative care on 11/11 and wishes for DNR/ DNI. MOLST filled out and placed in chart. Patient to follow up with Palliative care (Tara Kelley MD [phone], [address] [place] [address], [address] [place] [address]) outpatient as she is noted with lots of regret for not quitting smoking earlier and wishes.     # DVT PPX with HSQ   # DISPO - Home with no home PT.

## 2020-11-13 NOTE — DISCHARGE NOTE NURSING/CASE MANAGEMENT/SOCIAL WORK - NSDCDMETYPESERV_GEN_ALL_CORE_FT
A  Home BIPAP will be delivered to your home this Evening by the  Respiratory Therapist. They will call to arrange the Delivery time. They will set up & teach the operation & application of the Machine, this evening between 3p-5p.

## 2020-11-19 PROBLEM — J44.9 COPD, MODERATE: Status: ACTIVE | Noted: 2017-11-16

## 2020-11-19 NOTE — HISTORY OF PRESENT ILLNESS
[Post-hospitalization from ___ Hospital] : Post-hospitalization from [unfilled] Hospital [Admitted on: ___] : The patient was admitted on [unfilled] [Discharged on ___] : discharged on [unfilled] [FreeTextEntry2] : 69 yo F with CHF, CARLOTA, COPD on O2, HTN was admitted to [facility] for SOB. She stopped lasix and had progressive lower extremity edema and abdominal swelling. She developed SOB and came to the hospital. She had xray, no infection or fluid overload. She was started on BiPAP, steroids and azithromycin. She is still on prednisone, has several days left. She has completed azithromycin.\par \par She has felt much better since she started BIPAP. She is now up to 7 hours a day. She is sleeping better as well. She does wake up. She would like to know if it is ok to take melatonin. \par \par BPs at home this morning-- 134/79. Elevated here in office.

## 2020-11-19 NOTE — CURRENT MEDS
.8  reported by RN. pt on Heparin drip for Right femoral stent occlusion and left femoral artery occlusion. pt seen at bedside. NAD, no signs and symptoms of bleeding.   - Recheck body weight, document as drug dosing weight  - Follow up Heparin drip normogram, hold for an hour, rate down to 4 ml  - Monitor for s/s of bleeding   - Fall precaution and bleeding precaution   - Monitor PTT    Marielos Carrion NP-C  #[id number] [Takes medication as prescribed] : takes

## 2020-12-01 NOTE — PHYSICAL EXAM
[No Acute Distress] : no acute distress [Normal Oropharynx] : normal oropharynx [IV] : Mallampati Class: IV [Normal Appearance] : normal appearance [No Neck Mass] : no neck mass [Normal Rate/Rhythm] : normal rate/rhythm [Normal S1, S2] : normal s1, s2 [No Murmurs] : no murmurs [No Resp Distress] : no resp distress [Clear to Auscultation Bilaterally] : clear to auscultation bilaterally [No Abnormalities] : no abnormalities [Benign] : benign [Normal Gait] : normal gait [No Clubbing] : no clubbing [No Cyanosis] : no cyanosis [No Edema] : no edema [FROM] : FROM [Normal Color/ Pigmentation] : normal color/ pigmentation [No Focal Deficits] : no focal deficits [Oriented x3] : oriented x3 [Normal Affect] : normal affect [TextBox_68] : Decrease in air entry.

## 2020-12-01 NOTE — HISTORY OF PRESENT ILLNESS
[TextBox_4] : 70 year old lady with h/o severe COPD and respiratory failure . She was admitted to the hospital in acute respiratory failure.  She was treated with BiPAP and she felt better with it.  She was hospitalized for 1 week. She was discharged on BiPAP.  She is on tapering doses of steroids. \par She is taking prednisone 5 mg prednisone. She is suing BIPAP at night, which she says helps her a lot.  She cannot tolerate her face mask.  She has portable oxygen.

## 2020-12-01 NOTE — REASON FOR VISIT
[Follow-Up - From Hospitalization] : a follow-up visit after a recent hospitalization [COPD] : COPD [Shortness of Breath] : shortness of breath [TextBox_44] : Respiratory

## 2020-12-01 NOTE — DISCUSSION/SUMMARY
[FreeTextEntry1] : The patient is on BiPAP for acute respiratory failure.\par She is on lowering doses of Prednisone.\par Will give her a better interface as she does not like the mask.

## 2020-12-04 PROBLEM — R06.00 DYSPNEA ON EXERTION: Status: ACTIVE | Noted: 2018-08-20

## 2020-12-04 NOTE — HISTORY OF PRESENT ILLNESS
[FreeTextEntry1] : Here for followup. Hospitalized last month again for increasing dyspnea and abdominal distention. Diuresed and started on CPAP at night. Feeling better\par #HTN- On Coreg 25 mg twice daily, Hydralazine  50 mg TID and Imdur to 30 mg daily. \par #Chronic systolic heart failure- On Above meds. patient appears euvolemic.\par #HLD on statin therapy

## 2020-12-04 NOTE — DISCUSSION/SUMMARY
[FreeTextEntry1] : 70 year old woman with history of HFrEF, HTN HLD here for followup. SHe was recently readmitted for fluid overload and sleep apnea and prescribed a CPAP machine that has helped; she is waiting for a nasal mask but otherwise well. \par \par #HTN- On Coreg 25 mg twice daily, Hydralazine  50 mg TID and Imdur to 30 mg daily. \par #Chronic systolic heart failure- On Above meds. patient appears euvolemic.\par #HLD on statin therapy\par #FU in 1 month

## 2021-01-01 ENCOUNTER — APPOINTMENT (OUTPATIENT)
Dept: FAMILY MEDICINE | Facility: CLINIC | Age: 71
End: 2021-01-01
Payer: MEDICARE

## 2021-01-01 ENCOUNTER — TRANSCRIPTION ENCOUNTER (OUTPATIENT)
Age: 71
End: 2021-01-01

## 2021-01-01 ENCOUNTER — APPOINTMENT (OUTPATIENT)
Dept: CARDIOLOGY | Facility: CLINIC | Age: 71
End: 2021-01-01
Payer: MEDICARE

## 2021-01-01 ENCOUNTER — APPOINTMENT (OUTPATIENT)
Dept: PULMONOLOGY | Facility: CLINIC | Age: 71
End: 2021-01-01
Payer: MEDICARE

## 2021-01-01 ENCOUNTER — APPOINTMENT (OUTPATIENT)
Dept: PULMONOLOGY | Facility: CLINIC | Age: 71
End: 2021-01-01

## 2021-01-01 ENCOUNTER — INPATIENT (INPATIENT)
Facility: HOSPITAL | Age: 71
LOS: 15 days | Discharge: OTHER INSTITUTION | End: 2021-06-22
Attending: STUDENT IN AN ORGANIZED HEALTH CARE EDUCATION/TRAINING PROGRAM | Admitting: STUDENT IN AN ORGANIZED HEALTH CARE EDUCATION/TRAINING PROGRAM
Payer: MEDICAID

## 2021-01-01 ENCOUNTER — INPATIENT (INPATIENT)
Facility: HOSPITAL | Age: 71
LOS: 6 days | Discharge: HOME HEALTH | End: 2021-02-20
Attending: HOSPITALIST | Admitting: HOSPITALIST
Payer: MEDICARE

## 2021-01-01 ENCOUNTER — NON-APPOINTMENT (OUTPATIENT)
Age: 71
End: 2021-01-01

## 2021-01-01 ENCOUNTER — INPATIENT (INPATIENT)
Facility: HOSPITAL | Age: 71
LOS: 7 days | Discharge: HOME HEALTH | End: 2021-05-11
Attending: STUDENT IN AN ORGANIZED HEALTH CARE EDUCATION/TRAINING PROGRAM | Admitting: STUDENT IN AN ORGANIZED HEALTH CARE EDUCATION/TRAINING PROGRAM
Payer: MEDICARE

## 2021-01-01 ENCOUNTER — LABORATORY RESULT (OUTPATIENT)
Age: 71
End: 2021-01-01

## 2021-01-01 ENCOUNTER — APPOINTMENT (OUTPATIENT)
Dept: ELECTROPHYSIOLOGY | Facility: CLINIC | Age: 71
End: 2021-01-01
Payer: MEDICARE

## 2021-01-01 ENCOUNTER — INPATIENT (INPATIENT)
Facility: HOSPITAL | Age: 71
LOS: 5 days | Discharge: HOME | End: 2021-04-03
Attending: STUDENT IN AN ORGANIZED HEALTH CARE EDUCATION/TRAINING PROGRAM | Admitting: STUDENT IN AN ORGANIZED HEALTH CARE EDUCATION/TRAINING PROGRAM
Payer: MEDICARE

## 2021-01-01 ENCOUNTER — APPOINTMENT (OUTPATIENT)
Dept: CARDIOLOGY | Facility: CLINIC | Age: 71
End: 2021-01-01

## 2021-01-01 VITALS
OXYGEN SATURATION: 99 % | TEMPERATURE: 99 F | RESPIRATION RATE: 18 BRPM | DIASTOLIC BLOOD PRESSURE: 88 MMHG | SYSTOLIC BLOOD PRESSURE: 156 MMHG | HEART RATE: 93 BPM | HEIGHT: 63 IN

## 2021-01-01 VITALS
WEIGHT: 87 LBS | HEIGHT: 62 IN | TEMPERATURE: 97.7 F | BODY MASS INDEX: 16.01 KG/M2 | OXYGEN SATURATION: 94 % | HEART RATE: 97 BPM | DIASTOLIC BLOOD PRESSURE: 71 MMHG | SYSTOLIC BLOOD PRESSURE: 134 MMHG

## 2021-01-01 VITALS
HEIGHT: 63 IN | SYSTOLIC BLOOD PRESSURE: 138 MMHG | TEMPERATURE: 98 F | DIASTOLIC BLOOD PRESSURE: 75 MMHG | OXYGEN SATURATION: 98 % | RESPIRATION RATE: 26 BRPM | HEART RATE: 88 BPM

## 2021-01-01 VITALS
HEART RATE: 97 BPM | BODY MASS INDEX: 16.01 KG/M2 | TEMPERATURE: 97.7 F | DIASTOLIC BLOOD PRESSURE: 71 MMHG | WEIGHT: 87 LBS | HEIGHT: 62 IN | SYSTOLIC BLOOD PRESSURE: 134 MMHG | OXYGEN SATURATION: 94 %

## 2021-01-01 VITALS
RESPIRATION RATE: 18 BRPM | HEIGHT: 66 IN | OXYGEN SATURATION: 100 % | HEART RATE: 50 BPM | DIASTOLIC BLOOD PRESSURE: 55 MMHG | TEMPERATURE: 98 F | SYSTOLIC BLOOD PRESSURE: 167 MMHG

## 2021-01-01 VITALS — DIASTOLIC BLOOD PRESSURE: 90 MMHG | SYSTOLIC BLOOD PRESSURE: 150 MMHG

## 2021-01-01 VITALS
SYSTOLIC BLOOD PRESSURE: 170 MMHG | TEMPERATURE: 97.6 F | WEIGHT: 88 LBS | BODY MASS INDEX: 16.2 KG/M2 | HEIGHT: 62 IN | HEART RATE: 100 BPM | DIASTOLIC BLOOD PRESSURE: 84 MMHG | OXYGEN SATURATION: 96 %

## 2021-01-01 VITALS
SYSTOLIC BLOOD PRESSURE: 150 MMHG | HEART RATE: 90 BPM | OXYGEN SATURATION: 100 % | DIASTOLIC BLOOD PRESSURE: 70 MMHG | RESPIRATION RATE: 18 BRPM | TEMPERATURE: 98 F

## 2021-01-01 VITALS
HEART RATE: 89 BPM | OXYGEN SATURATION: 98 % | DIASTOLIC BLOOD PRESSURE: 59 MMHG | RESPIRATION RATE: 20 BRPM | TEMPERATURE: 98 F | SYSTOLIC BLOOD PRESSURE: 121 MMHG

## 2021-01-01 VITALS
RESPIRATION RATE: 16 BRPM | HEART RATE: 97 BPM | OXYGEN SATURATION: 99 % | WEIGHT: 91 LBS | SYSTOLIC BLOOD PRESSURE: 144 MMHG | HEIGHT: 62 IN | DIASTOLIC BLOOD PRESSURE: 83 MMHG | BODY MASS INDEX: 16.75 KG/M2

## 2021-01-01 VITALS
SYSTOLIC BLOOD PRESSURE: 176 MMHG | HEIGHT: 62 IN | OXYGEN SATURATION: 98 % | TEMPERATURE: 98.2 F | HEART RATE: 102 BPM | BODY MASS INDEX: 16.75 KG/M2 | WEIGHT: 91 LBS | DIASTOLIC BLOOD PRESSURE: 97 MMHG

## 2021-01-01 VITALS
HEIGHT: 62 IN | TEMPERATURE: 98 F | HEART RATE: 97 BPM | SYSTOLIC BLOOD PRESSURE: 158 MMHG | DIASTOLIC BLOOD PRESSURE: 93 MMHG | RESPIRATION RATE: 20 BRPM | OXYGEN SATURATION: 100 %

## 2021-01-01 VITALS — OXYGEN SATURATION: 96 %

## 2021-01-01 VITALS — HEART RATE: 95 BPM | OXYGEN SATURATION: 100 %

## 2021-01-01 DIAGNOSIS — Z95.810 PRESENCE OF AUTOMATIC (IMPLANTABLE) CARDIAC DEFIBRILLATOR: Chronic | ICD-10-CM

## 2021-01-01 DIAGNOSIS — J96.01 ACUTE RESPIRATORY FAILURE WITH HYPOXIA: ICD-10-CM

## 2021-01-01 DIAGNOSIS — I10 ESSENTIAL (PRIMARY) HYPERTENSION: ICD-10-CM

## 2021-01-01 DIAGNOSIS — R06.00 DYSPNEA, UNSPECIFIED: ICD-10-CM

## 2021-01-01 DIAGNOSIS — J43.9 EMPHYSEMA, UNSPECIFIED: ICD-10-CM

## 2021-01-01 DIAGNOSIS — Z29.9 ENCOUNTER FOR PROPHYLACTIC MEASURES, UNSPECIFIED: ICD-10-CM

## 2021-01-01 DIAGNOSIS — I25.10 ATHEROSCLEROTIC HEART DISEASE OF NATIVE CORONARY ARTERY WITHOUT ANGINA PECTORIS: ICD-10-CM

## 2021-01-01 DIAGNOSIS — J96.90 RESPIRATORY FAILURE, UNSPECIFIED, UNSPECIFIED WHETHER WITH HYPOXIA OR HYPERCAPNIA: ICD-10-CM

## 2021-01-01 DIAGNOSIS — E87.6 HYPOKALEMIA: ICD-10-CM

## 2021-01-01 DIAGNOSIS — R53.2 FUNCTIONAL QUADRIPLEGIA: ICD-10-CM

## 2021-01-01 DIAGNOSIS — J96.92 RESPIRATORY FAILURE, UNSPECIFIED WITH HYPERCAPNIA: ICD-10-CM

## 2021-01-01 DIAGNOSIS — J44.9 CHRONIC OBSTRUCTIVE PULMONARY DISEASE, UNSPECIFIED: ICD-10-CM

## 2021-01-01 DIAGNOSIS — I25.10 ATHEROSCLEROTIC HEART DISEASE OF NATIVE CORONARY ARTERY W/OUT ANGINA PECTORIS: ICD-10-CM

## 2021-01-01 DIAGNOSIS — J44.1 CHRONIC OBSTRUCTIVE PULMONARY DISEASE WITH (ACUTE) EXACERBATION: ICD-10-CM

## 2021-01-01 DIAGNOSIS — Z71.89 OTHER SPECIFIED COUNSELING: ICD-10-CM

## 2021-01-01 DIAGNOSIS — D64.9 ANEMIA, UNSPECIFIED: ICD-10-CM

## 2021-01-01 DIAGNOSIS — E53.8 DEFICIENCY OF OTHER SPECIFIED B GROUP VITAMINS: ICD-10-CM

## 2021-01-01 DIAGNOSIS — G47.33 OBSTRUCTIVE SLEEP APNEA (ADULT) (PEDIATRIC): ICD-10-CM

## 2021-01-01 DIAGNOSIS — Z02.9 ENCOUNTER FOR ADMINISTRATIVE EXAMINATIONS, UNSPECIFIED: ICD-10-CM

## 2021-01-01 DIAGNOSIS — I50.22 CHRONIC SYSTOLIC (CONGESTIVE) HEART FAILURE: ICD-10-CM

## 2021-01-01 DIAGNOSIS — I50.20 UNSPECIFIED SYSTOLIC (CONGESTIVE) HEART FAILURE: ICD-10-CM

## 2021-01-01 DIAGNOSIS — Z51.5 ENCOUNTER FOR PALLIATIVE CARE: ICD-10-CM

## 2021-01-01 DIAGNOSIS — R62.7 ADULT FAILURE TO THRIVE: ICD-10-CM

## 2021-01-01 LAB
A1C WITH ESTIMATED AVERAGE GLUCOSE RESULT: 5.1 % — SIGNIFICANT CHANGE UP (ref 4–5.6)
ALBUMIN SERPL ELPH-MCNC: 3.3 G/DL — SIGNIFICANT CHANGE UP (ref 3.3–5)
ALBUMIN SERPL ELPH-MCNC: 3.7 G/DL — SIGNIFICANT CHANGE UP (ref 3.3–5)
ALBUMIN SERPL ELPH-MCNC: 3.7 G/DL — SIGNIFICANT CHANGE UP (ref 3.3–5)
ALBUMIN SERPL ELPH-MCNC: 3.8 G/DL
ALBUMIN SERPL ELPH-MCNC: 4.1 G/DL — SIGNIFICANT CHANGE UP (ref 3.3–5)
ALBUMIN SERPL ELPH-MCNC: 4.2 G/DL — SIGNIFICANT CHANGE UP (ref 3.3–5)
ALBUMIN SERPL ELPH-MCNC: 4.2 G/DL — SIGNIFICANT CHANGE UP (ref 3.3–5)
ALBUMIN SERPL ELPH-MCNC: 4.3 G/DL — SIGNIFICANT CHANGE UP (ref 3.3–5)
ALP BLD-CCNC: 53 U/L
ALP SERPL-CCNC: 44 U/L — SIGNIFICANT CHANGE UP (ref 40–120)
ALP SERPL-CCNC: 44 U/L — SIGNIFICANT CHANGE UP (ref 40–120)
ALP SERPL-CCNC: 45 U/L — SIGNIFICANT CHANGE UP (ref 40–120)
ALP SERPL-CCNC: 48 U/L — SIGNIFICANT CHANGE UP (ref 40–120)
ALP SERPL-CCNC: 51 U/L — SIGNIFICANT CHANGE UP (ref 40–120)
ALP SERPL-CCNC: 54 U/L — SIGNIFICANT CHANGE UP (ref 40–120)
ALP SERPL-CCNC: 57 U/L — SIGNIFICANT CHANGE UP (ref 40–120)
ALT FLD-CCNC: 10 U/L — SIGNIFICANT CHANGE UP (ref 4–33)
ALT FLD-CCNC: 11 U/L — SIGNIFICANT CHANGE UP (ref 4–33)
ALT FLD-CCNC: 12 U/L — SIGNIFICANT CHANGE UP (ref 4–33)
ALT FLD-CCNC: 13 U/L — SIGNIFICANT CHANGE UP (ref 4–33)
ALT FLD-CCNC: 17 U/L — SIGNIFICANT CHANGE UP (ref 4–33)
ALT FLD-CCNC: 22 U/L — SIGNIFICANT CHANGE UP (ref 4–33)
ALT FLD-CCNC: 6 U/L — SIGNIFICANT CHANGE UP (ref 4–33)
ALT SERPL-CCNC: 7 U/L
ANION GAP SERPL CALC-SCNC: 10 MMOL/L
ANION GAP SERPL CALC-SCNC: 10 MMOL/L — SIGNIFICANT CHANGE UP (ref 7–14)
ANION GAP SERPL CALC-SCNC: 12 MMOL/L — SIGNIFICANT CHANGE UP (ref 7–14)
ANION GAP SERPL CALC-SCNC: 15 MMOL/L — HIGH (ref 7–14)
ANION GAP SERPL CALC-SCNC: 16 MMOL/L — HIGH (ref 7–14)
ANION GAP SERPL CALC-SCNC: 16 MMOL/L — HIGH (ref 7–14)
ANION GAP SERPL CALC-SCNC: 4 MMOL/L — LOW (ref 7–14)
ANION GAP SERPL CALC-SCNC: 4 MMOL/L — LOW (ref 7–14)
ANION GAP SERPL CALC-SCNC: 5 MMOL/L — LOW (ref 7–14)
ANION GAP SERPL CALC-SCNC: 5 MMOL/L — LOW (ref 7–14)
ANION GAP SERPL CALC-SCNC: 6 MMOL/L — LOW (ref 7–14)
ANION GAP SERPL CALC-SCNC: 7 MMOL/L — SIGNIFICANT CHANGE UP (ref 7–14)
ANION GAP SERPL CALC-SCNC: 8 MMOL/L — SIGNIFICANT CHANGE UP (ref 7–14)
ANION GAP SERPL CALC-SCNC: 9 MMOL/L — SIGNIFICANT CHANGE UP (ref 7–14)
APTT BLD: 27.7 SEC — SIGNIFICANT CHANGE UP (ref 27–36.3)
AST SERPL-CCNC: 13 U/L
AST SERPL-CCNC: 13 U/L — SIGNIFICANT CHANGE UP (ref 4–32)
AST SERPL-CCNC: 13 U/L — SIGNIFICANT CHANGE UP (ref 4–32)
AST SERPL-CCNC: 14 U/L — SIGNIFICANT CHANGE UP (ref 4–32)
AST SERPL-CCNC: 14 U/L — SIGNIFICANT CHANGE UP (ref 4–32)
AST SERPL-CCNC: 15 U/L — SIGNIFICANT CHANGE UP (ref 4–32)
AST SERPL-CCNC: 20 U/L — SIGNIFICANT CHANGE UP (ref 4–32)
AST SERPL-CCNC: 21 U/L — SIGNIFICANT CHANGE UP (ref 4–32)
B PERT DNA SPEC QL NAA+PROBE: SIGNIFICANT CHANGE UP
BASE EXCESS BLDA CALC-SCNC: 12 MMOL/L — HIGH (ref -2–2)
BASE EXCESS BLDA CALC-SCNC: 12.1 MMOL/L — HIGH (ref -2–2)
BASE EXCESS BLDA CALC-SCNC: 16.2 MMOL/L — HIGH (ref -2–2)
BASE EXCESS BLDA CALC-SCNC: 17.6 MMOL/L — HIGH (ref -2–2)
BASE EXCESS BLDA CALC-SCNC: 17.8 MMOL/L — HIGH (ref -2–2)
BASE EXCESS BLDA CALC-SCNC: 19.6 MMOL/L — HIGH (ref -2–2)
BASE EXCESS BLDA CALC-SCNC: 7.3 MMOL/L — HIGH (ref -2–2)
BASE EXCESS BLDCOV CALC-SCNC: 21.6 MMOL/L — HIGH (ref -9.3–0.3)
BASE EXCESS BLDCOV CALC-SCNC: 22.8 MMOL/L — HIGH (ref -9.3–0.3)
BASE EXCESS BLDCOV CALC-SCNC: 23.2 MMOL/L — HIGH (ref -9.3–0.3)
BASE EXCESS BLDV CALC-SCNC: 11.9 MMOL/L — HIGH (ref -3–2)
BASE EXCESS BLDV CALC-SCNC: 12.7 MMOL/L — HIGH (ref -3–2)
BASE EXCESS BLDV CALC-SCNC: 12.9 MMOL/L — HIGH (ref -3–2)
BASE EXCESS BLDV CALC-SCNC: 13.1 MMOL/L — HIGH (ref -3–2)
BASE EXCESS BLDV CALC-SCNC: 13.4 MMOL/L — HIGH (ref -3–2)
BASE EXCESS BLDV CALC-SCNC: 13.6 MMOL/L — HIGH (ref -3–2)
BASE EXCESS BLDV CALC-SCNC: 15 MMOL/L — HIGH (ref -3–2)
BASE EXCESS BLDV CALC-SCNC: 15.5 MMOL/L — HIGH (ref -3–2)
BASE EXCESS BLDV CALC-SCNC: 18 MMOL/L — HIGH (ref -3–2)
BASE EXCESS BLDV CALC-SCNC: 18.1 MMOL/L — HIGH (ref -3–2)
BASE EXCESS BLDV CALC-SCNC: 19.5 MMOL/L — HIGH (ref -3–2)
BASE EXCESS BLDV CALC-SCNC: 20.3 MMOL/L — HIGH (ref -3–2)
BASE EXCESS BLDV CALC-SCNC: 20.7 MMOL/L — HIGH (ref -3–2)
BASOPHILS # BLD AUTO: 0.01 K/UL — SIGNIFICANT CHANGE UP (ref 0–0.2)
BASOPHILS # BLD AUTO: 0.02 K/UL
BASOPHILS # BLD AUTO: 0.02 K/UL — SIGNIFICANT CHANGE UP (ref 0–0.2)
BASOPHILS # BLD AUTO: 0.02 K/UL — SIGNIFICANT CHANGE UP (ref 0–0.2)
BASOPHILS # BLD AUTO: 0.03 K/UL — SIGNIFICANT CHANGE UP (ref 0–0.2)
BASOPHILS # BLD AUTO: 0.03 K/UL — SIGNIFICANT CHANGE UP (ref 0–0.2)
BASOPHILS NFR BLD AUTO: 0.1 % — SIGNIFICANT CHANGE UP (ref 0–2)
BASOPHILS NFR BLD AUTO: 0.2 % — SIGNIFICANT CHANGE UP (ref 0–2)
BASOPHILS NFR BLD AUTO: 0.3 % — SIGNIFICANT CHANGE UP (ref 0–2)
BASOPHILS NFR BLD AUTO: 0.3 % — SIGNIFICANT CHANGE UP (ref 0–2)
BASOPHILS NFR BLD AUTO: 0.5 %
BASOPHILS NFR BLD AUTO: 0.5 % — SIGNIFICANT CHANGE UP (ref 0–2)
BASOPHILS NFR BLD AUTO: 0.5 % — SIGNIFICANT CHANGE UP (ref 0–2)
BASOPHILS NFR BLD AUTO: 0.8 % — SIGNIFICANT CHANGE UP (ref 0–2)
BILIRUB SERPL-MCNC: 0.3 MG/DL — SIGNIFICANT CHANGE UP (ref 0.2–1.2)
BILIRUB SERPL-MCNC: 0.4 MG/DL
BILIRUB SERPL-MCNC: 0.4 MG/DL — SIGNIFICANT CHANGE UP (ref 0.2–1.2)
BILIRUB SERPL-MCNC: 0.4 MG/DL — SIGNIFICANT CHANGE UP (ref 0.2–1.2)
BILIRUB SERPL-MCNC: 0.6 MG/DL — SIGNIFICANT CHANGE UP (ref 0.2–1.2)
BILIRUB SERPL-MCNC: 0.7 MG/DL — SIGNIFICANT CHANGE UP (ref 0.2–1.2)
BLOOD GAS ARTERIAL COMPREHENSIVE RESULT: SIGNIFICANT CHANGE UP
BLOOD GAS VENOUS - CREATININE: 0.6 MG/DL — SIGNIFICANT CHANGE UP (ref 0.5–1.3)
BLOOD GAS VENOUS - CREATININE: 0.7 MG/DL — SIGNIFICANT CHANGE UP (ref 0.5–1.3)
BLOOD GAS VENOUS - CREATININE: 0.8 MG/DL — SIGNIFICANT CHANGE UP (ref 0.5–1.3)
BLOOD GAS VENOUS - CREATININE: 0.8 MG/DL — SIGNIFICANT CHANGE UP (ref 0.5–1.3)
BLOOD GAS VENOUS COMPREHENSIVE RESULT: SIGNIFICANT CHANGE UP
BUN SERPL-MCNC: 11 MG/DL — SIGNIFICANT CHANGE UP (ref 7–23)
BUN SERPL-MCNC: 13 MG/DL
BUN SERPL-MCNC: 14 MG/DL — SIGNIFICANT CHANGE UP (ref 7–23)
BUN SERPL-MCNC: 15 MG/DL — SIGNIFICANT CHANGE UP (ref 7–23)
BUN SERPL-MCNC: 17 MG/DL — SIGNIFICANT CHANGE UP (ref 7–23)
BUN SERPL-MCNC: 18 MG/DL — SIGNIFICANT CHANGE UP (ref 7–23)
BUN SERPL-MCNC: 19 MG/DL — SIGNIFICANT CHANGE UP (ref 7–23)
BUN SERPL-MCNC: 20 MG/DL — SIGNIFICANT CHANGE UP (ref 7–23)
BUN SERPL-MCNC: 22 MG/DL — SIGNIFICANT CHANGE UP (ref 7–23)
BUN SERPL-MCNC: 23 MG/DL — SIGNIFICANT CHANGE UP (ref 7–23)
BUN SERPL-MCNC: 24 MG/DL — HIGH (ref 7–23)
BUN SERPL-MCNC: 25 MG/DL — HIGH (ref 7–23)
BUN SERPL-MCNC: 25 MG/DL — HIGH (ref 7–23)
BUN SERPL-MCNC: 26 MG/DL — HIGH (ref 7–23)
BUN SERPL-MCNC: 27 MG/DL — HIGH (ref 7–23)
BUN SERPL-MCNC: 27 MG/DL — HIGH (ref 7–23)
BUN SERPL-MCNC: 28 MG/DL — HIGH (ref 7–23)
BUN SERPL-MCNC: 28 MG/DL — HIGH (ref 7–23)
BUN SERPL-MCNC: 30 MG/DL — HIGH (ref 7–23)
BUN SERPL-MCNC: 31 MG/DL — HIGH (ref 7–23)
BUN SERPL-MCNC: 31 MG/DL — HIGH (ref 7–23)
BUN SERPL-MCNC: 32 MG/DL — HIGH (ref 7–23)
BUN SERPL-MCNC: 33 MG/DL — HIGH (ref 7–23)
BUN SERPL-MCNC: 34 MG/DL — HIGH (ref 7–23)
BUN SERPL-MCNC: 38 MG/DL — HIGH (ref 7–23)
BUN SERPL-MCNC: 40 MG/DL — HIGH (ref 7–23)
BUN SERPL-MCNC: 45 MG/DL — HIGH (ref 7–23)
BUN SERPL-MCNC: 49 MG/DL — HIGH (ref 7–23)
C PNEUM DNA SPEC QL NAA+PROBE: SIGNIFICANT CHANGE UP
CALCIUM SERPL-MCNC: 10 MG/DL — SIGNIFICANT CHANGE UP (ref 8.4–10.5)
CALCIUM SERPL-MCNC: 10 MG/DL — SIGNIFICANT CHANGE UP (ref 8.4–10.5)
CALCIUM SERPL-MCNC: 8.8 MG/DL
CALCIUM SERPL-MCNC: 8.8 MG/DL — SIGNIFICANT CHANGE UP (ref 8.4–10.5)
CALCIUM SERPL-MCNC: 8.9 MG/DL — SIGNIFICANT CHANGE UP (ref 8.4–10.5)
CALCIUM SERPL-MCNC: 9 MG/DL — SIGNIFICANT CHANGE UP (ref 8.4–10.5)
CALCIUM SERPL-MCNC: 9.1 MG/DL — SIGNIFICANT CHANGE UP (ref 8.4–10.5)
CALCIUM SERPL-MCNC: 9.2 MG/DL — SIGNIFICANT CHANGE UP (ref 8.4–10.5)
CALCIUM SERPL-MCNC: 9.3 MG/DL — SIGNIFICANT CHANGE UP (ref 8.4–10.5)
CALCIUM SERPL-MCNC: 9.3 MG/DL — SIGNIFICANT CHANGE UP (ref 8.4–10.5)
CALCIUM SERPL-MCNC: 9.4 MG/DL — SIGNIFICANT CHANGE UP (ref 8.4–10.5)
CALCIUM SERPL-MCNC: 9.5 MG/DL — SIGNIFICANT CHANGE UP (ref 8.4–10.5)
CALCIUM SERPL-MCNC: 9.7 MG/DL — SIGNIFICANT CHANGE UP (ref 8.4–10.5)
CALCIUM SERPL-MCNC: 9.9 MG/DL — SIGNIFICANT CHANGE UP (ref 8.4–10.5)
CALCIUM SERPL-MCNC: 9.9 MG/DL — SIGNIFICANT CHANGE UP (ref 8.4–10.5)
CHLORIDE BLDV-SCNC: 91 MMOL/L — LOW (ref 96–108)
CHLORIDE BLDV-SCNC: 91 MMOL/L — LOW (ref 96–108)
CHLORIDE BLDV-SCNC: 92 MMOL/L — LOW (ref 96–108)
CHLORIDE BLDV-SCNC: 95 MMOL/L — LOW (ref 96–108)
CHLORIDE BLDV-SCNC: 98 MMOL/L — SIGNIFICANT CHANGE UP (ref 96–108)
CHLORIDE BLDV-SCNC: 99 MMOL/L — SIGNIFICANT CHANGE UP (ref 96–108)
CHLORIDE SERPL-SCNC: 100 MMOL/L — SIGNIFICANT CHANGE UP (ref 98–107)
CHLORIDE SERPL-SCNC: 101 MMOL/L — SIGNIFICANT CHANGE UP (ref 98–107)
CHLORIDE SERPL-SCNC: 102 MMOL/L — SIGNIFICANT CHANGE UP (ref 98–107)
CHLORIDE SERPL-SCNC: 102 MMOL/L — SIGNIFICANT CHANGE UP (ref 98–107)
CHLORIDE SERPL-SCNC: 105 MMOL/L
CHLORIDE SERPL-SCNC: 91 MMOL/L — LOW (ref 98–107)
CHLORIDE SERPL-SCNC: 92 MMOL/L — LOW (ref 98–107)
CHLORIDE SERPL-SCNC: 92 MMOL/L — LOW (ref 98–107)
CHLORIDE SERPL-SCNC: 93 MMOL/L — LOW (ref 98–107)
CHLORIDE SERPL-SCNC: 94 MMOL/L — LOW (ref 98–107)
CHLORIDE SERPL-SCNC: 95 MMOL/L — LOW (ref 98–107)
CHLORIDE SERPL-SCNC: 96 MMOL/L — LOW (ref 98–107)
CHLORIDE SERPL-SCNC: 96 MMOL/L — LOW (ref 98–107)
CHLORIDE SERPL-SCNC: 97 MMOL/L — LOW (ref 98–107)
CHLORIDE SERPL-SCNC: 98 MMOL/L — SIGNIFICANT CHANGE UP (ref 98–107)
CHLORIDE SERPL-SCNC: 99 MMOL/L — SIGNIFICANT CHANGE UP (ref 98–107)
CHOLEST SERPL-MCNC: 181 MG/DL — SIGNIFICANT CHANGE UP
CHOLEST SERPL-MCNC: 224 MG/DL
CK MB BLD-MCNC: 5.9 % — HIGH (ref 0–2.5)
CK MB CFR SERPL CALC: 1.7 NG/ML — SIGNIFICANT CHANGE UP
CK SERPL-CCNC: 29 U/L — SIGNIFICANT CHANGE UP (ref 25–170)
CO2 SERPL-SCNC: 32 MMOL/L
CO2 SERPL-SCNC: 33 MMOL/L — HIGH (ref 22–31)
CO2 SERPL-SCNC: 33 MMOL/L — HIGH (ref 22–31)
CO2 SERPL-SCNC: 34 MMOL/L — HIGH (ref 22–31)
CO2 SERPL-SCNC: 35 MMOL/L — HIGH (ref 22–31)
CO2 SERPL-SCNC: 35 MMOL/L — HIGH (ref 22–31)
CO2 SERPL-SCNC: 36 MMOL/L — HIGH (ref 22–31)
CO2 SERPL-SCNC: 37 MMOL/L — HIGH (ref 22–31)
CO2 SERPL-SCNC: 38 MMOL/L — HIGH (ref 22–31)
CO2 SERPL-SCNC: 39 MMOL/L — HIGH (ref 22–31)
CO2 SERPL-SCNC: 40 MMOL/L — HIGH (ref 22–31)
CO2 SERPL-SCNC: 41 MMOL/L — HIGH (ref 22–31)
CO2 SERPL-SCNC: 42 MMOL/L — HIGH (ref 22–31)
CO2 SERPL-SCNC: 42 MMOL/L — HIGH (ref 22–31)
CO2 SERPL-SCNC: 43 MMOL/L — HIGH (ref 22–31)
CO2 SERPL-SCNC: 44 MMOL/L — HIGH (ref 22–31)
CO2 SERPL-SCNC: 46 MMOL/L — CRITICAL HIGH (ref 22–31)
COVID-19 SPIKE DOMAIN AB INTERP: NEGATIVE — SIGNIFICANT CHANGE UP
COVID-19 SPIKE DOMAIN AB INTERP: NEGATIVE — SIGNIFICANT CHANGE UP
COVID-19 SPIKE DOMAIN ANTIBODY RESULT: 0.4 U/ML — SIGNIFICANT CHANGE UP
COVID-19 SPIKE DOMAIN ANTIBODY RESULT: 0.4 U/ML — SIGNIFICANT CHANGE UP
CREAT SERPL-MCNC: 0.47 MG/DL — LOW (ref 0.5–1.3)
CREAT SERPL-MCNC: 0.54 MG/DL — SIGNIFICANT CHANGE UP (ref 0.5–1.3)
CREAT SERPL-MCNC: 0.56 MG/DL — SIGNIFICANT CHANGE UP (ref 0.5–1.3)
CREAT SERPL-MCNC: 0.59 MG/DL — SIGNIFICANT CHANGE UP (ref 0.5–1.3)
CREAT SERPL-MCNC: 0.59 MG/DL — SIGNIFICANT CHANGE UP (ref 0.5–1.3)
CREAT SERPL-MCNC: 0.63 MG/DL — SIGNIFICANT CHANGE UP (ref 0.5–1.3)
CREAT SERPL-MCNC: 0.63 MG/DL — SIGNIFICANT CHANGE UP (ref 0.5–1.3)
CREAT SERPL-MCNC: 0.65 MG/DL — SIGNIFICANT CHANGE UP (ref 0.5–1.3)
CREAT SERPL-MCNC: 0.66 MG/DL — SIGNIFICANT CHANGE UP (ref 0.5–1.3)
CREAT SERPL-MCNC: 0.69 MG/DL — SIGNIFICANT CHANGE UP (ref 0.5–1.3)
CREAT SERPL-MCNC: 0.69 MG/DL — SIGNIFICANT CHANGE UP (ref 0.5–1.3)
CREAT SERPL-MCNC: 0.7 MG/DL — SIGNIFICANT CHANGE UP (ref 0.5–1.3)
CREAT SERPL-MCNC: 0.71 MG/DL — SIGNIFICANT CHANGE UP (ref 0.5–1.3)
CREAT SERPL-MCNC: 0.71 MG/DL — SIGNIFICANT CHANGE UP (ref 0.5–1.3)
CREAT SERPL-MCNC: 0.72 MG/DL — SIGNIFICANT CHANGE UP (ref 0.5–1.3)
CREAT SERPL-MCNC: 0.72 MG/DL — SIGNIFICANT CHANGE UP (ref 0.5–1.3)
CREAT SERPL-MCNC: 0.73 MG/DL — SIGNIFICANT CHANGE UP (ref 0.5–1.3)
CREAT SERPL-MCNC: 0.76 MG/DL — SIGNIFICANT CHANGE UP (ref 0.5–1.3)
CREAT SERPL-MCNC: 0.78 MG/DL
CREAT SERPL-MCNC: 0.79 MG/DL — SIGNIFICANT CHANGE UP (ref 0.5–1.3)
CREAT SERPL-MCNC: 0.8 MG/DL — SIGNIFICANT CHANGE UP (ref 0.5–1.3)
CREAT SERPL-MCNC: 0.82 MG/DL — SIGNIFICANT CHANGE UP (ref 0.5–1.3)
CREAT SERPL-MCNC: 0.83 MG/DL — SIGNIFICANT CHANGE UP (ref 0.5–1.3)
CREAT SERPL-MCNC: 0.83 MG/DL — SIGNIFICANT CHANGE UP (ref 0.5–1.3)
CREAT SERPL-MCNC: 0.87 MG/DL — SIGNIFICANT CHANGE UP (ref 0.5–1.3)
CREAT SERPL-MCNC: 0.9 MG/DL — SIGNIFICANT CHANGE UP (ref 0.5–1.3)
CREAT SERPL-MCNC: 0.97 MG/DL — SIGNIFICANT CHANGE UP (ref 0.5–1.3)
CREAT SERPL-MCNC: 1 MG/DL — SIGNIFICANT CHANGE UP (ref 0.5–1.3)
EOSINOPHIL # BLD AUTO: 0 K/UL — SIGNIFICANT CHANGE UP (ref 0–0.5)
EOSINOPHIL # BLD AUTO: 0 K/UL — SIGNIFICANT CHANGE UP (ref 0–0.5)
EOSINOPHIL # BLD AUTO: 0.01 K/UL — SIGNIFICANT CHANGE UP (ref 0–0.5)
EOSINOPHIL # BLD AUTO: 0.05 K/UL — SIGNIFICANT CHANGE UP (ref 0–0.5)
EOSINOPHIL # BLD AUTO: 0.06 K/UL — SIGNIFICANT CHANGE UP (ref 0–0.5)
EOSINOPHIL # BLD AUTO: 0.07 K/UL
EOSINOPHIL # BLD AUTO: 0.1 K/UL — SIGNIFICANT CHANGE UP (ref 0–0.5)
EOSINOPHIL NFR BLD AUTO: 0 % — SIGNIFICANT CHANGE UP (ref 0–6)
EOSINOPHIL NFR BLD AUTO: 0 % — SIGNIFICANT CHANGE UP (ref 0–6)
EOSINOPHIL NFR BLD AUTO: 0.2 % — SIGNIFICANT CHANGE UP (ref 0–6)
EOSINOPHIL NFR BLD AUTO: 0.3 % — SIGNIFICANT CHANGE UP (ref 0–6)
EOSINOPHIL NFR BLD AUTO: 1.2 % — SIGNIFICANT CHANGE UP (ref 0–6)
EOSINOPHIL NFR BLD AUTO: 1.5 % — SIGNIFICANT CHANGE UP (ref 0–6)
EOSINOPHIL NFR BLD AUTO: 1.6 % — SIGNIFICANT CHANGE UP (ref 0–6)
EOSINOPHIL NFR BLD AUTO: 1.9 %
ESTIMATED AVERAGE GLUCOSE: 100 MG/DL — SIGNIFICANT CHANGE UP (ref 68–114)
FERRITIN SERPL-MCNC: 44 NG/ML — SIGNIFICANT CHANGE UP (ref 15–150)
FERRITIN SERPL-MCNC: 49 NG/ML
FERRITIN SERPL-MCNC: 49 NG/ML — SIGNIFICANT CHANGE UP (ref 15–150)
FLUAV SUBTYP SPEC NAA+PROBE: SIGNIFICANT CHANGE UP
FLUBV RNA SPEC QL NAA+PROBE: SIGNIFICANT CHANGE UP
FOLATE SERPL-MCNC: 13.4 NG/ML — SIGNIFICANT CHANGE UP
FOLATE SERPL-MCNC: 7.5 NG/ML
GAS PNL BLDA: SIGNIFICANT CHANGE UP
GAS PNL BLDCOV: 7.36 — SIGNIFICANT CHANGE UP (ref 7.25–7.45)
GAS PNL BLDCOV: 7.4 — SIGNIFICANT CHANGE UP (ref 7.25–7.45)
GAS PNL BLDCOV: 7.41 — SIGNIFICANT CHANGE UP (ref 7.25–7.45)
GAS PNL BLDV: 141 MMOL/L — SIGNIFICANT CHANGE UP (ref 136–146)
GAS PNL BLDV: 142 MMOL/L — SIGNIFICANT CHANGE UP (ref 136–146)
GAS PNL BLDV: 143 MMOL/L — SIGNIFICANT CHANGE UP (ref 136–146)
GAS PNL BLDV: 145 MMOL/L — SIGNIFICANT CHANGE UP (ref 136–146)
GAS PNL BLDV: 146 MMOL/L — SIGNIFICANT CHANGE UP (ref 136–146)
GLUCOSE BLDC GLUCOMTR-MCNC: 102 MG/DL — HIGH (ref 70–99)
GLUCOSE BLDV-MCNC: 107 MG/DL — HIGH (ref 70–99)
GLUCOSE BLDV-MCNC: 108 MG/DL — HIGH (ref 70–99)
GLUCOSE BLDV-MCNC: 134 MG/DL — HIGH (ref 70–99)
GLUCOSE BLDV-MCNC: 91 MG/DL — SIGNIFICANT CHANGE UP (ref 70–99)
GLUCOSE BLDV-MCNC: 93 MG/DL — SIGNIFICANT CHANGE UP (ref 70–99)
GLUCOSE BLDV-MCNC: 93 MG/DL — SIGNIFICANT CHANGE UP (ref 70–99)
GLUCOSE SERPL-MCNC: 100 MG/DL — HIGH (ref 70–99)
GLUCOSE SERPL-MCNC: 105 MG/DL — HIGH (ref 70–99)
GLUCOSE SERPL-MCNC: 106 MG/DL — HIGH (ref 70–99)
GLUCOSE SERPL-MCNC: 108 MG/DL — HIGH (ref 70–99)
GLUCOSE SERPL-MCNC: 110 MG/DL — HIGH (ref 70–99)
GLUCOSE SERPL-MCNC: 111 MG/DL — HIGH (ref 70–99)
GLUCOSE SERPL-MCNC: 111 MG/DL — HIGH (ref 70–99)
GLUCOSE SERPL-MCNC: 113 MG/DL — HIGH (ref 70–99)
GLUCOSE SERPL-MCNC: 115 MG/DL — HIGH (ref 70–99)
GLUCOSE SERPL-MCNC: 115 MG/DL — HIGH (ref 70–99)
GLUCOSE SERPL-MCNC: 116 MG/DL — HIGH (ref 70–99)
GLUCOSE SERPL-MCNC: 117 MG/DL — HIGH (ref 70–99)
GLUCOSE SERPL-MCNC: 121 MG/DL — HIGH (ref 70–99)
GLUCOSE SERPL-MCNC: 123 MG/DL — HIGH (ref 70–99)
GLUCOSE SERPL-MCNC: 138 MG/DL — HIGH (ref 70–99)
GLUCOSE SERPL-MCNC: 138 MG/DL — HIGH (ref 70–99)
GLUCOSE SERPL-MCNC: 182 MG/DL — HIGH (ref 70–99)
GLUCOSE SERPL-MCNC: 198 MG/DL — HIGH (ref 70–99)
GLUCOSE SERPL-MCNC: 266 MG/DL — HIGH (ref 70–99)
GLUCOSE SERPL-MCNC: 86 MG/DL
GLUCOSE SERPL-MCNC: 86 MG/DL — SIGNIFICANT CHANGE UP (ref 70–99)
GLUCOSE SERPL-MCNC: 86 MG/DL — SIGNIFICANT CHANGE UP (ref 70–99)
GLUCOSE SERPL-MCNC: 90 MG/DL — SIGNIFICANT CHANGE UP (ref 70–99)
GLUCOSE SERPL-MCNC: 91 MG/DL — SIGNIFICANT CHANGE UP (ref 70–99)
GLUCOSE SERPL-MCNC: 94 MG/DL — SIGNIFICANT CHANGE UP (ref 70–99)
GLUCOSE SERPL-MCNC: 95 MG/DL — SIGNIFICANT CHANGE UP (ref 70–99)
GLUCOSE SERPL-MCNC: 96 MG/DL — SIGNIFICANT CHANGE UP (ref 70–99)
GLUCOSE SERPL-MCNC: 98 MG/DL — SIGNIFICANT CHANGE UP (ref 70–99)
GLUCOSE SERPL-MCNC: 99 MG/DL — SIGNIFICANT CHANGE UP (ref 70–99)
HADV DNA SPEC QL NAA+PROBE: SIGNIFICANT CHANGE UP
HCO3 BLDA-SCNC: 30 MMOL/L — HIGH (ref 22–26)
HCO3 BLDA-SCNC: 34 MMOL/L — HIGH (ref 22–26)
HCO3 BLDA-SCNC: 35 MMOL/L — HIGH (ref 22–26)
HCO3 BLDA-SCNC: 39 MMOL/L — HIGH (ref 22–26)
HCO3 BLDA-SCNC: 42 MMOL/L — HIGH (ref 22–26)
HCO3 BLDCOV-SCNC: 43 MMOL/L — SIGNIFICANT CHANGE UP
HCO3 BLDCOV-SCNC: 45 MMOL/L — SIGNIFICANT CHANGE UP
HCO3 BLDCOV-SCNC: 45 MMOL/L — SIGNIFICANT CHANGE UP
HCO3 BLDV-SCNC: 32 MMOL/L — HIGH (ref 20–27)
HCO3 BLDV-SCNC: 33 MMOL/L — HIGH (ref 20–27)
HCO3 BLDV-SCNC: 33 MMOL/L — HIGH (ref 20–27)
HCO3 BLDV-SCNC: 34 MMOL/L — HIGH (ref 20–27)
HCO3 BLDV-SCNC: 36 MMOL/L — HIGH (ref 20–27)
HCO3 BLDV-SCNC: 36 MMOL/L — HIGH (ref 20–27)
HCO3 BLDV-SCNC: 38 MMOL/L — HIGH (ref 20–27)
HCO3 BLDV-SCNC: 40 MMOL/L — HIGH (ref 20–27)
HCO3 BLDV-SCNC: 41 MMOL/L — HIGH (ref 20–27)
HCO3 BLDV-SCNC: 41 MMOL/L — HIGH (ref 20–27)
HCO3 BLDV-SCNC: 43 MMOL/L — HIGH (ref 20–27)
HCOV 229E RNA SPEC QL NAA+PROBE: SIGNIFICANT CHANGE UP
HCOV HKU1 RNA SPEC QL NAA+PROBE: SIGNIFICANT CHANGE UP
HCOV NL63 RNA SPEC QL NAA+PROBE: SIGNIFICANT CHANGE UP
HCOV OC43 RNA SPEC QL NAA+PROBE: SIGNIFICANT CHANGE UP
HCT VFR BLD CALC: 29.9 % — LOW (ref 34.5–45)
HCT VFR BLD CALC: 30.9 % — LOW (ref 34.5–45)
HCT VFR BLD CALC: 32 % — LOW (ref 34.5–45)
HCT VFR BLD CALC: 32.1 % — LOW (ref 34.5–45)
HCT VFR BLD CALC: 32.2 % — LOW (ref 34.5–45)
HCT VFR BLD CALC: 32.2 % — LOW (ref 34.5–45)
HCT VFR BLD CALC: 32.3 % — LOW (ref 34.5–45)
HCT VFR BLD CALC: 32.4 % — LOW (ref 34.5–45)
HCT VFR BLD CALC: 32.4 % — LOW (ref 34.5–45)
HCT VFR BLD CALC: 32.7 % — LOW (ref 34.5–45)
HCT VFR BLD CALC: 32.7 % — LOW (ref 34.5–45)
HCT VFR BLD CALC: 32.8 % — LOW (ref 34.5–45)
HCT VFR BLD CALC: 32.9 %
HCT VFR BLD CALC: 32.9 % — LOW (ref 34.5–45)
HCT VFR BLD CALC: 33.2 % — LOW (ref 34.5–45)
HCT VFR BLD CALC: 33.6 % — LOW (ref 34.5–45)
HCT VFR BLD CALC: 33.8 % — LOW (ref 34.5–45)
HCT VFR BLD CALC: 33.9 % — LOW (ref 34.5–45)
HCT VFR BLD CALC: 34.1 % — LOW (ref 34.5–45)
HCT VFR BLD CALC: 35.1 % — SIGNIFICANT CHANGE UP (ref 34.5–45)
HCT VFR BLD CALC: 35.1 % — SIGNIFICANT CHANGE UP (ref 34.5–45)
HCT VFR BLD CALC: 35.2 % — SIGNIFICANT CHANGE UP (ref 34.5–45)
HCT VFR BLD CALC: 35.6 % — SIGNIFICANT CHANGE UP (ref 34.5–45)
HCT VFR BLD CALC: 35.9 % — SIGNIFICANT CHANGE UP (ref 34.5–45)
HCT VFR BLD CALC: 36 % — SIGNIFICANT CHANGE UP (ref 34.5–45)
HCT VFR BLD CALC: 36.2 % — SIGNIFICANT CHANGE UP (ref 34.5–45)
HCT VFR BLD CALC: 36.8 % — SIGNIFICANT CHANGE UP (ref 34.5–45)
HCT VFR BLD CALC: 36.8 % — SIGNIFICANT CHANGE UP (ref 34.5–45)
HCT VFR BLD CALC: 37.5 % — SIGNIFICANT CHANGE UP (ref 34.5–45)
HCT VFR BLD CALC: 39.7 % — SIGNIFICANT CHANGE UP (ref 34.5–45)
HCT VFR BLD CALC: 40.1 % — SIGNIFICANT CHANGE UP (ref 34.5–45)
HCT VFR BLDA CALC: 30.2 % — LOW (ref 34.5–46.5)
HCT VFR BLDA CALC: 34.7 % — SIGNIFICANT CHANGE UP (ref 34.5–46.5)
HCT VFR BLDA CALC: 36.6 % — SIGNIFICANT CHANGE UP (ref 34.5–46.5)
HCT VFR BLDA CALC: 37.2 % — SIGNIFICANT CHANGE UP (ref 34.5–46.5)
HCT VFR BLDA CALC: 38.4 % — SIGNIFICANT CHANGE UP (ref 34.5–46.5)
HCT VFR BLDA CALC: 41.1 % — SIGNIFICANT CHANGE UP (ref 34.5–46.5)
HDLC SERPL-MCNC: 65 MG/DL
HDLC SERPL-MCNC: 71 MG/DL — SIGNIFICANT CHANGE UP
HEMOCCULT STL QL IA: NEGATIVE
HGB BLD CALC-MCNC: 11.3 G/DL — LOW (ref 11.5–15.5)
HGB BLD CALC-MCNC: 11.9 G/DL — SIGNIFICANT CHANGE UP (ref 11.5–15.5)
HGB BLD CALC-MCNC: 12.1 G/DL — SIGNIFICANT CHANGE UP (ref 11.5–15.5)
HGB BLD CALC-MCNC: 12.5 G/DL — SIGNIFICANT CHANGE UP (ref 11.5–15.5)
HGB BLD CALC-MCNC: 13.4 G/DL — SIGNIFICANT CHANGE UP (ref 11.5–15.5)
HGB BLD CALC-MCNC: 9.7 G/DL — LOW (ref 11.5–15.5)
HGB BLD-MCNC: 10 G/DL — LOW (ref 11.5–15.5)
HGB BLD-MCNC: 10.1 G/DL
HGB BLD-MCNC: 10.1 G/DL — LOW (ref 11.5–15.5)
HGB BLD-MCNC: 10.2 G/DL — LOW (ref 11.5–15.5)
HGB BLD-MCNC: 10.3 G/DL — LOW (ref 11.5–15.5)
HGB BLD-MCNC: 10.5 G/DL — LOW (ref 11.5–15.5)
HGB BLD-MCNC: 10.6 G/DL — LOW (ref 11.5–15.5)
HGB BLD-MCNC: 11 G/DL — LOW (ref 11.5–15.5)
HGB BLD-MCNC: 11.1 G/DL — LOW (ref 11.5–15.5)
HGB BLD-MCNC: 11.1 G/DL — LOW (ref 11.5–15.5)
HGB BLD-MCNC: 11.2 G/DL — LOW (ref 11.5–15.5)
HGB BLD-MCNC: 11.3 G/DL — LOW (ref 11.5–15.5)
HGB BLD-MCNC: 11.3 G/DL — LOW (ref 11.5–15.5)
HGB BLD-MCNC: 11.4 G/DL — LOW (ref 11.5–15.5)
HGB BLD-MCNC: 11.4 G/DL — LOW (ref 11.5–15.5)
HGB BLD-MCNC: 11.6 G/DL — SIGNIFICANT CHANGE UP (ref 11.5–15.5)
HGB BLD-MCNC: 11.9 G/DL — SIGNIFICANT CHANGE UP (ref 11.5–15.5)
HGB BLD-MCNC: 12.5 G/DL — SIGNIFICANT CHANGE UP (ref 11.5–15.5)
HGB BLD-MCNC: 9.2 G/DL — LOW (ref 11.5–15.5)
HGB BLD-MCNC: 9.7 G/DL — LOW (ref 11.5–15.5)
HGB BLD-MCNC: 9.8 G/DL — LOW (ref 11.5–15.5)
HGB BLD-MCNC: 9.8 G/DL — LOW (ref 11.5–15.5)
HGB BLD-MCNC: 9.9 G/DL — LOW (ref 11.5–15.5)
HMPV RNA SPEC QL NAA+PROBE: SIGNIFICANT CHANGE UP
HOROWITZ INDEX BLDV+IHG-RTO: SIGNIFICANT CHANGE UP
HPIV1 RNA SPEC QL NAA+PROBE: SIGNIFICANT CHANGE UP
HPIV2 RNA SPEC QL NAA+PROBE: SIGNIFICANT CHANGE UP
HPIV3 RNA SPEC QL NAA+PROBE: SIGNIFICANT CHANGE UP
HPIV4 RNA SPEC QL NAA+PROBE: SIGNIFICANT CHANGE UP
IANC: 1.77 K/UL — SIGNIFICANT CHANGE UP (ref 1.5–8.5)
IANC: 2.03 K/UL — SIGNIFICANT CHANGE UP (ref 1.5–8.5)
IANC: 2.53 K/UL — SIGNIFICANT CHANGE UP (ref 1.5–8.5)
IANC: 3.42 K/UL — SIGNIFICANT CHANGE UP (ref 1.5–8.5)
IANC: 3.44 K/UL — SIGNIFICANT CHANGE UP (ref 1.5–8.5)
IANC: 4.3 K/UL — SIGNIFICANT CHANGE UP (ref 1.5–8.5)
IANC: 4.43 K/UL — SIGNIFICANT CHANGE UP (ref 1.5–8.5)
IANC: 5.05 K/UL — SIGNIFICANT CHANGE UP (ref 1.5–8.5)
IANC: 5.46 K/UL — SIGNIFICANT CHANGE UP (ref 1.5–8.5)
IMM GRANULOCYTES NFR BLD AUTO: 0.2 % — SIGNIFICANT CHANGE UP (ref 0–1.5)
IMM GRANULOCYTES NFR BLD AUTO: 0.3 %
IMM GRANULOCYTES NFR BLD AUTO: 0.3 % — SIGNIFICANT CHANGE UP (ref 0–1.5)
IMM GRANULOCYTES NFR BLD AUTO: 0.4 % — SIGNIFICANT CHANGE UP (ref 0–1.5)
IMM GRANULOCYTES NFR BLD AUTO: 0.6 % — SIGNIFICANT CHANGE UP (ref 0–1.5)
IMM GRANULOCYTES NFR BLD AUTO: 0.7 % — SIGNIFICANT CHANGE UP (ref 0–1.5)
INR BLD: 1.04 RATIO — SIGNIFICANT CHANGE UP (ref 0.88–1.16)
IRON SATN MFR SERPL: 17 % — SIGNIFICANT CHANGE UP (ref 14–50)
IRON SATN MFR SERPL: 20 % — SIGNIFICANT CHANGE UP (ref 14–50)
IRON SATN MFR SERPL: 21 %
IRON SATN MFR SERPL: 53 UG/DL — SIGNIFICANT CHANGE UP (ref 30–160)
IRON SATN MFR SERPL: 57 UG/DL — SIGNIFICANT CHANGE UP (ref 30–160)
IRON SERPL-MCNC: 68 UG/DL
LACTATE BLDV-MCNC: 0.7 MMOL/L — SIGNIFICANT CHANGE UP (ref 0.5–2)
LACTATE BLDV-MCNC: 1.1 MMOL/L — SIGNIFICANT CHANGE UP (ref 0.5–2)
LACTATE BLDV-MCNC: 1.2 MMOL/L — SIGNIFICANT CHANGE UP (ref 0.5–2)
LACTATE BLDV-MCNC: 1.2 MMOL/L — SIGNIFICANT CHANGE UP (ref 0.5–2)
LACTATE BLDV-MCNC: 1.5 MMOL/L — SIGNIFICANT CHANGE UP (ref 0.5–2)
LACTATE BLDV-MCNC: 1.9 MMOL/L — SIGNIFICANT CHANGE UP (ref 0.5–2)
LDLC SERPL CALC-MCNC: 130 MG/DL
LIPID PNL WITH DIRECT LDL SERPL: 99 MG/DL — SIGNIFICANT CHANGE UP
LYMPHOCYTES # BLD AUTO: 0.84 K/UL — LOW (ref 1–3.3)
LYMPHOCYTES # BLD AUTO: 0.86 K/UL — LOW (ref 1–3.3)
LYMPHOCYTES # BLD AUTO: 0.96 K/UL — LOW (ref 1–3.3)
LYMPHOCYTES # BLD AUTO: 1.05 K/UL
LYMPHOCYTES # BLD AUTO: 1.09 K/UL — SIGNIFICANT CHANGE UP (ref 1–3.3)
LYMPHOCYTES # BLD AUTO: 1.13 K/UL — SIGNIFICANT CHANGE UP (ref 1–3.3)
LYMPHOCYTES # BLD AUTO: 1.28 K/UL — SIGNIFICANT CHANGE UP (ref 1–3.3)
LYMPHOCYTES # BLD AUTO: 1.52 K/UL — SIGNIFICANT CHANGE UP (ref 1–3.3)
LYMPHOCYTES # BLD AUTO: 1.58 K/UL — SIGNIFICANT CHANGE UP (ref 1–3.3)
LYMPHOCYTES # BLD AUTO: 1.84 K/UL — SIGNIFICANT CHANGE UP (ref 1–3.3)
LYMPHOCYTES # BLD AUTO: 15 % — SIGNIFICANT CHANGE UP (ref 13–44)
LYMPHOCYTES # BLD AUTO: 15.1 % — SIGNIFICANT CHANGE UP (ref 13–44)
LYMPHOCYTES # BLD AUTO: 15.2 % — SIGNIFICANT CHANGE UP (ref 13–44)
LYMPHOCYTES # BLD AUTO: 18.1 % — SIGNIFICANT CHANGE UP (ref 13–44)
LYMPHOCYTES # BLD AUTO: 23.4 % — SIGNIFICANT CHANGE UP (ref 13–44)
LYMPHOCYTES # BLD AUTO: 30.7 % — SIGNIFICANT CHANGE UP (ref 13–44)
LYMPHOCYTES # BLD AUTO: 31.3 % — SIGNIFICANT CHANGE UP (ref 13–44)
LYMPHOCYTES # BLD AUTO: 31.5 % — SIGNIFICANT CHANGE UP (ref 13–44)
LYMPHOCYTES # BLD AUTO: 41.4 % — SIGNIFICANT CHANGE UP (ref 13–44)
LYMPHOCYTES NFR BLD AUTO: 28.7 %
MAGNESIUM SERPL-MCNC: 1.7 MG/DL — SIGNIFICANT CHANGE UP (ref 1.6–2.6)
MAGNESIUM SERPL-MCNC: 1.8 MG/DL — SIGNIFICANT CHANGE UP (ref 1.6–2.6)
MAGNESIUM SERPL-MCNC: 1.9 MG/DL — SIGNIFICANT CHANGE UP (ref 1.6–2.6)
MAGNESIUM SERPL-MCNC: 2 MG/DL — SIGNIFICANT CHANGE UP (ref 1.6–2.6)
MAGNESIUM SERPL-MCNC: 2.1 MG/DL — SIGNIFICANT CHANGE UP (ref 1.6–2.6)
MAGNESIUM SERPL-MCNC: 2.2 MG/DL — SIGNIFICANT CHANGE UP (ref 1.6–2.6)
MAGNESIUM SERPL-MCNC: 2.2 MG/DL — SIGNIFICANT CHANGE UP (ref 1.6–2.6)
MAGNESIUM SERPL-MCNC: 2.3 MG/DL — SIGNIFICANT CHANGE UP (ref 1.6–2.6)
MAGNESIUM SERPL-MCNC: 2.3 MG/DL — SIGNIFICANT CHANGE UP (ref 1.6–2.6)
MAN DIFF?: NORMAL
MCHC RBC-ENTMCNC: 28.8 GM/DL — LOW (ref 32–36)
MCHC RBC-ENTMCNC: 29.4 PG — SIGNIFICANT CHANGE UP (ref 27–34)
MCHC RBC-ENTMCNC: 29.5 GM/DL — LOW (ref 32–36)
MCHC RBC-ENTMCNC: 29.5 PG — SIGNIFICANT CHANGE UP (ref 27–34)
MCHC RBC-ENTMCNC: 29.6 PG — SIGNIFICANT CHANGE UP (ref 27–34)
MCHC RBC-ENTMCNC: 29.6 PG — SIGNIFICANT CHANGE UP (ref 27–34)
MCHC RBC-ENTMCNC: 29.7 PG — SIGNIFICANT CHANGE UP (ref 27–34)
MCHC RBC-ENTMCNC: 29.8 PG — SIGNIFICANT CHANGE UP (ref 27–34)
MCHC RBC-ENTMCNC: 29.9 GM/DL — LOW (ref 32–36)
MCHC RBC-ENTMCNC: 29.9 PG — SIGNIFICANT CHANGE UP (ref 27–34)
MCHC RBC-ENTMCNC: 30 GM/DL — LOW (ref 32–36)
MCHC RBC-ENTMCNC: 30 PG — SIGNIFICANT CHANGE UP (ref 27–34)
MCHC RBC-ENTMCNC: 30 PG — SIGNIFICANT CHANGE UP (ref 27–34)
MCHC RBC-ENTMCNC: 30.1 GM/DL — LOW (ref 32–36)
MCHC RBC-ENTMCNC: 30.1 GM/DL — LOW (ref 32–36)
MCHC RBC-ENTMCNC: 30.1 PG — SIGNIFICANT CHANGE UP (ref 27–34)
MCHC RBC-ENTMCNC: 30.2 GM/DL — LOW (ref 32–36)
MCHC RBC-ENTMCNC: 30.3 GM/DL — LOW (ref 32–36)
MCHC RBC-ENTMCNC: 30.3 PG — SIGNIFICANT CHANGE UP (ref 27–34)
MCHC RBC-ENTMCNC: 30.4 GM/DL — LOW (ref 32–36)
MCHC RBC-ENTMCNC: 30.5 PG — SIGNIFICANT CHANGE UP (ref 27–34)
MCHC RBC-ENTMCNC: 30.6 PG — SIGNIFICANT CHANGE UP (ref 27–34)
MCHC RBC-ENTMCNC: 30.6 PG — SIGNIFICANT CHANGE UP (ref 27–34)
MCHC RBC-ENTMCNC: 30.7 GM/DL
MCHC RBC-ENTMCNC: 30.8 GM/DL — LOW (ref 32–36)
MCHC RBC-ENTMCNC: 30.8 PG
MCHC RBC-ENTMCNC: 30.9 GM/DL — LOW (ref 32–36)
MCHC RBC-ENTMCNC: 31 GM/DL — LOW (ref 32–36)
MCHC RBC-ENTMCNC: 31 GM/DL — LOW (ref 32–36)
MCHC RBC-ENTMCNC: 31.1 GM/DL — LOW (ref 32–36)
MCHC RBC-ENTMCNC: 31.2 GM/DL — LOW (ref 32–36)
MCHC RBC-ENTMCNC: 31.2 GM/DL — LOW (ref 32–36)
MCHC RBC-ENTMCNC: 31.3 GM/DL — LOW (ref 32–36)
MCHC RBC-ENTMCNC: 31.3 GM/DL — LOW (ref 32–36)
MCHC RBC-ENTMCNC: 31.4 GM/DL — LOW (ref 32–36)
MCHC RBC-ENTMCNC: 31.5 GM/DL — LOW (ref 32–36)
MCHC RBC-ENTMCNC: 31.7 GM/DL — LOW (ref 32–36)
MCHC RBC-ENTMCNC: 31.7 GM/DL — LOW (ref 32–36)
MCHC RBC-ENTMCNC: 31.8 GM/DL — LOW (ref 32–36)
MCHC RBC-ENTMCNC: 31.8 GM/DL — LOW (ref 32–36)
MCHC RBC-ENTMCNC: 32.1 GM/DL — SIGNIFICANT CHANGE UP (ref 32–36)
MCV RBC AUTO: 100.3 FL
MCV RBC AUTO: 101.8 FL — HIGH (ref 80–100)
MCV RBC AUTO: 102.3 FL — HIGH (ref 80–100)
MCV RBC AUTO: 93.6 FL — SIGNIFICANT CHANGE UP (ref 80–100)
MCV RBC AUTO: 93.9 FL — SIGNIFICANT CHANGE UP (ref 80–100)
MCV RBC AUTO: 94.2 FL — SIGNIFICANT CHANGE UP (ref 80–100)
MCV RBC AUTO: 94.7 FL — SIGNIFICANT CHANGE UP (ref 80–100)
MCV RBC AUTO: 95 FL — SIGNIFICANT CHANGE UP (ref 80–100)
MCV RBC AUTO: 95.1 FL — SIGNIFICANT CHANGE UP (ref 80–100)
MCV RBC AUTO: 95.3 FL — SIGNIFICANT CHANGE UP (ref 80–100)
MCV RBC AUTO: 95.3 FL — SIGNIFICANT CHANGE UP (ref 80–100)
MCV RBC AUTO: 95.4 FL — SIGNIFICANT CHANGE UP (ref 80–100)
MCV RBC AUTO: 95.6 FL — SIGNIFICANT CHANGE UP (ref 80–100)
MCV RBC AUTO: 95.8 FL — SIGNIFICANT CHANGE UP (ref 80–100)
MCV RBC AUTO: 96.1 FL — SIGNIFICANT CHANGE UP (ref 80–100)
MCV RBC AUTO: 96.2 FL — SIGNIFICANT CHANGE UP (ref 80–100)
MCV RBC AUTO: 96.2 FL — SIGNIFICANT CHANGE UP (ref 80–100)
MCV RBC AUTO: 96.4 FL — SIGNIFICANT CHANGE UP (ref 80–100)
MCV RBC AUTO: 96.7 FL — SIGNIFICANT CHANGE UP (ref 80–100)
MCV RBC AUTO: 96.8 FL — SIGNIFICANT CHANGE UP (ref 80–100)
MCV RBC AUTO: 97.1 FL — SIGNIFICANT CHANGE UP (ref 80–100)
MCV RBC AUTO: 97.6 FL — SIGNIFICANT CHANGE UP (ref 80–100)
MCV RBC AUTO: 97.9 FL — SIGNIFICANT CHANGE UP (ref 80–100)
MCV RBC AUTO: 97.9 FL — SIGNIFICANT CHANGE UP (ref 80–100)
MCV RBC AUTO: 98.3 FL — SIGNIFICANT CHANGE UP (ref 80–100)
MCV RBC AUTO: 98.5 FL — SIGNIFICANT CHANGE UP (ref 80–100)
MCV RBC AUTO: 98.7 FL — SIGNIFICANT CHANGE UP (ref 80–100)
MCV RBC AUTO: 98.8 FL — SIGNIFICANT CHANGE UP (ref 80–100)
MCV RBC AUTO: 99.7 FL — SIGNIFICANT CHANGE UP (ref 80–100)
MONOCYTES # BLD AUTO: 0.25 K/UL
MONOCYTES # BLD AUTO: 0.28 K/UL — SIGNIFICANT CHANGE UP (ref 0–0.9)
MONOCYTES # BLD AUTO: 0.29 K/UL — SIGNIFICANT CHANGE UP (ref 0–0.9)
MONOCYTES # BLD AUTO: 0.32 K/UL — SIGNIFICANT CHANGE UP (ref 0–0.9)
MONOCYTES # BLD AUTO: 0.37 K/UL — SIGNIFICANT CHANGE UP (ref 0–0.9)
MONOCYTES # BLD AUTO: 0.42 K/UL — SIGNIFICANT CHANGE UP (ref 0–0.9)
MONOCYTES # BLD AUTO: 0.42 K/UL — SIGNIFICANT CHANGE UP (ref 0–0.9)
MONOCYTES # BLD AUTO: 0.52 K/UL — SIGNIFICANT CHANGE UP (ref 0–0.9)
MONOCYTES # BLD AUTO: 0.54 K/UL — SIGNIFICANT CHANGE UP (ref 0–0.9)
MONOCYTES # BLD AUTO: 0.54 K/UL — SIGNIFICANT CHANGE UP (ref 0–0.9)
MONOCYTES NFR BLD AUTO: 11.6 % — SIGNIFICANT CHANGE UP (ref 2–14)
MONOCYTES NFR BLD AUTO: 4.6 % — SIGNIFICANT CHANGE UP (ref 2–14)
MONOCYTES NFR BLD AUTO: 6.7 % — SIGNIFICANT CHANGE UP (ref 2–14)
MONOCYTES NFR BLD AUTO: 6.8 %
MONOCYTES NFR BLD AUTO: 6.9 % — SIGNIFICANT CHANGE UP (ref 2–14)
MONOCYTES NFR BLD AUTO: 7.3 % — SIGNIFICANT CHANGE UP (ref 2–14)
MONOCYTES NFR BLD AUTO: 7.6 % — SIGNIFICANT CHANGE UP (ref 2–14)
MONOCYTES NFR BLD AUTO: 8 % — SIGNIFICANT CHANGE UP (ref 2–14)
MONOCYTES NFR BLD AUTO: 9.2 % — SIGNIFICANT CHANGE UP (ref 2–14)
MONOCYTES NFR BLD AUTO: 9.7 % — SIGNIFICANT CHANGE UP (ref 2–14)
NEUTROPHILS # BLD AUTO: 1.77 K/UL — LOW (ref 1.8–7.4)
NEUTROPHILS # BLD AUTO: 2.03 K/UL — SIGNIFICANT CHANGE UP (ref 1.8–7.4)
NEUTROPHILS # BLD AUTO: 2.26 K/UL
NEUTROPHILS # BLD AUTO: 2.53 K/UL — SIGNIFICANT CHANGE UP (ref 1.8–7.4)
NEUTROPHILS # BLD AUTO: 3.42 K/UL — SIGNIFICANT CHANGE UP (ref 1.8–7.4)
NEUTROPHILS # BLD AUTO: 3.44 K/UL — SIGNIFICANT CHANGE UP (ref 1.8–7.4)
NEUTROPHILS # BLD AUTO: 4.3 K/UL — SIGNIFICANT CHANGE UP (ref 1.8–7.4)
NEUTROPHILS # BLD AUTO: 4.43 K/UL — SIGNIFICANT CHANGE UP (ref 1.8–7.4)
NEUTROPHILS # BLD AUTO: 5.05 K/UL — SIGNIFICANT CHANGE UP (ref 1.8–7.4)
NEUTROPHILS # BLD AUTO: 5.46 K/UL — SIGNIFICANT CHANGE UP (ref 1.8–7.4)
NEUTROPHILS NFR BLD AUTO: 46.2 % — SIGNIFICANT CHANGE UP (ref 43–77)
NEUTROPHILS NFR BLD AUTO: 56.2 % — SIGNIFICANT CHANGE UP (ref 43–77)
NEUTROPHILS NFR BLD AUTO: 58.6 % — SIGNIFICANT CHANGE UP (ref 43–77)
NEUTROPHILS NFR BLD AUTO: 60.7 % — SIGNIFICANT CHANGE UP (ref 43–77)
NEUTROPHILS NFR BLD AUTO: 61.8 %
NEUTROPHILS NFR BLD AUTO: 66.3 % — SIGNIFICANT CHANGE UP (ref 43–77)
NEUTROPHILS NFR BLD AUTO: 74.2 % — SIGNIFICANT CHANGE UP (ref 43–77)
NEUTROPHILS NFR BLD AUTO: 76.4 % — SIGNIFICANT CHANGE UP (ref 43–77)
NEUTROPHILS NFR BLD AUTO: 77.3 % — HIGH (ref 43–77)
NEUTROPHILS NFR BLD AUTO: 79.3 % — HIGH (ref 43–77)
NON HDL CHOLESTEROL: 110 MG/DL — SIGNIFICANT CHANGE UP
NONHDLC SERPL-MCNC: 159 MG/DL
NRBC # BLD: 0 /100 WBCS — SIGNIFICANT CHANGE UP
NRBC # FLD: 0 K/UL — SIGNIFICANT CHANGE UP
NT-PROBNP SERPL-SCNC: 1248 PG/ML — HIGH
NT-PROBNP SERPL-SCNC: 777 PG/ML — HIGH
NT-PROBNP SERPL-SCNC: 807 PG/ML — HIGH
NT-PROBNP SERPL-SCNC: 840 PG/ML — HIGH
PCO2 BLDA: 107 MMHG — CRITICAL HIGH (ref 32–48)
PCO2 BLDA: 54 MMHG — HIGH (ref 32–48)
PCO2 BLDA: 65 MMHG — HIGH (ref 32–48)
PCO2 BLDA: 72 MMHG — CRITICAL HIGH (ref 32–48)
PCO2 BLDA: 77 MMHG — CRITICAL HIGH (ref 32–48)
PCO2 BLDA: 84 MMHG — CRITICAL HIGH (ref 32–48)
PCO2 BLDA: 93 MMHG — CRITICAL HIGH (ref 32–48)
PCO2 BLDCOV: 78 MMHG — HIGH (ref 27–49)
PCO2 BLDCOV: 80 MMHG — HIGH (ref 27–49)
PCO2 BLDCOV: 90 MMHG — HIGH (ref 27–49)
PCO2 BLDV: 102 MMHG — CRITICAL HIGH (ref 41–51)
PCO2 BLDV: 106 MMHG — CRITICAL HIGH (ref 41–51)
PCO2 BLDV: 63 MMHG — HIGH (ref 41–51)
PCO2 BLDV: 66 MMHG — HIGH (ref 41–51)
PCO2 BLDV: 69 MMHG — HIGH (ref 39–42)
PCO2 BLDV: 69 MMHG — HIGH (ref 41–51)
PCO2 BLDV: 74 MMHG — HIGH (ref 41–51)
PCO2 BLDV: 74 MMHG — HIGH (ref 41–51)
PCO2 BLDV: 79 MMHG — HIGH (ref 41–51)
PCO2 BLDV: 96 MMHG — CRITICAL HIGH (ref 41–51)
PCO2 BLDV: >110 MMHG — CRITICAL HIGH (ref 41–51)
PH BLDA: 7.25 — LOW (ref 7.35–7.45)
PH BLDA: 7.29 — LOW (ref 7.35–7.45)
PH BLDA: 7.3 — LOW (ref 7.35–7.45)
PH BLDA: 7.36 — SIGNIFICANT CHANGE UP (ref 7.35–7.45)
PH BLDA: 7.38 — SIGNIFICANT CHANGE UP (ref 7.35–7.45)
PH BLDA: 7.39 — SIGNIFICANT CHANGE UP (ref 7.35–7.45)
PH BLDA: 7.42 — SIGNIFICANT CHANGE UP (ref 7.35–7.45)
PH BLDV: 7.18 — CRITICAL LOW (ref 7.32–7.43)
PH BLDV: 7.19 — CRITICAL LOW (ref 7.32–7.43)
PH BLDV: 7.2 — CRITICAL LOW (ref 7.32–7.43)
PH BLDV: 7.2 — CRITICAL LOW (ref 7.32–7.43)
PH BLDV: 7.29 — LOW (ref 7.32–7.43)
PH BLDV: 7.3 — LOW (ref 7.32–7.43)
PH BLDV: 7.32 — SIGNIFICANT CHANGE UP (ref 7.32–7.43)
PH BLDV: 7.37 — SIGNIFICANT CHANGE UP (ref 7.32–7.43)
PH BLDV: 7.39 — SIGNIFICANT CHANGE UP (ref 7.32–7.43)
PH BLDV: 7.4 — SIGNIFICANT CHANGE UP (ref 7.32–7.43)
PH BLDV: 7.4 — SIGNIFICANT CHANGE UP (ref 7.32–7.43)
PH BLDV: 7.42 — SIGNIFICANT CHANGE UP (ref 7.32–7.43)
PH BLDV: 7.42 — SIGNIFICANT CHANGE UP (ref 7.32–7.43)
PHOSPHATE SERPL-MCNC: 1 MG/DL — CRITICAL LOW (ref 2.5–4.5)
PHOSPHATE SERPL-MCNC: 1.4 MG/DL — LOW (ref 2.5–4.5)
PHOSPHATE SERPL-MCNC: 2.1 MG/DL — LOW (ref 2.5–4.5)
PHOSPHATE SERPL-MCNC: 2.2 MG/DL — LOW (ref 2.5–4.5)
PHOSPHATE SERPL-MCNC: 2.7 MG/DL — SIGNIFICANT CHANGE UP (ref 2.5–4.5)
PHOSPHATE SERPL-MCNC: 2.8 MG/DL — SIGNIFICANT CHANGE UP (ref 2.5–4.5)
PHOSPHATE SERPL-MCNC: 2.9 MG/DL — SIGNIFICANT CHANGE UP (ref 2.5–4.5)
PHOSPHATE SERPL-MCNC: 2.9 MG/DL — SIGNIFICANT CHANGE UP (ref 2.5–4.5)
PHOSPHATE SERPL-MCNC: 3.1 MG/DL — SIGNIFICANT CHANGE UP (ref 2.5–4.5)
PHOSPHATE SERPL-MCNC: 3.2 MG/DL — SIGNIFICANT CHANGE UP (ref 2.5–4.5)
PHOSPHATE SERPL-MCNC: 3.2 MG/DL — SIGNIFICANT CHANGE UP (ref 2.5–4.5)
PHOSPHATE SERPL-MCNC: 3.4 MG/DL — SIGNIFICANT CHANGE UP (ref 2.5–4.5)
PHOSPHATE SERPL-MCNC: 3.5 MG/DL — SIGNIFICANT CHANGE UP (ref 2.5–4.5)
PHOSPHATE SERPL-MCNC: 3.5 MG/DL — SIGNIFICANT CHANGE UP (ref 2.5–4.5)
PHOSPHATE SERPL-MCNC: 3.6 MG/DL — SIGNIFICANT CHANGE UP (ref 2.5–4.5)
PHOSPHATE SERPL-MCNC: 3.7 MG/DL — SIGNIFICANT CHANGE UP (ref 2.5–4.5)
PHOSPHATE SERPL-MCNC: 3.7 MG/DL — SIGNIFICANT CHANGE UP (ref 2.5–4.5)
PHOSPHATE SERPL-MCNC: 3.9 MG/DL — SIGNIFICANT CHANGE UP (ref 2.5–4.5)
PHOSPHATE SERPL-MCNC: 4.1 MG/DL — SIGNIFICANT CHANGE UP (ref 2.5–4.5)
PHOSPHATE SERPL-MCNC: 6.1 MG/DL — HIGH (ref 2.5–4.5)
PLATELET # BLD AUTO: 155 K/UL — SIGNIFICANT CHANGE UP (ref 150–400)
PLATELET # BLD AUTO: 159 K/UL — SIGNIFICANT CHANGE UP (ref 150–400)
PLATELET # BLD AUTO: 168 K/UL — SIGNIFICANT CHANGE UP (ref 150–400)
PLATELET # BLD AUTO: 170 K/UL — SIGNIFICANT CHANGE UP (ref 150–400)
PLATELET # BLD AUTO: 170 K/UL — SIGNIFICANT CHANGE UP (ref 150–400)
PLATELET # BLD AUTO: 174 K/UL — SIGNIFICANT CHANGE UP (ref 150–400)
PLATELET # BLD AUTO: 176 K/UL
PLATELET # BLD AUTO: 176 K/UL — SIGNIFICANT CHANGE UP (ref 150–400)
PLATELET # BLD AUTO: 176 K/UL — SIGNIFICANT CHANGE UP (ref 150–400)
PLATELET # BLD AUTO: 177 K/UL — SIGNIFICANT CHANGE UP (ref 150–400)
PLATELET # BLD AUTO: 178 K/UL — SIGNIFICANT CHANGE UP (ref 150–400)
PLATELET # BLD AUTO: 180 K/UL — SIGNIFICANT CHANGE UP (ref 150–400)
PLATELET # BLD AUTO: 181 K/UL — SIGNIFICANT CHANGE UP (ref 150–400)
PLATELET # BLD AUTO: 181 K/UL — SIGNIFICANT CHANGE UP (ref 150–400)
PLATELET # BLD AUTO: 183 K/UL — SIGNIFICANT CHANGE UP (ref 150–400)
PLATELET # BLD AUTO: 189 K/UL — SIGNIFICANT CHANGE UP (ref 150–400)
PLATELET # BLD AUTO: 192 K/UL — SIGNIFICANT CHANGE UP (ref 150–400)
PLATELET # BLD AUTO: 194 K/UL — SIGNIFICANT CHANGE UP (ref 150–400)
PLATELET # BLD AUTO: 194 K/UL — SIGNIFICANT CHANGE UP (ref 150–400)
PLATELET # BLD AUTO: 198 K/UL — SIGNIFICANT CHANGE UP (ref 150–400)
PLATELET # BLD AUTO: 206 K/UL — SIGNIFICANT CHANGE UP (ref 150–400)
PLATELET # BLD AUTO: 206 K/UL — SIGNIFICANT CHANGE UP (ref 150–400)
PLATELET # BLD AUTO: 207 K/UL — SIGNIFICANT CHANGE UP (ref 150–400)
PLATELET # BLD AUTO: 216 K/UL — SIGNIFICANT CHANGE UP (ref 150–400)
PLATELET # BLD AUTO: 216 K/UL — SIGNIFICANT CHANGE UP (ref 150–400)
PLATELET # BLD AUTO: 218 K/UL — SIGNIFICANT CHANGE UP (ref 150–400)
PLATELET # BLD AUTO: 220 K/UL — SIGNIFICANT CHANGE UP (ref 150–400)
PLATELET # BLD AUTO: 221 K/UL — SIGNIFICANT CHANGE UP (ref 150–400)
PLATELET # BLD AUTO: 224 K/UL — SIGNIFICANT CHANGE UP (ref 150–400)
PLATELET # BLD AUTO: 225 K/UL — SIGNIFICANT CHANGE UP (ref 150–400)
PLATELET # BLD AUTO: 227 K/UL — SIGNIFICANT CHANGE UP (ref 150–400)
PO2 BLDA: 138 MMHG — HIGH (ref 83–108)
PO2 BLDA: 158 MMHG — HIGH (ref 83–108)
PO2 BLDA: 173 MMHG — HIGH (ref 83–108)
PO2 BLDA: 179 MMHG — HIGH (ref 83–108)
PO2 BLDA: 76 MMHG — LOW (ref 83–108)
PO2 BLDA: 76 MMHG — LOW (ref 83–108)
PO2 BLDA: 96 MMHG — SIGNIFICANT CHANGE UP (ref 83–108)
PO2 BLDCOA: 100 MMHG — HIGH (ref 24–41)
PO2 BLDCOA: 36 MMHG — SIGNIFICANT CHANGE UP (ref 24–41)
PO2 BLDCOA: 78 MMHG — HIGH (ref 24–41)
PO2 BLDV: 136 MMHG — HIGH (ref 35–40)
PO2 BLDV: 28 MMHG — LOW (ref 35–40)
PO2 BLDV: 29 MMHG — LOW (ref 35–40)
PO2 BLDV: 30 MMHG — LOW (ref 35–40)
PO2 BLDV: 30 MMHG — LOW (ref 35–40)
PO2 BLDV: 37 MMHG — SIGNIFICANT CHANGE UP (ref 35–40)
PO2 BLDV: 37 MMHG — SIGNIFICANT CHANGE UP (ref 35–40)
PO2 BLDV: 51 MMHG — HIGH (ref 35–40)
PO2 BLDV: 53 MMHG — HIGH (ref 35–40)
PO2 BLDV: 59 MMHG — HIGH (ref 35–40)
PO2 BLDV: 60 MMHG — HIGH (ref 35–40)
POTASSIUM BLDV-SCNC: 2.9 MMOL/L — CRITICAL LOW (ref 3.4–4.5)
POTASSIUM BLDV-SCNC: 3.8 MMOL/L — SIGNIFICANT CHANGE UP (ref 3.4–4.5)
POTASSIUM BLDV-SCNC: 4.1 MMOL/L — SIGNIFICANT CHANGE UP (ref 3.4–4.5)
POTASSIUM BLDV-SCNC: 4.2 MMOL/L — SIGNIFICANT CHANGE UP (ref 3.4–4.5)
POTASSIUM BLDV-SCNC: 4.3 MMOL/L — SIGNIFICANT CHANGE UP (ref 3.4–4.5)
POTASSIUM BLDV-SCNC: 4.3 MMOL/L — SIGNIFICANT CHANGE UP (ref 3.4–4.5)
POTASSIUM BLDV-SCNC: 4.6 MMOL/L — HIGH (ref 3.4–4.5)
POTASSIUM SERPL-MCNC: 2.9 MMOL/L — CRITICAL LOW (ref 3.5–5.3)
POTASSIUM SERPL-MCNC: 3.1 MMOL/L — LOW (ref 3.5–5.3)
POTASSIUM SERPL-MCNC: 3.2 MMOL/L — LOW (ref 3.5–5.3)
POTASSIUM SERPL-MCNC: 3.6 MMOL/L — SIGNIFICANT CHANGE UP (ref 3.5–5.3)
POTASSIUM SERPL-MCNC: 3.7 MMOL/L — SIGNIFICANT CHANGE UP (ref 3.5–5.3)
POTASSIUM SERPL-MCNC: 3.8 MMOL/L — SIGNIFICANT CHANGE UP (ref 3.5–5.3)
POTASSIUM SERPL-MCNC: 3.9 MMOL/L — SIGNIFICANT CHANGE UP (ref 3.5–5.3)
POTASSIUM SERPL-MCNC: 4 MMOL/L — SIGNIFICANT CHANGE UP (ref 3.5–5.3)
POTASSIUM SERPL-MCNC: 4 MMOL/L — SIGNIFICANT CHANGE UP (ref 3.5–5.3)
POTASSIUM SERPL-MCNC: 4.1 MMOL/L — SIGNIFICANT CHANGE UP (ref 3.5–5.3)
POTASSIUM SERPL-MCNC: 4.3 MMOL/L — SIGNIFICANT CHANGE UP (ref 3.5–5.3)
POTASSIUM SERPL-MCNC: 4.4 MMOL/L — SIGNIFICANT CHANGE UP (ref 3.5–5.3)
POTASSIUM SERPL-MCNC: 4.5 MMOL/L — SIGNIFICANT CHANGE UP (ref 3.5–5.3)
POTASSIUM SERPL-MCNC: 4.6 MMOL/L — SIGNIFICANT CHANGE UP (ref 3.5–5.3)
POTASSIUM SERPL-MCNC: 4.6 MMOL/L — SIGNIFICANT CHANGE UP (ref 3.5–5.3)
POTASSIUM SERPL-MCNC: 5.3 MMOL/L — SIGNIFICANT CHANGE UP (ref 3.5–5.3)
POTASSIUM SERPL-SCNC: 2.9 MMOL/L — CRITICAL LOW (ref 3.5–5.3)
POTASSIUM SERPL-SCNC: 3.1 MMOL/L — LOW (ref 3.5–5.3)
POTASSIUM SERPL-SCNC: 3.2 MMOL/L — LOW (ref 3.5–5.3)
POTASSIUM SERPL-SCNC: 3.6 MMOL/L — SIGNIFICANT CHANGE UP (ref 3.5–5.3)
POTASSIUM SERPL-SCNC: 3.7 MMOL/L — SIGNIFICANT CHANGE UP (ref 3.5–5.3)
POTASSIUM SERPL-SCNC: 3.8 MMOL/L — SIGNIFICANT CHANGE UP (ref 3.5–5.3)
POTASSIUM SERPL-SCNC: 3.9 MMOL/L
POTASSIUM SERPL-SCNC: 3.9 MMOL/L — SIGNIFICANT CHANGE UP (ref 3.5–5.3)
POTASSIUM SERPL-SCNC: 4 MMOL/L — SIGNIFICANT CHANGE UP (ref 3.5–5.3)
POTASSIUM SERPL-SCNC: 4 MMOL/L — SIGNIFICANT CHANGE UP (ref 3.5–5.3)
POTASSIUM SERPL-SCNC: 4.1 MMOL/L — SIGNIFICANT CHANGE UP (ref 3.5–5.3)
POTASSIUM SERPL-SCNC: 4.3 MMOL/L — SIGNIFICANT CHANGE UP (ref 3.5–5.3)
POTASSIUM SERPL-SCNC: 4.4 MMOL/L — SIGNIFICANT CHANGE UP (ref 3.5–5.3)
POTASSIUM SERPL-SCNC: 4.5 MMOL/L — SIGNIFICANT CHANGE UP (ref 3.5–5.3)
POTASSIUM SERPL-SCNC: 4.6 MMOL/L — SIGNIFICANT CHANGE UP (ref 3.5–5.3)
POTASSIUM SERPL-SCNC: 4.6 MMOL/L — SIGNIFICANT CHANGE UP (ref 3.5–5.3)
POTASSIUM SERPL-SCNC: 5.3 MMOL/L — SIGNIFICANT CHANGE UP (ref 3.5–5.3)
PROT SERPL-MCNC: 5.2 G/DL — LOW (ref 6–8.3)
PROT SERPL-MCNC: 5.5 G/DL
PROT SERPL-MCNC: 5.7 G/DL — LOW (ref 6–8.3)
PROT SERPL-MCNC: 6 G/DL — SIGNIFICANT CHANGE UP (ref 6–8.3)
PROT SERPL-MCNC: 6.3 G/DL — SIGNIFICANT CHANGE UP (ref 6–8.3)
PROT SERPL-MCNC: 6.3 G/DL — SIGNIFICANT CHANGE UP (ref 6–8.3)
PROT SERPL-MCNC: 6.4 G/DL — SIGNIFICANT CHANGE UP (ref 6–8.3)
PROT SERPL-MCNC: 6.9 G/DL — SIGNIFICANT CHANGE UP (ref 6–8.3)
PROTHROM AB SERPL-ACNC: 11.9 SEC — SIGNIFICANT CHANGE UP (ref 10.6–13.6)
RAPID RVP RESULT: SIGNIFICANT CHANGE UP
RBC # BLD: 3.11 M/UL — LOW (ref 3.8–5.2)
RBC # BLD: 3.26 M/UL — LOW (ref 3.8–5.2)
RBC # BLD: 3.28 M/UL
RBC # BLD: 3.29 M/UL — LOW (ref 3.8–5.2)
RBC # BLD: 3.3 M/UL — LOW (ref 3.8–5.2)
RBC # BLD: 3.33 M/UL — LOW (ref 3.8–5.2)
RBC # BLD: 3.36 M/UL — LOW (ref 3.8–5.2)
RBC # BLD: 3.38 M/UL — LOW (ref 3.8–5.2)
RBC # BLD: 3.39 M/UL — LOW (ref 3.8–5.2)
RBC # BLD: 3.39 M/UL — LOW (ref 3.8–5.2)
RBC # BLD: 3.4 M/UL — LOW (ref 3.8–5.2)
RBC # BLD: 3.41 M/UL — LOW (ref 3.8–5.2)
RBC # BLD: 3.42 M/UL — LOW (ref 3.8–5.2)
RBC # BLD: 3.42 M/UL — LOW (ref 3.8–5.2)
RBC # BLD: 3.43 M/UL — LOW (ref 3.8–5.2)
RBC # BLD: 3.43 M/UL — LOW (ref 3.8–5.2)
RBC # BLD: 3.46 M/UL — LOW (ref 3.8–5.2)
RBC # BLD: 3.55 M/UL — LOW (ref 3.8–5.2)
RBC # BLD: 3.64 M/UL — LOW (ref 3.8–5.2)
RBC # BLD: 3.69 M/UL — LOW (ref 3.8–5.2)
RBC # BLD: 3.72 M/UL — LOW (ref 3.8–5.2)
RBC # BLD: 3.73 M/UL — LOW (ref 3.8–5.2)
RBC # BLD: 3.78 M/UL — LOW (ref 3.8–5.2)
RBC # BLD: 3.8 M/UL — SIGNIFICANT CHANGE UP (ref 3.8–5.2)
RBC # BLD: 3.81 M/UL — SIGNIFICANT CHANGE UP (ref 3.8–5.2)
RBC # BLD: 3.85 M/UL — SIGNIFICANT CHANGE UP (ref 3.8–5.2)
RBC # BLD: 3.93 M/UL — SIGNIFICANT CHANGE UP (ref 3.8–5.2)
RBC # BLD: 3.93 M/UL — SIGNIFICANT CHANGE UP (ref 3.8–5.2)
RBC # BLD: 4.03 M/UL — SIGNIFICANT CHANGE UP (ref 3.8–5.2)
RBC # BLD: 4.08 M/UL — SIGNIFICANT CHANGE UP (ref 3.8–5.2)
RBC # FLD: 12.2 % — SIGNIFICANT CHANGE UP (ref 10.3–14.5)
RBC # FLD: 12.3 % — SIGNIFICANT CHANGE UP (ref 10.3–14.5)
RBC # FLD: 12.4 % — SIGNIFICANT CHANGE UP (ref 10.3–14.5)
RBC # FLD: 12.5 % — SIGNIFICANT CHANGE UP (ref 10.3–14.5)
RBC # FLD: 12.6 % — SIGNIFICANT CHANGE UP (ref 10.3–14.5)
RBC # FLD: 12.8 % — SIGNIFICANT CHANGE UP (ref 10.3–14.5)
RBC # FLD: 12.9 % — SIGNIFICANT CHANGE UP (ref 10.3–14.5)
RBC # FLD: 13 % — SIGNIFICANT CHANGE UP (ref 10.3–14.5)
RBC # FLD: 13.1 % — SIGNIFICANT CHANGE UP (ref 10.3–14.5)
RBC # FLD: 13.2 % — SIGNIFICANT CHANGE UP (ref 10.3–14.5)
RBC # FLD: 13.3 % — SIGNIFICANT CHANGE UP (ref 10.3–14.5)
RBC # FLD: 13.4 % — SIGNIFICANT CHANGE UP (ref 10.3–14.5)
RBC # FLD: 13.6 % — SIGNIFICANT CHANGE UP (ref 10.3–14.5)
RBC # FLD: 13.7 % — SIGNIFICANT CHANGE UP (ref 10.3–14.5)
RBC # FLD: 13.9 %
RBC # FLD: 14 % — SIGNIFICANT CHANGE UP (ref 10.3–14.5)
RETICS #: 52.3 K/UL — SIGNIFICANT CHANGE UP (ref 25–125)
RETICS #: 55.8 K/UL — SIGNIFICANT CHANGE UP (ref 25–125)
RETICS/RBC NFR: 1.3 % — SIGNIFICANT CHANGE UP (ref 0.5–2.5)
RETICS/RBC NFR: 1.6 % — SIGNIFICANT CHANGE UP (ref 0.5–2.5)
RSV RNA SPEC QL NAA+PROBE: SIGNIFICANT CHANGE UP
RV+EV RNA SPEC QL NAA+PROBE: SIGNIFICANT CHANGE UP
SAO2 % BLDA: 95 % — SIGNIFICANT CHANGE UP (ref 95–99)
SAO2 % BLDA: 95.8 % — SIGNIFICANT CHANGE UP (ref 95–99)
SAO2 % BLDA: 97.7 % — SIGNIFICANT CHANGE UP (ref 95–99)
SAO2 % BLDA: 98.7 % — SIGNIFICANT CHANGE UP (ref 95–99)
SAO2 % BLDA: 98.9 % — SIGNIFICANT CHANGE UP (ref 95–99)
SAO2 % BLDA: 98.9 % — SIGNIFICANT CHANGE UP (ref 95–99)
SAO2 % BLDA: 99.3 % — HIGH (ref 95–99)
SAO2 % BLDCOV: 68.8 % — SIGNIFICANT CHANGE UP
SAO2 % BLDCOV: 95.1 % — SIGNIFICANT CHANGE UP
SAO2 % BLDCOV: 97.1 % — SIGNIFICANT CHANGE UP
SAO2 % BLDV: 40.3 % — LOW (ref 60–85)
SAO2 % BLDV: 43.8 % — LOW (ref 60–85)
SAO2 % BLDV: 47.5 % — LOW (ref 60–85)
SAO2 % BLDV: 48.2 % — LOW (ref 60–85)
SAO2 % BLDV: 50.9 % — LOW (ref 60–85)
SAO2 % BLDV: 55.8 % — LOW (ref 60–85)
SAO2 % BLDV: 57.6 % — LOW (ref 60–85)
SAO2 % BLDV: 62.9 % — SIGNIFICANT CHANGE UP (ref 60–85)
SAO2 % BLDV: 79.7 % — SIGNIFICANT CHANGE UP (ref 60–85)
SAO2 % BLDV: 89.5 % — HIGH (ref 60–85)
SAO2 % BLDV: 90.9 % — HIGH (ref 60–85)
SAO2 % BLDV: 93.3 % — HIGH (ref 60–85)
SAO2 % BLDV: 98.8 % — HIGH (ref 60–85)
SARS-COV-2 IGG+IGM SERPL QL IA: 0.4 U/ML — SIGNIFICANT CHANGE UP
SARS-COV-2 IGG+IGM SERPL QL IA: 0.4 U/ML — SIGNIFICANT CHANGE UP
SARS-COV-2 IGG+IGM SERPL QL IA: NEGATIVE — SIGNIFICANT CHANGE UP
SARS-COV-2 IGG+IGM SERPL QL IA: NEGATIVE — SIGNIFICANT CHANGE UP
SARS-COV-2 RNA SPEC QL NAA+PROBE: SIGNIFICANT CHANGE UP
SODIUM SERPL-SCNC: 139 MMOL/L — SIGNIFICANT CHANGE UP (ref 135–145)
SODIUM SERPL-SCNC: 140 MMOL/L — SIGNIFICANT CHANGE UP (ref 135–145)
SODIUM SERPL-SCNC: 140 MMOL/L — SIGNIFICANT CHANGE UP (ref 135–145)
SODIUM SERPL-SCNC: 141 MMOL/L — SIGNIFICANT CHANGE UP (ref 135–145)
SODIUM SERPL-SCNC: 141 MMOL/L — SIGNIFICANT CHANGE UP (ref 135–145)
SODIUM SERPL-SCNC: 142 MMOL/L — SIGNIFICANT CHANGE UP (ref 135–145)
SODIUM SERPL-SCNC: 143 MMOL/L — SIGNIFICANT CHANGE UP (ref 135–145)
SODIUM SERPL-SCNC: 144 MMOL/L — SIGNIFICANT CHANGE UP (ref 135–145)
SODIUM SERPL-SCNC: 145 MMOL/L — SIGNIFICANT CHANGE UP (ref 135–145)
SODIUM SERPL-SCNC: 146 MMOL/L — HIGH (ref 135–145)
SODIUM SERPL-SCNC: 147 MMOL/L
SODIUM SERPL-SCNC: 147 MMOL/L — HIGH (ref 135–145)
SODIUM SERPL-SCNC: 148 MMOL/L — HIGH (ref 135–145)
TIBC SERPL-MCNC: 261 UG/DL — SIGNIFICANT CHANGE UP (ref 220–430)
TIBC SERPL-MCNC: 319 UG/DL
TIBC SERPL-MCNC: 334 UG/DL — SIGNIFICANT CHANGE UP (ref 220–430)
TRIGL SERPL-MCNC: 142 MG/DL
TRIGL SERPL-MCNC: 56 MG/DL — SIGNIFICANT CHANGE UP
TROPONIN T, HIGH SENSITIVITY RESULT: 19 NG/L — SIGNIFICANT CHANGE UP
TROPONIN T, HIGH SENSITIVITY RESULT: 27 NG/L — SIGNIFICANT CHANGE UP
TROPONIN T, HIGH SENSITIVITY RESULT: 31 NG/L — SIGNIFICANT CHANGE UP
TROPONIN T, HIGH SENSITIVITY RESULT: 33 NG/L — SIGNIFICANT CHANGE UP
TROPONIN T, HIGH SENSITIVITY RESULT: 33 NG/L — SIGNIFICANT CHANGE UP
TSH SERPL-MCNC: 0.64 UIU/ML — SIGNIFICANT CHANGE UP (ref 0.27–4.2)
UIBC SERPL-MCNC: 208 UG/DL — SIGNIFICANT CHANGE UP (ref 110–370)
UIBC SERPL-MCNC: 251 UG/DL
UIBC SERPL-MCNC: 277 UG/DL — SIGNIFICANT CHANGE UP (ref 110–370)
VIT B12 SERPL-MCNC: 2000 PG/ML — HIGH (ref 200–900)
VIT B12 SERPL-MCNC: 270 PG/ML
WBC # BLD: 2.91 K/UL — LOW (ref 3.8–10.5)
WBC # BLD: 3.61 K/UL — LOW (ref 3.8–10.5)
WBC # BLD: 3.71 K/UL — LOW (ref 3.8–10.5)
WBC # BLD: 3.82 K/UL — SIGNIFICANT CHANGE UP (ref 3.8–10.5)
WBC # BLD: 3.85 K/UL — SIGNIFICANT CHANGE UP (ref 3.8–10.5)
WBC # BLD: 4.05 K/UL — SIGNIFICANT CHANGE UP (ref 3.8–10.5)
WBC # BLD: 4.12 K/UL — SIGNIFICANT CHANGE UP (ref 3.8–10.5)
WBC # BLD: 4.17 K/UL — SIGNIFICANT CHANGE UP (ref 3.8–10.5)
WBC # BLD: 4.22 K/UL — SIGNIFICANT CHANGE UP (ref 3.8–10.5)
WBC # BLD: 4.46 K/UL — SIGNIFICANT CHANGE UP (ref 3.8–10.5)
WBC # BLD: 4.64 K/UL — SIGNIFICANT CHANGE UP (ref 3.8–10.5)
WBC # BLD: 4.86 K/UL — SIGNIFICANT CHANGE UP (ref 3.8–10.5)
WBC # BLD: 5.48 K/UL — SIGNIFICANT CHANGE UP (ref 3.8–10.5)
WBC # BLD: 5.62 K/UL — SIGNIFICANT CHANGE UP (ref 3.8–10.5)
WBC # BLD: 5.69 K/UL — SIGNIFICANT CHANGE UP (ref 3.8–10.5)
WBC # BLD: 5.73 K/UL — SIGNIFICANT CHANGE UP (ref 3.8–10.5)
WBC # BLD: 5.82 K/UL — SIGNIFICANT CHANGE UP (ref 3.8–10.5)
WBC # BLD: 5.84 K/UL — SIGNIFICANT CHANGE UP (ref 3.8–10.5)
WBC # BLD: 6.35 K/UL — SIGNIFICANT CHANGE UP (ref 3.8–10.5)
WBC # BLD: 6.36 K/UL — SIGNIFICANT CHANGE UP (ref 3.8–10.5)
WBC # BLD: 6.41 K/UL — SIGNIFICANT CHANGE UP (ref 3.8–10.5)
WBC # BLD: 6.45 K/UL — SIGNIFICANT CHANGE UP (ref 3.8–10.5)
WBC # BLD: 6.49 K/UL — SIGNIFICANT CHANGE UP (ref 3.8–10.5)
WBC # BLD: 7.15 K/UL — SIGNIFICANT CHANGE UP (ref 3.8–10.5)
WBC # BLD: 7.33 K/UL — SIGNIFICANT CHANGE UP (ref 3.8–10.5)
WBC # BLD: 7.46 K/UL — SIGNIFICANT CHANGE UP (ref 3.8–10.5)
WBC # BLD: 8.7 K/UL — SIGNIFICANT CHANGE UP (ref 3.8–10.5)
WBC # BLD: 8.97 K/UL — SIGNIFICANT CHANGE UP (ref 3.8–10.5)
WBC # BLD: 9.09 K/UL — SIGNIFICANT CHANGE UP (ref 3.8–10.5)
WBC # BLD: 9.28 K/UL — SIGNIFICANT CHANGE UP (ref 3.8–10.5)
WBC # FLD AUTO: 2.91 K/UL — LOW (ref 3.8–10.5)
WBC # FLD AUTO: 3.61 K/UL — LOW (ref 3.8–10.5)
WBC # FLD AUTO: 3.66 K/UL
WBC # FLD AUTO: 3.71 K/UL — LOW (ref 3.8–10.5)
WBC # FLD AUTO: 3.82 K/UL — SIGNIFICANT CHANGE UP (ref 3.8–10.5)
WBC # FLD AUTO: 3.85 K/UL — SIGNIFICANT CHANGE UP (ref 3.8–10.5)
WBC # FLD AUTO: 4.05 K/UL — SIGNIFICANT CHANGE UP (ref 3.8–10.5)
WBC # FLD AUTO: 4.12 K/UL — SIGNIFICANT CHANGE UP (ref 3.8–10.5)
WBC # FLD AUTO: 4.17 K/UL — SIGNIFICANT CHANGE UP (ref 3.8–10.5)
WBC # FLD AUTO: 4.22 K/UL — SIGNIFICANT CHANGE UP (ref 3.8–10.5)
WBC # FLD AUTO: 4.46 K/UL — SIGNIFICANT CHANGE UP (ref 3.8–10.5)
WBC # FLD AUTO: 4.64 K/UL — SIGNIFICANT CHANGE UP (ref 3.8–10.5)
WBC # FLD AUTO: 4.86 K/UL — SIGNIFICANT CHANGE UP (ref 3.8–10.5)
WBC # FLD AUTO: 5.48 K/UL — SIGNIFICANT CHANGE UP (ref 3.8–10.5)
WBC # FLD AUTO: 5.62 K/UL — SIGNIFICANT CHANGE UP (ref 3.8–10.5)
WBC # FLD AUTO: 5.69 K/UL — SIGNIFICANT CHANGE UP (ref 3.8–10.5)
WBC # FLD AUTO: 5.73 K/UL — SIGNIFICANT CHANGE UP (ref 3.8–10.5)
WBC # FLD AUTO: 5.82 K/UL — SIGNIFICANT CHANGE UP (ref 3.8–10.5)
WBC # FLD AUTO: 5.84 K/UL — SIGNIFICANT CHANGE UP (ref 3.8–10.5)
WBC # FLD AUTO: 6.35 K/UL — SIGNIFICANT CHANGE UP (ref 3.8–10.5)
WBC # FLD AUTO: 6.36 K/UL — SIGNIFICANT CHANGE UP (ref 3.8–10.5)
WBC # FLD AUTO: 6.41 K/UL — SIGNIFICANT CHANGE UP (ref 3.8–10.5)
WBC # FLD AUTO: 6.45 K/UL — SIGNIFICANT CHANGE UP (ref 3.8–10.5)
WBC # FLD AUTO: 6.49 K/UL — SIGNIFICANT CHANGE UP (ref 3.8–10.5)
WBC # FLD AUTO: 7.15 K/UL — SIGNIFICANT CHANGE UP (ref 3.8–10.5)
WBC # FLD AUTO: 7.33 K/UL — SIGNIFICANT CHANGE UP (ref 3.8–10.5)
WBC # FLD AUTO: 7.46 K/UL — SIGNIFICANT CHANGE UP (ref 3.8–10.5)
WBC # FLD AUTO: 8.7 K/UL — SIGNIFICANT CHANGE UP (ref 3.8–10.5)
WBC # FLD AUTO: 8.97 K/UL — SIGNIFICANT CHANGE UP (ref 3.8–10.5)
WBC # FLD AUTO: 9.09 K/UL — SIGNIFICANT CHANGE UP (ref 3.8–10.5)
WBC # FLD AUTO: 9.28 K/UL — SIGNIFICANT CHANGE UP (ref 3.8–10.5)

## 2021-01-01 PROCEDURE — 99291 CRITICAL CARE FIRST HOUR: CPT

## 2021-01-01 PROCEDURE — 99233 SBSQ HOSP IP/OBS HIGH 50: CPT | Mod: GC

## 2021-01-01 PROCEDURE — 99497 ADVNCD CARE PLAN 30 MIN: CPT | Mod: 25

## 2021-01-01 PROCEDURE — 99072 ADDL SUPL MATRL&STAF TM PHE: CPT

## 2021-01-01 PROCEDURE — 99285 EMERGENCY DEPT VISIT HI MDM: CPT

## 2021-01-01 PROCEDURE — 99223 1ST HOSP IP/OBS HIGH 75: CPT | Mod: GC

## 2021-01-01 PROCEDURE — 36415 COLL VENOUS BLD VENIPUNCTURE: CPT

## 2021-01-01 PROCEDURE — 99223 1ST HOSP IP/OBS HIGH 75: CPT

## 2021-01-01 PROCEDURE — 93010 ELECTROCARDIOGRAM REPORT: CPT

## 2021-01-01 PROCEDURE — 71045 X-RAY EXAM CHEST 1 VIEW: CPT | Mod: 26

## 2021-01-01 PROCEDURE — 99239 HOSP IP/OBS DSCHRG MGMT >30: CPT

## 2021-01-01 PROCEDURE — 99233 SBSQ HOSP IP/OBS HIGH 50: CPT

## 2021-01-01 PROCEDURE — 99232 SBSQ HOSP IP/OBS MODERATE 35: CPT | Mod: GC

## 2021-01-01 PROCEDURE — 99232 SBSQ HOSP IP/OBS MODERATE 35: CPT

## 2021-01-01 PROCEDURE — 99497 ADVNCD CARE PLAN 30 MIN: CPT

## 2021-01-01 PROCEDURE — 93296 REM INTERROG EVL PM/IDS: CPT

## 2021-01-01 PROCEDURE — 99285 EMERGENCY DEPT VISIT HI MDM: CPT | Mod: 25

## 2021-01-01 PROCEDURE — 93000 ELECTROCARDIOGRAM COMPLETE: CPT

## 2021-01-01 PROCEDURE — 99214 OFFICE O/P EST MOD 30 MIN: CPT

## 2021-01-01 PROCEDURE — 99213 OFFICE O/P EST LOW 20 MIN: CPT

## 2021-01-01 PROCEDURE — 93295 DEV INTERROG REMOTE 1/2/MLT: CPT

## 2021-01-01 PROCEDURE — 99214 OFFICE O/P EST MOD 30 MIN: CPT | Mod: 25

## 2021-01-01 PROCEDURE — 99497 ADVNCD CARE PLAN 30 MIN: CPT | Mod: GC,25

## 2021-01-01 PROCEDURE — 36600 WITHDRAWAL OF ARTERIAL BLOOD: CPT

## 2021-01-01 PROCEDURE — 99223 1ST HOSP IP/OBS HIGH 75: CPT | Mod: GC,25

## 2021-01-01 PROCEDURE — 12345: CPT | Mod: NC

## 2021-01-01 PROCEDURE — 93281 PM DEVICE PROGR EVAL MULTI: CPT | Mod: 26

## 2021-01-01 PROCEDURE — 99233 SBSQ HOSP IP/OBS HIGH 50: CPT | Mod: 25

## 2021-01-01 PROCEDURE — 99233 SBSQ HOSP IP/OBS HIGH 50: CPT | Mod: GC,25

## 2021-01-01 RX ORDER — MORPHINE SULFATE 50 MG/1
2 CAPSULE, EXTENDED RELEASE ORAL
Refills: 0 | Status: DISCONTINUED | OUTPATIENT
Start: 2021-01-01 | End: 2021-01-01

## 2021-01-01 RX ORDER — ALBUTEROL 90 UG/1
1 AEROSOL, METERED ORAL EVERY 6 HOURS
Refills: 0 | Status: DISCONTINUED | OUTPATIENT
Start: 2021-01-01 | End: 2021-01-01

## 2021-01-01 RX ORDER — HYDRALAZINE HCL 50 MG
5 TABLET ORAL ONCE
Refills: 0 | Status: COMPLETED | OUTPATIENT
Start: 2021-01-01 | End: 2021-01-01

## 2021-01-01 RX ORDER — HYDRALAZINE HCL 50 MG
75 TABLET ORAL THREE TIMES A DAY
Refills: 0 | Status: DISCONTINUED | OUTPATIENT
Start: 2021-01-01 | End: 2021-01-01

## 2021-01-01 RX ORDER — IPRATROPIUM/ALBUTEROL SULFATE 18-103MCG
3 AEROSOL WITH ADAPTER (GRAM) INHALATION EVERY 6 HOURS
Refills: 0 | Status: DISCONTINUED | OUTPATIENT
Start: 2021-01-01 | End: 2021-01-01

## 2021-01-01 RX ORDER — BUDESONIDE AND FORMOTEROL FUMARATE DIHYDRATE 160; 4.5 UG/1; UG/1
2 AEROSOL RESPIRATORY (INHALATION)
Qty: 0 | Refills: 0 | DISCHARGE

## 2021-01-01 RX ORDER — FUROSEMIDE 40 MG
20 TABLET ORAL DAILY
Refills: 0 | Status: DISCONTINUED | OUTPATIENT
Start: 2021-01-01 | End: 2021-01-01

## 2021-01-01 RX ORDER — MORPHINE SULFATE 50 MG/1
3 CAPSULE, EXTENDED RELEASE ORAL ONCE
Refills: 0 | Status: DISCONTINUED | OUTPATIENT
Start: 2021-01-01 | End: 2021-01-01

## 2021-01-01 RX ORDER — IPRATROPIUM/ALBUTEROL SULFATE 18-103MCG
3 AEROSOL WITH ADAPTER (GRAM) INHALATION ONCE
Refills: 0 | Status: COMPLETED | OUTPATIENT
Start: 2021-01-01 | End: 2021-01-01

## 2021-01-01 RX ORDER — SODIUM CHLORIDE 0.65 %
1 AEROSOL, SPRAY (ML) NASAL
Refills: 0 | Status: DISCONTINUED | OUTPATIENT
Start: 2021-01-01 | End: 2021-01-01

## 2021-01-01 RX ORDER — ENOXAPARIN SODIUM 100 MG/ML
40 INJECTION SUBCUTANEOUS DAILY
Refills: 0 | Status: DISCONTINUED | OUTPATIENT
Start: 2021-01-01 | End: 2021-01-01

## 2021-01-01 RX ORDER — POTASSIUM CHLORIDE 20 MEQ
40 PACKET (EA) ORAL ONCE
Refills: 0 | Status: COMPLETED | OUTPATIENT
Start: 2021-01-01 | End: 2021-01-01

## 2021-01-01 RX ORDER — MORPHINE SULFATE 50 MG/1
2 CAPSULE, EXTENDED RELEASE ORAL EVERY 4 HOURS
Refills: 0 | Status: DISCONTINUED | OUTPATIENT
Start: 2021-01-01 | End: 2021-01-01

## 2021-01-01 RX ORDER — TIOTROPIUM BROMIDE 18 UG/1
1 CAPSULE ORAL; RESPIRATORY (INHALATION) DAILY
Refills: 0 | Status: DISCONTINUED | OUTPATIENT
Start: 2021-01-01 | End: 2021-01-01

## 2021-01-01 RX ORDER — POTASSIUM PHOSPHATE, MONOBASIC POTASSIUM PHOSPHATE, DIBASIC 236; 224 MG/ML; MG/ML
15 INJECTION, SOLUTION INTRAVENOUS ONCE
Refills: 0 | Status: DISCONTINUED | OUTPATIENT
Start: 2021-01-01 | End: 2021-01-01

## 2021-01-01 RX ORDER — FUROSEMIDE 40 MG
40 TABLET ORAL DAILY
Refills: 0 | Status: DISCONTINUED | OUTPATIENT
Start: 2021-01-01 | End: 2021-01-01

## 2021-01-01 RX ORDER — ISOSORBIDE MONONITRATE 60 MG/1
30 TABLET, EXTENDED RELEASE ORAL DAILY
Refills: 0 | Status: DISCONTINUED | OUTPATIENT
Start: 2021-01-01 | End: 2021-01-01

## 2021-01-01 RX ORDER — FUROSEMIDE 40 MG
1 TABLET ORAL
Qty: 0 | Refills: 0 | DISCHARGE

## 2021-01-01 RX ORDER — SODIUM,POTASSIUM PHOSPHATES 278-250MG
1 POWDER IN PACKET (EA) ORAL
Refills: 0 | Status: COMPLETED | OUTPATIENT
Start: 2021-01-01 | End: 2021-01-01

## 2021-01-01 RX ORDER — ALBUTEROL 90 UG/1
1 AEROSOL, METERED ORAL ONCE
Refills: 0 | Status: COMPLETED | OUTPATIENT
Start: 2021-01-01 | End: 2021-01-01

## 2021-01-01 RX ORDER — FLUTICASONE PROPIONATE 50 MCG
1 SPRAY, SUSPENSION NASAL
Qty: 1 | Refills: 0
Start: 2021-01-01 | End: 2021-01-01

## 2021-01-01 RX ORDER — TIOTROPIUM BROMIDE 18 UG/1
1 CAPSULE ORAL; RESPIRATORY (INHALATION)
Qty: 1 | Refills: 0
Start: 2021-01-01 | End: 2021-01-01

## 2021-01-01 RX ORDER — SODIUM CHLORIDE 9 MG/ML
500 INJECTION, SOLUTION INTRAVENOUS ONCE
Refills: 0 | Status: COMPLETED | OUTPATIENT
Start: 2021-01-01 | End: 2021-01-01

## 2021-01-01 RX ORDER — PANTOPRAZOLE SODIUM 20 MG/1
40 TABLET, DELAYED RELEASE ORAL
Refills: 0 | Status: DISCONTINUED | OUTPATIENT
Start: 2021-01-01 | End: 2021-01-01

## 2021-01-01 RX ORDER — MORPHINE SULFATE 50 MG/1
2 CAPSULE, EXTENDED RELEASE ORAL ONCE
Refills: 0 | Status: DISCONTINUED | OUTPATIENT
Start: 2021-01-01 | End: 2021-01-01

## 2021-01-01 RX ORDER — ASPIRIN/CALCIUM CARB/MAGNESIUM 324 MG
81 TABLET ORAL DAILY
Refills: 0 | Status: DISCONTINUED | OUTPATIENT
Start: 2021-01-01 | End: 2021-01-01

## 2021-01-01 RX ORDER — UMECLIDINIUM 62.5 UG/1
62.5 AEROSOL, POWDER ORAL DAILY
Qty: 3 | Refills: 2 | Status: DISCONTINUED | COMMUNITY
Start: 2020-03-19 | End: 2021-01-01

## 2021-01-01 RX ORDER — SIMVASTATIN 20 MG/1
1 TABLET, FILM COATED ORAL
Qty: 30 | Refills: 0
Start: 2021-01-01 | End: 2021-01-01

## 2021-01-01 RX ORDER — MORPHINE SULFATE 50 MG/1
2 CAPSULE, EXTENDED RELEASE ORAL
Qty: 0 | Refills: 0 | DISCHARGE
Start: 2021-01-01

## 2021-01-01 RX ORDER — CARVEDILOL PHOSPHATE 80 MG/1
25 CAPSULE, EXTENDED RELEASE ORAL EVERY 12 HOURS
Refills: 0 | Status: DISCONTINUED | OUTPATIENT
Start: 2021-01-01 | End: 2021-01-01

## 2021-01-01 RX ORDER — AZITHROMYCIN 500 MG/1
500 TABLET, FILM COATED ORAL DAILY
Refills: 0 | Status: DISCONTINUED | OUTPATIENT
Start: 2021-01-01 | End: 2021-01-01

## 2021-01-01 RX ORDER — POLYETHYLENE GLYCOL 3350 17 G/17G
17 POWDER, FOR SOLUTION ORAL DAILY
Refills: 0 | Status: DISCONTINUED | OUTPATIENT
Start: 2021-01-01 | End: 2021-01-01

## 2021-01-01 RX ORDER — SODIUM CHLORIDE 9 MG/ML
250 INJECTION, SOLUTION INTRAVENOUS ONCE
Refills: 0 | Status: COMPLETED | OUTPATIENT
Start: 2021-01-01 | End: 2021-01-01

## 2021-01-01 RX ORDER — SIMVASTATIN 20 MG/1
20 TABLET, FILM COATED ORAL AT BEDTIME
Refills: 0 | Status: DISCONTINUED | OUTPATIENT
Start: 2021-01-01 | End: 2021-01-01

## 2021-01-01 RX ORDER — SODIUM,POTASSIUM PHOSPHATES 278-250MG
1 POWDER IN PACKET (EA) ORAL ONCE
Refills: 0 | Status: COMPLETED | OUTPATIENT
Start: 2021-01-01 | End: 2021-01-01

## 2021-01-01 RX ORDER — CARVEDILOL PHOSPHATE 80 MG/1
37.5 CAPSULE, EXTENDED RELEASE ORAL EVERY 12 HOURS
Refills: 0 | Status: DISCONTINUED | OUTPATIENT
Start: 2021-01-01 | End: 2021-01-01

## 2021-01-01 RX ORDER — HYDRALAZINE HCL 50 MG
50 TABLET ORAL EVERY 8 HOURS
Refills: 0 | Status: DISCONTINUED | OUTPATIENT
Start: 2021-01-01 | End: 2021-01-01

## 2021-01-01 RX ORDER — FUROSEMIDE 40 MG
40 TABLET ORAL ONCE
Refills: 0 | Status: DISCONTINUED | OUTPATIENT
Start: 2021-01-01 | End: 2021-01-01

## 2021-01-01 RX ORDER — POTASSIUM CHLORIDE 20 MEQ
20 PACKET (EA) ORAL ONCE
Refills: 0 | Status: COMPLETED | OUTPATIENT
Start: 2021-01-01 | End: 2021-01-01

## 2021-01-01 RX ORDER — TIOTROPIUM BROMIDE 18 UG/1
1 CAPSULE ORAL; RESPIRATORY (INHALATION)
Qty: 30 | Refills: 0
Start: 2021-01-01 | End: 2021-01-01

## 2021-01-01 RX ORDER — IPRATROPIUM/ALBUTEROL SULFATE 18-103MCG
3 AEROSOL WITH ADAPTER (GRAM) INHALATION
Qty: 360 | Refills: 0
Start: 2021-01-01 | End: 2021-01-01

## 2021-01-01 RX ORDER — MAGNESIUM SULFATE 500 MG/ML
2 VIAL (ML) INJECTION ONCE
Refills: 0 | Status: COMPLETED | OUTPATIENT
Start: 2021-01-01 | End: 2021-01-01

## 2021-01-01 RX ORDER — MORPHINE SULFATE 50 MG/1
1 CAPSULE, EXTENDED RELEASE ORAL EVERY 6 HOURS
Refills: 0 | Status: DISCONTINUED | OUTPATIENT
Start: 2021-01-01 | End: 2021-01-01

## 2021-01-01 RX ORDER — FUROSEMIDE 20 MG/1
20 TABLET ORAL
Qty: 30 | Refills: 1 | Status: ACTIVE | COMMUNITY
Start: 2021-01-01 | End: 1900-01-01

## 2021-01-01 RX ORDER — ENOXAPARIN SODIUM 100 MG/ML
30 INJECTION SUBCUTANEOUS
Qty: 0 | Refills: 0 | DISCHARGE
Start: 2021-01-01

## 2021-01-01 RX ORDER — ACETAMINOPHEN 500 MG
650 TABLET ORAL EVERY 6 HOURS
Refills: 0 | Status: DISCONTINUED | OUTPATIENT
Start: 2021-01-01 | End: 2021-01-01

## 2021-01-01 RX ORDER — ALBUTEROL 90 UG/1
2 AEROSOL, METERED ORAL EVERY 6 HOURS
Refills: 0 | Status: DISCONTINUED | OUTPATIENT
Start: 2021-01-01 | End: 2021-01-01

## 2021-01-01 RX ORDER — SIMVASTATIN 20 MG/1
1 TABLET, FILM COATED ORAL
Qty: 0 | Refills: 0 | DISCHARGE

## 2021-01-01 RX ORDER — ENOXAPARIN SODIUM 100 MG/ML
30 INJECTION SUBCUTANEOUS DAILY
Refills: 0 | Status: DISCONTINUED | OUTPATIENT
Start: 2021-01-01 | End: 2021-01-01

## 2021-01-01 RX ORDER — IPRATROPIUM/ALBUTEROL SULFATE 18-103MCG
3 AEROSOL WITH ADAPTER (GRAM) INHALATION
Qty: 0 | Refills: 0 | DISCHARGE
Start: 2021-01-01

## 2021-01-01 RX ORDER — BUDESONIDE AND FORMOTEROL FUMARATE DIHYDRATE 160; 4.5 UG/1; UG/1
160-4.5 AEROSOL RESPIRATORY (INHALATION)
Qty: 10.2 | Refills: 0 | Status: DISCONTINUED | COMMUNITY
Start: 2020-03-05 | End: 2021-01-01

## 2021-01-01 RX ORDER — HYDRALAZINE HCL 50 MG
25 TABLET ORAL EVERY 8 HOURS
Refills: 0 | Status: DISCONTINUED | OUTPATIENT
Start: 2021-01-01 | End: 2021-01-01

## 2021-01-01 RX ORDER — POTASSIUM CHLORIDE 20 MEQ
40 PACKET (EA) ORAL EVERY 4 HOURS
Refills: 0 | Status: DISCONTINUED | OUTPATIENT
Start: 2021-01-01 | End: 2021-01-01

## 2021-01-01 RX ORDER — MORPHINE SULFATE 50 MG/1
3 CAPSULE, EXTENDED RELEASE ORAL EVERY 4 HOURS
Refills: 0 | Status: DISCONTINUED | OUTPATIENT
Start: 2021-01-01 | End: 2021-01-01

## 2021-01-01 RX ORDER — AZITHROMYCIN 500 MG/1
500 TABLET, FILM COATED ORAL EVERY 24 HOURS
Refills: 0 | Status: DISCONTINUED | OUTPATIENT
Start: 2021-01-01 | End: 2021-01-01

## 2021-01-01 RX ORDER — OXYMETAZOLINE HYDROCHLORIDE 0.5 MG/ML
1 SPRAY NASAL
Qty: 15 | Refills: 0
Start: 2021-01-01 | End: 2021-01-01

## 2021-01-01 RX ORDER — TIOTROPIUM BROMIDE INHALATION SPRAY 1.56 UG/1
1.25 SPRAY, METERED RESPIRATORY (INHALATION)
Refills: 0 | Status: ACTIVE | COMMUNITY

## 2021-01-01 RX ORDER — CARVEDILOL PHOSPHATE 80 MG/1
3 CAPSULE, EXTENDED RELEASE ORAL
Qty: 180 | Refills: 0
Start: 2021-01-01 | End: 2021-01-01

## 2021-01-01 RX ORDER — MORPHINE SULFATE 50 MG/1
2 CAPSULE, EXTENDED RELEASE ORAL EVERY 6 HOURS
Refills: 0 | Status: DISCONTINUED | OUTPATIENT
Start: 2021-01-01 | End: 2021-01-01

## 2021-01-01 RX ORDER — BUDESONIDE AND FORMOTEROL FUMARATE DIHYDRATE 160; 4.5 UG/1; UG/1
2 AEROSOL RESPIRATORY (INHALATION)
Refills: 0 | Status: DISCONTINUED | OUTPATIENT
Start: 2021-01-01 | End: 2021-01-01

## 2021-01-01 RX ORDER — OXYMETAZOLINE HYDROCHLORIDE 0.5 MG/ML
1 SPRAY NASAL
Refills: 0 | Status: DISCONTINUED | OUTPATIENT
Start: 2021-01-01 | End: 2021-01-01

## 2021-01-01 RX ORDER — FLUTICASONE PROPIONATE 50 MCG
1 SPRAY, SUSPENSION NASAL
Refills: 0 | Status: DISCONTINUED | OUTPATIENT
Start: 2021-01-01 | End: 2021-01-01

## 2021-01-01 RX ORDER — PREDNISOLONE 5 MG
40 TABLET ORAL ONCE
Refills: 0 | Status: COMPLETED | OUTPATIENT
Start: 2021-01-01 | End: 2021-01-01

## 2021-01-01 RX ORDER — POTASSIUM PHOSPHATE, MONOBASIC POTASSIUM PHOSPHATE, DIBASIC 236; 224 MG/ML; MG/ML
30 INJECTION, SOLUTION INTRAVENOUS ONCE
Refills: 0 | Status: DISCONTINUED | OUTPATIENT
Start: 2021-01-01 | End: 2021-01-01

## 2021-01-01 RX ORDER — ISOSORBIDE MONONITRATE 60 MG/1
1 TABLET, EXTENDED RELEASE ORAL
Qty: 0 | Refills: 0 | DISCHARGE

## 2021-01-01 RX ORDER — BUDESONIDE AND FORMOTEROL FUMARATE DIHYDRATE 160; 4.5 UG/1; UG/1
2 AEROSOL RESPIRATORY (INHALATION)
Qty: 1 | Refills: 0
Start: 2021-01-01 | End: 2021-01-01

## 2021-01-01 RX ORDER — CHLORHEXIDINE GLUCONATE 213 G/1000ML
1 SOLUTION TOPICAL DAILY
Refills: 0 | Status: DISCONTINUED | OUTPATIENT
Start: 2021-01-01 | End: 2021-01-01

## 2021-01-01 RX ORDER — ISOSORBIDE MONONITRATE 30 MG/1
30 TABLET, EXTENDED RELEASE ORAL DAILY
Qty: 90 | Refills: 3 | Status: COMPLETED | COMMUNITY
End: 2021-01-01

## 2021-01-01 RX ORDER — PETROLATUM,WHITE
1 JELLY (GRAM) TOPICAL
Refills: 0 | Status: DISCONTINUED | OUTPATIENT
Start: 2021-01-01 | End: 2021-01-01

## 2021-01-01 RX ORDER — IPRATROPIUM/ALBUTEROL SULFATE 18-103MCG
3 AEROSOL WITH ADAPTER (GRAM) INHALATION EVERY 6 HOURS
Refills: 0 | Status: ACTIVE | OUTPATIENT
Start: 2021-01-01 | End: 2022-02-25

## 2021-01-01 RX ORDER — ISOSORBIDE MONONITRATE 30 MG/1
30 TABLET, EXTENDED RELEASE ORAL DAILY
Qty: 30 | Refills: 5 | Status: ACTIVE | COMMUNITY
Start: 2021-01-01 | End: 1900-01-01

## 2021-01-01 RX ORDER — AZITHROMYCIN 500 MG/1
500 TABLET, FILM COATED ORAL
Refills: 0 | Status: DISCONTINUED | OUTPATIENT
Start: 2021-01-01 | End: 2021-01-01

## 2021-01-01 RX ORDER — POTASSIUM CHLORIDE 20 MEQ
10 PACKET (EA) ORAL
Refills: 0 | Status: COMPLETED | OUTPATIENT
Start: 2021-01-01 | End: 2021-01-01

## 2021-01-01 RX ORDER — FUROSEMIDE 40 MG
40 TABLET ORAL ONCE
Refills: 0 | Status: COMPLETED | OUTPATIENT
Start: 2021-01-01 | End: 2021-01-01

## 2021-01-01 RX ORDER — PANTOPRAZOLE SODIUM 20 MG/1
1 TABLET, DELAYED RELEASE ORAL
Qty: 30 | Refills: 0
Start: 2021-01-01 | End: 2021-01-01

## 2021-01-01 RX ORDER — HYDRALAZINE HCL 50 MG
50 TABLET ORAL THREE TIMES A DAY
Refills: 0 | Status: DISCONTINUED | OUTPATIENT
Start: 2021-01-01 | End: 2021-01-01

## 2021-01-01 RX ORDER — SODIUM CHLORIDE 9 MG/ML
1000 INJECTION, SOLUTION INTRAVENOUS
Refills: 0 | Status: DISCONTINUED | OUTPATIENT
Start: 2021-01-01 | End: 2021-01-01

## 2021-01-01 RX ORDER — FLUTICASONE PROPIONATE 50 MCG
1 SPRAY, SUSPENSION NASAL
Qty: 0 | Refills: 0 | DISCHARGE
Start: 2021-01-01

## 2021-01-01 RX ORDER — AZITHROMYCIN 500 MG/1
TABLET, FILM COATED ORAL
Refills: 0 | Status: DISCONTINUED | OUTPATIENT
Start: 2021-01-01 | End: 2021-01-01

## 2021-01-01 RX ORDER — SODIUM CHLORIDE 0.65 %
1 AEROSOL, SPRAY (ML) NASAL
Qty: 1 | Refills: 0
Start: 2021-01-01 | End: 2021-01-01

## 2021-01-01 RX ORDER — HYDRALAZINE HCL 50 MG
3 TABLET ORAL
Qty: 270 | Refills: 0
Start: 2021-01-01 | End: 2021-01-01

## 2021-01-01 RX ORDER — NITROGLYCERIN 6.5 MG
0.4 CAPSULE, EXTENDED RELEASE ORAL ONCE
Refills: 0 | Status: COMPLETED | OUTPATIENT
Start: 2021-01-01 | End: 2021-01-01

## 2021-01-01 RX ORDER — POTASSIUM PHOSPHATE, MONOBASIC POTASSIUM PHOSPHATE, DIBASIC 236; 224 MG/ML; MG/ML
15 INJECTION, SOLUTION INTRAVENOUS ONCE
Refills: 0 | Status: COMPLETED | OUTPATIENT
Start: 2021-01-01 | End: 2021-01-01

## 2021-01-01 RX ORDER — SENNA PLUS 8.6 MG/1
2 TABLET ORAL AT BEDTIME
Refills: 0 | Status: DISCONTINUED | OUTPATIENT
Start: 2021-01-01 | End: 2021-01-01

## 2021-01-01 RX ORDER — SODIUM CHLORIDE 0.65 %
1 AEROSOL, SPRAY (ML) NASAL DAILY
Refills: 0 | Status: DISCONTINUED | OUTPATIENT
Start: 2021-01-01 | End: 2021-01-01

## 2021-01-01 RX ORDER — SODIUM CHLORIDE 0.65 %
1 AEROSOL, SPRAY (ML) NASAL EVERY 4 HOURS
Refills: 0 | Status: DISCONTINUED | OUTPATIENT
Start: 2021-01-01 | End: 2021-01-01

## 2021-01-01 RX ORDER — HYDRALAZINE HCL 50 MG
75 TABLET ORAL EVERY 8 HOURS
Refills: 0 | Status: DISCONTINUED | OUTPATIENT
Start: 2021-01-01 | End: 2021-01-01

## 2021-01-01 RX ORDER — JNJ-78436735 50000000000 [PFU]/.5ML
0.5 SUSPENSION INTRAMUSCULAR ONCE
Refills: 0 | Status: DISCONTINUED | OUTPATIENT
Start: 2021-01-01 | End: 2021-01-01

## 2021-01-01 RX ORDER — SODIUM CHLORIDE 9 MG/ML
250 INJECTION INTRAMUSCULAR; INTRAVENOUS; SUBCUTANEOUS ONCE
Refills: 0 | Status: COMPLETED | OUTPATIENT
Start: 2021-01-01 | End: 2021-01-01

## 2021-01-01 RX ORDER — HYDRALAZINE HYDROCHLORIDE 25 MG/1
25 TABLET ORAL 3 TIMES DAILY
Qty: 270 | Refills: 0 | Status: ACTIVE | COMMUNITY

## 2021-01-01 RX ORDER — POTASSIUM CHLORIDE 20 MEQ
40 PACKET (EA) ORAL EVERY 4 HOURS
Refills: 0 | Status: COMPLETED | OUTPATIENT
Start: 2021-01-01 | End: 2021-01-01

## 2021-01-01 RX ORDER — PREDNISONE 20 MG/1
20 TABLET ORAL
Qty: 10 | Refills: 0 | Status: ACTIVE | COMMUNITY
Start: 2021-01-01 | End: 1900-01-01

## 2021-01-01 RX ORDER — POTASSIUM CHLORIDE 20 MEQ
40 PACKET (EA) ORAL ONCE
Refills: 0 | Status: DISCONTINUED | OUTPATIENT
Start: 2021-01-01 | End: 2021-01-01

## 2021-01-01 RX ORDER — PETROLATUM,WHITE
1 JELLY (GRAM) TOPICAL
Qty: 0 | Refills: 0 | DISCHARGE
Start: 2021-01-01

## 2021-01-01 RX ORDER — BUDESONIDE, GLYCOPYRROLATE, AND FORMOTEROL FUMARATE 160; 9; 4.8 UG/1; UG/1; UG/1
160-9-4.8 AEROSOL, METERED RESPIRATORY (INHALATION)
Qty: 3 | Refills: 0 | Status: COMPLETED | COMMUNITY
Start: 2021-01-01 | End: 2021-01-01

## 2021-01-01 RX ADMIN — CHLORHEXIDINE GLUCONATE 1 APPLICATION(S): 213 SOLUTION TOPICAL at 11:29

## 2021-01-01 RX ADMIN — TIOTROPIUM BROMIDE 1 CAPSULE(S): 18 CAPSULE ORAL; RESPIRATORY (INHALATION) at 09:21

## 2021-01-01 RX ADMIN — BUDESONIDE AND FORMOTEROL FUMARATE DIHYDRATE 2 PUFF(S): 160; 4.5 AEROSOL RESPIRATORY (INHALATION) at 22:10

## 2021-01-01 RX ADMIN — MORPHINE SULFATE 1 MILLIGRAM(S): 50 CAPSULE, EXTENDED RELEASE ORAL at 00:27

## 2021-01-01 RX ADMIN — PANTOPRAZOLE SODIUM 40 MILLIGRAM(S): 20 TABLET, DELAYED RELEASE ORAL at 06:11

## 2021-01-01 RX ADMIN — Medication 40 MILLIGRAM(S): at 05:28

## 2021-01-01 RX ADMIN — Medication 75 MILLIGRAM(S): at 21:17

## 2021-01-01 RX ADMIN — Medication 1 TABLET(S): at 11:41

## 2021-01-01 RX ADMIN — OXYMETAZOLINE HYDROCHLORIDE 1 SPRAY(S): 0.5 SPRAY NASAL at 11:11

## 2021-01-01 RX ADMIN — ISOSORBIDE MONONITRATE 30 MILLIGRAM(S): 60 TABLET, EXTENDED RELEASE ORAL at 11:06

## 2021-01-01 RX ADMIN — ALBUTEROL 1 PUFF(S): 90 AEROSOL, METERED ORAL at 17:07

## 2021-01-01 RX ADMIN — Medication 40 MILLIGRAM(S): at 13:59

## 2021-01-01 RX ADMIN — ISOSORBIDE MONONITRATE 30 MILLIGRAM(S): 60 TABLET, EXTENDED RELEASE ORAL at 12:53

## 2021-01-01 RX ADMIN — AZITHROMYCIN 500 MILLIGRAM(S): 500 TABLET, FILM COATED ORAL at 13:13

## 2021-01-01 RX ADMIN — Medication 75 MILLIGRAM(S): at 14:15

## 2021-01-01 RX ADMIN — Medication 25 MILLIGRAM(S): at 21:17

## 2021-01-01 RX ADMIN — Medication 81 MILLIGRAM(S): at 13:44

## 2021-01-01 RX ADMIN — CARVEDILOL PHOSPHATE 37.5 MILLIGRAM(S): 80 CAPSULE, EXTENDED RELEASE ORAL at 05:05

## 2021-01-01 RX ADMIN — Medication 3 MILLILITER(S): at 04:14

## 2021-01-01 RX ADMIN — ALBUTEROL 1 PUFF(S): 90 AEROSOL, METERED ORAL at 17:29

## 2021-01-01 RX ADMIN — MORPHINE SULFATE 2 MILLIGRAM(S): 50 CAPSULE, EXTENDED RELEASE ORAL at 17:30

## 2021-01-01 RX ADMIN — Medication 81 MILLIGRAM(S): at 11:57

## 2021-01-01 RX ADMIN — Medication 3 MILLILITER(S): at 09:20

## 2021-01-01 RX ADMIN — Medication 40 MILLIGRAM(S): at 06:16

## 2021-01-01 RX ADMIN — BUDESONIDE AND FORMOTEROL FUMARATE DIHYDRATE 2 PUFF(S): 160; 4.5 AEROSOL RESPIRATORY (INHALATION) at 22:23

## 2021-01-01 RX ADMIN — PANTOPRAZOLE SODIUM 40 MILLIGRAM(S): 20 TABLET, DELAYED RELEASE ORAL at 06:32

## 2021-01-01 RX ADMIN — BUDESONIDE AND FORMOTEROL FUMARATE DIHYDRATE 2 PUFF(S): 160; 4.5 AEROSOL RESPIRATORY (INHALATION) at 09:46

## 2021-01-01 RX ADMIN — BUDESONIDE AND FORMOTEROL FUMARATE DIHYDRATE 2 PUFF(S): 160; 4.5 AEROSOL RESPIRATORY (INHALATION) at 19:28

## 2021-01-01 RX ADMIN — MORPHINE SULFATE 2 MILLIGRAM(S): 50 CAPSULE, EXTENDED RELEASE ORAL at 17:53

## 2021-01-01 RX ADMIN — Medication 81 MILLIGRAM(S): at 11:25

## 2021-01-01 RX ADMIN — Medication 20 MILLIEQUIVALENT(S): at 11:02

## 2021-01-01 RX ADMIN — SODIUM CHLORIDE 250 MILLILITER(S): 9 INJECTION INTRAMUSCULAR; INTRAVENOUS; SUBCUTANEOUS at 11:00

## 2021-01-01 RX ADMIN — Medication 30 MILLIGRAM(S): at 21:36

## 2021-01-01 RX ADMIN — Medication 1 SPRAY(S): at 13:43

## 2021-01-01 RX ADMIN — Medication 25 MILLIGRAM(S): at 06:15

## 2021-01-01 RX ADMIN — Medication 50 MILLIGRAM(S): at 13:04

## 2021-01-01 RX ADMIN — Medication 75 MILLIGRAM(S): at 21:29

## 2021-01-01 RX ADMIN — Medication 75 MILLIGRAM(S): at 21:39

## 2021-01-01 RX ADMIN — CHLORHEXIDINE GLUCONATE 1 APPLICATION(S): 213 SOLUTION TOPICAL at 11:14

## 2021-01-01 RX ADMIN — Medication 5 MILLIGRAM(S): at 04:10

## 2021-01-01 RX ADMIN — CHLORHEXIDINE GLUCONATE 1 APPLICATION(S): 213 SOLUTION TOPICAL at 12:28

## 2021-01-01 RX ADMIN — ALBUTEROL 1 PUFF(S): 90 AEROSOL, METERED ORAL at 05:27

## 2021-01-01 RX ADMIN — Medication 100 MILLIEQUIVALENT(S): at 08:38

## 2021-01-01 RX ADMIN — Medication 81 MILLIGRAM(S): at 14:49

## 2021-01-01 RX ADMIN — AZITHROMYCIN 255 MILLIGRAM(S): 500 TABLET, FILM COATED ORAL at 21:41

## 2021-01-01 RX ADMIN — Medication 75 MILLIGRAM(S): at 05:01

## 2021-01-01 RX ADMIN — Medication 1 SPRAY(S): at 06:10

## 2021-01-01 RX ADMIN — PANTOPRAZOLE SODIUM 40 MILLIGRAM(S): 20 TABLET, DELAYED RELEASE ORAL at 06:28

## 2021-01-01 RX ADMIN — Medication 1 PACKET(S): at 16:20

## 2021-01-01 RX ADMIN — SIMVASTATIN 20 MILLIGRAM(S): 20 TABLET, FILM COATED ORAL at 21:16

## 2021-01-01 RX ADMIN — Medication 75 MILLIGRAM(S): at 14:54

## 2021-01-01 RX ADMIN — Medication 75 MILLIGRAM(S): at 12:21

## 2021-01-01 RX ADMIN — ENOXAPARIN SODIUM 30 MILLIGRAM(S): 100 INJECTION SUBCUTANEOUS at 11:58

## 2021-01-01 RX ADMIN — Medication 75 MILLIGRAM(S): at 15:22

## 2021-01-01 RX ADMIN — ENOXAPARIN SODIUM 30 MILLIGRAM(S): 100 INJECTION SUBCUTANEOUS at 11:49

## 2021-01-01 RX ADMIN — Medication 1 TABLET(S): at 21:40

## 2021-01-01 RX ADMIN — CARVEDILOL PHOSPHATE 25 MILLIGRAM(S): 80 CAPSULE, EXTENDED RELEASE ORAL at 19:14

## 2021-01-01 RX ADMIN — Medication 5 MILLIGRAM(S): at 05:36

## 2021-01-01 RX ADMIN — Medication 1 SPRAY(S): at 07:21

## 2021-01-01 RX ADMIN — Medication 40 MILLIGRAM(S): at 04:43

## 2021-01-01 RX ADMIN — Medication 2 GRAM(S): at 21:09

## 2021-01-01 RX ADMIN — Medication 75 MILLIGRAM(S): at 05:00

## 2021-01-01 RX ADMIN — Medication 40 MILLIGRAM(S): at 17:45

## 2021-01-01 RX ADMIN — Medication 81 MILLIGRAM(S): at 13:35

## 2021-01-01 RX ADMIN — CHLORHEXIDINE GLUCONATE 1 APPLICATION(S): 213 SOLUTION TOPICAL at 12:59

## 2021-01-01 RX ADMIN — CARVEDILOL PHOSPHATE 37.5 MILLIGRAM(S): 80 CAPSULE, EXTENDED RELEASE ORAL at 17:55

## 2021-01-01 RX ADMIN — Medication 10 MILLIEQUIVALENT(S): at 11:00

## 2021-01-01 RX ADMIN — Medication 40 MILLIGRAM(S): at 05:27

## 2021-01-01 RX ADMIN — TIOTROPIUM BROMIDE 1 CAPSULE(S): 18 CAPSULE ORAL; RESPIRATORY (INHALATION) at 10:00

## 2021-01-01 RX ADMIN — Medication 50 MILLIGRAM(S): at 05:54

## 2021-01-01 RX ADMIN — SIMVASTATIN 20 MILLIGRAM(S): 20 TABLET, FILM COATED ORAL at 21:57

## 2021-01-01 RX ADMIN — BUDESONIDE AND FORMOTEROL FUMARATE DIHYDRATE 2 PUFF(S): 160; 4.5 AEROSOL RESPIRATORY (INHALATION) at 22:56

## 2021-01-01 RX ADMIN — CARVEDILOL PHOSPHATE 37.5 MILLIGRAM(S): 80 CAPSULE, EXTENDED RELEASE ORAL at 05:52

## 2021-01-01 RX ADMIN — Medication 75 MILLIGRAM(S): at 15:21

## 2021-01-01 RX ADMIN — CARVEDILOL PHOSPHATE 25 MILLIGRAM(S): 80 CAPSULE, EXTENDED RELEASE ORAL at 21:34

## 2021-01-01 RX ADMIN — PANTOPRAZOLE SODIUM 40 MILLIGRAM(S): 20 TABLET, DELAYED RELEASE ORAL at 06:13

## 2021-01-01 RX ADMIN — Medication 75 MILLIGRAM(S): at 14:11

## 2021-01-01 RX ADMIN — Medication 75 MILLIGRAM(S): at 12:59

## 2021-01-01 RX ADMIN — Medication 5 MILLIGRAM(S): at 17:23

## 2021-01-01 RX ADMIN — MORPHINE SULFATE 2 MILLIGRAM(S): 50 CAPSULE, EXTENDED RELEASE ORAL at 18:00

## 2021-01-01 RX ADMIN — SIMVASTATIN 20 MILLIGRAM(S): 20 TABLET, FILM COATED ORAL at 22:33

## 2021-01-01 RX ADMIN — ENOXAPARIN SODIUM 30 MILLIGRAM(S): 100 INJECTION SUBCUTANEOUS at 11:41

## 2021-01-01 RX ADMIN — Medication 1 TABLET(S): at 17:31

## 2021-01-01 RX ADMIN — SODIUM CHLORIDE 50 MILLILITER(S): 9 INJECTION, SOLUTION INTRAVENOUS at 20:07

## 2021-01-01 RX ADMIN — MORPHINE SULFATE 2 MILLIGRAM(S): 50 CAPSULE, EXTENDED RELEASE ORAL at 14:45

## 2021-01-01 RX ADMIN — Medication 75 MILLIGRAM(S): at 13:49

## 2021-01-01 RX ADMIN — CARVEDILOL PHOSPHATE 37.5 MILLIGRAM(S): 80 CAPSULE, EXTENDED RELEASE ORAL at 18:30

## 2021-01-01 RX ADMIN — CARVEDILOL PHOSPHATE 25 MILLIGRAM(S): 80 CAPSULE, EXTENDED RELEASE ORAL at 17:52

## 2021-01-01 RX ADMIN — Medication 1 PACKET(S): at 18:45

## 2021-01-01 RX ADMIN — ENOXAPARIN SODIUM 30 MILLIGRAM(S): 100 INJECTION SUBCUTANEOUS at 11:04

## 2021-01-01 RX ADMIN — CARVEDILOL PHOSPHATE 25 MILLIGRAM(S): 80 CAPSULE, EXTENDED RELEASE ORAL at 06:11

## 2021-01-01 RX ADMIN — ALBUTEROL 1 PUFF(S): 90 AEROSOL, METERED ORAL at 10:55

## 2021-01-01 RX ADMIN — Medication 40 MILLIEQUIVALENT(S): at 17:47

## 2021-01-01 RX ADMIN — CARVEDILOL PHOSPHATE 25 MILLIGRAM(S): 80 CAPSULE, EXTENDED RELEASE ORAL at 17:44

## 2021-01-01 RX ADMIN — Medication 50 MILLIGRAM(S): at 14:48

## 2021-01-01 RX ADMIN — Medication 1 SPRAY(S): at 17:18

## 2021-01-01 RX ADMIN — MORPHINE SULFATE 1 MILLIGRAM(S): 50 CAPSULE, EXTENDED RELEASE ORAL at 14:15

## 2021-01-01 RX ADMIN — Medication 75 MILLIGRAM(S): at 05:33

## 2021-01-01 RX ADMIN — Medication 100 MILLIEQUIVALENT(S): at 10:03

## 2021-01-01 RX ADMIN — ENOXAPARIN SODIUM 30 MILLIGRAM(S): 100 INJECTION SUBCUTANEOUS at 13:34

## 2021-01-01 RX ADMIN — ENOXAPARIN SODIUM 40 MILLIGRAM(S): 100 INJECTION SUBCUTANEOUS at 12:58

## 2021-01-01 RX ADMIN — MORPHINE SULFATE 1 MILLIGRAM(S): 50 CAPSULE, EXTENDED RELEASE ORAL at 17:41

## 2021-01-01 RX ADMIN — MORPHINE SULFATE 2 MILLIGRAM(S): 50 CAPSULE, EXTENDED RELEASE ORAL at 15:00

## 2021-01-01 RX ADMIN — CARVEDILOL PHOSPHATE 25 MILLIGRAM(S): 80 CAPSULE, EXTENDED RELEASE ORAL at 05:00

## 2021-01-01 RX ADMIN — Medication 81 MILLIGRAM(S): at 17:44

## 2021-01-01 RX ADMIN — ENOXAPARIN SODIUM 30 MILLIGRAM(S): 100 INJECTION SUBCUTANEOUS at 12:23

## 2021-01-01 RX ADMIN — Medication 75 MILLIGRAM(S): at 13:12

## 2021-01-01 RX ADMIN — Medication 75 MILLIGRAM(S): at 21:35

## 2021-01-01 RX ADMIN — Medication 0.4 MILLIGRAM(S): at 17:31

## 2021-01-01 RX ADMIN — Medication 75 MILLIGRAM(S): at 21:32

## 2021-01-01 RX ADMIN — AZITHROMYCIN 255 MILLIGRAM(S): 500 TABLET, FILM COATED ORAL at 18:25

## 2021-01-01 RX ADMIN — SIMVASTATIN 20 MILLIGRAM(S): 20 TABLET, FILM COATED ORAL at 21:39

## 2021-01-01 RX ADMIN — Medication 75 MILLIGRAM(S): at 06:53

## 2021-01-01 RX ADMIN — SENNA PLUS 2 TABLET(S): 8.6 TABLET ORAL at 21:12

## 2021-01-01 RX ADMIN — SIMVASTATIN 20 MILLIGRAM(S): 20 TABLET, FILM COATED ORAL at 22:36

## 2021-01-01 RX ADMIN — SENNA PLUS 2 TABLET(S): 8.6 TABLET ORAL at 21:20

## 2021-01-01 RX ADMIN — Medication 3 MILLILITER(S): at 16:11

## 2021-01-01 RX ADMIN — BUDESONIDE AND FORMOTEROL FUMARATE DIHYDRATE 2 PUFF(S): 160; 4.5 AEROSOL RESPIRATORY (INHALATION) at 10:15

## 2021-01-01 RX ADMIN — CARVEDILOL PHOSPHATE 25 MILLIGRAM(S): 80 CAPSULE, EXTENDED RELEASE ORAL at 18:35

## 2021-01-01 RX ADMIN — Medication 3 MILLILITER(S): at 23:15

## 2021-01-01 RX ADMIN — Medication 1 TABLET(S): at 12:50

## 2021-01-01 RX ADMIN — Medication 100 MILLIEQUIVALENT(S): at 04:09

## 2021-01-01 RX ADMIN — BUDESONIDE AND FORMOTEROL FUMARATE DIHYDRATE 2 PUFF(S): 160; 4.5 AEROSOL RESPIRATORY (INHALATION) at 22:49

## 2021-01-01 RX ADMIN — ALBUTEROL 1 PUFF(S): 90 AEROSOL, METERED ORAL at 09:09

## 2021-01-01 RX ADMIN — BUDESONIDE AND FORMOTEROL FUMARATE DIHYDRATE 2 PUFF(S): 160; 4.5 AEROSOL RESPIRATORY (INHALATION) at 22:21

## 2021-01-01 RX ADMIN — BUDESONIDE AND FORMOTEROL FUMARATE DIHYDRATE 2 PUFF(S): 160; 4.5 AEROSOL RESPIRATORY (INHALATION) at 10:55

## 2021-01-01 RX ADMIN — BUDESONIDE AND FORMOTEROL FUMARATE DIHYDRATE 2 PUFF(S): 160; 4.5 AEROSOL RESPIRATORY (INHALATION) at 22:30

## 2021-01-01 RX ADMIN — BUDESONIDE AND FORMOTEROL FUMARATE DIHYDRATE 2 PUFF(S): 160; 4.5 AEROSOL RESPIRATORY (INHALATION) at 21:38

## 2021-01-01 RX ADMIN — ALBUTEROL 1 PUFF(S): 90 AEROSOL, METERED ORAL at 17:44

## 2021-01-01 RX ADMIN — TIOTROPIUM BROMIDE 1 CAPSULE(S): 18 CAPSULE ORAL; RESPIRATORY (INHALATION) at 09:45

## 2021-01-01 RX ADMIN — ISOSORBIDE MONONITRATE 30 MILLIGRAM(S): 60 TABLET, EXTENDED RELEASE ORAL at 13:13

## 2021-01-01 RX ADMIN — ENOXAPARIN SODIUM 30 MILLIGRAM(S): 100 INJECTION SUBCUTANEOUS at 17:47

## 2021-01-01 RX ADMIN — Medication 3 MILLILITER(S): at 22:37

## 2021-01-01 RX ADMIN — BUDESONIDE AND FORMOTEROL FUMARATE DIHYDRATE 2 PUFF(S): 160; 4.5 AEROSOL RESPIRATORY (INHALATION) at 09:21

## 2021-01-01 RX ADMIN — ENOXAPARIN SODIUM 30 MILLIGRAM(S): 100 INJECTION SUBCUTANEOUS at 18:46

## 2021-01-01 RX ADMIN — Medication 40 MILLIGRAM(S): at 05:39

## 2021-01-01 RX ADMIN — TIOTROPIUM BROMIDE 1 CAPSULE(S): 18 CAPSULE ORAL; RESPIRATORY (INHALATION) at 09:12

## 2021-01-01 RX ADMIN — CARVEDILOL PHOSPHATE 25 MILLIGRAM(S): 80 CAPSULE, EXTENDED RELEASE ORAL at 17:40

## 2021-01-01 RX ADMIN — ISOSORBIDE MONONITRATE 30 MILLIGRAM(S): 60 TABLET, EXTENDED RELEASE ORAL at 11:04

## 2021-01-01 RX ADMIN — CHLORHEXIDINE GLUCONATE 1 APPLICATION(S): 213 SOLUTION TOPICAL at 12:00

## 2021-01-01 RX ADMIN — CARVEDILOL PHOSPHATE 37.5 MILLIGRAM(S): 80 CAPSULE, EXTENDED RELEASE ORAL at 06:11

## 2021-01-01 RX ADMIN — ISOSORBIDE MONONITRATE 30 MILLIGRAM(S): 60 TABLET, EXTENDED RELEASE ORAL at 11:22

## 2021-01-01 RX ADMIN — ENOXAPARIN SODIUM 30 MILLIGRAM(S): 100 INJECTION SUBCUTANEOUS at 11:44

## 2021-01-01 RX ADMIN — Medication 75 MILLIGRAM(S): at 19:14

## 2021-01-01 RX ADMIN — MORPHINE SULFATE 1 MILLIGRAM(S): 50 CAPSULE, EXTENDED RELEASE ORAL at 07:32

## 2021-01-01 RX ADMIN — CARVEDILOL PHOSPHATE 25 MILLIGRAM(S): 80 CAPSULE, EXTENDED RELEASE ORAL at 06:28

## 2021-01-01 RX ADMIN — CHLORHEXIDINE GLUCONATE 1 APPLICATION(S): 213 SOLUTION TOPICAL at 17:33

## 2021-01-01 RX ADMIN — Medication 75 MILLIGRAM(S): at 21:57

## 2021-01-01 RX ADMIN — Medication 3 MILLILITER(S): at 09:45

## 2021-01-01 RX ADMIN — Medication 75 MILLIGRAM(S): at 06:29

## 2021-01-01 RX ADMIN — BUDESONIDE AND FORMOTEROL FUMARATE DIHYDRATE 2 PUFF(S): 160; 4.5 AEROSOL RESPIRATORY (INHALATION) at 09:45

## 2021-01-01 RX ADMIN — Medication 50 MILLIGRAM(S): at 06:16

## 2021-01-01 RX ADMIN — PANTOPRAZOLE SODIUM 40 MILLIGRAM(S): 20 TABLET, DELAYED RELEASE ORAL at 05:28

## 2021-01-01 RX ADMIN — PANTOPRAZOLE SODIUM 40 MILLIGRAM(S): 20 TABLET, DELAYED RELEASE ORAL at 05:27

## 2021-01-01 RX ADMIN — ENOXAPARIN SODIUM 40 MILLIGRAM(S): 100 INJECTION SUBCUTANEOUS at 12:10

## 2021-01-01 RX ADMIN — Medication 40 MILLIGRAM(S): at 05:38

## 2021-01-01 RX ADMIN — Medication 81 MILLIGRAM(S): at 11:23

## 2021-01-01 RX ADMIN — ALBUTEROL 1 PUFF(S): 90 AEROSOL, METERED ORAL at 18:22

## 2021-01-01 RX ADMIN — Medication 100 MILLIEQUIVALENT(S): at 03:04

## 2021-01-01 RX ADMIN — Medication 75 MILLIGRAM(S): at 14:04

## 2021-01-01 RX ADMIN — TIOTROPIUM BROMIDE 1 CAPSULE(S): 18 CAPSULE ORAL; RESPIRATORY (INHALATION) at 18:02

## 2021-01-01 RX ADMIN — ISOSORBIDE MONONITRATE 30 MILLIGRAM(S): 60 TABLET, EXTENDED RELEASE ORAL at 06:53

## 2021-01-01 RX ADMIN — Medication 75 MILLIGRAM(S): at 05:27

## 2021-01-01 RX ADMIN — Medication 81 MILLIGRAM(S): at 12:58

## 2021-01-01 RX ADMIN — Medication 3 MILLILITER(S): at 15:21

## 2021-01-01 RX ADMIN — Medication 81 MILLIGRAM(S): at 12:23

## 2021-01-01 RX ADMIN — TIOTROPIUM BROMIDE 1 CAPSULE(S): 18 CAPSULE ORAL; RESPIRATORY (INHALATION) at 13:44

## 2021-01-01 RX ADMIN — ENOXAPARIN SODIUM 30 MILLIGRAM(S): 100 INJECTION SUBCUTANEOUS at 10:55

## 2021-01-01 RX ADMIN — Medication 1 APPLICATION(S): at 17:03

## 2021-01-01 RX ADMIN — CARVEDILOL PHOSPHATE 37.5 MILLIGRAM(S): 80 CAPSULE, EXTENDED RELEASE ORAL at 18:18

## 2021-01-01 RX ADMIN — Medication 40 MILLIGRAM(S): at 15:55

## 2021-01-01 RX ADMIN — Medication 30 MILLIGRAM(S): at 06:21

## 2021-01-01 RX ADMIN — CARVEDILOL PHOSPHATE 25 MILLIGRAM(S): 80 CAPSULE, EXTENDED RELEASE ORAL at 05:33

## 2021-01-01 RX ADMIN — Medication 75 MILLIGRAM(S): at 21:41

## 2021-01-01 RX ADMIN — AZITHROMYCIN 500 MILLIGRAM(S): 500 TABLET, FILM COATED ORAL at 11:24

## 2021-01-01 RX ADMIN — Medication 75 MILLIGRAM(S): at 05:16

## 2021-01-01 RX ADMIN — Medication 75 MILLIGRAM(S): at 14:49

## 2021-01-01 RX ADMIN — Medication 40 MILLIGRAM(S): at 18:16

## 2021-01-01 RX ADMIN — Medication 81 MILLIGRAM(S): at 15:26

## 2021-01-01 RX ADMIN — Medication 75 MILLIGRAM(S): at 05:39

## 2021-01-01 RX ADMIN — Medication 81 MILLIGRAM(S): at 10:55

## 2021-01-01 RX ADMIN — SIMVASTATIN 20 MILLIGRAM(S): 20 TABLET, FILM COATED ORAL at 22:22

## 2021-01-01 RX ADMIN — Medication 1 TABLET(S): at 18:19

## 2021-01-01 RX ADMIN — Medication 3 MILLILITER(S): at 15:50

## 2021-01-01 RX ADMIN — BUDESONIDE AND FORMOTEROL FUMARATE DIHYDRATE 2 PUFF(S): 160; 4.5 AEROSOL RESPIRATORY (INHALATION) at 19:41

## 2021-01-01 RX ADMIN — Medication 125 MILLIGRAM(S): at 23:07

## 2021-01-01 RX ADMIN — BUDESONIDE AND FORMOTEROL FUMARATE DIHYDRATE 2 PUFF(S): 160; 4.5 AEROSOL RESPIRATORY (INHALATION) at 07:22

## 2021-01-01 RX ADMIN — CHLORHEXIDINE GLUCONATE 1 APPLICATION(S): 213 SOLUTION TOPICAL at 11:26

## 2021-01-01 RX ADMIN — Medication 40 MILLIGRAM(S): at 06:08

## 2021-01-01 RX ADMIN — Medication 40 MILLIGRAM(S): at 06:09

## 2021-01-01 RX ADMIN — Medication 75 MILLIGRAM(S): at 22:22

## 2021-01-01 RX ADMIN — Medication 1 SPRAY(S): at 06:16

## 2021-01-01 RX ADMIN — Medication 100 MILLIEQUIVALENT(S): at 14:55

## 2021-01-01 RX ADMIN — Medication 1 APPLICATION(S): at 17:21

## 2021-01-01 RX ADMIN — Medication 3 MILLILITER(S): at 04:07

## 2021-01-01 RX ADMIN — Medication 40 MILLIGRAM(S): at 06:15

## 2021-01-01 RX ADMIN — ALBUTEROL 1 PUFF(S): 90 AEROSOL, METERED ORAL at 21:29

## 2021-01-01 RX ADMIN — Medication 75 MILLIGRAM(S): at 05:38

## 2021-01-01 RX ADMIN — BUDESONIDE AND FORMOTEROL FUMARATE DIHYDRATE 2 PUFF(S): 160; 4.5 AEROSOL RESPIRATORY (INHALATION) at 21:37

## 2021-01-01 RX ADMIN — Medication 125 MILLIGRAM(S): at 22:08

## 2021-01-01 RX ADMIN — MORPHINE SULFATE 2 MILLIGRAM(S): 50 CAPSULE, EXTENDED RELEASE ORAL at 20:08

## 2021-01-01 RX ADMIN — Medication 75 MILLIGRAM(S): at 21:37

## 2021-01-01 RX ADMIN — CARVEDILOL PHOSPHATE 25 MILLIGRAM(S): 80 CAPSULE, EXTENDED RELEASE ORAL at 05:54

## 2021-01-01 RX ADMIN — SIMVASTATIN 20 MILLIGRAM(S): 20 TABLET, FILM COATED ORAL at 21:36

## 2021-01-01 RX ADMIN — BUDESONIDE AND FORMOTEROL FUMARATE DIHYDRATE 2 PUFF(S): 160; 4.5 AEROSOL RESPIRATORY (INHALATION) at 07:27

## 2021-01-01 RX ADMIN — PANTOPRAZOLE SODIUM 40 MILLIGRAM(S): 20 TABLET, DELAYED RELEASE ORAL at 05:13

## 2021-01-01 RX ADMIN — ALBUTEROL 2 PUFF(S): 90 AEROSOL, METERED ORAL at 10:19

## 2021-01-01 RX ADMIN — CARVEDILOL PHOSPHATE 25 MILLIGRAM(S): 80 CAPSULE, EXTENDED RELEASE ORAL at 17:07

## 2021-01-01 RX ADMIN — Medication 1 SPRAY(S): at 10:54

## 2021-01-01 RX ADMIN — MORPHINE SULFATE 1 MILLIGRAM(S): 50 CAPSULE, EXTENDED RELEASE ORAL at 00:42

## 2021-01-01 RX ADMIN — ALBUTEROL 1 PUFF(S): 90 AEROSOL, METERED ORAL at 05:13

## 2021-01-01 RX ADMIN — BUDESONIDE AND FORMOTEROL FUMARATE DIHYDRATE 2 PUFF(S): 160; 4.5 AEROSOL RESPIRATORY (INHALATION) at 20:24

## 2021-01-01 RX ADMIN — ALBUTEROL 1 PUFF(S): 90 AEROSOL, METERED ORAL at 09:42

## 2021-01-01 RX ADMIN — Medication 3 MILLILITER(S): at 03:04

## 2021-01-01 RX ADMIN — CARVEDILOL PHOSPHATE 25 MILLIGRAM(S): 80 CAPSULE, EXTENDED RELEASE ORAL at 06:53

## 2021-01-01 RX ADMIN — Medication 81 MILLIGRAM(S): at 11:04

## 2021-01-01 RX ADMIN — ALBUTEROL 1 PUFF(S): 90 AEROSOL, METERED ORAL at 21:34

## 2021-01-01 RX ADMIN — CARVEDILOL PHOSPHATE 25 MILLIGRAM(S): 80 CAPSULE, EXTENDED RELEASE ORAL at 06:31

## 2021-01-01 RX ADMIN — Medication 81 MILLIGRAM(S): at 12:10

## 2021-01-01 RX ADMIN — Medication 75 MILLIGRAM(S): at 21:36

## 2021-01-01 RX ADMIN — CARVEDILOL PHOSPHATE 25 MILLIGRAM(S): 80 CAPSULE, EXTENDED RELEASE ORAL at 18:17

## 2021-01-01 RX ADMIN — Medication 20 MILLIGRAM(S): at 12:53

## 2021-01-01 RX ADMIN — MORPHINE SULFATE 1 MILLIGRAM(S): 50 CAPSULE, EXTENDED RELEASE ORAL at 10:13

## 2021-01-01 RX ADMIN — CARVEDILOL PHOSPHATE 25 MILLIGRAM(S): 80 CAPSULE, EXTENDED RELEASE ORAL at 05:35

## 2021-01-01 RX ADMIN — Medication 75 MILLIGRAM(S): at 21:20

## 2021-01-01 RX ADMIN — BUDESONIDE AND FORMOTEROL FUMARATE DIHYDRATE 2 PUFF(S): 160; 4.5 AEROSOL RESPIRATORY (INHALATION) at 10:00

## 2021-01-01 RX ADMIN — BUDESONIDE AND FORMOTEROL FUMARATE DIHYDRATE 2 PUFF(S): 160; 4.5 AEROSOL RESPIRATORY (INHALATION) at 11:18

## 2021-01-01 RX ADMIN — MORPHINE SULFATE 2 MILLIGRAM(S): 50 CAPSULE, EXTENDED RELEASE ORAL at 12:29

## 2021-01-01 RX ADMIN — MORPHINE SULFATE 2 MILLIGRAM(S): 50 CAPSULE, EXTENDED RELEASE ORAL at 17:19

## 2021-01-01 RX ADMIN — Medication 40 MILLIGRAM(S): at 05:54

## 2021-01-01 RX ADMIN — OXYMETAZOLINE HYDROCHLORIDE 1 SPRAY(S): 0.5 SPRAY NASAL at 18:43

## 2021-01-01 RX ADMIN — MORPHINE SULFATE 2 MILLIGRAM(S): 50 CAPSULE, EXTENDED RELEASE ORAL at 21:09

## 2021-01-01 RX ADMIN — Medication 1 SPRAY(S): at 18:27

## 2021-01-01 RX ADMIN — BUDESONIDE AND FORMOTEROL FUMARATE DIHYDRATE 2 PUFF(S): 160; 4.5 AEROSOL RESPIRATORY (INHALATION) at 07:35

## 2021-01-01 RX ADMIN — BUDESONIDE AND FORMOTEROL FUMARATE DIHYDRATE 2 PUFF(S): 160; 4.5 AEROSOL RESPIRATORY (INHALATION) at 21:50

## 2021-01-01 RX ADMIN — Medication 75 MILLIGRAM(S): at 13:36

## 2021-01-01 RX ADMIN — Medication 25 MILLIGRAM(S): at 05:59

## 2021-01-01 RX ADMIN — ISOSORBIDE MONONITRATE 30 MILLIGRAM(S): 60 TABLET, EXTENDED RELEASE ORAL at 05:13

## 2021-01-01 RX ADMIN — Medication 81 MILLIGRAM(S): at 12:54

## 2021-01-01 RX ADMIN — SIMVASTATIN 20 MILLIGRAM(S): 20 TABLET, FILM COATED ORAL at 21:37

## 2021-01-01 RX ADMIN — CARVEDILOL PHOSPHATE 25 MILLIGRAM(S): 80 CAPSULE, EXTENDED RELEASE ORAL at 19:21

## 2021-01-01 RX ADMIN — BUDESONIDE AND FORMOTEROL FUMARATE DIHYDRATE 2 PUFF(S): 160; 4.5 AEROSOL RESPIRATORY (INHALATION) at 10:09

## 2021-01-01 RX ADMIN — BUDESONIDE AND FORMOTEROL FUMARATE DIHYDRATE 2 PUFF(S): 160; 4.5 AEROSOL RESPIRATORY (INHALATION) at 21:34

## 2021-01-01 RX ADMIN — Medication 75 MILLIGRAM(S): at 06:12

## 2021-01-01 RX ADMIN — Medication 20 MILLIGRAM(S): at 05:53

## 2021-01-01 RX ADMIN — Medication 3 MILLILITER(S): at 03:15

## 2021-01-01 RX ADMIN — Medication 40 MILLIEQUIVALENT(S): at 12:21

## 2021-01-01 RX ADMIN — CARVEDILOL PHOSPHATE 25 MILLIGRAM(S): 80 CAPSULE, EXTENDED RELEASE ORAL at 17:47

## 2021-01-01 RX ADMIN — Medication 40 MILLIEQUIVALENT(S): at 14:56

## 2021-01-01 RX ADMIN — PANTOPRAZOLE SODIUM 40 MILLIGRAM(S): 20 TABLET, DELAYED RELEASE ORAL at 05:40

## 2021-01-01 RX ADMIN — Medication 3 MILLILITER(S): at 22:21

## 2021-01-01 RX ADMIN — ALBUTEROL 1 PUFF(S): 90 AEROSOL, METERED ORAL at 13:43

## 2021-01-01 RX ADMIN — Medication 75 MILLIGRAM(S): at 05:07

## 2021-01-01 RX ADMIN — ISOSORBIDE MONONITRATE 30 MILLIGRAM(S): 60 TABLET, EXTENDED RELEASE ORAL at 12:59

## 2021-01-01 RX ADMIN — Medication 75 MILLIGRAM(S): at 21:24

## 2021-01-01 RX ADMIN — MORPHINE SULFATE 3 MILLIGRAM(S): 50 CAPSULE, EXTENDED RELEASE ORAL at 15:15

## 2021-01-01 RX ADMIN — Medication 3 MILLILITER(S): at 03:43

## 2021-01-01 RX ADMIN — Medication 75 MILLIGRAM(S): at 21:11

## 2021-01-01 RX ADMIN — ENOXAPARIN SODIUM 30 MILLIGRAM(S): 100 INJECTION SUBCUTANEOUS at 13:04

## 2021-01-01 RX ADMIN — CARVEDILOL PHOSPHATE 25 MILLIGRAM(S): 80 CAPSULE, EXTENDED RELEASE ORAL at 06:13

## 2021-01-01 RX ADMIN — BUDESONIDE AND FORMOTEROL FUMARATE DIHYDRATE 2 PUFF(S): 160; 4.5 AEROSOL RESPIRATORY (INHALATION) at 21:24

## 2021-01-01 RX ADMIN — Medication 3 MILLILITER(S): at 23:26

## 2021-01-01 RX ADMIN — Medication 75 MILLIGRAM(S): at 06:11

## 2021-01-01 RX ADMIN — BUDESONIDE AND FORMOTEROL FUMARATE DIHYDRATE 2 PUFF(S): 160; 4.5 AEROSOL RESPIRATORY (INHALATION) at 21:41

## 2021-01-01 RX ADMIN — Medication 1 TABLET(S): at 08:27

## 2021-01-01 RX ADMIN — BUDESONIDE AND FORMOTEROL FUMARATE DIHYDRATE 2 PUFF(S): 160; 4.5 AEROSOL RESPIRATORY (INHALATION) at 11:13

## 2021-01-01 RX ADMIN — CARVEDILOL PHOSPHATE 25 MILLIGRAM(S): 80 CAPSULE, EXTENDED RELEASE ORAL at 18:43

## 2021-01-01 RX ADMIN — Medication 650 MILLIGRAM(S): at 11:49

## 2021-01-01 RX ADMIN — ISOSORBIDE MONONITRATE 30 MILLIGRAM(S): 60 TABLET, EXTENDED RELEASE ORAL at 05:16

## 2021-01-01 RX ADMIN — Medication 40 MILLIEQUIVALENT(S): at 06:31

## 2021-01-01 RX ADMIN — Medication 40 MILLIGRAM(S): at 05:09

## 2021-01-01 RX ADMIN — CARVEDILOL PHOSPHATE 25 MILLIGRAM(S): 80 CAPSULE, EXTENDED RELEASE ORAL at 17:31

## 2021-01-01 RX ADMIN — Medication 75 MILLIGRAM(S): at 04:43

## 2021-01-01 RX ADMIN — Medication 75 MILLIGRAM(S): at 22:36

## 2021-01-01 RX ADMIN — Medication 40 MILLIGRAM(S): at 04:15

## 2021-01-01 RX ADMIN — CARVEDILOL PHOSPHATE 25 MILLIGRAM(S): 80 CAPSULE, EXTENDED RELEASE ORAL at 05:39

## 2021-01-01 RX ADMIN — ENOXAPARIN SODIUM 30 MILLIGRAM(S): 100 INJECTION SUBCUTANEOUS at 11:51

## 2021-01-01 RX ADMIN — PANTOPRAZOLE SODIUM 40 MILLIGRAM(S): 20 TABLET, DELAYED RELEASE ORAL at 05:54

## 2021-01-01 RX ADMIN — SIMVASTATIN 20 MILLIGRAM(S): 20 TABLET, FILM COATED ORAL at 21:12

## 2021-01-01 RX ADMIN — Medication 3 MILLILITER(S): at 09:59

## 2021-01-01 RX ADMIN — Medication 75 MILLIGRAM(S): at 22:17

## 2021-01-01 RX ADMIN — ISOSORBIDE MONONITRATE 30 MILLIGRAM(S): 60 TABLET, EXTENDED RELEASE ORAL at 05:12

## 2021-01-01 RX ADMIN — Medication 100 MILLIEQUIVALENT(S): at 10:22

## 2021-01-01 RX ADMIN — BUDESONIDE AND FORMOTEROL FUMARATE DIHYDRATE 2 PUFF(S): 160; 4.5 AEROSOL RESPIRATORY (INHALATION) at 22:02

## 2021-01-01 RX ADMIN — CARVEDILOL PHOSPHATE 25 MILLIGRAM(S): 80 CAPSULE, EXTENDED RELEASE ORAL at 05:13

## 2021-01-01 RX ADMIN — ENOXAPARIN SODIUM 30 MILLIGRAM(S): 100 INJECTION SUBCUTANEOUS at 13:44

## 2021-01-01 RX ADMIN — ENOXAPARIN SODIUM 40 MILLIGRAM(S): 100 INJECTION SUBCUTANEOUS at 10:53

## 2021-01-01 RX ADMIN — Medication 3 MILLILITER(S): at 16:06

## 2021-01-01 RX ADMIN — Medication 75 MILLIGRAM(S): at 12:00

## 2021-01-01 RX ADMIN — MORPHINE SULFATE 2 MILLIGRAM(S): 50 CAPSULE, EXTENDED RELEASE ORAL at 05:09

## 2021-01-01 RX ADMIN — PANTOPRAZOLE SODIUM 40 MILLIGRAM(S): 20 TABLET, DELAYED RELEASE ORAL at 06:10

## 2021-01-01 RX ADMIN — BUDESONIDE AND FORMOTEROL FUMARATE DIHYDRATE 2 PUFF(S): 160; 4.5 AEROSOL RESPIRATORY (INHALATION) at 09:30

## 2021-01-01 RX ADMIN — Medication 40 MILLIGRAM(S): at 05:59

## 2021-01-01 RX ADMIN — AZITHROMYCIN 500 MILLIGRAM(S): 500 TABLET, FILM COATED ORAL at 10:50

## 2021-01-01 RX ADMIN — CARVEDILOL PHOSPHATE 25 MILLIGRAM(S): 80 CAPSULE, EXTENDED RELEASE ORAL at 17:29

## 2021-01-01 RX ADMIN — BUDESONIDE AND FORMOTEROL FUMARATE DIHYDRATE 2 PUFF(S): 160; 4.5 AEROSOL RESPIRATORY (INHALATION) at 09:41

## 2021-01-01 RX ADMIN — SIMVASTATIN 20 MILLIGRAM(S): 20 TABLET, FILM COATED ORAL at 21:20

## 2021-01-01 RX ADMIN — Medication 40 MILLIGRAM(S): at 05:00

## 2021-01-01 RX ADMIN — MORPHINE SULFATE 3 MILLIGRAM(S): 50 CAPSULE, EXTENDED RELEASE ORAL at 13:40

## 2021-01-01 RX ADMIN — MORPHINE SULFATE 1 MILLIGRAM(S): 50 CAPSULE, EXTENDED RELEASE ORAL at 04:26

## 2021-01-01 RX ADMIN — Medication 50 MILLIGRAM(S): at 21:20

## 2021-01-01 RX ADMIN — SIMVASTATIN 20 MILLIGRAM(S): 20 TABLET, FILM COATED ORAL at 22:49

## 2021-01-01 RX ADMIN — BUDESONIDE AND FORMOTEROL FUMARATE DIHYDRATE 2 PUFF(S): 160; 4.5 AEROSOL RESPIRATORY (INHALATION) at 21:01

## 2021-01-01 RX ADMIN — BUDESONIDE AND FORMOTEROL FUMARATE DIHYDRATE 2 PUFF(S): 160; 4.5 AEROSOL RESPIRATORY (INHALATION) at 09:19

## 2021-01-01 RX ADMIN — Medication 40 MILLIGRAM(S): at 06:53

## 2021-01-01 RX ADMIN — BUDESONIDE AND FORMOTEROL FUMARATE DIHYDRATE 2 PUFF(S): 160; 4.5 AEROSOL RESPIRATORY (INHALATION) at 21:26

## 2021-01-01 RX ADMIN — CARVEDILOL PHOSPHATE 25 MILLIGRAM(S): 80 CAPSULE, EXTENDED RELEASE ORAL at 04:43

## 2021-01-01 RX ADMIN — BUDESONIDE AND FORMOTEROL FUMARATE DIHYDRATE 2 PUFF(S): 160; 4.5 AEROSOL RESPIRATORY (INHALATION) at 21:05

## 2021-01-01 RX ADMIN — CARVEDILOL PHOSPHATE 25 MILLIGRAM(S): 80 CAPSULE, EXTENDED RELEASE ORAL at 06:17

## 2021-01-01 RX ADMIN — CHLORHEXIDINE GLUCONATE 1 APPLICATION(S): 213 SOLUTION TOPICAL at 11:06

## 2021-01-01 RX ADMIN — Medication 3 MILLILITER(S): at 21:27

## 2021-01-01 RX ADMIN — BUDESONIDE AND FORMOTEROL FUMARATE DIHYDRATE 2 PUFF(S): 160; 4.5 AEROSOL RESPIRATORY (INHALATION) at 09:07

## 2021-01-01 RX ADMIN — CHLORHEXIDINE GLUCONATE 1 APPLICATION(S): 213 SOLUTION TOPICAL at 11:07

## 2021-01-01 RX ADMIN — TIOTROPIUM BROMIDE 1 CAPSULE(S): 18 CAPSULE ORAL; RESPIRATORY (INHALATION) at 09:30

## 2021-01-01 RX ADMIN — ENOXAPARIN SODIUM 30 MILLIGRAM(S): 100 INJECTION SUBCUTANEOUS at 12:09

## 2021-01-01 RX ADMIN — Medication 75 MILLIGRAM(S): at 13:50

## 2021-01-01 RX ADMIN — MORPHINE SULFATE 2 MILLIGRAM(S): 50 CAPSULE, EXTENDED RELEASE ORAL at 01:00

## 2021-01-01 RX ADMIN — Medication 75 MILLIGRAM(S): at 14:38

## 2021-01-01 RX ADMIN — Medication 81 MILLIGRAM(S): at 11:52

## 2021-01-01 RX ADMIN — CARVEDILOL PHOSPHATE 25 MILLIGRAM(S): 80 CAPSULE, EXTENDED RELEASE ORAL at 18:20

## 2021-01-01 RX ADMIN — BUDESONIDE AND FORMOTEROL FUMARATE DIHYDRATE 2 PUFF(S): 160; 4.5 AEROSOL RESPIRATORY (INHALATION) at 21:56

## 2021-01-01 RX ADMIN — Medication 75 MILLIGRAM(S): at 12:53

## 2021-01-01 RX ADMIN — Medication 75 MILLIGRAM(S): at 06:16

## 2021-01-01 RX ADMIN — Medication 81 MILLIGRAM(S): at 12:09

## 2021-01-01 RX ADMIN — Medication 40 MILLIGRAM(S): at 16:36

## 2021-01-01 RX ADMIN — MORPHINE SULFATE 3 MILLIGRAM(S): 50 CAPSULE, EXTENDED RELEASE ORAL at 10:13

## 2021-01-01 RX ADMIN — TIOTROPIUM BROMIDE 1 CAPSULE(S): 18 CAPSULE ORAL; RESPIRATORY (INHALATION) at 11:18

## 2021-01-01 RX ADMIN — SENNA PLUS 2 TABLET(S): 8.6 TABLET ORAL at 21:26

## 2021-01-01 RX ADMIN — Medication 40 MILLIGRAM(S): at 17:23

## 2021-01-01 RX ADMIN — Medication 40 MILLIGRAM(S): at 05:52

## 2021-01-01 RX ADMIN — CARVEDILOL PHOSPHATE 37.5 MILLIGRAM(S): 80 CAPSULE, EXTENDED RELEASE ORAL at 18:10

## 2021-01-01 RX ADMIN — SODIUM CHLORIDE 250 MILLILITER(S): 9 INJECTION INTRAMUSCULAR; INTRAVENOUS; SUBCUTANEOUS at 10:01

## 2021-01-01 RX ADMIN — Medication 3 MILLILITER(S): at 23:11

## 2021-01-01 RX ADMIN — MORPHINE SULFATE 1 MILLIGRAM(S): 50 CAPSULE, EXTENDED RELEASE ORAL at 22:03

## 2021-01-01 RX ADMIN — MORPHINE SULFATE 1 MILLIGRAM(S): 50 CAPSULE, EXTENDED RELEASE ORAL at 09:43

## 2021-01-01 RX ADMIN — ENOXAPARIN SODIUM 30 MILLIGRAM(S): 100 INJECTION SUBCUTANEOUS at 13:00

## 2021-01-01 RX ADMIN — Medication 650 MILLIGRAM(S): at 13:00

## 2021-01-01 RX ADMIN — CARVEDILOL PHOSPHATE 25 MILLIGRAM(S): 80 CAPSULE, EXTENDED RELEASE ORAL at 05:30

## 2021-01-01 RX ADMIN — PANTOPRAZOLE SODIUM 40 MILLIGRAM(S): 20 TABLET, DELAYED RELEASE ORAL at 06:53

## 2021-01-01 RX ADMIN — Medication 75 MILLIGRAM(S): at 13:44

## 2021-01-01 RX ADMIN — Medication 50 MILLIGRAM(S): at 05:38

## 2021-01-01 RX ADMIN — Medication 3 MILLILITER(S): at 15:06

## 2021-01-01 RX ADMIN — CARVEDILOL PHOSPHATE 25 MILLIGRAM(S): 80 CAPSULE, EXTENDED RELEASE ORAL at 17:45

## 2021-01-01 RX ADMIN — MORPHINE SULFATE 1 MILLIGRAM(S): 50 CAPSULE, EXTENDED RELEASE ORAL at 14:30

## 2021-01-01 RX ADMIN — Medication 3 MILLILITER(S): at 23:07

## 2021-01-01 RX ADMIN — ENOXAPARIN SODIUM 40 MILLIGRAM(S): 100 INJECTION SUBCUTANEOUS at 12:24

## 2021-01-01 RX ADMIN — MORPHINE SULFATE 2 MILLIGRAM(S): 50 CAPSULE, EXTENDED RELEASE ORAL at 05:24

## 2021-01-01 RX ADMIN — SODIUM CHLORIDE 500 MILLILITER(S): 9 INJECTION, SOLUTION INTRAVENOUS at 04:10

## 2021-01-01 RX ADMIN — BUDESONIDE AND FORMOTEROL FUMARATE DIHYDRATE 2 PUFF(S): 160; 4.5 AEROSOL RESPIRATORY (INHALATION) at 13:01

## 2021-01-01 RX ADMIN — CARVEDILOL PHOSPHATE 25 MILLIGRAM(S): 80 CAPSULE, EXTENDED RELEASE ORAL at 05:16

## 2021-01-01 RX ADMIN — Medication 25 MILLIGRAM(S): at 22:35

## 2021-01-01 RX ADMIN — CHLORHEXIDINE GLUCONATE 1 APPLICATION(S): 213 SOLUTION TOPICAL at 11:41

## 2021-01-01 RX ADMIN — Medication 81 MILLIGRAM(S): at 18:18

## 2021-01-01 RX ADMIN — BUDESONIDE AND FORMOTEROL FUMARATE DIHYDRATE 2 PUFF(S): 160; 4.5 AEROSOL RESPIRATORY (INHALATION) at 21:20

## 2021-01-01 RX ADMIN — Medication 75 MILLIGRAM(S): at 13:47

## 2021-01-01 RX ADMIN — Medication 1 SPRAY(S): at 05:09

## 2021-01-01 RX ADMIN — BUDESONIDE AND FORMOTEROL FUMARATE DIHYDRATE 2 PUFF(S): 160; 4.5 AEROSOL RESPIRATORY (INHALATION) at 21:46

## 2021-01-01 RX ADMIN — Medication 40 MILLIGRAM(S): at 05:13

## 2021-01-01 RX ADMIN — AZITHROMYCIN 500 MILLIGRAM(S): 500 TABLET, FILM COATED ORAL at 11:04

## 2021-01-01 RX ADMIN — SIMVASTATIN 20 MILLIGRAM(S): 20 TABLET, FILM COATED ORAL at 21:32

## 2021-01-01 RX ADMIN — BUDESONIDE AND FORMOTEROL FUMARATE DIHYDRATE 2 PUFF(S): 160; 4.5 AEROSOL RESPIRATORY (INHALATION) at 10:01

## 2021-01-01 RX ADMIN — Medication 30 MILLIGRAM(S): at 05:30

## 2021-01-01 RX ADMIN — CARVEDILOL PHOSPHATE 25 MILLIGRAM(S): 80 CAPSULE, EXTENDED RELEASE ORAL at 06:32

## 2021-01-01 RX ADMIN — Medication 40 MILLIGRAM(S): at 06:28

## 2021-01-01 RX ADMIN — CHLORHEXIDINE GLUCONATE 1 APPLICATION(S): 213 SOLUTION TOPICAL at 17:02

## 2021-01-01 RX ADMIN — Medication 75 MILLIGRAM(S): at 06:15

## 2021-01-01 RX ADMIN — SODIUM CHLORIDE 83.33 MILLILITER(S): 9 INJECTION, SOLUTION INTRAVENOUS at 04:09

## 2021-01-01 RX ADMIN — TIOTROPIUM BROMIDE 1 CAPSULE(S): 18 CAPSULE ORAL; RESPIRATORY (INHALATION) at 10:56

## 2021-01-01 RX ADMIN — Medication 75 MILLIGRAM(S): at 22:30

## 2021-01-01 RX ADMIN — PANTOPRAZOLE SODIUM 40 MILLIGRAM(S): 20 TABLET, DELAYED RELEASE ORAL at 05:52

## 2021-01-01 RX ADMIN — Medication 40 MILLIGRAM(S): at 12:25

## 2021-01-01 RX ADMIN — ENOXAPARIN SODIUM 30 MILLIGRAM(S): 100 INJECTION SUBCUTANEOUS at 11:13

## 2021-01-01 RX ADMIN — Medication 3 MILLILITER(S): at 16:07

## 2021-01-01 RX ADMIN — ISOSORBIDE MONONITRATE 30 MILLIGRAM(S): 60 TABLET, EXTENDED RELEASE ORAL at 05:00

## 2021-01-01 RX ADMIN — MORPHINE SULFATE 2 MILLIGRAM(S): 50 CAPSULE, EXTENDED RELEASE ORAL at 11:37

## 2021-01-01 RX ADMIN — ENOXAPARIN SODIUM 30 MILLIGRAM(S): 100 INJECTION SUBCUTANEOUS at 11:24

## 2021-01-01 RX ADMIN — Medication 75 MILLIGRAM(S): at 05:30

## 2021-01-01 RX ADMIN — Medication 3 MILLILITER(S): at 15:20

## 2021-01-01 RX ADMIN — CARVEDILOL PHOSPHATE 37.5 MILLIGRAM(S): 80 CAPSULE, EXTENDED RELEASE ORAL at 06:15

## 2021-01-01 RX ADMIN — CHLORHEXIDINE GLUCONATE 1 APPLICATION(S): 213 SOLUTION TOPICAL at 11:56

## 2021-01-01 RX ADMIN — SIMVASTATIN 20 MILLIGRAM(S): 20 TABLET, FILM COATED ORAL at 21:18

## 2021-01-01 RX ADMIN — CARVEDILOL PHOSPHATE 25 MILLIGRAM(S): 80 CAPSULE, EXTENDED RELEASE ORAL at 05:01

## 2021-01-01 RX ADMIN — BUDESONIDE AND FORMOTEROL FUMARATE DIHYDRATE 2 PUFF(S): 160; 4.5 AEROSOL RESPIRATORY (INHALATION) at 22:48

## 2021-01-01 RX ADMIN — Medication 75 MILLIGRAM(S): at 06:09

## 2021-01-01 RX ADMIN — Medication 81 MILLIGRAM(S): at 12:18

## 2021-01-01 RX ADMIN — TIOTROPIUM BROMIDE 1 CAPSULE(S): 18 CAPSULE ORAL; RESPIRATORY (INHALATION) at 15:22

## 2021-01-01 RX ADMIN — TIOTROPIUM BROMIDE 1 CAPSULE(S): 18 CAPSULE ORAL; RESPIRATORY (INHALATION) at 10:16

## 2021-01-01 RX ADMIN — PANTOPRAZOLE SODIUM 40 MILLIGRAM(S): 20 TABLET, DELAYED RELEASE ORAL at 06:29

## 2021-01-01 RX ADMIN — MORPHINE SULFATE 3 MILLIGRAM(S): 50 CAPSULE, EXTENDED RELEASE ORAL at 21:12

## 2021-01-01 RX ADMIN — Medication 81 MILLIGRAM(S): at 13:00

## 2021-01-01 RX ADMIN — MORPHINE SULFATE 1 MILLIGRAM(S): 50 CAPSULE, EXTENDED RELEASE ORAL at 03:56

## 2021-01-01 RX ADMIN — Medication 3 MILLILITER(S): at 17:31

## 2021-01-01 RX ADMIN — BUDESONIDE AND FORMOTEROL FUMARATE DIHYDRATE 2 PUFF(S): 160; 4.5 AEROSOL RESPIRATORY (INHALATION) at 10:05

## 2021-01-01 RX ADMIN — SIMVASTATIN 20 MILLIGRAM(S): 20 TABLET, FILM COATED ORAL at 22:25

## 2021-01-01 RX ADMIN — Medication 50 MILLIGRAM(S): at 21:21

## 2021-01-01 RX ADMIN — Medication 3 MILLILITER(S): at 22:50

## 2021-01-01 RX ADMIN — Medication 1 TABLET(S): at 11:48

## 2021-01-01 RX ADMIN — PANTOPRAZOLE SODIUM 40 MILLIGRAM(S): 20 TABLET, DELAYED RELEASE ORAL at 05:17

## 2021-01-01 RX ADMIN — TIOTROPIUM BROMIDE 1 CAPSULE(S): 18 CAPSULE ORAL; RESPIRATORY (INHALATION) at 09:29

## 2021-01-01 RX ADMIN — Medication 3 MILLILITER(S): at 22:08

## 2021-01-01 RX ADMIN — ISOSORBIDE MONONITRATE 30 MILLIGRAM(S): 60 TABLET, EXTENDED RELEASE ORAL at 06:13

## 2021-01-01 RX ADMIN — MORPHINE SULFATE 2 MILLIGRAM(S): 50 CAPSULE, EXTENDED RELEASE ORAL at 01:21

## 2021-01-01 RX ADMIN — MORPHINE SULFATE 2 MILLIGRAM(S): 50 CAPSULE, EXTENDED RELEASE ORAL at 12:45

## 2021-01-01 RX ADMIN — Medication 40 MILLIGRAM(S): at 05:03

## 2021-01-01 RX ADMIN — BUDESONIDE AND FORMOTEROL FUMARATE DIHYDRATE 2 PUFF(S): 160; 4.5 AEROSOL RESPIRATORY (INHALATION) at 21:42

## 2021-01-01 RX ADMIN — Medication 75 MILLIGRAM(S): at 06:31

## 2021-01-01 RX ADMIN — MORPHINE SULFATE 1 MILLIGRAM(S): 50 CAPSULE, EXTENDED RELEASE ORAL at 07:47

## 2021-01-01 RX ADMIN — Medication 1 TABLET(S): at 21:21

## 2021-01-01 RX ADMIN — CHLORHEXIDINE GLUCONATE 1 APPLICATION(S): 213 SOLUTION TOPICAL at 11:58

## 2021-01-01 RX ADMIN — Medication 150 GRAM(S): at 18:12

## 2021-01-01 RX ADMIN — CARVEDILOL PHOSPHATE 25 MILLIGRAM(S): 80 CAPSULE, EXTENDED RELEASE ORAL at 06:10

## 2021-01-01 RX ADMIN — CARVEDILOL PHOSPHATE 25 MILLIGRAM(S): 80 CAPSULE, EXTENDED RELEASE ORAL at 19:25

## 2021-01-01 RX ADMIN — BUDESONIDE AND FORMOTEROL FUMARATE DIHYDRATE 2 PUFF(S): 160; 4.5 AEROSOL RESPIRATORY (INHALATION) at 09:36

## 2021-01-01 RX ADMIN — ENOXAPARIN SODIUM 30 MILLIGRAM(S): 100 INJECTION SUBCUTANEOUS at 11:29

## 2021-01-01 RX ADMIN — ENOXAPARIN SODIUM 30 MILLIGRAM(S): 100 INJECTION SUBCUTANEOUS at 12:18

## 2021-01-01 RX ADMIN — CARVEDILOL PHOSPHATE 25 MILLIGRAM(S): 80 CAPSULE, EXTENDED RELEASE ORAL at 05:28

## 2021-01-01 RX ADMIN — AZITHROMYCIN 500 MILLIGRAM(S): 500 TABLET, FILM COATED ORAL at 17:44

## 2021-01-01 RX ADMIN — BUDESONIDE AND FORMOTEROL FUMARATE DIHYDRATE 2 PUFF(S): 160; 4.5 AEROSOL RESPIRATORY (INHALATION) at 21:31

## 2021-01-01 RX ADMIN — BUDESONIDE AND FORMOTEROL FUMARATE DIHYDRATE 2 PUFF(S): 160; 4.5 AEROSOL RESPIRATORY (INHALATION) at 19:16

## 2021-01-01 RX ADMIN — SIMVASTATIN 20 MILLIGRAM(S): 20 TABLET, FILM COATED ORAL at 21:22

## 2021-01-01 RX ADMIN — CARVEDILOL PHOSPHATE 25 MILLIGRAM(S): 80 CAPSULE, EXTENDED RELEASE ORAL at 05:59

## 2021-01-01 RX ADMIN — ISOSORBIDE MONONITRATE 30 MILLIGRAM(S): 60 TABLET, EXTENDED RELEASE ORAL at 13:00

## 2021-01-01 RX ADMIN — CARVEDILOL PHOSPHATE 25 MILLIGRAM(S): 80 CAPSULE, EXTENDED RELEASE ORAL at 17:32

## 2021-01-01 RX ADMIN — Medication 81 MILLIGRAM(S): at 11:43

## 2021-01-01 RX ADMIN — Medication 75 MILLIGRAM(S): at 06:10

## 2021-01-01 RX ADMIN — CARVEDILOL PHOSPHATE 25 MILLIGRAM(S): 80 CAPSULE, EXTENDED RELEASE ORAL at 05:08

## 2021-01-01 RX ADMIN — Medication 3 MILLILITER(S): at 10:33

## 2021-01-01 RX ADMIN — Medication 3 MILLILITER(S): at 04:06

## 2021-01-01 RX ADMIN — ISOSORBIDE MONONITRATE 30 MILLIGRAM(S): 60 TABLET, EXTENDED RELEASE ORAL at 12:18

## 2021-01-01 RX ADMIN — ENOXAPARIN SODIUM 40 MILLIGRAM(S): 100 INJECTION SUBCUTANEOUS at 13:12

## 2021-01-01 RX ADMIN — PANTOPRAZOLE SODIUM 40 MILLIGRAM(S): 20 TABLET, DELAYED RELEASE ORAL at 06:16

## 2021-01-01 RX ADMIN — PANTOPRAZOLE SODIUM 40 MILLIGRAM(S): 20 TABLET, DELAYED RELEASE ORAL at 05:00

## 2021-01-01 RX ADMIN — ALBUTEROL 1 PUFF(S): 90 AEROSOL, METERED ORAL at 20:25

## 2021-01-01 RX ADMIN — Medication 3 MILLILITER(S): at 21:45

## 2021-01-01 RX ADMIN — BUDESONIDE AND FORMOTEROL FUMARATE DIHYDRATE 2 PUFF(S): 160; 4.5 AEROSOL RESPIRATORY (INHALATION) at 09:38

## 2021-01-01 RX ADMIN — ENOXAPARIN SODIUM 30 MILLIGRAM(S): 100 INJECTION SUBCUTANEOUS at 13:47

## 2021-01-01 RX ADMIN — Medication 50 MILLIGRAM(S): at 13:00

## 2021-01-01 RX ADMIN — Medication 3 MILLILITER(S): at 07:23

## 2021-01-01 RX ADMIN — Medication 125 MILLIGRAM(S): at 09:09

## 2021-01-01 RX ADMIN — Medication 40 MILLIGRAM(S): at 05:16

## 2021-01-01 RX ADMIN — Medication 75 MILLIGRAM(S): at 22:49

## 2021-01-01 RX ADMIN — CARVEDILOL PHOSPHATE 37.5 MILLIGRAM(S): 80 CAPSULE, EXTENDED RELEASE ORAL at 19:48

## 2021-01-01 RX ADMIN — Medication 125 MILLIGRAM(S): at 17:31

## 2021-01-01 RX ADMIN — BUDESONIDE AND FORMOTEROL FUMARATE DIHYDRATE 2 PUFF(S): 160; 4.5 AEROSOL RESPIRATORY (INHALATION) at 21:29

## 2021-01-01 RX ADMIN — Medication 75 MILLIGRAM(S): at 13:37

## 2021-01-01 RX ADMIN — Medication 3 MILLILITER(S): at 09:12

## 2021-01-01 RX ADMIN — Medication 75 MILLIGRAM(S): at 06:20

## 2021-01-01 RX ADMIN — Medication 81 MILLIGRAM(S): at 11:41

## 2021-01-01 RX ADMIN — Medication 75 MILLIGRAM(S): at 21:25

## 2021-01-01 RX ADMIN — ISOSORBIDE MONONITRATE 30 MILLIGRAM(S): 60 TABLET, EXTENDED RELEASE ORAL at 11:25

## 2021-01-01 RX ADMIN — Medication 3 MILLILITER(S): at 21:47

## 2021-01-01 RX ADMIN — CARVEDILOL PHOSPHATE 25 MILLIGRAM(S): 80 CAPSULE, EXTENDED RELEASE ORAL at 18:02

## 2021-01-01 RX ADMIN — Medication 30 MILLIGRAM(S): at 05:37

## 2021-01-01 RX ADMIN — OXYMETAZOLINE HYDROCHLORIDE 1 SPRAY(S): 0.5 SPRAY NASAL at 05:00

## 2021-01-01 RX ADMIN — AZITHROMYCIN 255 MILLIGRAM(S): 500 TABLET, FILM COATED ORAL at 17:07

## 2021-01-01 RX ADMIN — ENOXAPARIN SODIUM 30 MILLIGRAM(S): 100 INJECTION SUBCUTANEOUS at 12:28

## 2021-01-01 RX ADMIN — BUDESONIDE AND FORMOTEROL FUMARATE DIHYDRATE 2 PUFF(S): 160; 4.5 AEROSOL RESPIRATORY (INHALATION) at 09:42

## 2021-01-01 RX ADMIN — Medication 81 MILLIGRAM(S): at 11:48

## 2021-01-01 RX ADMIN — MORPHINE SULFATE 2 MILLIGRAM(S): 50 CAPSULE, EXTENDED RELEASE ORAL at 11:01

## 2021-01-01 RX ADMIN — SIMVASTATIN 20 MILLIGRAM(S): 20 TABLET, FILM COATED ORAL at 22:35

## 2021-01-01 RX ADMIN — POLYETHYLENE GLYCOL 3350 17 GRAM(S): 17 POWDER, FOR SOLUTION ORAL at 13:37

## 2021-01-01 RX ADMIN — Medication 50 MILLIGRAM(S): at 15:26

## 2021-01-01 RX ADMIN — Medication 75 MILLIGRAM(S): at 05:13

## 2021-01-01 RX ADMIN — Medication 81 MILLIGRAM(S): at 13:04

## 2021-01-01 RX ADMIN — ISOSORBIDE MONONITRATE 30 MILLIGRAM(S): 60 TABLET, EXTENDED RELEASE ORAL at 11:44

## 2021-01-01 RX ADMIN — Medication 75 MILLIGRAM(S): at 05:52

## 2021-01-01 RX ADMIN — Medication 81 MILLIGRAM(S): at 13:11

## 2021-01-01 RX ADMIN — CARVEDILOL PHOSPHATE 25 MILLIGRAM(S): 80 CAPSULE, EXTENDED RELEASE ORAL at 18:50

## 2021-01-01 RX ADMIN — ISOSORBIDE MONONITRATE 30 MILLIGRAM(S): 60 TABLET, EXTENDED RELEASE ORAL at 06:28

## 2021-01-01 RX ADMIN — Medication 30 MILLIGRAM(S): at 05:00

## 2021-01-01 RX ADMIN — Medication 25 MILLIGRAM(S): at 13:47

## 2021-01-01 RX ADMIN — Medication 50 MILLIGRAM(S): at 22:48

## 2021-01-01 RX ADMIN — ISOSORBIDE MONONITRATE 30 MILLIGRAM(S): 60 TABLET, EXTENDED RELEASE ORAL at 11:58

## 2021-01-01 RX ADMIN — PANTOPRAZOLE SODIUM 40 MILLIGRAM(S): 20 TABLET, DELAYED RELEASE ORAL at 05:39

## 2021-01-01 RX ADMIN — PANTOPRAZOLE SODIUM 40 MILLIGRAM(S): 20 TABLET, DELAYED RELEASE ORAL at 06:15

## 2021-01-01 RX ADMIN — TIOTROPIUM BROMIDE 1 CAPSULE(S): 18 CAPSULE ORAL; RESPIRATORY (INHALATION) at 11:56

## 2021-01-01 RX ADMIN — MORPHINE SULFATE 1 MILLIGRAM(S): 50 CAPSULE, EXTENDED RELEASE ORAL at 21:33

## 2021-01-01 RX ADMIN — Medication 3 MILLILITER(S): at 04:17

## 2021-01-01 RX ADMIN — CARVEDILOL PHOSPHATE 25 MILLIGRAM(S): 80 CAPSULE, EXTENDED RELEASE ORAL at 05:38

## 2021-01-01 RX ADMIN — Medication 1 SPRAY(S): at 05:39

## 2021-01-01 RX ADMIN — Medication 81 MILLIGRAM(S): at 11:05

## 2021-01-01 RX ADMIN — Medication 81 MILLIGRAM(S): at 10:53

## 2021-01-01 RX ADMIN — ALBUTEROL 1 PUFF(S): 90 AEROSOL, METERED ORAL at 11:56

## 2021-01-01 RX ADMIN — ISOSORBIDE MONONITRATE 30 MILLIGRAM(S): 60 TABLET, EXTENDED RELEASE ORAL at 10:53

## 2021-01-01 RX ADMIN — Medication 3 MILLILITER(S): at 22:05

## 2021-01-01 RX ADMIN — CARVEDILOL PHOSPHATE 25 MILLIGRAM(S): 80 CAPSULE, EXTENDED RELEASE ORAL at 06:16

## 2021-01-01 RX ADMIN — MORPHINE SULFATE 3 MILLIGRAM(S): 50 CAPSULE, EXTENDED RELEASE ORAL at 22:12

## 2021-01-01 RX ADMIN — Medication 40 MILLIGRAM(S): at 06:32

## 2021-01-01 RX ADMIN — MORPHINE SULFATE 2 MILLIGRAM(S): 50 CAPSULE, EXTENDED RELEASE ORAL at 18:15

## 2021-01-01 RX ADMIN — CARVEDILOL PHOSPHATE 25 MILLIGRAM(S): 80 CAPSULE, EXTENDED RELEASE ORAL at 17:23

## 2021-01-01 RX ADMIN — ISOSORBIDE MONONITRATE 30 MILLIGRAM(S): 60 TABLET, EXTENDED RELEASE ORAL at 12:10

## 2021-01-01 RX ADMIN — SIMVASTATIN 20 MILLIGRAM(S): 20 TABLET, FILM COATED ORAL at 21:43

## 2021-01-01 RX ADMIN — Medication 3 MILLILITER(S): at 09:39

## 2021-01-01 RX ADMIN — ENOXAPARIN SODIUM 30 MILLIGRAM(S): 100 INJECTION SUBCUTANEOUS at 11:57

## 2021-01-01 RX ADMIN — SIMVASTATIN 20 MILLIGRAM(S): 20 TABLET, FILM COATED ORAL at 21:42

## 2021-01-01 RX ADMIN — MORPHINE SULFATE 2 MILLIGRAM(S): 50 CAPSULE, EXTENDED RELEASE ORAL at 00:47

## 2021-01-01 RX ADMIN — PANTOPRAZOLE SODIUM 40 MILLIGRAM(S): 20 TABLET, DELAYED RELEASE ORAL at 04:43

## 2021-01-01 RX ADMIN — CARVEDILOL PHOSPHATE 25 MILLIGRAM(S): 80 CAPSULE, EXTENDED RELEASE ORAL at 06:15

## 2021-01-01 RX ADMIN — Medication 3 MILLILITER(S): at 15:10

## 2021-01-01 RX ADMIN — AZITHROMYCIN 500 MILLIGRAM(S): 500 TABLET, FILM COATED ORAL at 11:44

## 2021-01-01 RX ADMIN — ISOSORBIDE MONONITRATE 30 MILLIGRAM(S): 60 TABLET, EXTENDED RELEASE ORAL at 13:35

## 2021-01-01 RX ADMIN — SIMVASTATIN 20 MILLIGRAM(S): 20 TABLET, FILM COATED ORAL at 21:33

## 2021-01-01 RX ADMIN — Medication 125 MILLIMOLE(S): at 13:12

## 2021-01-01 RX ADMIN — SIMVASTATIN 20 MILLIGRAM(S): 20 TABLET, FILM COATED ORAL at 21:24

## 2021-01-01 RX ADMIN — AZITHROMYCIN 255 MILLIGRAM(S): 500 TABLET, FILM COATED ORAL at 17:44

## 2021-01-01 RX ADMIN — CARVEDILOL PHOSPHATE 25 MILLIGRAM(S): 80 CAPSULE, EXTENDED RELEASE ORAL at 17:02

## 2021-01-01 RX ADMIN — ENOXAPARIN SODIUM 30 MILLIGRAM(S): 100 INJECTION SUBCUTANEOUS at 12:26

## 2021-01-01 RX ADMIN — TIOTROPIUM BROMIDE 1 CAPSULE(S): 18 CAPSULE ORAL; RESPIRATORY (INHALATION) at 09:47

## 2021-01-01 RX ADMIN — CARVEDILOL PHOSPHATE 25 MILLIGRAM(S): 80 CAPSULE, EXTENDED RELEASE ORAL at 05:27

## 2021-01-01 RX ADMIN — Medication 3 MILLILITER(S): at 03:10

## 2021-01-01 RX ADMIN — ISOSORBIDE MONONITRATE 30 MILLIGRAM(S): 60 TABLET, EXTENDED RELEASE ORAL at 11:41

## 2021-01-01 RX ADMIN — SIMVASTATIN 20 MILLIGRAM(S): 20 TABLET, FILM COATED ORAL at 22:16

## 2021-01-01 RX ADMIN — BUDESONIDE AND FORMOTEROL FUMARATE DIHYDRATE 2 PUFF(S): 160; 4.5 AEROSOL RESPIRATORY (INHALATION) at 22:16

## 2021-01-01 RX ADMIN — MORPHINE SULFATE 3 MILLIGRAM(S): 50 CAPSULE, EXTENDED RELEASE ORAL at 04:51

## 2021-01-01 RX ADMIN — Medication 1 SPRAY(S): at 22:17

## 2021-01-01 RX ADMIN — Medication 650 MILLIGRAM(S): at 12:30

## 2021-01-01 RX ADMIN — CARVEDILOL PHOSPHATE 37.5 MILLIGRAM(S): 80 CAPSULE, EXTENDED RELEASE ORAL at 05:41

## 2021-01-01 RX ADMIN — BUDESONIDE AND FORMOTEROL FUMARATE DIHYDRATE 2 PUFF(S): 160; 4.5 AEROSOL RESPIRATORY (INHALATION) at 09:06

## 2021-01-01 RX ADMIN — Medication 1 APPLICATION(S): at 05:10

## 2021-01-01 RX ADMIN — Medication 3 MILLILITER(S): at 03:35

## 2021-01-01 RX ADMIN — ENOXAPARIN SODIUM 30 MILLIGRAM(S): 100 INJECTION SUBCUTANEOUS at 12:44

## 2021-01-01 RX ADMIN — BUDESONIDE AND FORMOTEROL FUMARATE DIHYDRATE 2 PUFF(S): 160; 4.5 AEROSOL RESPIRATORY (INHALATION) at 21:17

## 2021-01-01 RX ADMIN — MORPHINE SULFATE 2 MILLIGRAM(S): 50 CAPSULE, EXTENDED RELEASE ORAL at 01:02

## 2021-01-01 RX ADMIN — Medication 40 MILLIGRAM(S): at 06:29

## 2021-01-01 RX ADMIN — Medication 3 MILLILITER(S): at 09:05

## 2021-01-01 RX ADMIN — ENOXAPARIN SODIUM 30 MILLIGRAM(S): 100 INJECTION SUBCUTANEOUS at 13:49

## 2021-01-01 RX ADMIN — CARVEDILOL PHOSPHATE 25 MILLIGRAM(S): 80 CAPSULE, EXTENDED RELEASE ORAL at 06:19

## 2021-01-01 RX ADMIN — Medication 3 MILLILITER(S): at 20:58

## 2021-01-01 RX ADMIN — BUDESONIDE AND FORMOTEROL FUMARATE DIHYDRATE 2 PUFF(S): 160; 4.5 AEROSOL RESPIRATORY (INHALATION) at 22:15

## 2021-01-01 RX ADMIN — CHLORHEXIDINE GLUCONATE 1 APPLICATION(S): 213 SOLUTION TOPICAL at 11:19

## 2021-01-01 RX ADMIN — Medication 1 SPRAY(S): at 17:53

## 2021-01-01 RX ADMIN — Medication 1 PACKET(S): at 14:13

## 2021-01-01 RX ADMIN — SIMVASTATIN 20 MILLIGRAM(S): 20 TABLET, FILM COATED ORAL at 21:26

## 2021-01-01 RX ADMIN — BUDESONIDE AND FORMOTEROL FUMARATE DIHYDRATE 2 PUFF(S): 160; 4.5 AEROSOL RESPIRATORY (INHALATION) at 13:04

## 2021-01-01 RX ADMIN — Medication 40 MILLIGRAM(S): at 05:35

## 2021-01-01 RX ADMIN — BUDESONIDE AND FORMOTEROL FUMARATE DIHYDRATE 2 PUFF(S): 160; 4.5 AEROSOL RESPIRATORY (INHALATION) at 11:55

## 2021-01-01 RX ADMIN — CARVEDILOL PHOSPHATE 25 MILLIGRAM(S): 80 CAPSULE, EXTENDED RELEASE ORAL at 18:45

## 2021-01-01 RX ADMIN — TIOTROPIUM BROMIDE 1 CAPSULE(S): 18 CAPSULE ORAL; RESPIRATORY (INHALATION) at 10:05

## 2021-01-01 RX ADMIN — Medication 75 MILLIGRAM(S): at 05:12

## 2021-01-01 RX ADMIN — Medication 650 MILLIGRAM(S): at 12:27

## 2021-01-01 RX ADMIN — TIOTROPIUM BROMIDE 1 CAPSULE(S): 18 CAPSULE ORAL; RESPIRATORY (INHALATION) at 09:36

## 2021-01-01 RX ADMIN — ENOXAPARIN SODIUM 40 MILLIGRAM(S): 100 INJECTION SUBCUTANEOUS at 12:54

## 2021-01-01 RX ADMIN — BUDESONIDE AND FORMOTEROL FUMARATE DIHYDRATE 2 PUFF(S): 160; 4.5 AEROSOL RESPIRATORY (INHALATION) at 09:29

## 2021-01-01 RX ADMIN — SIMVASTATIN 20 MILLIGRAM(S): 20 TABLET, FILM COATED ORAL at 21:40

## 2021-01-01 RX ADMIN — Medication 3 MILLILITER(S): at 10:01

## 2021-01-01 RX ADMIN — ENOXAPARIN SODIUM 30 MILLIGRAM(S): 100 INJECTION SUBCUTANEOUS at 11:20

## 2021-01-07 PROBLEM — G47.33 OBSTRUCTIVE SLEEP APNEA, ADULT: Status: ACTIVE | Noted: 2017-11-16

## 2021-01-07 NOTE — HISTORY OF PRESENT ILLNESS
[TextBox_4] : The patient with h/o COPD and dyspnea is here for a follow up. She is on home oxygen which he uses daily. She is upset about the current situation.\par She uses BIPAP machine regularly. She breathes a lot better at night with BIPAP.\par She is essentially home bound. She uses oxygen all the time. She has it connected to her BIPAP set up.\par She is taking Symbicort and Spiriva\par \par

## 2021-02-13 NOTE — H&P ADULT - PROBLEM SELECTOR PLAN 1
Suspect largely related to acute pulmonary edema (due to acute on chronic systolic heart failure) in setting of uncontrolled HTN. There may be a concomitant component of COPD exacerbation. The differential also includes acute infection such as COVID.  - Trial lasix 40mg IV x 1 STAT. Monitor respiratory status and I/O. Anticipate pt may need additional IV lasix in-house, and possibly po lasix maintenance therapy long-term.  - Will manage uncontrolled HTN with home regimen of carvedilol and hydralazine, with the addition of lasix as above.  - For possible COPD component, will provide prn albuterol MDI. Continue symbicort, supplemental O2 via NC, and BiPAP at night. Pt is s/p solumedrol; will evaluate daily if further steroids indicated.  - F/u COVID PCR  - Otherwise, continue aspirin for CAD in setting of CHF Suspect largely related to acute pulmonary edema (due to acute on chronic systolic heart failure) in setting of uncontrolled HTN. There may be a concomitant component of COPD exacerbation. The differential also includes acute infection such as COVID.  - Trial lasix 40mg IV x 1 STAT. Monitor respiratory status and I/O. Anticipate pt may need additional IV lasix in-house, and possibly po lasix maintenance therapy long-term.  - Will manage uncontrolled HTN with home regimen of carvedilol and hydralazine, with the addition of lasix as above.  - For possible COPD component, and considering hypercarbia, will start BiPAP and obtain ABG. Will provide prn albuterol MDI. Continue symbicort. Pt is s/p solumedrol; will evaluate daily if further steroids indicated.  - COVID PCR negative  - Otherwise, continue aspirin for CAD in setting of CHF

## 2021-02-13 NOTE — CONSULT NOTE ADULT - ATTENDING COMMENTS
71 F with HFrEF, COPD on home o2, james on cpap, pulm htn here with acute hypoxemic and hypercapnic respiratory failure due to COPD exacerbation and possible ADHF.  Patient improved with diuresis/steroids/inhalers.    - would start short course of steroids for COPD exacerbation (5 day limited course)  - c/w bipap qhs and prn  - c/w inhalers  - diuresis prn for acute on chronic systolic HF, would need bp control

## 2021-02-13 NOTE — ED ADULT TRIAGE NOTE - CHIEF COMPLAINT QUOTE
History of COPD and uses 3L oxygen at home.  Experiencing 2 days of SOB, worse with exertion.  She monitors her home SPO2 and states, SPO2 has remained at 100%.  Denies Chest pain or weakness.

## 2021-02-13 NOTE — H&P ADULT - HISTORY OF PRESENT ILLNESS
70F with CAD, HTN, HFrEF s/p ICD, COPD (on home O2), CARLOTA on CPAP p/w dyspnea.     INCOMPLETE 70F with CAD, HTN, HFrEF s/p ICD, COPD (on home O2), CARLOTA on CPAP p/w dyspnea. Pt notes she has not been able to sleep for the past few days, as she has been unable to lie flat. She typically wears BiPAP at night, and uses 3L/min O2 continuously during the day, but has gotten progressively more tired and fatigued. In the past, she states her doctor has advised her to take 20mg lasix po as needed when she develops leg swelling; she has not taken any recently as she has not refilled her prescription. She denies cough, chest pain, palpitations, lightheadedness.     In the ED, pt's SBP was noted to be >200. Pt states she routinely check her BP at home. In the mornings, SBP is typically around 160, but during the day it goes down to 130-140. Pt reports 100% adherence recently with HTN regimen which includes hydralazine 50mg TID and carvedilol 25mg BID. 70F with CAD, HTN, HFrEF s/p ICD, COPD (on home O2), CARLOTA on BiPAP p/w dyspnea. Pt notes she has not been able to sleep for the past few days, as she has been unable to lie flat. She typically wears BiPAP at night, and uses 3L/min O2 continuously during the day, but has gotten progressively more tired and fatigued. In the past, she states her doctor has advised her to take 20mg lasix po as needed when she develops leg swelling; she has not taken any recently as she has not refilled her prescription. She denies cough, chest pain, palpitations, lightheadedness.     In the ED, pt's SBP was noted to be >200. Pt states she routinely check her BP at home. In the mornings, SBP is typically around 160, but during the day it goes down to 130-140. Pt reports 100% adherence recently with HTN regimen which includes hydralazine 50mg TID and carvedilol 25mg BID.

## 2021-02-13 NOTE — ED ADULT NURSE NOTE - OBJECTIVE STATEMENT
pt received to room 15, a&ox4, ambulatory, pmh of COPD on home oxygen, HTN, CHF p/w SOB x3 days. pt presents with labored breathing on 2L NC satting 100%. chest rise with respirations is symmetrical. pt denies chest pain, dizziness, N/V/D, urinary symptoms. abdomen soft nontender. pt given inhaler and IVP solumedrol as ordered. right 20G IV placed, labs collected and sent. pending EKG and chest Xray.

## 2021-02-13 NOTE — H&P ADULT - RESPIRATORY AND THORAX
Thank you,  Preeti Sandsn  Utilization Review Department  Phone: [phone]; Fax [phone]  ATTENTION: Please call with any questions or concerns to [phone]  and carefully follow the prompts so that you are directed to the right person  Send all requests for admission clinical reviews, approved or denied determinations and any other requests to fax [phone]  All voicemails are confidential    Continued Stay Review    Date: 10/19/2018    Vital Signs: /62   Pulse (!) 120   Temp 97 9 °F (36 6 °C) (Oral)   Resp 18   Ht 5' 8" (1 727 m)   Wt 81 7 kg (180 lb 1 9 oz)   SpO2 97%   BMI 27 39 kg/m²     Medications:   Scheduled Meds:   Current Facility-Administered Medications:  acetaminophen 650 mg Oral Q6H PRN   apixaban 5 mg Oral BID   diltiazem 240 mg Oral Daily   diltiazem 15 mg Intravenous Q6H PRN   metoprolol succinate 50 mg Oral Daily   ondansetron 4 mg Intravenous Q6H PRN     Continuous Infusions:    PRN Meds:     Abnormal Labs/Diagnostic Results: 10/19 glucose 117,     Age/Sex: 78 y o  male     Assessment/Plan: 10/19/2018 Cardiology MD:  Assessment/ Plan  atrial flutter with RVR, asymptomatic, duration unknown   · Currently on Toprol  mg total dose, Cardizem  mg daily     · Started on anticoagulation with Eliquis 5 mg b i d    · Since he is asymptomatic, will rate control and follow him up closely with EP as an outpatient for consideration of ablation  · Echocardiogram-EF 53%  Right atrium mildly dilated  Hypertensive urgency  /80, currently  On admission-190/114    As an outpatient, on no antihypertensives   Currently on Toprol- mg total dose, Cardizem CD 2 40 mg daily    Monitor    Discharge Plan: tbd details…

## 2021-02-13 NOTE — ED PROVIDER NOTE - PROGRESS NOTE DETAILS
DARYL Ashford- blood gas pH 7.29, CO2 102, work of breathing slightly increased since arrival w/ use of accessory muscles. Discussed with pt that she should be put on BIPAP to help improve oxygenation however pt has been on it before and does not feel comfortable with the big face mask. Pt brought her Nasal mask with her from home but I informed her that he have to wait until her Covid swab is negative before using that mask. Pt states that she understands and will wait and see how she does on NC. If breathing worsens, she will be amenable to BIPAP. I consulted pulmonary, there are no [facility] beds so pt will need to be admitted to medicine.

## 2021-02-13 NOTE — CONSULT NOTE ADULT - ASSESSMENT
69 YO F with PMHx of HFrEF 20-25 s/p PPM/ ICD placement, Former Smoker, COPD (on home O2, last COPD exacerbations in 11/2020, 8/2020), ) , CARLOTA on CPAP, moderate PulmHTN and HTN p/w SOB and LE progressive edema. Found with acute on chronic hypercapnic respiratory failure in setting of COPD exacerbation. Patient reports that for the last 3 days she has had DOMÍNGUEZ to the point where she has difficulty doing her ADLs. She has been having difficulty catching her breath, even at rest. She has a chronic, productive cough of green sputum that has been unchanged. She denies fevers at home. She reports compliance with her CPAP machine. She reports compliance with her albuterol and symbicort. She reports she was last on steroids 11/20. She has not taken any steroids or antibiotics for the exacerbation of symptoms. Of note, per chart review, patient is DNR/DNI.       Problem: Acute on chronic hypercapneic respiratory failure  Assessment: Likely 2/2 to COPD exacerbation. May be component of HF / volume overload. RVP pending. CXR no consolidation or opacities. VBG pH: 7.29,| pCO2: 102 (baseline 60s) with a bicarb on BMP of 43.    69 YO F with PMHx of HFrEF 20-25 s/p PPM/ ICD placement, Former Smoker, COPD (on home O2, last COPD exacerbations in 11/2020, 8/2020), ) , CARLOTA on CPAP, moderate PulmHTN and HTN p/w SOB and LE progressive edema. Found with acute on chronic hypercapnic respiratory failure in setting of COPD exacerbation. Patient reports that for the last 3 days she has had DOMÍNGUEZ to the point where she has difficulty doing her ADLs. She has been having difficulty catching her breath, even at rest. She has a chronic, productive cough of green sputum that has been unchanged. She denies fevers at home. She reports compliance with her CPAP machine. She reports compliance with her albuterol and symbicort. She reports she was last on steroids 11/20. She has not taken any steroids or antibiotics for the exacerbation of symptoms. Of note, per chart review, patient is DNR/DNI.       Problem: Acute on chronic hypercapneic respiratory failure  Assessment: Likely 2/2 to COPD exacerbation vs HF / volume overload. RVP pending. CXR no consolidation or opacities. VBG pH: 7.29,| pCO2: 102 (baseline 60s) with a bicarb on BMP of 43. Patient has an elevated PCO2 on VBG however pH is only 7.29. Do not suspect PNA (no change in chronic cough, no fevers).  Plan: Please obtain ABG.  Would recommend continuing steroids and diuresing the patient. Recommend bilevel with nasal mask for several hours and QHS for now. Please order home symbicort and duonebs ATC q6 hours. Collect sputum if possible. COVID PCR pending. Can observe off abx.     *Per chart review patient is DNR/DNI based on palliative care note from 11/11/2020.  69 YO F with PMHx of HFrEF 20-25 s/p PPM/ ICD placement, Former Smoker, COPD (on home O2, last COPD exacerbations in 11/2020, 8/2020), ) , CARLOTA on CPAP, moderate PulmHTN and HTN p/w SOB and LE progressive edema. Found with acute on chronic hypercapnic respiratory failure in setting of COPD exacerbation. Patient reports that for the last 3 days she has had DOMÍNGUEZ to the point where she has difficulty doing her ADLs. She has been having difficulty catching her breath, even at rest. She has a chronic, productive cough of green sputum that has been unchanged. She denies fevers at home. She reports compliance with her CPAP machine. She reports compliance with her albuterol and symbicort. She reports she was last on steroids 11/20. She has not taken any steroids or antibiotics for the exacerbation of symptoms. Of note, per chart review, patient is DNR/DNI.       Problem: Acute on chronic hypercapneic respiratory failure  Assessment: Likely 2/2 to COPD exacerbation vs HF / volume overload. RVP pending. CXR no consolidation or opacities. VBG pH: 7.29,| pCO2: 102 (baseline 60s) with a bicarb on BMP of 43. Patient has an elevated PCO2 on VBG however pH is only 7.29. Do not suspect PNA (no change in chronic cough, no fevers). Patient reports significant improvement with albuterol and steroids. Seen again in ED ambulating with O2 without assistance, speaking in complete sentences.  Plan: Would recommend continuing steroids and diuresing the patient. Recommend bilevel with nasal mask PRN and QHS. Please order home symbicort and duonebs ATC q6 hours. Collect sputum if possible. COVID PCR pending. Can observe off abx.     *Per chart review patient is DNR/DNI based on palliative care note from 11/11/2020.

## 2021-02-13 NOTE — ED ADULT NURSE NOTE - NS ED NURSE LEVEL OF CONSCIOUSNESS MENTAL STATUS
Awake/Alert Detail Level: Simple Additional Notes: Advised patient to use Cetaphil or CeraVe products.\\n\\nAdvised mother to take the patient to an allergist to make sure this isn’t being caused/ worsened by a food or environmental allergy. She hasn’t been yet, but still plans on going.\\n\\nI referred patient to Dr. Bunn because she specializes in pediatric dermatology. I explained that with severe atopic dermatitis in a child she could potentially need strong topical steroids and/or oral prednisolone.

## 2021-02-13 NOTE — ED PROVIDER NOTE - ATTENDING CONTRIBUTION TO CARE
Dr Bloch, ATTENDING MD-  I performed a face to face bedside interview with patient regarding history of present illness, review of symptoms and past medical history. I completed an independent physical exam.  I have discussed patient's plan of care with PA.   Documentation as above in the note.  Patient alert and oriented, cachectic, no jaundice, airway patent, heart s1s2, lungs decreased BS with occasional wheeze, no edema, no JVD. motor 5/5, SOB on minimal extertion.

## 2021-02-13 NOTE — H&P ADULT - ASSESSMENT
70F with HFrEF s/p ICD, COPD (on home O2), CARLOTA on CPAP, HTN p/w dyspnea.     INCOMPLETE 70F with HFrEF s/p ICD, COPD (on home O2), CARLOTA on CPAP, HTN p/w dyspnea, uncontrolled HTN. Found to have b/l LE edema and cephalization on CXR concerning for acute pulmonary edema causing acute on chronic respiratory failure with hypercarbia as well as hypoxia. 70F with HFrEF s/p ICD, COPD (on home O2), CARLOTA on nocturnal BiPAP, HTN p/w dyspnea, uncontrolled HTN. Found to have b/l LE edema and cephalization on CXR concerning for acute pulmonary edema causing acute on chronic respiratory failure with hypercarbia as well as hypoxia.

## 2021-02-13 NOTE — CONSULT NOTE ADULT - SUBJECTIVE AND OBJECTIVE BOX
CHIEF COMPLAINT:    HPI: 69 YO F with PMHx of HFrEF 20-25 s/p PPM/ ICD placement, Former Smoker, COPD (on home O2) , CARLOTA on CPAP, and last COPD exacerbations in 11/2020, 8/2020), moderate PulmHTN and HTN p/w SOB and LE progressive edema. Found with acute on chronic hypercapnic respiratory failure in setting of COPD exacerbation. Patient reports that for the last 3 days she has had DOMÍNGUEZ to the point where she has difficulty doing her ADLs. She has been having difficulty catching her breath, even at rest. She has a chronic, productive cough of green sputum that has been unchanged. She denies fevers at home. She reports compliance with her CPAP machine. She reports compliance with her albuterol and symbicort. She reports she was last on steroids 11/20. She has not taken any steroids or antibiotics for the exacerbation of symptoms. Of note, per chart review, patient is DNR/DNI. She reports improvement since arriving to the hospital.     In the ED she received albuterol inhaler and methylprednisolone       PAST MEDICAL & SURGICAL HISTORY:  Right-sided heart failure    Pulmonary hypertension  moderate    Sleep apnea  cannot tolerate CPAP    LBBB (left bundle branch block)    Chronic systolic CHF (congestive heart failure)  EF 20-25% s/p [employer] ICD (9/2018)    HTN (hypertension)    COPD (chronic obstructive pulmonary disease)  not on home O2    CAD (coronary artery disease)    Presence of cardioverter defibrillator  [employer] PPM/ICD placed 9/2018        FAMILY HISTORY:  Family history of colon cancer        SOCIAL HISTORY:  Smoking: [ ] Never Smoked [ ] Former Smoker (__ packs x ___ years) [ ] Current Smoker  (__ packs x ___ years)  Substance Use: [ ] Never Used [ ] Used ____  EtOH Use:  Marital Status: [ ] Single [ ]  [ ]  [ ]   Sexual History:   Occupation:  Recent Travel:  Country of Birth:  Advance Directives:    Allergies    No Known Allergies    Intolerances        HOME MEDICATIONS:    REVIEW OF SYSTEMS:  Constitutional: [ ] negative [ ] fevers [ ] chills [ ] weight loss [ ] weight gain  HEENT: [ ] negative [ ] dry eyes [ ] eye irritation [ ] postnasal drip [ ] nasal congestion  CV: [ ] negative  [ ] chest pain [ ] orthopnea [ ] palpitations [ ] murmur  Resp: [ ] negative [ ] cough [ ] shortness of breath [ ] dyspnea [ ] wheezing [ ] sputum [ ] hemoptysis  GI: [ ] negative [ ] nausea [ ] vomiting [ ] diarrhea [ ] constipation [ ] abd pain [ ] dysphagia   : [ ] negative [ ] dysuria [ ] nocturia [ ] hematuria [ ] increased urinary frequency  Musculoskeletal: [ ] negative [ ] back pain [ ] myalgias [ ] arthralgias [ ] fracture  Skin: [ ] negative [ ] rash [ ] itch  Neurological: [ ] negative [ ] headache [ ] dizziness [ ] syncope [ ] weakness [ ] numbness  Psychiatric: [ ] negative [ ] anxiety [ ] depression  Endocrine: [ ] negative [ ] diabetes [ ] thyroid problem  Hematologic/Lymphatic: [ ] negative [ ] anemia [ ] bleeding problem  Allergic/Immunologic: [ ] negative [ ] itchy eyes [ ] nasal discharge [ ] hives [ ] angioedema  [x ] All other systems negative  [ ] Unable to assess ROS because ________    OBJECTIVE:  ICU Vital Signs Last 24 Hrs  T(C): 36.7 (13 Feb 2021 07:57), Max: 36.7 (13 Feb 2021 07:57)  T(F): 98.1 (13 Feb 2021 07:57), Max: 98.1 (13 Feb 2021 07:57)  HR: 97 (13 Feb 2021 07:57) (97 - 97)  BP: 158/93 (13 Feb 2021 07:57) (158/93 - 158/93)  BP(mean): --  ABP: --  ABP(mean): --  RR: 20 (13 Feb 2021 07:57) (20 - 20)  SpO2: 100% (13 Feb 2021 07:57) (100% - 100%)        CAPILLARY BLOOD GLUCOSE    PHYSICAL EXAM:   GEN: Age appropriate, resting comfortably in bed, no acute distress, non toxic appearing, speaking in 3-4 word sentences. Appears underweight.  HEENT: Conjunctiva and sclera normal  PULM: Lungs decreased air entry bilaterally. Moderate increase in respiratory effort.   CV: RRR, S1S2, no MRG  MSK: no stiffness or joint effusions  Abdominal: Soft, nontender to palpation, non-distended, +BS  Extremities: No edema or cyanosis  NEURO: AAOx3  Psych: normal affect, normal behavior  Skin: No rashes, lesions    T(C): 36.7 (02-13-21 @ 07:57), Max: 36.7 (02-13-21 @ 07:57)  HR: 97 (02-13-21 @ 07:57) (97 - 97)  BP: 158/93 (02-13-21 @ 07:57) (158/93 - 158/93)  RR: 20 (02-13-21 @ 07:57) (20 - 20)  SpO2: 100% (02-13-21 @ 07:57) (100% - 100%)    HOSPITAL MEDICATIONS:                              LABS:                        11.3   4.17  )-----------( 218      ( 13 Feb 2021 09:30 )             36.2     Hgb Trend: 11.3<--  02-13    145  |  96<L>  |  11  ----------------------------<  113<H>  3.1<L>   |  43<H>  |  0.72    Ca    9.7      13 Feb 2021 09:30    TPro  6.3  /  Alb  4.1  /  TBili  0.6  /  DBili  x   /  AST  14  /  ALT  6   /  AlkPhos  44  02-13    Creatinine Trend: 0.72<--        Venous Blood Gas:  02-13 @ 09:30  7.29/102/28/40/43.8  VBG Lactate: 0.7      MICROBIOLOGY:     RADIOLOGY:  [ ] Reviewed and interpreted by me    PULMONARY FUNCTION TESTS:    EKG:

## 2021-02-13 NOTE — ED PROVIDER NOTE - RATE
Battery ok   Known PAC's  PVC  Has had at least one true pause that was unsymptomatic. Rest of pause episodes appear to be false.   Very small, on the baseline QRS's but I did not see any true pauses   Strips under media
92

## 2021-02-13 NOTE — ED ADULT NURSE REASSESSMENT NOTE - NS ED NURSE REASSESS COMMENT FT1
Informed patient we would write letter for work excusing 9/17-9/20. Patient agreed, will come to clinic  to  today. Informed patient top bring his ID to pick it up.     Encounter Closed.    pt vitally stable. CXR completed. potassium of 3.1 supplemented as ordered. pt received 10mEq IVPB, c/o of burning. then supplemented orally with Klor Con, tolerated well. pending orders for home BP meds. pt offers no complaints at this time. pending COVID results, then will initiate bipap therapy. pt admitted, pending bed assignment.

## 2021-02-13 NOTE — ED PROVIDER NOTE - CLINICAL SUMMARY MEDICAL DECISION MAKING FREE TEXT BOX
72 y/o female pmh htn, CHF s/p AICD, CAD, COPD w/ DOMÍNGUEZ x 3 days- concern for COPD exacerbation- will get labs, covid swab, cxr, steroids, albuterol, likely admit

## 2021-02-13 NOTE — ED PROVIDER NOTE - OBJECTIVE STATEMENT
70 y/o female pmh htn, CHF, s/p AICD, COPD on home O2 3L c/o DOMÍNGUEZ x 3 days. Pt admits to sob when walking from room to room in her house, requiring rest for symptoms to improve. Pt denies chest pain, orthopnea, cough, sore throat, abd pain, n/v/d, dizziness, syncope, fever or chills. Of note, pt has been using her inhaler much more frequently over the lat 2-3 days.

## 2021-02-14 NOTE — PROGRESS NOTE ADULT - PROBLEM SELECTOR PLAN 2
- Lovenox for VTE ppx  - Continue ppi per home regimen - Lovenox for VTE ppx  - Continue ppi per home regimen  -hypernatremia ; may be secondary to diuretics, encourage PO fluids, will continue to trend   hypokalemia : resolved   PT eval

## 2021-02-14 NOTE — PROGRESS NOTE ADULT - ASSESSMENT
70F with HFrEF s/p ICD, COPD (on home O2), CARLOTA on nocturnal BiPAP, HTN p/w dyspnea, uncontrolled HTN. Found to have b/l LE edema and cephalization on CXR concerning for acute pulmonary edema causing acute on chronic respiratory failure with hypercarbia as well as hypoxia.

## 2021-02-14 NOTE — PROGRESS NOTE ADULT - SUBJECTIVE AND OBJECTIVE BOX
Patient is a 71y old  Female who presents with a chief complaint of dyspna (13 Feb 2021 12:39)      SUBJECTIVE / OVERNIGHT EVENTS:    MEDICATIONS  (STANDING):  aspirin enteric coated 81 milliGRAM(s) Oral daily  budesonide 160 MICROgram(s)/formoterol 4.5 MICROgram(s) Inhaler 2 Puff(s) Inhalation two times a day  carvedilol 25 milliGRAM(s) Oral every 12 hours  enoxaparin Injectable 30 milliGRAM(s) SubCutaneous daily  furosemide   Injectable 40 milliGRAM(s) IV Push daily  hydrALAZINE 50 milliGRAM(s) Oral three times a day  pantoprazole    Tablet 40 milliGRAM(s) Oral before breakfast    MEDICATIONS  (PRN):  ALBUTerol    90 MICROgram(s) HFA Inhaler 1 Puff(s) Inhalation every 6 hours PRN Shortness of Breath and/or Wheezing      Vital Signs Last 24 Hrs  T(C): 37.1 (14 Feb 2021 05:52), Max: 37.1 (13 Feb 2021 12:25)  T(F): 98.8 (14 Feb 2021 05:52), Max: 98.8 (14 Feb 2021 05:52)  HR: 100 (14 Feb 2021 08:13) (85 - 100)  BP: 140/60 (14 Feb 2021 07:00) (134/92 - 210/98)  BP(mean): 139 (13 Feb 2021 13:58) (139 - 139)  RR: 18 (14 Feb 2021 05:52) (18 - 22)  SpO2: 96% (14 Feb 2021 08:13) (96% - 100%)  CAPILLARY BLOOD GLUCOSE        I&O's Summary      PHYSICAL EXAM:  GENERAL: NAD, well-developed  HEAD:  Atraumatic, Normocephalic  EYES: EOMI, PERRLA, conjunctiva and sclera clear  NECK: Supple, No JVD  CHEST/LUNG: Clear to auscultation bilaterally; No wheeze  HEART: Regular rate and rhythm; No murmurs, rubs, or gallops  ABDOMEN: Soft, Nontender, Nondistended; Bowel sounds present  EXTREMITIES:  2+ Peripheral Pulses, No clubbing, cyanosis, or edema  PSYCH: AAOx3  NEUROLOGY: non-focal  SKIN: No rashes or lesions    LABS:                        11.4   3.71  )-----------( 224      ( 14 Feb 2021 07:42 )             36.8     02-14    148<H>  |  95<L>  |  25<H>  ----------------------------<  182<H>  4.1   |  41<H>  |  0.90    Ca    10.0      14 Feb 2021 07:42    TPro  6.3  /  Alb  4.1  /  TBili  0.6  /  DBili  x   /  AST  14  /  ALT  6   /  AlkPhos  44  02-13              RADIOLOGY & ADDITIONAL TESTS:    Imaging Personally Reviewed:    Consultant(s) Notes Reviewed:      Care Discussed with Consultants/Other Providers:   Patient is a 71y old  Female who presents with a chief complaint of dyspna (13 Feb 2021 12:39)      SUBJECTIVE / OVERNIGHT EVENTS: patient seen and examined by bedside, pt feeling better with Bipap , SOB has improved, denies headache, dizziness, , CP, Palpitations , N/V/D, abdominal pain      MEDICATIONS  (STANDING):  aspirin enteric coated 81 milliGRAM(s) Oral daily  budesonide 160 MICROgram(s)/formoterol 4.5 MICROgram(s) Inhaler 2 Puff(s) Inhalation two times a day  carvedilol 25 milliGRAM(s) Oral every 12 hours  enoxaparin Injectable 30 milliGRAM(s) SubCutaneous daily  furosemide   Injectable 40 milliGRAM(s) IV Push daily  hydrALAZINE 50 milliGRAM(s) Oral three times a day  pantoprazole    Tablet 40 milliGRAM(s) Oral before breakfast    MEDICATIONS  (PRN):  ALBUTerol    90 MICROgram(s) HFA Inhaler 1 Puff(s) Inhalation every 6 hours PRN Shortness of Breath and/or Wheezing      Vital Signs Last 24 Hrs  T(C): 37.1 (14 Feb 2021 05:52), Max: 37.1 (13 Feb 2021 12:25)  T(F): 98.8 (14 Feb 2021 05:52), Max: 98.8 (14 Feb 2021 05:52)  HR: 100 (14 Feb 2021 08:13) (85 - 100)  BP: 140/60 (14 Feb 2021 07:00) (134/92 - 210/98)  BP(mean): 139 (13 Feb 2021 13:58) (139 - 139)  RR: 18 (14 Feb 2021 05:52) (18 - 22)  SpO2: 96% (14 Feb 2021 08:13) (96% - 100%)  CAPILLARY BLOOD GLUCOSE        I&O's Summary      PHYSICAL EXAM:  GENERAL: NAD ,frail   HEAD:  Atraumatic, Normocephalic  EYES: EOMI, PERRLA, conjunctiva and sclera clear  CHEST/LUNG: , decreased air entry b/l, slightly tachypneic when talking , No wheeze  HEART: Regular rate and rhythm;   ABDOMEN: Soft, Nontender, Nondistended; Bowel sounds present  EXTREMITIES:  2+ Peripheral Pulses, No clubbing, cyanosis, or edema  PSYCH: AAOx3  NEUROLOGY: non-focal      LABS:                        11.4   3.71  )-----------( 224      ( 14 Feb 2021 07:42 )             36.8     02-14    148<H>  |  95<L>  |  25<H>  ----------------------------<  182<H>  4.1   |  41<H>  |  0.90    Ca    10.0      14 Feb 2021 07:42    TPro  6.3  /  Alb  4.1  /  TBili  0.6  /  DBili  x   /  AST  14  /  ALT  6   /  AlkPhos  44  02-13              RADIOLOGY & ADDITIONAL TESTS:    Imaging Personally Reviewed:    Consultant(s) Notes Reviewed:  pulmonary     Care Discussed with Consultants/Other Providers:

## 2021-02-14 NOTE — PROGRESS NOTE ADULT - PROBLEM SELECTOR PLAN 1
Suspect largely related to acute pulmonary edema (due to acute on chronic systolic heart failure) in setting of uncontrolled HTN. There may be a concomitant component of COPD exacerbation. The differential also includes acute infection such as COVID.  - Trial lasix 40mg IV x 1 STAT. Monitor respiratory status and I/O. Anticipate pt may need additional IV lasix in-house, and possibly po lasix maintenance therapy long-term.  - Will manage uncontrolled HTN with home regimen of carvedilol and hydralazine, with the addition of lasix as above.  - For possible COPD component, and considering hypercarbia, will start BiPAP and obtain ABG. Will provide prn albuterol MDI. Continue symbicort. Pt is s/p solumedrol; will evaluate daily if further steroids indicated.  - COVID PCR negative  - Otherwise, continue aspirin for CAD in setting of CHF Suspect largely related to acute pulmonary edema (due to acute on chronic systolic heart failure) in setting of uncontrolled HTN. There may be a concomitant component of COPD exacerbation. The differential also includes acute infection such as COVID. COVID negative   - c/w Lasix 40 mg IV once daily   - Will manage uncontrolled HTN with home regimen of carvedilol and hydralazine, with the addition of lasix as above.  - For possible COPD component, and considering hypercarbia, pulmonary eval appreciated, c/w   BiPAP , Repeat ABG with improved hypercarbia .  Continue symbicort. Pt is s/p solumedrol; Pulmonary recommend short course of steroids , will start prednisone 40 mg , c/w inhalers PRN   - COVID PCR negative  - Otherwise, continue aspirin for CAD in setting of CHF  -pulmonary recommends to monitor off abx

## 2021-02-15 NOTE — PROGRESS NOTE ADULT - PROBLEM SELECTOR PLAN 2
- Lovenox for VTE ppx  - Continue ppi per home regimen  -hypernatremia ; may be secondary to diuretics, encourage PO fluids, will continue to trend   hypokalemia : resolved   PT eval - Lovenox for VTE ppx  - Continue ppi per home regimen  -hypernatremia ; may be secondary to diuretics, encourage PO fluids,  resolved will continue to trend   hypokalemia : resolved   PT eval

## 2021-02-15 NOTE — PHYSICAL THERAPY INITIAL EVALUATION ADULT - PERTINENT HX OF CURRENT PROBLEM, REHAB EVAL
Patient is 70 year old female admitted with history of COPD on home oxygen, HTN, HF, s/p ICD, CARLOTA on BIPAP, presents with dyspnea.

## 2021-02-15 NOTE — PROGRESS NOTE ADULT - PROBLEM SELECTOR PLAN 1
Suspect largely related to acute pulmonary edema (due to acute on chronic systolic heart failure) in setting of uncontrolled HTN. There may be a concomitant component of COPD exacerbation. The differential also includes acute infection such as COVID. COVID negative   - c/w Lasix 40 mg IV once daily   - Will manage uncontrolled HTN with home regimen of carvedilol and hydralazine, with the addition of lasix as above.  - For possible COPD component, and considering hypercarbia, pulmonary eval appreciated, c/w   BiPAP , Repeat ABG with improved hypercarbia .  Continue symbicort. Pt is s/p solumedrol; Pulmonary recommend short course of steroids , will start prednisone 40 mg , c/w inhalers PRN   - COVID PCR negative  - Otherwise, continue aspirin for CAD in setting of CHF  -pulmonary recommends to monitor off abx Suspect largely related to acute pulmonary edema (due to acute on chronic systolic heart failure) in setting of uncontrolled HTN. There may be a concomitant component of COPD exacerbation. The differential also includes acute infection such as COVID. COVID negative   - c/w Lasix 40 mg IV once daily   - Will manage uncontrolled HTN with home regimen of carvedilol and hydralazine, with the addition of lasix as above. BP still elevated, will increase hydralazine to 75 mg TD with hold parameters   - For possible COPD component, and considering hypercarbia, pulmonary eval appreciated, c/w   BiPAP , Repeat ABG with improved hypercarbia .  Continue symbicort. Pt is s/p solumedrol; Pulmonary recommend short course of steroids , will start prednisone 40 mg , c/w inhalers PRN   - COVID PCR negative  - Otherwise, continue aspirin for CAD in setting of CHF  -pulmonary recommends to monitor off abx

## 2021-02-15 NOTE — PROGRESS NOTE ADULT - SUBJECTIVE AND OBJECTIVE BOX
Patient is a 71y old  Female who presents with a chief complaint of dyspna (14 Feb 2021 10:10)      SUBJECTIVE / OVERNIGHT EVENTS:    MEDICATIONS  (STANDING):  aspirin enteric coated 81 milliGRAM(s) Oral daily  budesonide 160 MICROgram(s)/formoterol 4.5 MICROgram(s) Inhaler 2 Puff(s) Inhalation two times a day  carvedilol 25 milliGRAM(s) Oral every 12 hours  enoxaparin Injectable 30 milliGRAM(s) SubCutaneous daily  hydrALAZINE 50 milliGRAM(s) Oral three times a day  pantoprazole    Tablet 40 milliGRAM(s) Oral before breakfast  predniSONE   Tablet 40 milliGRAM(s) Oral daily    MEDICATIONS  (PRN):  ALBUTerol    90 MICROgram(s) HFA Inhaler 1 Puff(s) Inhalation every 6 hours PRN Shortness of Breath and/or Wheezing      Vital Signs Last 24 Hrs  T(C): 36.7 (15 Feb 2021 06:14), Max: 36.7 (15 Feb 2021 06:14)  T(F): 98.1 (15 Feb 2021 06:14), Max: 98.1 (15 Feb 2021 06:14)  HR: 99 (15 Feb 2021 09:45) (85 - 99)  BP: 172/79 (15 Feb 2021 06:14) (134/76 - 172/79)  BP(mean): --  RR: 20 (15 Feb 2021 06:14) (20 - 20)  SpO2: 95% (15 Feb 2021 09:45) (95% - 100%)  CAPILLARY BLOOD GLUCOSE        I&O's Summary      PHYSICAL EXAM:  GENERAL: NAD, well-developed  HEAD:  Atraumatic, Normocephalic  EYES: EOMI, PERRLA, conjunctiva and sclera clear  NECK: Supple, No JVD  CHEST/LUNG: Clear to auscultation bilaterally; No wheeze  HEART: Regular rate and rhythm; No murmurs, rubs, or gallops  ABDOMEN: Soft, Nontender, Nondistended; Bowel sounds present  EXTREMITIES:  2+ Peripheral Pulses, No clubbing, cyanosis, or edema  PSYCH: AAOx3  NEUROLOGY: non-focal  SKIN: No rashes or lesions    LABS:                        11.4   4.22  )-----------( 207      ( 15 Feb 2021 08:01 )             35.9     02-15    144  |  92<L>  |  24<H>  ----------------------------<  198<H>  3.9   |  44<H>  |  0.76    Ca    9.9      15 Feb 2021 08:01                RADIOLOGY & ADDITIONAL TESTS:    Imaging Personally Reviewed:    Consultant(s) Notes Reviewed:      Care Discussed with Consultants/Other Providers:   Patient is a 71y old  Female who presents with a chief complaint of dyspna (14 Feb 2021 10:10)      SUBJECTIVE / OVERNIGHT EVENTS: patient seen and examined by bedside, pt feeling better , still using BIPAP , denies headache, dizziness, , CP, Palpitations , N/V/D, abdominal pain        MEDICATIONS  (STANDING):  aspirin enteric coated 81 milliGRAM(s) Oral daily  budesonide 160 MICROgram(s)/formoterol 4.5 MICROgram(s) Inhaler 2 Puff(s) Inhalation two times a day  carvedilol 25 milliGRAM(s) Oral every 12 hours  enoxaparin Injectable 30 milliGRAM(s) SubCutaneous daily  hydrALAZINE 50 milliGRAM(s) Oral three times a day  pantoprazole    Tablet 40 milliGRAM(s) Oral before breakfast  predniSONE   Tablet 40 milliGRAM(s) Oral daily    MEDICATIONS  (PRN):  ALBUTerol    90 MICROgram(s) HFA Inhaler 1 Puff(s) Inhalation every 6 hours PRN Shortness of Breath and/or Wheezing      Vital Signs Last 24 Hrs  T(C): 36.7 (15 Feb 2021 06:14), Max: 36.7 (15 Feb 2021 06:14)  T(F): 98.1 (15 Feb 2021 06:14), Max: 98.1 (15 Feb 2021 06:14)  HR: 99 (15 Feb 2021 09:45) (85 - 99)  BP: 172/79 (15 Feb 2021 06:14) (134/76 - 172/79)  BP(mean): --  RR: 20 (15 Feb 2021 06:14) (20 - 20)  SpO2: 95% (15 Feb 2021 09:45) (95% - 100%)  CAPILLARY BLOOD GLUCOSE        I&O's Summary      PHYSICAL EXAM:  GENERAL: NAD ,frail   HEAD:  Atraumatic, Normocephalic  EYES: EOMI, PERRLA, conjunctiva and sclera clear  CHEST/LUNG: , decreased air entry b/l, slightly tachypneic when talking , No wheeze  HEART: Regular rate and rhythm;   ABDOMEN: Soft, Nontender, Nondistended; Bowel sounds present  EXTREMITIES:  2+ Peripheral Pulses, No clubbing, cyanosis, or edema  PSYCH: AAOx3  NEUROLOGY: non-focal    LABS:                        11.4   4.22  )-----------( 207      ( 15 Feb 2021 08:01 )             35.9     02-15    144  |  92<L>  |  24<H>  ----------------------------<  198<H>  3.9   |  44<H>  |  0.76    Ca    9.9      15 Feb 2021 08:01                RADIOLOGY & ADDITIONAL TESTS:    Imaging Personally Reviewed:    Consultant(s) Notes Reviewed:      Care Discussed with Consultants/Other Providers:

## 2021-02-16 NOTE — CHART NOTE - NSCHARTNOTEFT_GEN_A_CORE
Vital Signs Last 24 Hrs  T(C): 36.7 (16 Feb 2021 12:11), Max: 36.9 (16 Feb 2021 05:24)  T(F): 98 (16 Feb 2021 12:11), Max: 98.4 (16 Feb 2021 05:24)  HR: 97 (16 Feb 2021 12:11) (87 - 97)  BP: 148/85 (16 Feb 2021 12:11) (117/72 - 172/89)  BP(mean): 96 (16 Feb 2021 05:24) (96 - 96)  RR: 22 (16 Feb 2021 12:11) (15 - 22)  SpO2: 100% (16 Feb 2021 12:11) (96% - 100%)                        11.3   5.69  )-----------( 221      ( 16 Feb 2021 06:40 )             35.6   02-16    145  |  91<L>  |  31<H>  ----------------------------<  95  2.9<LL>   |  46<HH>  |  0.83    Ca    9.9      16 Feb 2021 06:40    Results and case discussed with Dr. Luis, patient remains on Bipap currently. ABG reviewed with attending.  [facility], to continue with albuterol inhalers and start spiriva.  Discussed with patient, RN and attending
Per chart review patient is DNR/DNI based on palliative care note from 11/11/2020. Recommend primary team review MOLST form, confirm with patient, and consult palliative care if needed.
Spoke with pt at bedside regarding code status. Pt states has MOLST form but is at home and is not able to be assessed. Pt explained DNR and DNI status. Pt expressed she would want compressions but not intubation if needed. Pt explained that DNR/DNI usually comes together. Pt expresses that she is unable to fully decide regarding her code status currently. Pt to remain FULL code for now, will re-evaluate tomorrow.     ACP  [id number]

## 2021-02-16 NOTE — PROGRESS NOTE ADULT - PROBLEM SELECTOR PLAN 2
- Lovenox for VTE ppx  - Continue ppi per home regimen  -hypernatremia ; may be secondary to diuretics, encourage PO fluids,  resolved will continue to trend   hypokalemia : resolved   PT eval Suspect acute exacerbation. Pt with end stage COPD at baseline. Pulmonary team following, recs appreciated.   - repeat ABG obtained today while on 4L O2 via NC  - repeat CXR obtained to assess pulmonary congestion --> no significant improvement s/p IV diuretics.  - c/w prednisone 40mg daily  - agree with Symbicort, spiriva, duonebs q6h standing, and acapella as per pulm  - will f/u with CM regarding obtaining home BIPAP  - agree with starting Azithromycin for total of 5d for anti-inflammatory and immunomodulatory effects  - continue with BIPAP PRN and QHS.

## 2021-02-16 NOTE — GOALS OF CARE CONVERSATION - ADVANCED CARE PLANNING - CONVERSATION DETAILS
Informed patient of severe pulmonary disease with inability to cure. Patient currently with severe exacerbation with minimal improvement with medical, noninvasive measures thus far. Patient mentioned and stated, "I am not ready to die" and became tearful. Patient endorsed verbally she would want her son to be her health care proxy. However, patient's son is currently not aware of pt's current hospitalization. I informed the patient that I could call her son to update him on how she is doing, but she declined and instructed me not to contact her son, Maribel, just yet. She preferred to wait for him to call her.

## 2021-02-16 NOTE — PROGRESS NOTE ADULT - SUBJECTIVE AND OBJECTIVE BOX
Patient is a 71y old  Female who presents with a chief complaint of dyspna (15 Feb 2021 10:46)        SUBJECTIVE / OVERNIGHT EVENTS:      MEDICATIONS  (STANDING):  aspirin enteric coated 81 milliGRAM(s) Oral daily  budesonide 160 MICROgram(s)/formoterol 4.5 MICROgram(s) Inhaler 2 Puff(s) Inhalation two times a day  carvedilol 25 milliGRAM(s) Oral every 12 hours  enoxaparin Injectable 30 milliGRAM(s) SubCutaneous daily  furosemide   Injectable 40 milliGRAM(s) IV Push daily  hydrALAZINE 75 milliGRAM(s) Oral three times a day  pantoprazole    Tablet 40 milliGRAM(s) Oral before breakfast  potassium chloride    Tablet ER 40 milliEquivalent(s) Oral every 4 hours  potassium chloride  10 mEq/100 mL IVPB 10 milliEquivalent(s) IV Intermittent every 1 hour  predniSONE   Tablet 40 milliGRAM(s) Oral daily    MEDICATIONS  (PRN):  ALBUTerol    90 MICROgram(s) HFA Inhaler 1 Puff(s) Inhalation every 6 hours PRN Shortness of Breath and/or Wheezing      Vital Signs Last 24 Hrs  T(C): 36.9 (16 Feb 2021 05:24), Max: 36.9 (16 Feb 2021 05:24)  T(F): 98.4 (16 Feb 2021 05:24), Max: 98.4 (16 Feb 2021 05:24)  HR: 93 (16 Feb 2021 11:06) (86 - 98)  BP: 124/75 (16 Feb 2021 05:24) (117/72 - 172/89)  BP(mean): 96 (16 Feb 2021 05:24) (96 - 96)  RR: 15 (16 Feb 2021 05:24) (15 - 18)  SpO2: 99% (16 Feb 2021 11:06) (96% - 100%)  CAPILLARY BLOOD GLUCOSE        I&O's Summary        PHYSICAL EXAM  GENERAL: NAD, well-developed  HEAD:  Atraumatic, Normocephalic  EYES: EOMI, PERRLA, conjunctiva and sclera clear  NECK: Supple, No JVD  CHEST/LUNG: Clear to auscultation bilaterally; No wheeze  HEART: Regular rate and rhythm; No murmurs, rubs, or gallops  ABDOMEN: Soft, Nontender, Nondistended; Bowel sounds present  EXTREMITIES:  2+ Peripheral Pulses, No clubbing, cyanosis, or edema  PSYCH: AAOx3  SKIN: No rashes or lesions    LABS:                        11.3   5.69  )-----------( 221      ( 16 Feb 2021 06:40 )             35.6     02-16    145  |  91<L>  |  31<H>  ----------------------------<  95  2.9<LL>   |  46<HH>  |  0.83    Ca    9.9      16 Feb 2021 06:40                RADIOLOGY & ADDITIONAL TESTS:    Imaging Personally Reviewed:  Consultant(s) Notes Reviewed:    Care Discussed with Consultants/Other Providers:   Patient is a 71y old  Female who presents with a chief complaint of dyspna (15 Feb 2021 10:46)      SUBJECTIVE / OVERNIGHT EVENTS:  Patient was primarily on the BIPAP overnight and this morning. Patient now on 4L O2 via NC, but she stated her baseline is 3L. Pt does not feel much better than how she came into the hospital. Patient intermittently tearful because she is afraid to die. She has no chest pain but still very SOB. No fever or chills. Pt expressed regret that she ever smoked.       MEDICATIONS  (STANDING):  aspirin enteric coated 81 milliGRAM(s) Oral daily  budesonide 160 MICROgram(s)/formoterol 4.5 MICROgram(s) Inhaler 2 Puff(s) Inhalation two times a day  carvedilol 25 milliGRAM(s) Oral every 12 hours  enoxaparin Injectable 30 milliGRAM(s) SubCutaneous daily  furosemide   Injectable 40 milliGRAM(s) IV Push daily  hydrALAZINE 75 milliGRAM(s) Oral three times a day  pantoprazole    Tablet 40 milliGRAM(s) Oral before breakfast  potassium chloride    Tablet ER 40 milliEquivalent(s) Oral every 4 hours  potassium chloride  10 mEq/100 mL IVPB 10 milliEquivalent(s) IV Intermittent every 1 hour  predniSONE   Tablet 40 milliGRAM(s) Oral daily    MEDICATIONS  (PRN):  ALBUTerol    90 MICROgram(s) HFA Inhaler 1 Puff(s) Inhalation every 6 hours PRN Shortness of Breath and/or Wheezing      Vital Signs Last 24 Hrs  T(C): 36.9 (16 Feb 2021 05:24), Max: 36.9 (16 Feb 2021 05:24)  T(F): 98.4 (16 Feb 2021 05:24), Max: 98.4 (16 Feb 2021 05:24)  HR: 93 (16 Feb 2021 11:06) (86 - 98)  BP: 124/75 (16 Feb 2021 05:24) (117/72 - 172/89)  BP(mean): 96 (16 Feb 2021 05:24) (96 - 96)  RR: 15 (16 Feb 2021 05:24) (15 - 18)  SpO2: 99% (16 Feb 2021 11:06) (96% - 100%)            PHYSICAL EXAM  GENERAL: NAD, chronically ill appearing and cachetic, small framed.   CHEST/LUNG: Minimal air movement throughout lung fields b/l. No wheezes, crackles or rhonchi. Use of accessory respiratory neck muscles  HEART: Regular rhythm, mildly tachycardic  EXTREMITIES:  2+ Peripheral Pulses, No clubbing, cyanosis, or edema. Minimal subcutaneous fat. ROM intact.  PSYCH: AAOx3, conversant, fearful and anxious          LABS:                        11.3   5.69  )-----------( 221      ( 16 Feb 2021 06:40 )             35.6     02-16    147<H>  |  97<L>  |  38<H>  ----------------------------<  116<H>  5.3   |  43<H>  |  0.97    Ca    10.0      16 Feb 2021 17:32          ABG - ( 16 Feb 2021 13:56 )  pH, Arterial: 7.42  pH, Blood: x     /  pCO2: 72    /  pO2: 156   / HCO3: 42    / Base Excess: 19.5  /  SaO2: 99.0            RADIOLOGY & ADDITIONAL TESTS:      EXAM:  XR CHEST PORTABLE URGENT 1V    PROCEDURE DATE:  Feb 16 2021       INTERPRETATION:  The heart is not enlarged. A left chest wall biventricular pacemaker ICD is unchanged in position.  There is hyperinflation of the lungs.  There is continued cephalization of pulmonary vasculature consistent with mild pulmonary vascular redistribution/congestion.  No focal lung consolidation, pleural effusion, or pneumothorax seen.  No acute bony abnormality is seen.      IMPRESSION:    Hyperinflation of the lungs.  Continued pulmonary vascular cephalization consistent with mild pulmonary vascular redistribution/congestion.        Consultant(s) Notes Reviewed:  yes  Care Discussed with Consultants/Other Providers:

## 2021-02-16 NOTE — PROGRESS NOTE ADULT - ASSESSMENT
70F with HFrEF s/p ICD, COPD (on home O2), ACRLOTA on nocturnal BiPAP, HTN p/w dyspnea, uncontrolled HTN. Found to have b/l LE edema and cephalization on CXR concerning for acute pulmonary edema causing acute on chronic respiratory failure with hypercarbia as well as hypoxia. 70F with HFrEF s/p ICD, COPD (on home O2), CARLOTA on nocturnal BiPAP, HTN p/w dyspnea, uncontrolled HTN. Found to have b/l LE edema and cephalization on CXR concerning for acute pulmonary edema causing acute on chronic respiratory failure with hypercarbia as well as hypoxia. Pt still symptomatic despite BIPAP and with noted hypokalemia likely due to IV diuretics.

## 2021-02-16 NOTE — PROGRESS NOTE ADULT - ASSESSMENT
70F PMH HFrEF 20-25 s/p PPM/ ICD placement, Former Smoker, COPD (on home O2, last COPD exacerbations in 11/2020, 8/2020), CARLOTA on CPAP, moderate PulmHTN and HTN p/w SOB and LE progressive edema. Found with acute on chronic hypercapnic respiratory failure in setting of COPD exacerbation. Patient reports that for the last 3 days she has had DOMÍNGUEZ to the point where she has difficulty doing her ADLs. She has been having difficulty catching her breath, even at rest. She has a chronic, productive cough of green sputum that has been unchanged. She denies fevers at home. She reports compliance with her CPAP machine. She reports compliance with her albuterol and symbicort. She reports she was last on steroids 11/20. She has not taken any steroids or antibiotics for the exacerbation of symptoms.        # Acute on chronic hypercapneic respiratory failure  - Etiology: Likely 2/2 to COPD exacerbation vs HF / volume overload.   - RVP nengative. CXR no consolidation or opacities. VBG pH: 7.29,| pCO2: 102 (baseline 60s) with a bicarb on BMP of 43. Patient has an elevated PCO2 on VBG however pH is only 7.29 and is now currently normalized  - Do not suspect PNA (no change in chronic cough, no fevers). Patient reports significant improvement with albuterol and steroids.  - Continue with prednisone 40mg, symbicort, spiriva, duonebs q6h standing, and acapella. Also, c/w diuresis.   - Please start Azithromycin for total of 5d for anti-inflammatory and immunomodulatory effects  - Recommend to continue with bilevel with nasal mask PRN and QHS.  - Please notify CM that patient will need to be qualified for Bipap at home due to hypercapnic respiratory failure due to COPD. Patient will need overnight oximetry study prior to discharge for qualification as she already meets PaCO2 requirement.    *Per chart review patient is DNR/DNI based on palliative care note from 11/11/2020. Please update chart.         Hebert Jean MD  PGY-6  Pulmonary and Critical Care Fellow  Pager: [phone]

## 2021-02-16 NOTE — PROVIDER CONTACT NOTE (CRITICAL VALUE NOTIFICATION) - ASSESSMENT
awake alert vss afebrile  no respiratory distress present
awake alert times 4 with continuous BIPAp on

## 2021-02-16 NOTE — PROGRESS NOTE ADULT - PROBLEM SELECTOR PLAN 1
Suspect largely related to acute pulmonary edema (due to acute on chronic systolic heart failure) in setting of uncontrolled HTN. There may be a concomitant component of COPD exacerbation. The differential also includes acute infection such as COVID. COVID negative   - c/w Lasix 40 mg IV once daily   - Will manage uncontrolled HTN with home regimen of carvedilol and hydralazine, with the addition of lasix as above. BP still elevated, will increase hydralazine to 75 mg TD with hold parameters   - For possible COPD component, and considering hypercarbia, pulmonary eval appreciated, c/w   BiPAP , Repeat ABG with improved hypercarbia .  Continue symbicort. Pt is s/p solumedrol; Pulmonary recommend short course of steroids , will start prednisone 40 mg , c/w inhalers PRN   - COVID PCR negative  - Otherwise, continue aspirin for CAD in setting of CHF  -pulmonary recommends to monitor off abx Presumed 2/2 acute pulmonary edema (due to acute on chronic systolic heart failure) in setting of uncontrolled HTN. There may be a concomitant component of COPD exacerbation. Not likely acute infection  - hold further IV Lasix 40 mg due to hypokalemia  - For possible COPD component, and considering hypercarbia, pulmonary eval appreciated, c/w   BiPAP , Repeat ABG with improved hypercarbia .  Continue symbicort. Pt is s/p solumedrol; Pulmonary recommend short course of steroids , will start prednisone 40 mg , c/w inhalers PRN   - COVID PCR negative  - Otherwise, continue aspirin for CAD in setting of CHF  -pulmonary recommends to monitor off abx

## 2021-02-16 NOTE — GOALS OF CARE CONVERSATION - ADVANCED CARE PLANNING - TREATMENT GUIDELINE COMMENT
Patient to remain FULL CODE at this time, which goes against prior discussion and completed MOLST. Will continue ongoing goals of care and advanced care directives.

## 2021-02-17 NOTE — PROGRESS NOTE ADULT - PROBLEM SELECTOR PLAN 2
Suspect acute exacerbation. Pt with end stage COPD at baseline. Pulmonary team following, recs appreciated. 2/16 CXR obtained --> no significant improvement s/p IV diuretics.  - c/w prednisone 40mg daily  - agree with Symbicort, spiriva, duonebs q6h standing, and acapella as per pulm  - will f/u with CM regarding obtaining home BIPAP. Pt reportedly already with BIPAP at home  - c/w Azithromycin for total of 5d for anti-inflammatory and immunomodulatory effects; currently on day 2  - continue with BIPAP PRN and QHS.

## 2021-02-17 NOTE — PROGRESS NOTE ADULT - SUBJECTIVE AND OBJECTIVE BOX
Patient is a 71y old  Female who presents with a chief complaint of dyspna (16 Feb 2021 17:31)    SUBJECTIVE / OVERNIGHT EVENTS:  Patient stated she is feeling better today. Pt not as SOB but does get dyspneic with minimal activity. Pt is optimistic of getting better. She reaffirmed at this time she would want to be FULL CODE and update her MOLST form.  Pt without chest pain, n/v or abdominal pain. Pt eating well.      MEDICATIONS  (STANDING):  ALBUTerol    90 MICROgram(s) HFA Inhaler 1 Puff(s) Inhalation every 6 hours  aspirin enteric coated 81 milliGRAM(s) Oral daily  azithromycin  IVPB 500 milliGRAM(s) IV Intermittent every 24 hours  budesonide 160 MICROgram(s)/formoterol 4.5 MICROgram(s) Inhaler 2 Puff(s) Inhalation two times a day  carvedilol 25 milliGRAM(s) Oral every 12 hours  enoxaparin Injectable 30 milliGRAM(s) SubCutaneous daily  hydrALAZINE 75 milliGRAM(s) Oral three times a day  pantoprazole    Tablet 40 milliGRAM(s) Oral before breakfast  predniSONE   Tablet 40 milliGRAM(s) Oral daily  tiotropium 18 MICROgram(s) Capsule 1 Capsule(s) Inhalation daily        Vital Signs Last 24 Hrs  T(C): 36.9 (17 Feb 2021 21:26), Max: 36.9 (17 Feb 2021 04:39)  T(F): 98.5 (17 Feb 2021 21:26), Max: 98.5 (17 Feb 2021 04:39)  HR: 84 (17 Feb 2021 21:26) (84 - 91)  BP: 103/62 (17 Feb 2021 21:26) (103/62 - 119/62)  BP(mean): 75 (17 Feb 2021 04:39) (75 - 75)  RR: 17 (17 Feb 2021 21:26) (17 - 20)  SpO2: 100% (17 Feb 2021 21:26) (97% - 100%)            PHYSICAL EXAM  GENERAL: NAD, chronically ill appearing and cachetic, small framed; speaking in full sentences while on BIPAP via nasal place  NECK: distended neck veins bilaterally  CHEST/LUNG: Minimal air movement throughout lung fields b/l. No wheezes, crackles or rhonchi. Use of accessory respiratory neck muscles  HEART: Regular rhythm and rate  EXTREMITIES:  2+ Peripheral Pulses, No clubbing, cyanosis, or edema. Minimal subcutaneous fat. ROM intact.  PSYCH: AAOx3, conversant, pleasant      LABS:                        11.2   9.28  )-----------( 220      ( 17 Feb 2021 08:23 )             37.5     02-17    144  |  98  |  30<H>  ----------------------------<  111<H>  4.4   |  39<H>  |  0.82    Ca    9.5      17 Feb 2021 08:23  Phos  2.9     02-17  Mg     1.9     02-17            Consultant(s) Notes Reviewed:  yes  Care Discussed with Consultants/Other Providers:

## 2021-02-17 NOTE — PROGRESS NOTE ADULT - PROBLEM SELECTOR PLAN 1
Presumed 2/2 acute pulmonary edema (due to acute on chronic systolic heart failure) in setting of uncontrolled HTN. Suspect concomitant component of COPD exacerbation. Not likely acute infection  - hold further IV Lasix 40 mg due to hypokalemia  - f/u pulm recs   - c/w continuous BiPAP  - Pt is s/p solumedrol and transitioned to prednisone 40mg x5 days.

## 2021-02-17 NOTE — PROGRESS NOTE ADULT - ASSESSMENT
70F with HFrEF s/p ICD, COPD (on home O2), CARLOTA on nocturnal BiPAP, HTN p/w dyspnea, uncontrolled HTN. Found to have b/l LE edema and cephalization on CXR concerning for acute pulmonary edema causing acute on chronic respiratory failure with hypercarbia as well as hypoxia. Pt still symptomatic despite BIPAP and with noted hypokalemia likely due to IV diuretics.

## 2021-02-18 NOTE — DISCHARGE NOTE PROVIDER - CARE PROVIDER_API CALL
Marisela Teran)  Critical Care Medicine; Internal Medicine; Pulmonary Disease  [address] [place] [address]  Phone: [phone]  Fax: [phone]  Established Patient  Follow Up Time:

## 2021-02-18 NOTE — DISCHARGE NOTE PROVIDER - NSDCMRMEDTOKEN_GEN_ALL_CORE_FT
albuterol 2.5 mg/3 mL (0.083%) inhalation solution: 3 milliliter(s) inhaled every 6 hours, As Needed  aspirin 81 mg oral delayed release tablet: 1 tab(s) orally once a day  carvedilol 25 mg oral tablet: 1 tab(s) orally 2 times a day  hydrALAZINE 50 mg oral tablet: 1 tab(s) orally 3 times a day  pantoprazole 40 mg oral delayed release tablet: 1 tab(s) orally once a day (before a meal)  Symbicort 160 mcg-4.5 mcg/inh inhalation aerosol: 2 puff(s) inhaled 2 times a day   albuterol 2.5 mg/3 mL (0.083%) inhalation solution: 3 milliliter(s) inhaled every 6 hours, As Needed  aspirin 81 mg oral delayed release tablet: 1 tab(s) orally once a day  carvedilol 25 mg oral tablet: 1 tab(s) orally 2 times a day  hydrALAZINE 25 mg oral tablet: 3 tab(s) orally 3 times a day  pantoprazole 40 mg oral delayed release tablet: 1 tab(s) orally once a day (before a meal)  Symbicort 160 mcg-4.5 mcg/inh inhalation aerosol: 2 puff(s) inhaled 2 times a day  tiotropium 18 mcg inhalation capsule: 1 cap(s) inhaled once a day

## 2021-02-18 NOTE — PROGRESS NOTE ADULT - PROBLEM SELECTOR PLAN 2
Suspect acute exacerbation. Pt with end stage COPD at baseline. Pulmonary team following, recs appreciated. 2/16 CXR obtained --> no significant improvement s/p IV diuretics.  - c/w prednisone 40mg daily  - agree with Symbicort, spiriva, duonebs q6h standing, and acapella as per pulm  - will f/u with CM regarding obtaining home BIPAP. Pt reportedly already with BIPAP at home  - c/w Azithromycin for total of 5d for anti-inflammatory and immunomodulatory effects; currently on day 3  - continue with BIPAP PRN and QHS.

## 2021-02-18 NOTE — DISCHARGE NOTE PROVIDER - NSDCFUSCHEDAPPT_GEN_ALL_CORE_FT
TJ MELENDEZ S ; 03/04/2021 ; [facility] [id number] [facility] ; 04/05/2021 ; [facility] [id number]

## 2021-02-18 NOTE — DISCHARGE NOTE PROVIDER - NSFOLLOWUPCLINICS_GEN_ALL_ED_FT
[facility] Pulmonolgy and Sleep Medicine  Pulmonology  [address] [place] [address]  Phone: [phone]  Fax:   Follow Up Time:

## 2021-02-18 NOTE — PROGRESS NOTE ADULT - ASSESSMENT
70F with HFrEF s/p ICD, COPD (on home O2), CARLOTA on nocturnal BiPAP, HTN p/w dyspnea, uncontrolled HTN. Found to have b/l LE edema and cephalization on CXR concerning for acute pulmonary edema causing acute on chronic respiratory failure with hypercarbia as well as hypoxia. Pt slowly symptomatically improving on continuous BIPAP and medical management. Medication induced hypokalemia has resolved since being off diuretics

## 2021-02-18 NOTE — PROGRESS NOTE ADULT - SUBJECTIVE AND OBJECTIVE BOX
Patient is a 71y old  Female who presents with a chief complaint of dyspna (18 Feb 2021 19:57)    SUBJECTIVE / OVERNIGHT EVENTS:  Patient continues to feel better. She is able to walk from her bed to the bathroom in her room but she does get severely SOB and needs to rest. She has no chest pain, cough, n/v or abdominal pain. Patient tolerating BIPAP via nasal mask throughout the day.       MEDICATIONS  (STANDING):  ALBUTerol    90 MICROgram(s) HFA Inhaler 1 Puff(s) Inhalation every 6 hours  aspirin enteric coated 81 milliGRAM(s) Oral daily  azithromycin  IVPB 500 milliGRAM(s) IV Intermittent every 24 hours  budesonide 160 MICROgram(s)/formoterol 4.5 MICROgram(s) Inhaler 2 Puff(s) Inhalation two times a day  carvedilol 25 milliGRAM(s) Oral every 12 hours  enoxaparin Injectable 30 milliGRAM(s) SubCutaneous daily  hydrALAZINE 75 milliGRAM(s) Oral three times a day  pantoprazole    Tablet 40 milliGRAM(s) Oral before breakfast  predniSONE   Tablet 40 milliGRAM(s) Oral daily  tiotropium 18 MICROgram(s) Capsule 1 Capsule(s) Inhalation daily    MEDICATIONS  (PRN):  sodium chloride 0.65% Nasal 1 Spray(s) Both Nostrils two times a day PRN Nasal Congestion      Vital Signs Last 24 Hrs  T(C): 36.4 (18 Feb 2021 20:23), Max: 37.1 (18 Feb 2021 05:24)  T(F): 97.6 (18 Feb 2021 20:23), Max: 98.7 (18 Feb 2021 05:24)  HR: 90 (18 Feb 2021 20:23) (71 - 92)  BP: 100/60 (18 Feb 2021 20:23) (100/60 - 128/62)  RR: 17 (18 Feb 2021 20:23) (17 - 18)  SpO2: 100% (18 Feb 2021 20:23) (95% - 100%)          PHYSICAL EXAM  GENERAL: NAD, chronically ill appearing and cachetic, small framed; speaking in full sentences while on BIPAP via nasal place  NECK: distended neck veins bilaterally  CHEST/LUNG: Minimal air movement throughout lung fields b/l. No wheezes, crackles or rhonchi. Use of accessory respiratory neck muscles  HEART: Regular rhythm and rate  EXTREMITIES:  2+ Peripheral Pulses, No clubbing, cyanosis, or edema. Minimal subcutaneous fat. ROM intact.  PSYCH: AAOx3, conversant, pleasant        LABS:                        11.2   9.28  )-----------( 220      ( 17 Feb 2021 08:23 )             37.5     02-18    142  |  98  |  28<H>  ----------------------------<  90  3.8   |  36<H>  |  0.80    Ca    9.4      18 Feb 2021 06:15  Phos  2.9     02-17  Mg     1.9     02-18            Consultant(s) Notes Reviewed:  yes  Care Discussed with Consultants/Other Providers:

## 2021-02-18 NOTE — DISCHARGE NOTE PROVIDER - HOSPITAL COURSE
70F with HFrEF s/p ICD, COPD (on home O2), CARLOTA on nocturnal BiPAP, HTN p/w dyspnea, uncontrolled HTN. Found to have b/l LE edema and cephalization on CXR concerning for acute pulmonary edema causing acute on chronic respiratory failure with hypercarbia as well as hypoxia. Pt still symptomatic despite BIPAP and with noted hypokalemia likely due to IV diuretics.     Acute respiratory failure with hypoxia and hypercapnia  - Suspect largely related to COPD exacerbation +/- acute pulmonary edema due to acute on chronic systolic heart failure in setting of uncontrolled HTN. Pt with end stage COPD at baseline. Pulmonary team following, recs appreciated. 2/16 CXR obtained --> no significant improvement s/p IV diuretics.  - known chronic systolic heart failure with EF 20-25% s/p AICD       - c/w Albuterol, Symbicort  - S/P Solumedrol, s/p Prednisone x5 days   - hold further IV Lasix 40 mg due to hypokalemia  - COVID PCR negative.   - Otherwise, continue ASA - CAD in setting of CHF.   - Pulm consulted- started spiriva, c/w albuterol inhalers q6h  - CXR 2/16-hyperinflation; on Bipap qd and prn  - azithromycin 500mg ivpb x 5 days started (2/16- ) for anti-inflammatory and immunomodulatory effects  - Needs close outpatient pulmonary follow-up (Dr. Teran).         Pulmonary emphysema, unspecified emphysema type.    - Suspect acute exacerbation. Pt with end stage COPD at baseline.  - Pulmonary team following, recs appreciated.  - 2/16 CXR obtained --> no significant improvement s/p IV diuretics.  - s/p prednisone   - c/w Symbicort, spiriva, duonebs q6h standing, and acapella as per pulm  - f/u CM regarding obtaining home BIPAP. Pt reportedly already with BIPAP at home---  - c/w Azithromycin for total of 5d for anti-inflammatory and immunomodulatory effects; currently on day 2 (2/16- )  - continue with BIPAP PRN and QHS.     Hypokalemia.    - Likely due to IV diuretics. Resolved s/p repletion and off lasix  - monitor potassium levels.     HTN  - well controlled   - hold further IV lasix  - continue with hydralazine 75mg TID and carvedilol 25mg q12h.     Advanced directives, counseling/discussion.    - Patient fearful of dying and currently states she would want everything done. Pt is aware of poor lung capacity. Will need to f/u regarding continued goals of care. However, at this time, patient wants to be FULL CODE.   - Previous Palliative states DNR/DNI on 11/11/2020, pt had MOLST - but pt currently wants to be FULL CODE. MD did GOC discussion on 2/16.      Dispo: home    On_________, case was discussed with __________, patient is medically cleared and optimized for discharge today. All medications were reviewed with attending, and sent to mutually agreed upon pharmacy.   70F with HFrEF s/p ICD, COPD (on home O2), CARLOTA on nocturnal BiPAP, HTN p/w dyspnea, uncontrolled HTN. Found to have b/l LE edema and cephalization on CXR concerning for acute pulmonary edema causing acute on chronic respiratory failure with hypercarbia as well as hypoxia. Pt still symptomatic despite BIPAP and with noted hypokalemia likely due to IV diuretics.     Acute respiratory failure with hypoxia and hypercapnia  - Suspect largely related to COPD exacerbation +/- acute pulmonary edema due to acute on chronic systolic heart failure in setting of uncontrolled HTN. Pt with end stage COPD at baseline. Pulmonary team following, recs appreciated. 2/16 CXR obtained --> no significant improvement s/p IV diuretics.  - known chronic systolic heart failure with EF 20-25% s/p AICD       - c/w Albuterol, Symbicort  - S/P Solumedrol, s/p Prednisone x5 days   - hold further IV Lasix 40 mg due to hypokalemia  - COVID PCR negative.   - Otherwise, continue ASA - CAD in setting of CHF.   - Pulm consulted- started spiriva, c/w albuterol inhalers q6h  - CXR 2/16-hyperinflation; on Bipap qd and prn  - azithromycin 500mg ivpb x 5 days started (2/16- ) for anti-inflammatory and immunomodulatory effects  - Needs close outpatient pulmonary follow-up (Dr. Teran).         Pulmonary emphysema, unspecified emphysema type.    - Suspect acute exacerbation. Pt with end stage COPD at baseline.  - Pulmonary team following, recs appreciated.  - 2/16 CXR obtained --> no significant improvement s/p IV diuretics.  - s/p prednisone   - c/w Symbicort, spiriva, duonebs q6h standing, and acapella as per pulm  - CM regarding obtaining home BIPAP. Pt reportedly already with BIPAP at home  - c/w Azithromycin for total of 5d for anti-inflammatory and immunomodulatory effects; currently on day 2 (2/16- )  - continue with BIPAP PRN and QHS.     Hypokalemia.    - Likely due to IV diuretics. Resolved s/p repletion and off lasix  - monitor potassium levels.     HTN  - well controlled   - continue with hydralazine 75mg TID and carvedilol 25mg q12h.     Advanced directives, counseling/discussion.    - Patient fearful of dying and currently states she would want everything done. Pt is aware of poor lung capacity. Will need to f/u regarding continued goals of care. However, at this time, patient wants to be FULL CODE.   - Previous Palliative states DNR/DNI on 11/11/2020, pt had MOLST - but pt currently wants to be FULL CODE. MD did GOC discussion on 2/16.      Dispo: home    on 2/20 patient is medically cleared and optimized for discharge today. All medications were reviewed with attending, and sent to mutually agreed upon pharmacy.

## 2021-02-18 NOTE — DISCHARGE NOTE PROVIDER - NSDCCPCAREPLAN_GEN_ALL_CORE_FT
PRINCIPAL DISCHARGE DIAGNOSIS  Diagnosis: Chronic obstructive pulmonary disease with acute exacerbation  Assessment and Plan of Treatment: Suspect largely related to COPD exacerbation +/- acute pulmonary edema due to acute on chronic systolic heart failure in setting of uncontrolled hypertension. Evaluated by pulmonologist, given diuretics and nebulier treatments as well as antibiotics. Continue as directed   - Needs close outpatient pulmonary follow-up (Dr. Teran).      SECONDARY DISCHARGE DIAGNOSES  Diagnosis: Pulmonary emphysema, unspecified emphysema type  Assessment and Plan of Treatment: Suspect acute exacerbation, with end stage COPD at baseline.  Continue with inhalers as directed.   Home Bipap and supplementation as directed       Diagnosis: HTN (hypertension)  Assessment and Plan of Treatment: Continue blood pressure medication regimen as directed. Follow a low salt/cholesterol diet. Monitor for any visual changes, headaches or dizziness.  Monitor blood pressure regularly.  Follow up with your primary care provider for further management for high blood pressure.     PRINCIPAL DISCHARGE DIAGNOSIS  Diagnosis: Chronic obstructive pulmonary disease with acute exacerbation  Assessment and Plan of Treatment: Suspect largely related to COPD exacerbation +/- acute pulmonary edema due to acute on chronic systolic heart failure in setting of uncontrolled hypertension. Evaluated by pulmonologist, given diuretics and nebulier treatments as well as antibiotics. Continue as directed   - Needs close outpatient pulmonary follow-up (Dr. Tearn).  -spiriva, c/w albuterol inhalers q6h      SECONDARY DISCHARGE DIAGNOSES  Diagnosis: HTN (hypertension)  Assessment and Plan of Treatment: chronic systolic heart failure with EF 20-25% s/p AICD       Continue blood pressure medication regimen as directed: Hydralazine 75mg 3 x day and carvedilol 25mg 2 x day   Follow a low salt/cholesterol diet. Monitor for any visual changes, headaches or dizziness.  Monitor blood pressure regularly.  Follow up with your primary care provider for further management for high blood pressure.    Diagnosis: Pulmonary emphysema, unspecified emphysema type  Assessment and Plan of Treatment: Suspect acute exacerbation, with end stage COPD at baseline.  Continue with inhalers as directed.   USe Home Bipap and supplementation as directed

## 2021-02-18 NOTE — PROGRESS NOTE ADULT - PROBLEM SELECTOR PLAN 1
Presumed 2/2 acute pulmonary edema (due to acute on chronic systolic heart failure) in setting of uncontrolled HTN. Suspect concomitant component of COPD exacerbation. Not likely acute infection  - no need for diuretics at this time  - f/u pulm recs   - c/w continuous BiPAP  - Pt is s/p solumedrol and transitioned to prednisone 40mg x5 days, now on day 5

## 2021-02-19 NOTE — PROGRESS NOTE ADULT - PROBLEM SELECTOR PLAN 1
Presumed 2/2 acute pulmonary edema (due to acute on chronic systolic heart failure) in setting of uncontrolled HTN. Suspect concomitant component of COPD exacerbation. Not likely acute infection  - no need for diuretics at this time  - f/u pulm recs   - transition to BiPAP PRN and at night  - Pt is s/p solumedrol and transitioned to prednisone 40mg x5 days; completed course on 2/18  - supplemental O2 as needed while at rest; goal O2 sat 88-92% while on supplemental O2. Recommend O2 with activity/ambulation

## 2021-02-19 NOTE — PROGRESS NOTE ADULT - SUBJECTIVE AND OBJECTIVE BOX
CHIEF COMPLAINT:    Interval Events: Afebrilie. Saturating well on home levels of NC. Able to walk out of bed, exercise with PT.     REVIEW OF SYSTEMS:  CONSTITUTIONAL: No weakness, fevers or chills  EYES/ENT: No visual changes;  No vertigo or throat pain   NECK: No pain or stiffness  RESPIRATORY: No cough, wheezing, hemoptysis; No shortness of breath  CARDIOVASCULAR: No chest pain or palpitations  GASTROINTESTINAL: No abdominal or epigastric pain. No nausea, vomiting, or hematemesis; No diarrhea or constipation. No melena or hematochezia.  GENITOURINARY: No dysuria, frequency or hematuria  NEUROLOGICAL: No numbness or weakness  SKIN: No itching, burning, rashes, or lesions   All other review of systems is negative unless indicated above.    OBJECTIVE:  ICU Vital Signs Last 24 Hrs  T(C): 36.8 (19 Feb 2021 05:09), Max: 36.8 (19 Feb 2021 05:09)  T(F): 98.2 (19 Feb 2021 05:09), Max: 98.2 (19 Feb 2021 05:09)  HR: 83 (19 Feb 2021 05:19) (71 - 92)  BP: 111/65 (19 Feb 2021 05:09) (100/60 - 128/62)  BP(mean): --  ABP: --  ABP(mean): --  RR: 17 (19 Feb 2021 05:09) (17 - 18)  SpO2: 98% (19 Feb 2021 05:19) (95% - 100%)        CAPILLARY BLOOD GLUCOSE          PHYSICAL EXAM:  General: WN/WD NAD  Neurology: A&Ox3, nonfocal, ROLON x 4  Eyes: PERRLA/ EOMI, Gross vision intact  ENT/Neck: Neck supple, trachea midline, No JVD, Gross hearing intact  Respiratory: CTA B/L, No wheezing, rales, rhonchi  CV: RRR, +S1/S2, -S3/S4, no murmurs, rubs or gallops  Abdominal: Soft, NT, ND +BS, No HSM  MSK: 5/5 strength UE/LE bilaterally  Extremities: No edema, 2+ peripheral pulses  Skin: No Rashes, Hematoma, Ecchymosis        HOSPITAL MEDICATIONS:  MEDICATIONS  (STANDING):  ALBUTerol    90 MICROgram(s) HFA Inhaler 1 Puff(s) Inhalation every 6 hours  aspirin enteric coated 81 milliGRAM(s) Oral daily  azithromycin  IVPB 500 milliGRAM(s) IV Intermittent every 24 hours  budesonide 160 MICROgram(s)/formoterol 4.5 MICROgram(s) Inhaler 2 Puff(s) Inhalation two times a day  carvedilol 25 milliGRAM(s) Oral every 12 hours  enoxaparin Injectable 30 milliGRAM(s) SubCutaneous daily  hydrALAZINE 75 milliGRAM(s) Oral three times a day  pantoprazole    Tablet 40 milliGRAM(s) Oral before breakfast  tiotropium 18 MICROgram(s) Capsule 1 Capsule(s) Inhalation daily    MEDICATIONS  (PRN):  sodium chloride 0.65% Nasal 1 Spray(s) Both Nostrils two times a day PRN Nasal Congestion      LABS:    Hgb Trend: 11.2<--, 11.3<--, 11.4<--, 11.4<--, 11.3<--  02-19    140  |  97<L>  |  31<H>  ----------------------------<  115<H>  3.7   |  34<H>  |  0.83    Ca    9.0      19 Feb 2021 09:07  Phos  3.1     02-19  Mg     1.9     02-19      Creatinine Trend: 0.83<--, 0.80<--, 0.82<--, 0.97<--, 0.83<--, 0.76<--

## 2021-02-19 NOTE — PROGRESS NOTE ADULT - SUBJECTIVE AND OBJECTIVE BOX
Patient is a 71y old  Female who presents with a chief complaint of dyspna (19 Feb 2021 09:44)      SUBJECTIVE / OVERNIGHT EVENTS:  Patient feels better today. Not as SOB at rest. No chest pain, n/v or abdominal pain. Patient with O2 sat 95% and above on RA while at rest in bed.      MEDICATIONS  (STANDING):  ALBUTerol    90 MICROgram(s) HFA Inhaler 1 Puff(s) Inhalation every 6 hours  aspirin enteric coated 81 milliGRAM(s) Oral daily  azithromycin  IVPB 500 milliGRAM(s) IV Intermittent every 24 hours  budesonide 160 MICROgram(s)/formoterol 4.5 MICROgram(s) Inhaler 2 Puff(s) Inhalation two times a day  carvedilol 25 milliGRAM(s) Oral every 12 hours  enoxaparin Injectable 30 milliGRAM(s) SubCutaneous daily  hydrALAZINE 75 milliGRAM(s) Oral three times a day  pantoprazole    Tablet 40 milliGRAM(s) Oral before breakfast  tiotropium 18 MICROgram(s) Capsule 1 Capsule(s) Inhalation daily    MEDICATIONS  (PRN):  sodium chloride 0.65% Nasal 1 Spray(s) Both Nostrils two times a day PRN Nasal Congestion      Vital Signs Last 24 Hrs  T(C): 36.8 (19 Feb 2021 21:13), Max: 37.1 (19 Feb 2021 12:19)  T(F): 98.2 (19 Feb 2021 21:13), Max: 98.7 (19 Feb 2021 12:19)  HR: 97 (19 Feb 2021 23:30) (79 - 97)  BP: 116/68 (19 Feb 2021 21:13) (111/65 - 130/81)  RR: 18 (19 Feb 2021 21:13) (17 - 18)  SpO2: 99% (19 Feb 2021 23:30) (95% - 100%)          PHYSICAL EXAM  GENERAL: NAD, chronically ill appearing and cachetic, small framed; speaking in full sentences while on 4L O2 via NC  NECK: distended neck veins bilaterally  CHEST/LUNG: Minimal air movement throughout lung fields b/l. No wheezes, crackles or rhonchi. No use of accessory respiratory neck muscles  HEART: Regular rhythm and rate  EXTREMITIES:  2+ Peripheral Pulses, No clubbing, cyanosis, or edema. Minimal subcutaneous fat. ROM intact.  PSYCH: AAOx3, conversant, pleasant          LABS:                        10.2   6.36  )-----------( 194      ( 19 Feb 2021 09:48 )             32.4     02-19    140  |  97<L>  |  31<H>  ----------------------------<  115<H>  3.7   |  34<H>  |  0.83    Ca    9.0      19 Feb 2021 09:07  Phos  3.1     02-19  Mg     1.9     02-19    ABG - ( 19 Feb 2021 13:04 )  pH, Arterial: 7.39  pH, Blood: x     /  pCO2: 54    /  pO2: 173   / HCO3: 30    / Base Excess: 7.3   /  SaO2: 98.9                Consultant(s) Notes Reviewed:  yes  Care Discussed with Consultants/Other Providers:

## 2021-02-19 NOTE — PROGRESS NOTE ADULT - ATTENDING COMMENTS
Acute hypercapnic respiratory failure due to COPD exacerbation +/- CHF exacerbation. She has known chronic systolic heart failure with EF 20-25% s/p AICD. She is improved with BiPAP 10/5 30%. Continue BiPAP qhs and prn day. Supplemental oxygen with NC during the day, goal SpO2>88%. Complete short course prednisone. On Symbicort, start Spiriva. Albuterol q6h. Diuresis with furosemide. Needs close outpatient pulmonary follow-up (Dr. Teran).
Acute hypercapnic respiratory failure due to COPD exacerbation +/- CHF exacerbation. She has known chronic systolic heart failure with EF 20-25% s/p AICD. She is improved with BiPAP 10/5 30%. Continue BiPAP qhs and prn day. Supplemental oxygen with NC during the day, goal SpO2>88%. S/p short course prednisone. Complete 5 days of azithromycin. Continue Symbicort bid and Spiriva daily. Albuterol q6h. Diuresis with furosemide. Needs close outpatient pulmonary follow-up (Dr. Teran).
Pt recommend home with home PT  dc planning in am if stable

## 2021-02-19 NOTE — PROGRESS NOTE ADULT - PROBLEM SELECTOR PLAN 2
Suspect acute exacerbation. Pt with end stage COPD at baseline. Pulmonary team following, recs appreciated. 2/16 CXR obtained --> no significant improvement s/p IV diuretics.  -  completed course of prednisone on 2/18  - agree with Symbicort, spiriva, duonebs q6h standing, and acapella as per pulm  - Pt confirmed she already has home BIPAP.   - c/w Azithromycin for total of 5d for anti-inflammatory and immunomodulatory effects; currently on day 4  - BIPAP PRN and QHS.

## 2021-02-19 NOTE — PROGRESS NOTE ADULT - ASSESSMENT
70F with HFrEF s/p ICD, COPD (on home O2), CARLOTA on nocturnal BiPAP, HTN p/w dyspnea, uncontrolled HTN. Found to have b/l LE edema and cephalization on CXR concerning for acute pulmonary edema causing acute on chronic respiratory failure with hypercarbia as well as hypoxia. Pt symptomatically improving on continuous BIPAP and medical management. Medication induced hypokalemia has resolved since being off diuretics

## 2021-02-19 NOTE — PROGRESS NOTE ADULT - ASSESSMENT
70F PMH HFrEF 20-25 s/p PPM/ ICD placement, Former Smoker, COPD (on home O2, last COPD exacerbations in 2020, 2020), CARLOTA on CPAP, moderate PulmHTN and HTN p/w SOB and LE progressive edema. Found with acute on chronic hypercapnic respiratory failure in setting of COPD exacerbation. Patient reports that for the last 3 days she has had DOMÍNGUEZ to the point where she has difficulty doing her ADLs. She has been having difficulty catching her breath, even at rest. She has a chronic, productive cough of green sputum that has been unchanged. She denies fevers at home. She reports compliance with her CPAP machine. She reports compliance with her albuterol and symbicort. She reports she was last on steroids . She has not taken any steroids or antibiotics for the exacerbation of symptoms.        # Acute on chronic hypercapneic respiratory failure --> currently resolved  - Etiology: Likely 2/2 to COPD exacerbation vs HF / volume overload.   - RVP nengative. CXR no consolidation or opacities. VBG pH: 7.29,| pCO2: 102 (baseline 60s) with a bicarb on BMP of 43. Patient has an elevated PCO2 on VBG however pH is only 7.29 and is now currently normalized  - Do not suspect PNA (no change in chronic cough, no fevers).   - Patient reports significant improvement with albuterol and steroids.  - C/w symbicort, spiriva, duonebs q6h standing, and acapella. Also, c/w diuresis with po meds   - Finish Azithromycin for total of 5d for anti-inflammatory and immunomodulatory effects  - Recommend to continue with bilevel with Nasal mask QHS.  - Please notify CM that patient will need to be qualified for Bipap at home due to hypercapnic respiratory failure due to COPD. Patient will need overnight oximetry study prior to discharge for qualification as she already meets PaCO2 requirement.    *Per chart review patient is DNR/DNI based on palliative care note from 2020. Please update chart.     No contraindications to discharge from pulmonary standpoint. Can finish azithro po at home, discharge on symbicort, spiriva. Make sure patient has nebulizer at home with nebulized albuterol ampules. She will need close f/u. If she does not have pulmonologist, can f/u with us at [address] [place] [address] office.     Patient can follow up at [address] [place] [address]. To obtain an expedited post hospitalization appointment, please e-mail: [email or web address] and include patients name, , and discharge diagnosis.         Hebert Jean MD  PGY-[id number]  Pulmonary and Critical Care Fellow  Pager: [phone]

## 2021-02-20 NOTE — PROGRESS NOTE ADULT - PROBLEM SELECTOR PROBLEM 1
Acute respiratory failure with hypoxia and hypercapnia

## 2021-02-20 NOTE — DISCHARGE NOTE NURSING/CASE MANAGEMENT/SOCIAL WORK - PATIENT PORTAL LINK FT
You can access the FollowMyHealth Patient Portal offered by [facility] by registering at the following website: [email or web address]. By joining [facility]’s FollowMyHealth portal, you will also be able to view your health information using other applications (apps) compatible with our system.

## 2021-02-20 NOTE — PROGRESS NOTE ADULT - PROBLEM SELECTOR PLAN 1
Presumed 2/2 acute pulmonary edema (due to acute on chronic systolic heart failure) in setting of uncontrolled HTN. Suspect concomitant component of COPD exacerbation. Not likely acute infection  - no need for diuretics at this time  - f/u pulm recs   - BiPAP PRN and at night  - Pt is s/p solumedrol and transitioned to prednisone 40mg x5 days; completed course on 2/18  - supplemental O2 as needed while at rest; goal O2 sat 88-92% while on supplemental O2. Recommend O2 with activity/ambulation

## 2021-02-20 NOTE — PROGRESS NOTE ADULT - PROBLEM SELECTOR PLAN 6
- Lovenox for VTE ppx  - Continue ppi per home regimen
- Lovenox for VTE ppx  - Continue ppi per home regimen      DISPO: Patient is medically stable to be discharged home with close outpatient pulmonary f/u. Spent 31 min coordinating discharge plan
- Lovenox for VTE ppx  - Continue ppi per home regimen      DISPO: Anticipate d/c home tomorrow if pt remains clinically stable.
- Lovenox for VTE ppx  - Continue ppi per home regimen
- Lovenox for VTE ppx  - Continue ppi per home regimen

## 2021-02-20 NOTE — PROGRESS NOTE ADULT - PROBLEM SELECTOR PLAN 2
Suspect acute exacerbation. Pt with end stage COPD at baseline. Pulmonary team following, recs appreciated. 2/16 CXR obtained --> no significant improvement s/p IV diuretics.  -  completed course of prednisone on 2/18  - agree with Symbicort, spiriva, duonebs q6h standing, and acapella as per pulm  - Pt confirmed she already has home BIPAP.   - c/w Azithromycin for total of 5d for anti-inflammatory and immunomodulatory effects; currently on day 5   - BIPAP PRN and QHS.

## 2021-02-20 NOTE — PROGRESS NOTE ADULT - PROBLEM SELECTOR PLAN 3
Likely due to IV diuretics. Resolved s/p repletion and off lasix  - monitor potassium levels.
Likely due to IV diuretics  - replete with oral and IV KCl  - repeat BMP and monitor potassium levels.

## 2021-02-20 NOTE — PROGRESS NOTE ADULT - REASON FOR ADMISSION
dyspna
dyspna
Acute on chronic hypercapnic and hypoxic respiratory failure
Acute on chronic hypoxic and hypercapnic respiratory failure
dyspna
dyspna
Acute on chronic hypoxic and hypercapnic respiratory failure
dyspna
Acute on chronic hypercapnic and hypoxic respiratory failure

## 2021-02-20 NOTE — PROGRESS NOTE ADULT - PROVIDER SPECIALTY LIST ADULT
Hospitalist
Pulmonology
Pulmonology
Hospitalist

## 2021-02-20 NOTE — PROGRESS NOTE ADULT - SUBJECTIVE AND OBJECTIVE BOX
Patient is a 71y old  Female who presents with a chief complaint of Acute on chronic hypercapnic and hypoxic respiratory failure (19 Feb 2021 14:18)    SUBJECTIVE / OVERNIGHT EVENTS:  No events overnight. Patient resting comfortably and feels at baseline state of health. She denied SOB at rest and no chest pain, n/v or abdominal pain. She is just waiting for arranged transportation to pick her up.    MEDICATIONS  (STANDING):  ALBUTerol    90 MICROgram(s) HFA Inhaler 1 Puff(s) Inhalation every 6 hours  aspirin enteric coated 81 milliGRAM(s) Oral daily  azithromycin  IVPB 500 milliGRAM(s) IV Intermittent every 24 hours  budesonide 160 MICROgram(s)/formoterol 4.5 MICROgram(s) Inhaler 2 Puff(s) Inhalation two times a day  carvedilol 25 milliGRAM(s) Oral every 12 hours  enoxaparin Injectable 30 milliGRAM(s) SubCutaneous daily  hydrALAZINE 75 milliGRAM(s) Oral three times a day  pantoprazole    Tablet 40 milliGRAM(s) Oral before breakfast  tiotropium 18 MICROgram(s) Capsule 1 Capsule(s) Inhalation daily    MEDICATIONS  (PRN):  sodium chloride 0.65% Nasal 1 Spray(s) Both Nostrils two times a day PRN Nasal Congestion      Vital Signs Last 24 Hrs  T(C): 36.7 (20 Feb 2021 05:35), Max: 36.8 (19 Feb 2021 21:13)  T(F): 98 (20 Feb 2021 05:35), Max: 98.2 (19 Feb 2021 21:13)  HR: 78 (20 Feb 2021 07:15) (77 - 97)  BP: 135/79 (20 Feb 2021 05:35) (116/68 - 135/79)  RR: 16 (20 Feb 2021 05:35) (16 - 18)  SpO2: 98% (20 Feb 2021 07:15) (95% - 99%)        PHYSICAL EXAM  GENERAL: NAD, chronically ill appearing and cachetic, small framed; speaking in full sentences while on O2 via NC  CHEST/LUNG: Minimal air movement throughout lung fields b/l. No wheezes, crackles or rhonchi. No use of accessory respiratory neck muscles  HEART: Regular rhythm and rate  EXTREMITIES:  2+ Peripheral Pulses, No clubbing, cyanosis, or edema. Minimal subcutaneous fat. ROM intact.  PSYCH: AAOx3, conversant, pleasant          LABS:                        10.2   6.36  )-----------( 194      ( 19 Feb 2021 09:48 )             32.4     02-19    140  |  97<L>  |  31<H>  ----------------------------<  115<H>  3.7   |  34<H>  |  0.83    Ca    9.0      19 Feb 2021 09:07  Phos  3.1     02-19  Mg     1.9     02-19                RADIOLOGY & ADDITIONAL TESTS:    Imaging Personally Reviewed:  Consultant(s) Notes Reviewed:    Care Discussed with Consultants/Other Providers:

## 2021-02-20 NOTE — PROGRESS NOTE ADULT - PROBLEM SELECTOR PROBLEM 5
Advanced directives, counseling/discussion

## 2021-02-20 NOTE — PROGRESS NOTE ADULT - PROBLEM SELECTOR PLAN 4
BP well controlled  - hold further IV lasix  - continue with hydralazine 75mg TID and carvedilol 25mg q12h
BP well controlled  - continue with hydralazine 75mg TID and carvedilol 25mg q12h
BP well controlled  - hold further IV lasix  - continue with hydralazine 75mg TID and carvedilol 25mg q12h
BP well controlled  - continue with hydralazine 75mg TID and carvedilol 25mg q12h
BP well controlled  - continue with hydralazine 75mg TID and carvedilol 25mg q12h

## 2021-03-04 NOTE — DISCUSSION/SUMMARY
[FreeTextEntry1] : 70 year old woman with history of HFrEF, HTN HLD here for followup. Recent hospital admission from 2/13/21-2/20/21 for COPD and HF. Off diuretics and Imdur.\par #HTN- On Coreg 25 mg twice daily, Hydralazine  75 mg TID will add Imdur 30 mg daily\par #Chronic systolic heart failure- On Above meds. patient appears euvolemic.\par #HLD on statin therapy\par #FU in 2 month

## 2021-03-04 NOTE — HISTORY OF PRESENT ILLNESS
[FreeTextEntry1] : Here for followup. Recently admitted to [facility] with HF and COPD from 2/13/2021-2/20/2021. She was discharged off diuretics and off Imdur. \par #HTN- On Coreg 25 mg twice daily, Hydralazine  75 mg TID and will add Imdur to 30 mg daily. \par #Chronic systolic heart failure- On Above meds. patient appears euvolemic.\par #HLD on statin therapy

## 2021-03-09 PROBLEM — E87.6 HYPOKALEMIA: Status: ACTIVE | Noted: 2021-01-01

## 2021-03-09 NOTE — HISTORY OF PRESENT ILLNESS
[FreeTextEntry1] : hospital follow up, M11Q form [de-identified] : 72 yo F with CHF, COPD on home O2 was hospitalized at [facility] from 21 - 21. She was admitted for acute pulmonary edema causing acute on chronic respiratory failure. She was also found to be hypokalemic due to IV lasix.  She was on BiPAP without much improvement. She was started on spiriva, and continued with symbicort and albuterol inhalers. She was also treated with azithromycin and prednisone.  \par \par HTN-- hydralazine 75mg TID, carvedilol 25mg Q12h, isosorbide mononitrate ER 30mg \par \par COPD-- spiriva, symbicort, albuterol, BIPAP nightly\par \par CHF-- no more swelling. Has lasix at home but . Will send new script-- she will only take if needed. Last echo -- EF 25%. \par \par HLD-- has been off statins for awhile.\par \par Had f/u with cardiology last week-- stable.

## 2021-03-12 PROBLEM — E53.8 LOW VITAMIN B12 LEVEL: Status: ACTIVE | Noted: 2021-01-01

## 2021-03-28 NOTE — ED PROVIDER NOTE - CLINICAL SUMMARY MEDICAL DECISION MAKING FREE TEXT BOX
71yF h/o HTN, HFrEF s/p ICD, COPD (on home O2-2L), CARLOTA on nocturnal BiPAP presents with worsening sob and dyspnea on exertion for the past 3 days. Concern for but not limited to COPD vs ACS vs Covid vs CHF. Low index of suspicion for PE. Will get ekg, labs, trop, bnp, CXR, give albuterol, steroids, 02, and reassess

## 2021-03-28 NOTE — ED PROVIDER NOTE - PHYSICAL EXAMINATION
Gen: AAOx3, non-toxic  Head: NCAT  HEENT: EOMI, oral mucosa moist, normal conjunctiva  Lung: diminished breath sounds b/l, some increase work of breathing         speaking in full sentences  CV: RRR, no murmurs, rubs or gallops  Abd: soft, NTND, no guarding, no CVA tenderness  MSK: no visible deformities  Neuro: No focal sensory or motor deficits  Skin: Warm, well perfused, no rash  Psych: normal affect.   ~Justin Martin M.D. Resident

## 2021-03-28 NOTE — ED PROVIDER NOTE - ATTENDING CONTRIBUTION TO CARE
DR. BLOCH, ATTENDING MD-  I performed a face to face bedside interview with patient regarding history of present illness, review of symptoms and past medical history. I completed an independent physical exam.  I have discussed patient's plan of care with the resident.  Patient examines cachectic, tachypneic, alert and oriented, Heart s1s2, lungs decreased BS diffusely.abd soft nontender, no edema, pulses intact. skin no rashes, no focal neuro deficits.

## 2021-03-28 NOTE — ED PROVIDER NOTE - OBJECTIVE STATEMENT
71yF h/o HTN, HFrEF s/p ICD, COPD (on home O2-2L), CARLOTA on nocturnal BiPAP presents with worsening sob and dyspnea on exertion for the past 3 days. Patient states that symptoms similar to past COPD exacerbations. No fever, chest pain, abd pain, n/v, urinary or bowel irregularities.

## 2021-03-28 NOTE — ED ADULT TRIAGE NOTE - CHIEF COMPLAINT QUOTE
Patient having shortness of breath started friday, Hx COPD, feels like having COPD exacerbation. Patient tachypneic with increase work of breathing with purse lip breathing in triage.

## 2021-03-28 NOTE — ED ADULT NURSE NOTE - OBJECTIVE STATEMENT
Pt received to room 7 c/o SOB. tachypnea noted. O2 sat 100% 4L NC. MD douglas in progress. Pt safety maintained. Continue to monitor. Pt received to room 7 c/o SOB. tachypnea noted. O2 sat 100% 4L NC. MD douglas in progress. Pt safety maintained. Continue to monitor.    20g placed in R wrist, labs drawn as ordered, covid sample sent. Pt safety maintained, continue to monitor.

## 2021-03-29 NOTE — PHYSICAL THERAPY INITIAL EVALUATION ADULT - ADDITIONAL COMMENTS
Pt lives alone, with 5 steps to enter and no steps to bedroom/bathroom. Pt was independent in all ADL prior to admission. pt was not using assistive device prior to admission. Pt was not using assistive device prior to admission.

## 2021-03-29 NOTE — H&P ADULT - HISTORY OF PRESENT ILLNESS
71 year old Female with  past medical history of HTN, HFrEF s/p ICD, CAD,  COPD (3L home O2) recent admission in February, Pulm hypertension, CARLOTA on nocturnal BiPAP presents to ED for worsening SOB and DOMÍNGUEZ for the past 3 days. Her SOB was not controllable using her prn inhalers and she began to have trouble ambulating through the house so she came to the ED. Patient states that her symptoms are similar to past COPD exacerbations. Denies F/C, N/V, HA, LE edema, wheezing, cough, CP, palpitations, diaphoresis, vision changes, sore throat, abd pain, diarrhea, numbness, weakness, rashes, pain.     In ED, requiring 4LNC, RR35, given Solu-medrol 125x1, Duoneb x 1.    71 year old Female with  past medical history of HTN, HFrEF s/p ICD (EF 25%), CAD,  COPD (3L home O2) recent admission in February, Pulm hypertension, CARLOTA on nocturnal BiPAP presents to ED for worsening SOB and DOMÍNGUEZ for the past 3 days. Her SOB was not controllable using her prn inhalers and she began to have trouble ambulating through the house so she came to the ED. Patient states that her symptoms are similar to past COPD exacerbations. Denies F/C, N/V, HA, LE edema, wheezing, cough, CP, palpitations, diaphoresis, vision changes, sore throat, abd pain, diarrhea, numbness, weakness, rashes, pain.     In ED, requiring 4LNC, RR35, given Solu-medrol 125x1, Duoneb x 1.    71 year old Female with  past medical history of HTN, HFrEF s/p ICD (EF 25%), CAD,  COPD (3L home O2) recent admission in February, Pulm hypertension, CARLOTA on nocturnal BiPAP presents to ED for worsening SOB and DOMÍNGUEZ for the past 3 days. She complains of moderate SOB that was not controllable using her prn inhalers and she began to have trouble ambulating through the house so she came to the ED. Patient states that her symptoms are similar to past COPD exacerbations. Denies F/C, N/V, HA, LE edema, wheezing, cough, CP, palpitations, diaphoresis, vision changes, sore throat, abd pain, diarrhea, numbness, weakness, rashes, pain.     In ED, requiring 4LNC, RR35, given Solu-medrol 125x1, Duoneb x 1.    71 year old Female with  past medical history of HTN, HFrEF s/p ICD (EF 25%), CAD,  COPD (3L home O2) recent admission in February, Pulm hypertension, CARLOTA on nocturnal BiPAP presents to ED for worsening SOB and DOMÍNGUEZ for the past 3 days. She complains of moderate SOB that was not controllable using her prn inhalers and she began to have trouble ambulating through the house so she came to the ED. Patient states that her symptoms are similar to past COPD exacerbations. Denies F/C, N/V, HA, LE edema, wheezing, cough, CP, palpitations, diaphoresis, vision changes, sore throat, abd pain, diarrhea, numbness, weakness, rashes, pain.     In ED, requiring 4LNC, RR25, given Solu-medrol 125x1, Duoneb x 1.

## 2021-03-29 NOTE — CONSULT NOTE ADULT - SUBJECTIVE AND OBJECTIVE BOX
CHIEF COMPLAINT:    HPI:  70 yo F PMH HTN, HFrEF 25% s/p ICD, CAD, COPD on chronic 3LNC, pHTN, CARLOTA on BiPAP p/w acutely worsening SOB x3 days. Of note, pt recently hospitalized from 2/13-2/20 for overload and hypercapneic/hypoxemic respiratory failure t/w 5 days of steroids as well as diuresis. Reports inc use of prn inhalers and progressively worsening ambulatory SOB prompting ED evaluation. Upon arrival noted to be normotensive and AF though tachypneic to mid 20s with saturations of 98% on 4LNC. Labs notable for hypernatremia to 147, proBNP 777, VBG 7.29/90/33, RVP/COVID negative, CXR w/o consolidation. Tx w/ steroids and nebulizers as well as BiPAP w/ improvement of hypercapnea. Pulmonary consulted for COPD exacerbation.     PAST MEDICAL & SURGICAL HISTORY:  Right-sided heart failure    Pulmonary hypertension  moderate    Sleep apnea  cannot tolerate CPAP    Chronic systolic CHF (congestive heart failure)  EF 20-25% s/p Medtronic ICD (9/2018)    HTN (hypertension)    COPD (chronic obstructive pulmonary disease)  not on home O2    CAD (coronary artery disease)    Presence of cardioverter defibrillator  Medtronic PPM/ICD placed 9/2018        FAMILY HISTORY:  Family history of colon cancer        SOCIAL HISTORY:  Smoking: [ ] Never Smoked [ ] Former Smoker (__ packs x ___ years) [ ] Current Smoker  (__ packs x ___ years)  Substance Use: [ ] Never Used [ ] Used ____  EtOH Use:  Marital Status: [ ] Single [ ]  [ ]  [ ]   Sexual History:   Occupation:  Recent Travel:  Country of Birth:  Advance Directives:    Allergies    No Known Allergies    Intolerances        HOME MEDICATIONS:  Home Medications:  aspirin 81 mg oral delayed release tablet: 1 tab(s) orally once a day (29 Mar 2021 04:59)  carvedilol 25 mg oral tablet: 1 tab(s) orally 2 times a day (29 Mar 2021 04:59)  isosorbide mononitrate 30 mg oral tablet, extended release: 1 tab(s) orally once a day (in the morning) (29 Mar 2021 04:59)  Lasix 20 mg oral tablet: 1 tab(s) orally once a day, As Needed (29 Mar 2021 04:59)  simvastatin 20 mg oral tablet: 1 tab(s) orally once a day (at bedtime) (29 Mar 2021 04:59)  Symbicort 160 mcg-4.5 mcg/inh inhalation aerosol: 2 puff(s) inhaled 2 times a day (29 Mar 2021 04:59)      REVIEW OF SYSTEMS:  Constitutional: [ ] negative [ ] fevers [ ] chills [ ] weight loss [ ] weight gain  HEENT: [ ] negative [ ] dry eyes [ ] eye irritation [ ] postnasal drip [ ] nasal congestion  CV: [ ] negative  [ ] chest pain [ ] orthopnea [ ] palpitations [ ] murmur  Resp: [ ] negative [ ] cough [ ] shortness of breath [ ] dyspnea [ ] wheezing [ ] sputum [ ] hemoptysis  GI: [ ] negative [ ] nausea [ ] vomiting [ ] diarrhea [ ] constipation [ ] abd pain [ ] dysphagia   : [ ] negative [ ] dysuria [ ] nocturia [ ] hematuria [ ] increased urinary frequency  Musculoskeletal: [ ] negative [ ] back pain [ ] myalgias [ ] arthralgias [ ] fracture  Skin: [ ] negative [ ] rash [ ] itch  Neurological: [ ] negative [ ] headache [ ] dizziness [ ] syncope [ ] weakness [ ] numbness  Psychiatric: [ ] negative [ ] anxiety [ ] depression  Endocrine: [ ] negative [ ] diabetes [ ] thyroid problem  Hematologic/Lymphatic: [ ] negative [ ] anemia [ ] bleeding problem  Allergic/Immunologic: [ ] negative [ ] itchy eyes [ ] nasal discharge [ ] hives [ ] angioedema  [ ] All other systems negative  [ ] Unable to assess ROS because ________    OBJECTIVE:  ICU Vital Signs Last 24 Hrs  T(C): 36.7 (29 Mar 2021 12:06), Max: 36.9 (28 Mar 2021 22:42)  T(F): 98.1 (29 Mar 2021 12:06), Max: 98.4 (28 Mar 2021 22:42)  HR: 98 (29 Mar 2021 12:06) (70 - 98)  BP: 155/90 (29 Mar 2021 12:06) (132/92 - 179/92)  BP(mean): --  ABP: --  ABP(mean): --  RR: 16 (29 Mar 2021 12:06) (16 - 26)  SpO2: 100% (29 Mar 2021 12:06) (97% - 100%)        CAPILLARY BLOOD GLUCOSE          PHYSICAL EXAM:  General:   HEENT:   Lymph Nodes:  Neck:   Respiratory:   Cardiovascular:   Abdomen:   Extremities:   Skin:   Neurological:  Psychiatry:    HOSPITAL MEDICATIONS:  Standing Meds:  aspirin enteric coated 81 milliGRAM(s) Oral daily  budesonide 160 MICROgram(s)/formoterol 4.5 MICROgram(s) Inhaler 2 Puff(s) Inhalation two times a day  carvedilol 25 milliGRAM(s) Oral every 12 hours  enoxaparin Injectable 40 milliGRAM(s) SubCutaneous daily  hydrALAZINE 75 milliGRAM(s) Oral three times a day  isosorbide   mononitrate ER Tablet (IMDUR) 30 milliGRAM(s) Oral daily  pantoprazole    Tablet 40 milliGRAM(s) Oral before breakfast  predniSONE   Tablet 40 milliGRAM(s) Oral daily  simvastatin 20 milliGRAM(s) Oral at bedtime  tiotropium 18 MICROgram(s) Capsule 1 Capsule(s) Inhalation daily      PRN Meds:  albuterol/ipratropium for Nebulization 3 milliLiter(s) Nebulizer every 6 hours PRN      LABS:                        11.6   2.91  )-----------( 183      ( 29 Mar 2021 07:47 )             36.8     Hgb Trend: 11.6<--, 11.1<--  03-29    142  |  98  |  15  ----------------------------<  138<H>  3.9   |  36<H>  |  0.72    Ca    9.5      29 Mar 2021 07:47  Phos  3.7     03-29  Mg     1.7     03-29    TPro  6.4  /  Alb  4.2  /  TBili  0.6  /  DBili  x   /  AST  15  /  ALT  10  /  AlkPhos  44  03-29    Creatinine Trend: 0.72<--, 0.80<--        Venous Blood Gas:  03-29 @ 07:47  7.37/69/30/34/50.9  VBG Lactate: 1.2  Venous Blood Gas:  03-29 @ 01:19  7.35/73/42/35/73.1  VBG Lactate: 0.8  Venous Blood Gas:  03-28 @ 22:22  7.29/90/33/35/53.0  VBG Lactate: 0.8      MICROBIOLOGY:       RADIOLOGY:  [ ] Reviewed and interpreted by me    PULMONARY FUNCTION TESTS:    EKG:   CHIEF COMPLAINT:    HPI:  72 yo F PMH HTN, HFrEF 25% s/p ICD, CAD, COPD on chronic 3LNC, pHTN, CARLOTA on BiPAP p/w acutely worsening SOB x3 days. Of note, pt recently hospitalized from 2/13-2/20 for overload and hypercapneic/hypoxemic respiratory failure t/w 5 days of steroids as well as diuresis. Reports inc use of prn inhalers and progressively worsening ambulatory SOB prompting ED evaluation. Upon arrival noted to be normotensive and AF though tachypneic to mid 20s with saturations of 98% on 4LNC. Labs notable for hypernatremia to 147, proBNP 777, VBG 7.29/90/33, RVP/COVID negative, CXR w/o consolidation. Tx w/ steroids and nebulizers as well as BiPAP w/ improvement of hypercapnea. Pulmonary consulted for COPD exacerbation.     PAST MEDICAL & SURGICAL HISTORY:  Right-sided heart failure    Pulmonary hypertension  moderate    Sleep apnea  cannot tolerate CPAP    Chronic systolic CHF (congestive heart failure)  EF 20-25% s/p Medtronic ICD (9/2018)    HTN (hypertension)    COPD (chronic obstructive pulmonary disease)  not on home O2    CAD (coronary artery disease)    Presence of cardioverter defibrillator  Medtronic PPM/ICD placed 9/2018        FAMILY HISTORY:  Family history of colon cancer        SOCIAL HISTORY:  Smoking: [ ] Never Smoked [ ] Former Smoker (__ packs x ___ years) [ ] Current Smoker  (__ packs x ___ years)  Substance Use: [ ] Never Used [ ] Used ____  EtOH Use:  Marital Status: [ ] Single [ ]  [ ]  [ ]   Sexual History:   Occupation:  Recent Travel:  Country of Birth:  Advance Directives:    Allergies    No Known Allergies    Intolerances        HOME MEDICATIONS:  Home Medications:  aspirin 81 mg oral delayed release tablet: 1 tab(s) orally once a day (29 Mar 2021 04:59)  carvedilol 25 mg oral tablet: 1 tab(s) orally 2 times a day (29 Mar 2021 04:59)  isosorbide mononitrate 30 mg oral tablet, extended release: 1 tab(s) orally once a day (in the morning) (29 Mar 2021 04:59)  Lasix 20 mg oral tablet: 1 tab(s) orally once a day, As Needed (29 Mar 2021 04:59)  simvastatin 20 mg oral tablet: 1 tab(s) orally once a day (at bedtime) (29 Mar 2021 04:59)  Symbicort 160 mcg-4.5 mcg/inh inhalation aerosol: 2 puff(s) inhaled 2 times a day (29 Mar 2021 04:59)      REVIEW OF SYSTEMS:  Constitutional: [x] negative [ ] fevers [ ] chills [ ] weight loss [ ] weight gain  HEENT: [x] negative [ ] dry eyes [ ] eye irritation [ ] postnasal drip [ ] nasal congestion  CV: [x] negative  [ ] chest pain [ ] orthopnea [ ] palpitations [ ] murmur  Resp: [x] negative [ ] cough [ ] shortness of breath [ ] dyspnea [ ] wheezing [ ] sputum [ ] hemoptysis  GI: [x] negative [ ] nausea [ ] vomiting [ ] diarrhea [ ] constipation [ ] abd pain [ ] dysphagia   : [x] negative [ ] dysuria [ ] nocturia [ ] hematuria [ ] increased urinary frequency  Musculoskeletal: [ ] negative [ ] back pain [ ] myalgias [ ] arthralgias [ ] fracture  Skin: [ ] negative [ ] rash [ ] itch  Neurological: [ ] negative [ ] headache [ ] dizziness [ ] syncope [ ] weakness [ ] numbness  Psychiatric: [ ] negative [ ] anxiety [ ] depression  Endocrine: [ ] negative [ ] diabetes [ ] thyroid problem  Hematologic/Lymphatic: [ ] negative [ ] anemia [ ] bleeding problem  Allergic/Immunologic: [ ] negative [ ] itchy eyes [ ] nasal discharge [ ] hives [ ] angioedema  [ ] All other systems negative  [ ] Unable to assess ROS because ________    OBJECTIVE:  ICU Vital Signs Last 24 Hrs  T(C): 36.7 (29 Mar 2021 12:06), Max: 36.9 (28 Mar 2021 22:42)  T(F): 98.1 (29 Mar 2021 12:06), Max: 98.4 (28 Mar 2021 22:42)  HR: 98 (29 Mar 2021 12:06) (70 - 98)  BP: 155/90 (29 Mar 2021 12:06) (132/92 - 179/92)  BP(mean): --  ABP: --  ABP(mean): --  RR: 16 (29 Mar 2021 12:06) (16 - 26)  SpO2: 100% (29 Mar 2021 12:06) (97% - 100%)        CAPILLARY BLOOD GLUCOSE          PHYSICAL EXAM:  GEN: Elderly female, NAD  HEENT: EOMI  CV: RR  PULM: Distant, no wheezes  ABD: Soft  EXT: [facility]    HOSPITAL MEDICATIONS:  Standing Meds:  aspirin enteric coated 81 milliGRAM(s) Oral daily  budesonide 160 MICROgram(s)/formoterol 4.5 MICROgram(s) Inhaler 2 Puff(s) Inhalation two times a day  carvedilol 25 milliGRAM(s) Oral every 12 hours  enoxaparin Injectable 40 milliGRAM(s) SubCutaneous daily  hydrALAZINE 75 milliGRAM(s) Oral three times a day  isosorbide   mononitrate ER Tablet (IMDUR) 30 milliGRAM(s) Oral daily  pantoprazole    Tablet 40 milliGRAM(s) Oral before breakfast  predniSONE   Tablet 40 milliGRAM(s) Oral daily  simvastatin 20 milliGRAM(s) Oral at bedtime  tiotropium 18 MICROgram(s) Capsule 1 Capsule(s) Inhalation daily      PRN Meds:  albuterol/ipratropium for Nebulization 3 milliLiter(s) Nebulizer every 6 hours PRN      LABS:                        11.6   2.91  )-----------( 183      ( 29 Mar 2021 07:47 )             36.8     Hgb Trend: 11.6<--, 11.1<--  03-29    142  |  98  |  15  ----------------------------<  138<H>  3.9   |  36<H>  |  0.72    Ca    9.5      29 Mar 2021 07:47  Phos  3.7     03-29  Mg     1.7     03-29    TPro  6.4  /  Alb  4.2  /  TBili  0.6  /  DBili  x   /  AST  15  /  ALT  10  /  AlkPhos  44  03-29    Creatinine Trend: 0.72<--, 0.80<--        Venous Blood Gas:  03-29 @ 07:47  7.37/69/30/34/50.9  VBG Lactate: 1.2  Venous Blood Gas:  03-29 @ 01:19  7.35/73/42/35/73.1  VBG Lactate: 0.8  Venous Blood Gas:  03-28 @ 22:22  7.29/90/33/35/53.0  VBG Lactate: 0.8      MICROBIOLOGY:       RADIOLOGY:  [ ] Reviewed and interpreted by me    PULMONARY FUNCTION TESTS:    EKG:

## 2021-03-29 NOTE — H&P ADULT - NSHPREVIEWOFSYSTEMS_GEN_ALL_CORE
ROS:      Constitutional: Denies F/C, malaise, fatigue, diaphoresis, weight loss  Eyes: Denies visual changes, eye pain, double vision  ENT: Denies rhinorrhea, congestion, sinus pain/pressure, ear pain, tinnitus, sore throat, odynophagia  Cardio: Denies CP, palpitations, edema, paroxysmal nocturnal dyspnea, orthopnea   Pulm: + SOB, Denies cough, wheezing, hemoptysis  GI: Denies N/V, Diarrhea, constipation, Abd pain, dysphagia, anorexia, hematemesis, BRBPR, melena  : Denies, dysuria, frequency, incontinence, change in urine color, difficulty urinating  MSK: Denies arthralgia, joint swelling, decreased ROM  Skin: Denies pruritus, rashes, lesions, wounds  Neuro: Denies seizures, HA, paresthesia, numbness, weakness  Psych: Denies Anxiety, depression, difficulty concentrating, labile mood, lack of energy, trouble sleeping  Endocrine: Denies heat/cold intolerance, polydipsia, polyuria  Hematologic: Denies bleeding, easy bruising, painful/swollen lymph nodes    Positives and pertinent negatives noted, all other systems negative.

## 2021-03-29 NOTE — CONSULT NOTE ADULT - ATTENDING COMMENTS
70 y/o F with PMH as above here with acute on chronic hypoxic respiratory failure  Clinically patient appears, improved now saturating well on her home requirement  Cont duonebs q6 atc  Agree with triple therapy for COPD with Symbicort and Spiriva   Rest of plan as above

## 2021-03-29 NOTE — ED ADULT NURSE REASSESSMENT NOTE - NS ED NURSE REASSESS COMMENT FT1
Called and spoke to Brxaton BRITO and reported patient is complaining potassium is very uncomfortable. Per Braxton BRITO ok to run potassium slower at 50ml/hr and LR at 250 ml over 2 hours to see if that improves patient's IV discomfort. Warm packs provided for comfort.

## 2021-03-29 NOTE — PROGRESS NOTE ADULT - SUBJECTIVE AND OBJECTIVE BOX
71 Presented for worsening shortness of breath and DOMÍNGUEZ due to likely COPD exacerbation  - ABG improved pH this AM   - pulm consult   -    - on 2L NC satting 100%  - pred 40 daily   - f/u PT   - patient DNR/DNI in November 2020 ? clarify if patient came w/ MOLST. If not should refill MOLST with patient and confirm still DNR/DNI.  INTERVAL HPI/OVERNIGHT EVENTS:  Patient feels much improved with the steroids. States she is approaching her normal breathing. Denies any chest pain, lower extremity edema. ROS otherwise negative.     VITAL SIGNS:  T(F): 98 (03-29-21 @ 13:48)  HR: 99 (03-29-21 @ 13:48)  BP: 116/86 (03-29-21 @ 13:48)  RR: 17 (03-29-21 @ 13:48)  SpO2: 100% (03-29-21 @ 13:48)  Wt(kg): --    PHYSICAL EXAM:  Constitutional: AA female  resting in bed, NAD  HEENT: sclera non-icteric, neck supple, trachea midline, no masses, no JVD, MMM   Respiratory: decreased breath sounds diffusely w/ prolonged expiratory phase, no wheezes, rales, or rhonchi   Cardiovascular: RRR, normal S1S2, no M/R/G  Gastrointestinal: soft, NTND, no masses palpable, BS normal  Extremities: Warm, well perfused, pulses equal bilateral upper and lower extremities, no edema, no clubbing  Neurological: AAOx3, CN Grossly intact  Skin: Normal temperature, warm, dry    MEDICATIONS  (STANDING):  aspirin enteric coated 81 milliGRAM(s) Oral daily  budesonide 160 MICROgram(s)/formoterol 4.5 MICROgram(s) Inhaler 2 Puff(s) Inhalation two times a day  carvedilol 25 milliGRAM(s) Oral every 12 hours  enoxaparin Injectable 40 milliGRAM(s) SubCutaneous daily  hydrALAZINE 75 milliGRAM(s) Oral three times a day  isosorbide   mononitrate ER Tablet (IMDUR) 30 milliGRAM(s) Oral daily  pantoprazole    Tablet 40 milliGRAM(s) Oral before breakfast  predniSONE   Tablet 40 milliGRAM(s) Oral daily  simvastatin 20 milliGRAM(s) Oral at bedtime  tiotropium 18 MICROgram(s) Capsule 1 Capsule(s) Inhalation daily    MEDICATIONS  (PRN):  ALBUTerol    90 MICROgram(s) HFA Inhaler 1 Puff(s) Inhalation every 6 hours PRN Shortness of Breath  albuterol/ipratropium for Nebulization 3 milliLiter(s) Nebulizer every 6 hours PRN Shortness of Breath and/or Wheezing      Allergies    No Known Allergies    Intolerances        LABS:                        11.6   2.91  )-----------( 183      ( 29 Mar 2021 07:47 )             36.8     03-29    142  |  98  |  15  ----------------------------<  138<H>  3.9   |  36<H>  |  0.72    Ca    9.5      29 Mar 2021 07:47  Phos  3.7     03-29  Mg     1.7     03-29    TPro  6.4  /  Alb  4.2  /  TBili  0.6  /  DBili  x   /  AST  15  /  ALT  10  /  AlkPhos  44  03-29          RADIOLOGY & ADDITIONAL TESTS:  Reviewed

## 2021-03-29 NOTE — H&P ADULT - NSHPLABSRESULTS_GEN_ALL_CORE
Labs:                        11.1   3.82  )-----------( 189      ( 28 Mar 2021 22:22 )             35.2     03-28    147<H>  |  99  |  14  ----------------------------<  96  3.2<L>   |  42<H>  |  0.80    Ca    9.2      28 Mar 2021 22:22    TPro  6.0  /  Alb  4.2  /  TBili  0.4  /  DBili  x   /  AST  20  /  ALT  11  /  AlkPhos  48  03-28      CARDIAC ENZYMES:          Serum Pro-Brain Natriuretic Peptide :     D-Dimer Assay:      Blood Gas Venous:  pH: 7.35 | HCO3: 35 | pCO2: 73 | pO2: 42 | Lactate: 0.8 (03-29-21 @ 01:19)  pH: 7.29 | HCO3: 35 | pCO2: 90 | pO2: 33 | Lactate: 0.8 (03-28-21 @ 22:22)      Blood Gas Arterial:      Hemoglobin A1C:      Urinanalysis Basic (03-29-21 @ 04:17):    CAPILLARY BLOOD GLUCOSE:      COVID-19 PCR:  NotDetec (03-28-21 @ 22:54)  NotDetec (02-13-21 @ 09:34)

## 2021-03-29 NOTE — H&P ADULT - PROBLEM SELECTOR PLAN 1
- likely 2/2 COPD exacerbation   - pCO2 90 to 73, pH 7.29 to 7.35 after BiPAP  - BiPAP PRN and at night  - f/u pulm recs in am  - D-Dimer, pro-BNP   - supplemental O2 as needed while at rest; goal O2 sat 88-92% while on supplemental O2. - likely 2/2 COPD exacerbation   - pCO2 90 to 73, pH 7.29 to 7.35 after BiPAP, will repeat in am   - BiPAP PRN and at night  - f/u pulm recs in am  - D-Dimer, pro-BNP   - supplemental O2 as needed while at rest; goal O2 sat 88-92% while on supplemental O2. - likely 2/2 COPD exacerbation   - pCO2 90 to 73, pH 7.29 to 7.35 after BiPAP, will repeat in am   - BiPAP PRN and at night  - f/u pulm recs in am  -check pro-bnp   - low suspicion for PE  - supplemental O2 as needed while at rest; goal O2 sat 88-92% while on supplemental O2.

## 2021-03-29 NOTE — H&P ADULT - ASSESSMENT
71 year old Female with  past medical history of HTN, HFrEF s/p ICD, CAD,  COPD (3L home O2) recent admission in February, Pulm hypertension, CARLOTA on nocturnal BiPAP presents to ED for worsening SOB and DOMÍNGUEZ for the past 3 days.  71 year old Female with  past medical history of HTN, HFrEF s/p ICD, CAD,  COPD (3L home O2) recent admission in February, Pulm hypertension, CARLOTA on nocturnal BiPAP presents to ED for worsening SOB and DOMÍNGUEZ for the past 3 days likely 2/2 COPD exacerbation

## 2021-03-29 NOTE — PROGRESS NOTE ADULT - PROBLEM SELECTOR PLAN 4
- H/H: 11.1 on admission, baseline per prior admissions   - No h/o Bleeding,  No Melena , Hemoptysis , hematochezia , epistaxis  - iron studies wnl, B12 wnl  - CTM

## 2021-03-29 NOTE — H&P ADULT - NSHPPHYSICALEXAM_GEN_ALL_CORE
Physical Exam:     General: NAD, A&Ox4, thin   Eyes: PERRLA, EOMI, Vision grossly intact b/l  ENT: MMM, no stridor, no pharyngeal/tonsilar erythema/edema, hearing grossly intact  Neck: Supple, trachea midline, no JVD, no thyromegaly/nodules, no lymphadenopathy, no ttp  Resp: diminished breath sounds b/l, otherwise CTA b/l, RR 20, speaking in full sentences   Cardio: RRR, nl S1/2, no murmurs, rubs, or gallops  Abd: Soft, NT/ND, +BS  Extremities: no edema, radial/DP pulses 2+ b/l, no acrocyanosis, Cap refill<3 sec  Neuro: Strength 5/5 in UE/LE b/l, sensation equal/intact b/l, CN 2-12 intact  Skin: no rashes, ecchymosis, normal temp, turgur  Lymph: no neck, axilla, groin LAD  Psych: appropriate mood/affect Physical Exam:     General: NAD, A&Ox4, thin   Eyes: PERRLA, EOMI, Vision grossly intact b/l  ENT: MMM, no stridor, no pharyngeal/tonsilar erythema/edema, hearing grossly intact  Neck: Supple, trachea midline, no JVD, no thyromegaly/nodules, no lymphadenopathy, no ttp  Resp: diminished breath sounds b/l, otherwise CTA b/l, RR 20, speaking in full sentences   Cardio: RRR, nl S1/2, no murmurs, rubs, or gallops  Abd: Soft, NT/ND, +BS  Extremities: no edema, radial/DP pulses 2+ b/l, no acrocyanosis, Cap refill<3 sec  Neuro: Strength 5/5 in UE/LE b/l, sensation equal/intact b/l, CN 2-12 intact  Skin: no rashes, ecchymosis, normal temp, turgor  Lymph: no neck, axilla, groin LAD  Psych: appropriate mood/affect

## 2021-03-29 NOTE — H&P ADULT - NSICDXPASTMEDICALHX_GEN_ALL_CORE_FT
PAST MEDICAL HISTORY:  CAD (coronary artery disease)     Chronic systolic CHF (congestive heart failure) EF 20-25% s/p [employer] ICD (9/2018)    COPD (chronic obstructive pulmonary disease) not on home O2    HTN (hypertension)     Pulmonary hypertension moderate    Right-sided heart failure     Sleep apnea cannot tolerate CPAP

## 2021-03-29 NOTE — CONSULT NOTE ADULT - ASSESSMENT
72 yo F PMH HTN, HFrEF 25% s/p ICD, CAD, COPD on chronic 3LNC, pHTN, CARLOTA on BiPAP p/w SOB c/f COPD exacerbation    - VBG w/ normalization of pH post NIPPV   - c/w nebulizers  - would place on 5 day course of prednisone  - continue with nocturnal BiPAP  - c/w symbicort  - will need f/u w/ outpatient pulmonologist on discharge    Wyatt Real MD  PGY-4  PCCM Fellow  Pager [phone]

## 2021-03-29 NOTE — H&P ADULT - NSHPSOCIALHISTORY_GEN_ALL_CORE
Former smoker, 1/2PPD x 40 years. Denies alcohol, illicit drug use.    with 1 son.   Ambulatory with walker at home. Nephew/Godson helps out a few times per week

## 2021-03-29 NOTE — H&P ADULT - PROBLEM SELECTOR PLAN 2
- CXR w/ lung hyperinflation, cephalization, no pulm congestion   - IV Steroids, Azithromycin   - Duonebs q6h  - continue budesonide, formoterol, tiotropium  - continuous pulse ox  - O2 PRN   - Pulm c/s in am - CXR w/ lung hyperinflation, cephalization, no pulm congestion   - 40 Methylprednisolone IV, daily   - Duonebs q6h prn   - continue budesonide, formoterol, tiotropium  - continuous pulse ox  - O2 PRN   - Pulm c/s in am - CXR w/ lung hyperinflation, cephalization, no pulm congestion   - prednisone 40 mg daily   - Duonebs q6h prn   - continue budesonide, formoterol, tiotropium  - continuous pulse ox  - O2 PRN   - Pulm c/s in am

## 2021-03-29 NOTE — PROGRESS NOTE ADULT - PROBLEM SELECTOR PLAN 1
- likely 2/2 COPD exacerbation   - BiPAP PRN and at night  - f/u pulm recs  -check pro-bnp--- [phone]   - low suspicion for PE  - supplemental O2 as needed while at rest; goal O2 sat 88-92% while on supplemental O2.

## 2021-03-29 NOTE — PHYSICAL THERAPY INITIAL EVALUATION ADULT - PERTINENT HX OF CURRENT PROBLEM, REHAB EVAL
71 year old Female with  past medical history of HTN, HFrEF s/p ICD (EF 25%), CAD,  COPD (3L home O2) recent admission in February, Pulm hypertension, CARLOTA on nocturnal BiPAP presents to ED for worsening SOB and DOMÍNGUEZ for the past 3 days. She complains of moderate SOB that was not controllable using her prn inhalers and she began to have trouble ambulating through the house so she came to the ED.

## 2021-03-29 NOTE — H&P ADULT - PROBLEM SELECTOR PLAN 4
- H/H: 11.1 on admission, baseline per prior admissions   - No h/o Bleeding,  No Melena , Hemoptysis , hematochezia , epistaxis  - Will check reticulocyte count, Iron , Ferrtin , TIBC, Vit B12 , Folic Acid - H/H: 11.1 on admission, baseline per prior admissions   - No h/o Bleeding,  No Melena , Hemoptysis , hematochezia , epistaxis  - Will check reticulocyte count, Iron , Ferritin , TIBC, Vit B12 , Folic Acid

## 2021-03-29 NOTE — H&P ADULT - ATTENDING COMMENTS
71 year old Female with  past medical history of HTN, HFrEF s/p ICD (EF 25%), CAD,  COPD (3L home O2) recent admission in February, Pulm hypertension, CARLOTA on nocturnal BiPAP presents to ED for worsening SOB and DOMÍNGUEZ likely 2/2 COPD exacerbation. Pt reports worsening shortness of breath 2/2 second hand smoke exposure but denies cough or sputum production. Comfortable on bipap, will wean off to NC. Continue with PO steroids. Hold abx given no focal infiltrates on CXR and no cough/sputum. Appears euvolemic, low suspicion for ADHF, hold lasix for now. Pulm c/s in AM.

## 2021-03-29 NOTE — ED ADULT NURSE REASSESSMENT NOTE - NS ED NURSE REASSESS COMMENT FT1
Patient received from previous RN. Patient resting quietly in bed. Denies any headache, dizziness, or chest pain. Patient reports her SOB has improved. CPAP in place IPAP 10 EPAP 5 30%. Cardiac monitor in place- sinus rhythm. Safety maintained. Patient stable upon exiting the room.

## 2021-03-29 NOTE — H&P ADULT - PROBLEM SELECTOR PLAN 3
- euvolemic on exam, hold diuretics for now   - pro-BNP  - strict I&Os  - fluid restriction   - low sodium intake   - last TTE 2019 w/ Right/left systolic dysfunction, EF: 25%  - CXR: without pulm congestion

## 2021-03-29 NOTE — PROGRESS NOTE ADULT - ASSESSMENT
71 year old Female with  past medical history of HTN, HFrEF s/p ICD, CAD,  COPD (3L home O2) recent admission in February, Pulm hypertension, CARLOTA on nocturnal BiPAP presents to ED for worsening SOB and DOMÍNGUEZ for the past 3 days likely 2/2 COPD exacerbation. Patient much improved on home 3L.       71 Presented for worsening shortness of breath and DOMÍNGUEZ due to likely COPD exacerbation  - ABG improved pH this AM   - pulm consult   -    - on 2L NC satting 100%  - pred 40 daily   - f/u PT   - patient DNR/DNI in November 2020 ? clarify if patient came w/ MOLST. If not should refill MOLST with patient and confirm still DNR/DNI.  71 year old Female with  past medical history of HTN, HFrEF s/p ICD, CAD,  COPD (3L home O2) recent admission in February, Pulm hypertension, CARLOTA on nocturnal BiPAP presents to ED for worsening SOB and DOMÍNGUEZ for the past 3 days likely 2/2 COPD exacerbation. Patient much improved on home 3L.

## 2021-03-30 NOTE — PROGRESS NOTE ADULT - PROBLEM SELECTOR PLAN 5
- Continue Home BP meds w/ parameters   - monitor BP   - DASH/TLC diet - Continue Home BP meds w/ parameters   - uptitrated coreg to 37.5mg BID due to elevated BP  - monitor BP   - DASH/TLC diet

## 2021-03-30 NOTE — PROGRESS NOTE ADULT - SUBJECTIVE AND OBJECTIVE BOX
INTERVAL HPI/OVERNIGHT EVENTS:  Patient complaining of nasal congestion this AM. States she feels dyspneic, w/o wheezing this AM. Denies any chest pain, lower extremity edema. ROS otherwise negative.     VITAL SIGNS:  Vital Signs Last 24 Hrs  T(C): 36.6 (30 Mar 2021 14:53), Max: 37.1 (29 Mar 2021 21:23)  T(F): 97.8 (30 Mar 2021 14:53), Max: 98.7 (29 Mar 2021 21:23)  HR: 97 (30 Mar 2021 14:53) (80 - 99)  BP: 146/81 (30 Mar 2021 14:53) (141/77 - 168/92)  BP(mean): --  RR: 18 (30 Mar 2021 14:53) (18 - 18)  SpO2: 100% (30 Mar 2021 14:53) (99% - 100%)    PHYSICAL EXAM:  Constitutional: AA female  resting in bed, NAD  HEENT: sclera non-icteric, neck supple, trachea midline, no masses, no JVD, MMM   Respiratory: decreased breath sounds diffusely w/ prolonged expiratory phase, no wheezes, rales, or rhonchi   Cardiovascular: RRR, normal S1S2, no M/R/G  Gastrointestinal: soft, NTND, no masses palpable, BS normal  Extremities: Warm, well perfused, pulses equal bilateral upper and lower extremities, no edema, no clubbing  Neurological: AAOx3, CN Grossly intact  Skin: Normal temperature, warm, dry    MEDICATIONS  (STANDING):  aspirin enteric coated 81 milliGRAM(s) Oral daily  budesonide 160 MICROgram(s)/formoterol 4.5 MICROgram(s) Inhaler 2 Puff(s) Inhalation two times a day  carvedilol 37.5 milliGRAM(s) Oral every 12 hours  enoxaparin Injectable 40 milliGRAM(s) SubCutaneous daily  fluticasone propionate 50 MICROgram(s)/spray Nasal Spray 1 Spray(s) Both Nostrils two times a day  hydrALAZINE 75 milliGRAM(s) Oral three times a day  isosorbide   mononitrate ER Tablet (IMDUR) 30 milliGRAM(s) Oral daily  pantoprazole    Tablet 40 milliGRAM(s) Oral before breakfast  predniSONE   Tablet 40 milliGRAM(s) Oral daily  simvastatin 20 milliGRAM(s) Oral at bedtime  tiotropium 18 MICROgram(s) Capsule 1 Capsule(s) Inhalation daily    MEDICATIONS  (PRN):  ALBUTerol    90 MICROgram(s) HFA Inhaler 1 Puff(s) Inhalation every 6 hours PRN Shortness of Breath  albuterol/ipratropium for Nebulization 3 milliLiter(s) Nebulizer every 6 hours PRN Shortness of Breath and/or Wheezing  sodium chloride 0.65% Nasal 1 Spray(s) Both Nostrils every 4 hours PRN Nasal Congestion        Allergies    No Known Allergies    Intolerances    LABS:                         11.6   2.91  )-----------( 183      ( 29 Mar 2021 07:47 )             36.8     03-29    142  |  98  |  15  ----------------------------<  138<H>  3.9   |  36<H>  |  0.72    Ca    9.5      29 Mar 2021 07:47  Phos  3.7     03-29  Mg     1.7     03-29    TPro  6.4  /  Alb  4.2  /  TBili  0.6  /  DBili  x   /  AST  15  /  ALT  10  /  AlkPhos  44  03-29                  RADIOLOGY, EKG & ADDITIONAL TESTS: Reviewed.

## 2021-03-30 NOTE — PROGRESS NOTE ADULT - PROBLEM SELECTOR PLAN 1
- likely 2/2 COPD exacerbation   - BiPAP PRN and at night  - f/u pulm recs  -check pro-bnp--- 777   - low suspicion for PE  - supplemental O2 as needed while at rest; goal O2 sat 88-92% while on supplemental O2. - likely 2/2 COPD exacerbation   - BiPAP PRN and at night  - f/u pulm recs  -check pro-bnp--- 777   - low suspicion for PE  - supplemental O2 as needed while at rest; goal O2 sat 88-92% while on supplemental O2.  #Nasal Congestion  - starting fluticasone spray

## 2021-03-30 NOTE — PROGRESS NOTE ADULT - ASSESSMENT
71 year old Female with  past medical history of HTN, HFrEF s/p ICD, CAD,  COPD (3L home O2) recent admission in February, Pulm hypertension, CARLOTA on nocturnal BiPAP presents to ED for worsening SOB and DOMÍNGUEZ for the past 3 days likely 2/2 COPD exacerbation. Patient much improved on home 3L. Patient w/ nasal congestion today and subjective dyspnea.

## 2021-03-31 NOTE — PROGRESS NOTE ADULT - ASSESSMENT
71 year old Female with  past medical history of HTN, HFrEF s/p ICD, CAD,  COPD (3L home O2) recent admission in February, Pulm hypertension, CARLOTA on nocturnal BiPAP presents to ED for worsening SOB and DOMÍNGUEZ 2/2 COPD exacerbation. Patient on home 3L, however w/ subjective dyspnea. Calling pulm to re-evaluate patient.

## 2021-03-31 NOTE — PROGRESS NOTE ADULT - SUBJECTIVE AND OBJECTIVE BOX
INTERVAL HPI/OVERNIGHT EVENTS:  Patient states nasal congestion is improved from day prior. Patient on her home 3L NC however feels subjectively short of breath. "I cannot catch my breath". Patient denies any chest pain, lower extremity edema. ROS otherwise negative.     Vital Signs Last 24 Hrs  T(C): 36.8 (31 Mar 2021 12:57), Max: 36.8 (31 Mar 2021 12:57)  T(F): 98.2 (31 Mar 2021 12:57), Max: 98.2 (31 Mar 2021 12:57)  HR: 96 (31 Mar 2021 12:57) (87 - 106)  BP: 139/78 (31 Mar 2021 12:57) (129/77 - 150/90)  BP(mean): --  RR: 18 (31 Mar 2021 12:57) (18 - 20)  SpO2: 100% (31 Mar 2021 12:57) (99% - 100%)    PHYSICAL EXAM:  Constitutional: AA female  resting in bed, NAD  HEENT: sclera non-icteric, neck supple, trachea midline, no masses, no JVD, MMM   Respiratory: decreased breath sounds diffusely w/ prolonged expiratory phase, no wheezes, rales, or rhonchi   Cardiovascular: RRR, normal S1S2, no M/R/G  Gastrointestinal: soft, NTND, no masses palpable, BS normal  Extremities: Warm, well perfused, pulses equal bilateral upper and lower extremities, no edema, no clubbing  Neurological: AAOx3, CN Grossly intact  Skin: Normal temperature, warm, dry    MEDICATIONS  (STANDING):  aspirin enteric coated 81 milliGRAM(s) Oral daily  budesonide 160 MICROgram(s)/formoterol 4.5 MICROgram(s) Inhaler 2 Puff(s) Inhalation two times a day  carvedilol 37.5 milliGRAM(s) Oral every 12 hours  enoxaparin Injectable 40 milliGRAM(s) SubCutaneous daily  fluticasone propionate 50 MICROgram(s)/spray Nasal Spray 1 Spray(s) Both Nostrils two times a day  hydrALAZINE 75 milliGRAM(s) Oral three times a day  isosorbide   mononitrate ER Tablet (IMDUR) 30 milliGRAM(s) Oral daily  pantoprazole    Tablet 40 milliGRAM(s) Oral before breakfast  predniSONE   Tablet 40 milliGRAM(s) Oral daily  simvastatin 20 milliGRAM(s) Oral at bedtime  tiotropium 18 MICROgram(s) Capsule 1 Capsule(s) Inhalation daily    MEDICATIONS  (PRN):  ALBUTerol    90 MICROgram(s) HFA Inhaler 1 Puff(s) Inhalation every 6 hours PRN Shortness of Breath  albuterol/ipratropium for Nebulization 3 milliLiter(s) Nebulizer every 6 hours PRN Shortness of Breath and/or Wheezing  sodium chloride 0.65% Nasal 1 Spray(s) Both Nostrils every 4 hours PRN Nasal Congestion    Allergies  No Known Allergies  Intolerances    LABS:                         9.8    5.73  )-----------( 174      ( 31 Mar 2021 09:23 )             32.3     03-31    144  |  102  |  20  ----------------------------<  99  3.8   |  37<H>  |  0.69    Ca    8.9      31 Mar 2021 09:23  Phos  2.7     03-31  Mg     1.7     03-31    CXR:   Hyperinflation of the lungs as on prior studies. The lungs are clear.                    RADIOLOGY, EKG & ADDITIONAL TESTS: Reviewed.

## 2021-03-31 NOTE — PROGRESS NOTE ADULT - ASSESSMENT
70 yo F PMH HTN, HFrEF 25% s/p ICD, CAD, COPD on chronic 3LNC, pHTN, CARLOTA on BiPAP p/w SOB c/f COPD exacerbation    - c/w nebulizers, can transition to standing w/ prn if worsening SOB/wheezing  - would place on 5 day course of prednisone, would not increase dose at this time  - continue with nocturnal BiPAP  - c/w symbicort  - c/w fluticasone  - will need f/u w/ outpatient pulmonologist on discharge    Wyatt Real MD  PGY-4  PCCM Fellow  Pager [phone]

## 2021-03-31 NOTE — PROGRESS NOTE ADULT - PROBLEM SELECTOR PLAN 1
- likely 2/2 COPD exacerbation   - BiPAP PRN and at night  - f/u pulm recs-- calling pulmonary for re-eval given persistent subjective dyspnea  -check pro-bnp--- 777   - low suspicion for PE  - supplemental O2 as needed while at rest; goal O2 sat 88-92% while on supplemental O2.  - cxr today to evaluate etiology of worsening dyspnea  #Nasal Congestion  - c/w fluticasone spray-- patient reports improvement

## 2021-03-31 NOTE — PROGRESS NOTE ADULT - SUBJECTIVE AND OBJECTIVE BOX
CHIEF COMPLAINT:    Interval Events:  Reports feeling more SOB this AM, though feels well this afternoon. Denies wheezing    REVIEW OF SYSTEMS:  Constitutional: [x] negative [ ] fevers [ ] chills [ ] weight loss [ ] weight gain  HEENT: [x] negative [ ] dry eyes [ ] eye irritation [ ] postnasal drip [ ] nasal congestion  CV: [x] negative  [ ] chest pain [ ] orthopnea [ ] palpitations [ ] murmur  Resp: [ ] negative [ ] cough [x] shortness of breath [ ] dyspnea [ ] wheezing [ ] sputum [ ] hemoptysis  GI: [x] negative [ ] nausea [ ] vomiting [ ] diarrhea [ ] constipation [ ] abd pain [ ] dysphagia   : [x] negative [ ] dysuria [ ] nocturia [ ] hematuria [ ] increased urinary frequency  Musculoskeletal: [ ] negative [ ] back pain [ ] myalgias [ ] arthralgias [ ] fracture  Skin: [ ] negative [ ] rash [ ] itch  Neurological: [ ] negative [ ] headache [ ] dizziness [ ] syncope [ ] weakness [ ] numbness  Psychiatric: [ ] negative [ ] anxiety [ ] depression  Endocrine: [ ] negative [ ] diabetes [ ] thyroid problem  Hematologic/Lymphatic: [ ] negative [ ] anemia [ ] bleeding problem  Allergic/Immunologic: [ ] negative [ ] itchy eyes [ ] nasal discharge [ ] hives [ ] angioedema  [ ] All other systems negative  [ ] Unable to assess ROS because ________    OBJECTIVE:  ICU Vital Signs Last 24 Hrs  T(C): 36.8 (31 Mar 2021 12:57), Max: 36.8 (31 Mar 2021 12:57)  T(F): 98.2 (31 Mar 2021 12:57), Max: 98.2 (31 Mar 2021 12:57)  HR: 96 (31 Mar 2021 15:33) (87 - 106)  BP: 139/78 (31 Mar 2021 12:57) (139/78 - 150/90)  BP(mean): --  ABP: --  ABP(mean): --  RR: 18 (31 Mar 2021 12:57) (18 - 20)  SpO2: 100% (31 Mar 2021 15:33) (99% - 100%)        CAPILLARY BLOOD GLUCOSE          PHYSICAL EXAM:  GEN: Elderly female, NAD  HEENT: EOMI  CV: RR  PULM: Distant, but no wheezes or increased WOB  ABD: Soft  EXT: [facility]    HOSPITAL MEDICATIONS:  MEDICATIONS  (STANDING):  aspirin enteric coated 81 milliGRAM(s) Oral daily  budesonide 160 MICROgram(s)/formoterol 4.5 MICROgram(s) Inhaler 2 Puff(s) Inhalation two times a day  carvedilol 37.5 milliGRAM(s) Oral every 12 hours  enoxaparin Injectable 40 milliGRAM(s) SubCutaneous daily  fluticasone propionate 50 MICROgram(s)/spray Nasal Spray 1 Spray(s) Both Nostrils two times a day  hydrALAZINE 75 milliGRAM(s) Oral three times a day  isosorbide   mononitrate ER Tablet (IMDUR) 30 milliGRAM(s) Oral daily  pantoprazole    Tablet 40 milliGRAM(s) Oral before breakfast  predniSONE   Tablet 40 milliGRAM(s) Oral daily  simvastatin 20 milliGRAM(s) Oral at bedtime  tiotropium 18 MICROgram(s) Capsule 1 Capsule(s) Inhalation daily    MEDICATIONS  (PRN):  ALBUTerol    90 MICROgram(s) HFA Inhaler 1 Puff(s) Inhalation every 6 hours PRN Shortness of Breath  albuterol/ipratropium for Nebulization 3 milliLiter(s) Nebulizer every 6 hours PRN Shortness of Breath and/or Wheezing  sodium chloride 0.65% Nasal 1 Spray(s) Both Nostrils every 4 hours PRN Nasal Congestion      LABS:                        9.8    5.73  )-----------( 174      ( 31 Mar 2021 09:23 )             32.3     Hgb Trend: 9.8<--, 11.6<--, 11.1<--  03-31    144  |  102  |  20  ----------------------------<  99  3.8   |  37<H>  |  0.69    Ca    8.9      31 Mar 2021 09:23  Phos  2.7     03-31  Mg     1.7     03-31      Creatinine Trend: 0.69<--, 0.72<--, 0.80<--            MICROBIOLOGY:       RADIOLOGY:  [ ] Reviewed and interpreted by me    PULMONARY FUNCTION TESTS:    EKG:

## 2021-03-31 NOTE — PROGRESS NOTE ADULT - PROBLEM SELECTOR PLAN 4
- H/H: 11.1 on admission, baseline per prior admissions, hgb downtrending unclear etiology. iron studies added on.   - No h/o Bleeding,  No Melena , Hemoptysis , hematochezia , epistaxis  - CTM

## 2021-04-01 NOTE — PROGRESS NOTE ADULT - SUBJECTIVE AND OBJECTIVE BOX
INTERVAL HPI/OVERNIGHT EVENTS:  Patient states nasal congestion is improved from day prior. Patient on her home 3L NC however feels subjectively short of breath. "I still cannot catch my breath". Patient denies any chest pain, lower extremity edema. ROS otherwise negative.     Vital Signs Last 24 Hrs  T(C): 36.6 (01 Apr 2021 13:10), Max: 37.2 (31 Mar 2021 18:26)  T(F): 97.9 (01 Apr 2021 13:10), Max: 98.9 (31 Mar 2021 18:26)  HR: 96 (01 Apr 2021 15:20) (79 - 101)  BP: 135/73 (01 Apr 2021 13:10) (135/73 - 153/84)  BP(mean): --  RR: 18 (01 Apr 2021 13:10) (18 - 19)  SpO2: 100% (01 Apr 2021 15:20) (100% - 100%)    PHYSICAL EXAM:  Constitutional: AA female  resting in bed, NAD  HEENT: sclera non-icteric, neck supple, trachea midline, no masses, no JVD, MMM   Respiratory: decreased breath sounds diffusely w/ prolonged expiratory phase, no wheezes, rales, or rhonchi   Cardiovascular: RRR, normal S1S2, no M/R/G  Gastrointestinal: soft, NTND, no masses palpable, BS normal  Extremities: Warm, well perfused, pulses equal bilateral upper and lower extremities, no edema, no clubbing  Neurological: AAOx3, CN Grossly intact  Skin: Normal temperature, warm, dry    MEDICATIONS  (STANDING):  albuterol/ipratropium for Nebulization 3 milliLiter(s) Nebulizer every 6 hours  aspirin enteric coated 81 milliGRAM(s) Oral daily  budesonide 160 MICROgram(s)/formoterol 4.5 MICROgram(s) Inhaler 2 Puff(s) Inhalation two times a day  carvedilol 37.5 milliGRAM(s) Oral every 12 hours  enoxaparin Injectable 40 milliGRAM(s) SubCutaneous daily  fluticasone propionate 50 MICROgram(s)/spray Nasal Spray 1 Spray(s) Both Nostrils two times a day  hydrALAZINE 75 milliGRAM(s) Oral three times a day  isosorbide   mononitrate ER Tablet (IMDUR) 30 milliGRAM(s) Oral daily  pantoprazole    Tablet 40 milliGRAM(s) Oral before breakfast  simvastatin 20 milliGRAM(s) Oral at bedtime  tiotropium 18 MICROgram(s) Capsule 1 Capsule(s) Inhalation daily    MEDICATIONS  (PRN):  sodium chloride 0.65% Nasal 1 Spray(s) Both Nostrils every 4 hours PRN Nasal Congestion    Allergies  No Known Allergies  Intolerances    LABS:                         10.9   6.13  )-----------( 174      ( 01 Apr 2021 07:16 )             35.6     04-01    143  |  101  |  27<H>  ----------------------------<  86  4.0   |  37<H>  |  0.87    Ca    9.2      01 Apr 2021 07:16  Phos  3.3     04-01  Mg     1.9     04-01    CXR:   Hyperinflation of the lungs as on prior studies. The lungs are clear.                    RADIOLOGY, EKG & ADDITIONAL TESTS: Reviewed.

## 2021-04-01 NOTE — PROGRESS NOTE ADULT - PROBLEM SELECTOR PLAN 8
DVT ppx: Lovenox, OOB w/ assistance   PT c/s--- home no skilled PT needs

## 2021-04-01 NOTE — PROGRESS NOTE ADULT - PROBLEM SELECTOR PLAN 1
- likely 2/2 COPD exacerbation   - BiPAP PRN and at night  - f/u pulm recs-- ATC nebs  -check pro-bnp--- 777   - low suspicion for PE  - supplemental O2 as needed while at rest; goal O2 sat 88-92% while on supplemental O2.  - repeat x-ray w/o infiltrate or pulmonary edema, continued dyspnea likely 2/2 to COPD exacerbation   #Nasal Congestion  - c/w fluticasone spray-- patient reports improvement

## 2021-04-01 NOTE — PROGRESS NOTE ADULT - PROBLEM SELECTOR PLAN 4
- H/H: 11.1 on admission, currently 10.9 iron studies added on wnl. B12/folate wnl. Unclear etiology CTM.  - No h/o Bleeding,  No Melena , Hemoptysis , hematochezia , epistaxis - H/H: 11.1 on admission, currently 10.9 iron studies added on wnl. B12/folate wnl. Unclear etiology. Hypoproliferative.   - No h/o Bleeding,  No Melena , Hemoptysis , hematochezia , epistaxis  - can have outpatient heme anemia workup

## 2021-04-01 NOTE — PROGRESS NOTE ADULT - PROBLEM SELECTOR PLAN 7
Resolved present on admission DVT ppx: Lovenox, OOB w/ assistance   PT c/s--- home no skilled PT needs

## 2021-04-01 NOTE — PROGRESS NOTE ADULT - ASSESSMENT
71 year old Female with  past medical history of HTN, HFrEF s/p ICD, CAD,  COPD (3L home O2) recent admission in February, Pulm hypertension, CARLOTA on nocturnal BiPAP presents to ED for worsening SOB and DOMÍNGUEZ 2/2 COPD exacerbation. Patient on home 3L, however w/ subjective dyspnea. Pulm re-evaluated recommending ATC nebs, ordered. Patient remains admitted for continued dyspnea 2/2 to COPD exacerbation.

## 2021-04-02 NOTE — PROGRESS NOTE ADULT - ASSESSMENT
72 yo F PMH HTN, HFrEF 25% s/p ICD, CAD, COPD on chronic 3LNC, pHTN, CARLOTA on BiPAP p/w SOB c/f COPD exacerbation    - c/w nebulizers, can transition to prn to assess wheezing off bronchodilators  - c/w 5 day course of prednisone  - continue with nocturnal BiPAP  - c/w symbicort  - c/w fluticasone  - saturating 100% on home 3LNC  - reports that difficulty breathing is 2/2 nasal congestion, recommended ocean spray use; please transition to humidified nasal cannula. recommend nasal clearance  - pulmonary to sign off, will need outpatient pulmonary follow up  Please email: [email or web address] to setup an appointment prior to discharge. Include the patient's name, , MRN and contact information in the email.      Pulmonary/Sleep Clinic  [address] [place] [address]  [phone]      Wyatt Real MD  PGY-4  PCCM Fellow  Pager [phone]   70 yo F PMH HTN, HFrEF 25% s/p ICD, CAD, COPD on chronic 3LNC, pHTN, CARLOTA on BiPAP p/w SOB c/f COPD exacerbation    - c/w nebulizers, can transition to prn to assess wheezing off bronchodilators  - can do slow pred taper (40mg x5 days, followed by 30mg x5 days, 20mg x5 days, until f/u with pulmonary)  - continue with nocturnal BiPAP  - c/w symbicort  - c/w fluticasone  - saturating 100% on home 3LNC  - reports that difficulty breathing is 2/2 nasal congestion, recommended ocean spray use; please transition to humidified nasal cannula. recommend nasal clearance  - pulmonary to sign off, will need outpatient pulmonary follow up  Please email: [email or web address] to setup an appointment prior to discharge. Include the patient's name, , MRN and contact information in the email.      Pulmonary/Sleep Clinic  [address] [place] [address]  [phone]      Wyatt Real MD  PGY-4  PCCM Fellow  Pager [phone]   70 yo F PMH HTN, HFrEF 25% s/p ICD, CAD, COPD on chronic 3LNC, pHTN, CARLOTA on BiPAP p/w SOB c/f COPD exacerbation    - c/w nebulizers, can transition to prn to assess wheezing off bronchodilators  - can do slow pred taper (40mg x5 days, followed by 30mg x5 days, 20mg x5 days, until f/u with pulmonary)  - continue with nocturnal BiPAP  - c/w symbicort  - c/w fluticasone  - c/w spiriva  - saturating 100% on home 3LNC  - reports that difficulty breathing is 2/2 nasal congestion, recommended ocean spray use; please transition to humidified nasal cannula. recommend nasal clearance  - pulmonary to sign off, will need outpatient pulmonary follow up  Please email: [email or web address] to setup an appointment prior to discharge. Include the patient's name, , MRN and contact information in the email.      Pulmonary/Sleep Clinic  [address] [place] [address]  [phone]      Wyatt Real MD  PGY-4  PCCM Fellow  Pager [phone]

## 2021-04-02 NOTE — PROGRESS NOTE ADULT - SUBJECTIVE AND OBJECTIVE BOX
INTERVAL HPI/OVERNIGHT EVENTS:  Patient complaining of nasal congestion. Patient on her home 3L NC however still w/ subjective dyspnea. Patient denies any chest pain. ROS otherwise negative.      Vital Signs Last 24 Hrs  T(C): 36.7 (02 Apr 2021 12:51), Max: 36.8 (01 Apr 2021 21:54)  T(F): 98 (02 Apr 2021 12:51), Max: 98.3 (01 Apr 2021 21:54)  HR: 88 (02 Apr 2021 12:51) (81 - 103)  BP: 138/77 (02 Apr 2021 12:51) (138/77 - 159/96)  BP(mean): --  RR: 18 (02 Apr 2021 12:51) (18 - 18)  SpO2: 100% (02 Apr 2021 12:51) (98% - 100%)    PHYSICAL EXAM:  Constitutional: AA female  resting in bed, NAD  HEENT: sclera non-icteric, neck supple, trachea midline, no masses, no JVD, MMM   Respiratory: decreased breath sounds diffusely w/ prolonged expiratory phase, no wheezes, rales, or rhonchi   Cardiovascular: RRR, normal S1S2, no M/R/G  Gastrointestinal: soft, NTND, no masses palpable, BS normal  Extremities: Warm, well perfused, pulses equal bilateral upper and lower extremities, no edema, no clubbing  Neurological: AAOx3, CN Grossly intact  Skin: Normal temperature, warm, dry    MEDICATIONS  (STANDING):  albuterol/ipratropium for Nebulization 3 milliLiter(s) Nebulizer every 6 hours  aspirin enteric coated 81 milliGRAM(s) Oral daily  budesonide 160 MICROgram(s)/formoterol 4.5 MICROgram(s) Inhaler 2 Puff(s) Inhalation two times a day  carvedilol 37.5 milliGRAM(s) Oral every 12 hours  enoxaparin Injectable 40 milliGRAM(s) SubCutaneous daily  fluticasone propionate 50 MICROgram(s)/spray Nasal Spray 1 Spray(s) Both Nostrils two times a day  furosemide    Tablet 20 milliGRAM(s) Oral daily  hydrALAZINE 75 milliGRAM(s) Oral three times a day  isosorbide   mononitrate ER Tablet (IMDUR) 30 milliGRAM(s) Oral daily  pantoprazole    Tablet 40 milliGRAM(s) Oral before breakfast  predniSONE   Tablet 40 milliGRAM(s) Oral daily  simvastatin 20 milliGRAM(s) Oral at bedtime  tiotropium 18 MICROgram(s) Capsule 1 Capsule(s) Inhalation daily    MEDICATIONS  (PRN):  sodium chloride 0.65% Nasal 1 Spray(s) Both Nostrils every 4 hours PRN Nasal Congestion      Allergies  No Known Allergies  Intolerances    LABS:                         10.0   5.84  )-----------( 178      ( 02 Apr 2021 07:36 )             32.0     04-02    139  |  98  |  27<H>  ----------------------------<  90  3.8   |  37<H>  |  0.79    Ca    8.8      02 Apr 2021 07:36  Phos  3.7     04-02  Mg     2.0     04-02    CXR:   Hyperinflation of the lungs as on prior studies. The lungs are clear.                    RADIOLOGY, EKG & ADDITIONAL TESTS: Reviewed.

## 2021-04-02 NOTE — DISCHARGE NOTE PROVIDER - NSDCFUADDAPPT_GEN_ALL_CORE_FT
Please follow up with your primary care provider in 1 to 2 weeks for further care. If you don't have a primary care provider please follow up at our Medicine Clinic at  [facility] [place] [address] [phone] or [phone]  (please call to make appointment)

## 2021-04-02 NOTE — PROGRESS NOTE ADULT - SUBJECTIVE AND OBJECTIVE BOX
CHIEF COMPLAINT:    Interval Events:  NAEO. Reports this morning difficulty breathing iso nasal congestion. Has been using fluticasone. Also has ocean spray nasal solution at bedside but has not been using it. Reports that her breathing from a wheezing/chest standpoint is significantly improved.     REVIEW OF SYSTEMS:  Constitutional: [x] negative [ ] fevers [ ] chills [ ] weight loss [ ] weight gain  HEENT: [ ] negative [ ] dry eyes [ ] eye irritation [ ] postnasal drip [x] nasal congestion  CV: [x] negative  [ ] chest pain [ ] orthopnea [ ] palpitations [ ] murmur  Resp: [x] negative [ ] cough [ ] shortness of breath [ ] dyspnea [ ] wheezing [ ] sputum [ ] hemoptysis  GI: [x] negative [ ] nausea [ ] vomiting [ ] diarrhea [ ] constipation [ ] abd pain [ ] dysphagia   : [x] negative [ ] dysuria [ ] nocturia [ ] hematuria [ ] increased urinary frequency  Musculoskeletal: [ ] negative [ ] back pain [ ] myalgias [ ] arthralgias [ ] fracture  Skin: [ ] negative [ ] rash [ ] itch  Neurological: [ ] negative [ ] headache [ ] dizziness [ ] syncope [ ] weakness [ ] numbness  Psychiatric: [ ] negative [ ] anxiety [ ] depression  Endocrine: [ ] negative [ ] diabetes [ ] thyroid problem  Hematologic/Lymphatic: [ ] negative [ ] anemia [ ] bleeding problem  Allergic/Immunologic: [ ] negative [ ] itchy eyes [ ] nasal discharge [ ] hives [ ] angioedema  [ ] All other systems negative  [ ] Unable to assess ROS because ________    OBJECTIVE:  ICU Vital Signs Last 24 Hrs  T(C): 36.8 (02 Apr 2021 06:10), Max: 36.8 (01 Apr 2021 21:54)  T(F): 98.2 (02 Apr 2021 06:10), Max: 98.3 (01 Apr 2021 21:54)  HR: 82 (02 Apr 2021 06:10) (81 - 103)  BP: 159/96 (02 Apr 2021 06:10) (135/73 - 159/96)  BP(mean): --  ABP: --  ABP(mean): --  RR: 18 (02 Apr 2021 06:10) (18 - 18)  SpO2: 100% (02 Apr 2021 06:10) (98% - 100%)        CAPILLARY BLOOD GLUCOSE          PHYSICAL EXAM:  GEN: Elderly female,  NAD  HEENT: EOMI  CV: RR   PULM: Clear, no wheezes, no tachypnea  ABD: Soft  EXT: [facility]    HOSPITAL MEDICATIONS:  MEDICATIONS  (STANDING):  albuterol/ipratropium for Nebulization 3 milliLiter(s) Nebulizer every 6 hours  aspirin enteric coated 81 milliGRAM(s) Oral daily  budesonide 160 MICROgram(s)/formoterol 4.5 MICROgram(s) Inhaler 2 Puff(s) Inhalation two times a day  carvedilol 37.5 milliGRAM(s) Oral every 12 hours  enoxaparin Injectable 40 milliGRAM(s) SubCutaneous daily  fluticasone propionate 50 MICROgram(s)/spray Nasal Spray 1 Spray(s) Both Nostrils two times a day  furosemide    Tablet 20 milliGRAM(s) Oral daily  hydrALAZINE 75 milliGRAM(s) Oral three times a day  isosorbide   mononitrate ER Tablet (IMDUR) 30 milliGRAM(s) Oral daily  pantoprazole    Tablet 40 milliGRAM(s) Oral before breakfast  predniSONE   Tablet 40 milliGRAM(s) Oral daily  simvastatin 20 milliGRAM(s) Oral at bedtime  tiotropium 18 MICROgram(s) Capsule 1 Capsule(s) Inhalation daily    MEDICATIONS  (PRN):  sodium chloride 0.65% Nasal 1 Spray(s) Both Nostrils every 4 hours PRN Nasal Congestion      LABS:                        10.0   5.84  )-----------( 178      ( 02 Apr 2021 07:36 )             32.0     Hgb Trend: 10.0<--, 10.9<--, 9.8<--, 11.6<--, 11.1<--  04-02    139  |  98  |  27<H>  ----------------------------<  90  3.8   |  37<H>  |  0.79    Ca    8.8      02 Apr 2021 07:36  Phos  3.7     04-02  Mg     2.0     04-02      Creatinine Trend: 0.79<--, 0.87<--, 0.69<--, 0.72<--, 0.80<--            MICROBIOLOGY:       RADIOLOGY:  [ ] Reviewed and interpreted by me    PULMONARY FUNCTION TESTS:    EKG:

## 2021-04-02 NOTE — DISCHARGE NOTE PROVIDER - CARE PROVIDER_API CALL
Marisela Teran)  Critical Care Medicine; Internal Medicine; Pulmonary Disease  [address] [place] [address]  Phone: [phone]  Fax: [phone]  Follow Up Time: 1 week

## 2021-04-02 NOTE — PROGRESS NOTE ADULT - PROBLEM SELECTOR PLAN 1
- likely 2/2 COPD exacerbation   - BiPAP PRN and at night  - f/u pulm recs-- recommending steroid taper 40mg x 5 days, 30mg x 5 days, 20mg x 5 days until follow up w/ pulmonary   -check pro-bnp--- 777   - low suspicion for PE  - supplemental O2 as needed while at rest; goal O2 sat 88-92% while on supplemental O2.  - repeat x-ray w/o infiltrate or pulmonary edema, continued dyspnea likely 2/2 to COPD exacerbation   #Nasal Congestion  - c/w fluticasone spray-- patient reports improvement  - encourage ocean spray use

## 2021-04-02 NOTE — DISCHARGE NOTE PROVIDER - NSDCMRMEDTOKEN_GEN_ALL_CORE_FT
albuterol 2.5 mg/3 mL (0.083%) inhalation solution: 3 milliliter(s) inhaled every 6 hours, As Needed  aspirin 81 mg oral delayed release tablet: 1 tab(s) orally once a day  carvedilol 25 mg oral tablet: 1 tab(s) orally 2 times a day  hydrALAZINE 25 mg oral tablet: 3 tab(s) orally 3 times a day  isosorbide mononitrate 30 mg oral tablet, extended release: 1 tab(s) orally once a day (in the morning)  Lasix 20 mg oral tablet: 1 tab(s) orally once a day, As Needed  pantoprazole 40 mg oral delayed release tablet: 1 tab(s) orally once a day (before a meal)  simvastatin 20 mg oral tablet: 1 tab(s) orally once a day (at bedtime)  Symbicort 160 mcg-4.5 mcg/inh inhalation aerosol: 2 puff(s) inhaled 2 times a day  tiotropium 18 mcg inhalation capsule: 1 cap(s) inhaled once a day   albuterol 2.5 mg/3 mL (0.083%) inhalation solution: 3 milliliter(s) inhaled every 6 hours, As Needed  aspirin 81 mg oral delayed release tablet: 1 tab(s) orally once a day  hydrALAZINE 25 mg oral tablet: 3 tab(s) orally 3 times a day  isosorbide mononitrate 30 mg oral tablet, extended release: 1 tab(s) orally once a day (in the morning)  Lasix 20 mg oral tablet: 1 tab(s) orally once a day, As Needed  pantoprazole 40 mg oral delayed release tablet: 1 tab(s) orally once a day (before a meal)  simvastatin 20 mg oral tablet: 1 tab(s) orally once a day (at bedtime)  Symbicort 160 mcg-4.5 mcg/inh inhalation aerosol: 2 puff(s) inhaled 2 times a day  tiotropium 18 mcg inhalation capsule: 1 cap(s) inhaled once a day   aspirin 81 mg oral delayed release tablet: 1 tab(s) orally once a day  budesonide-formoterol 160 mcg-4.5 mcg/inh inhalation aerosol: 2 puff(s) inhaled 2 times a day   carvedilol 12.5 mg oral tablet: 3 tab(s) orally every 12 hours  fluticasone 50 mcg/inh nasal spray: 1 spray(s) nasal 2 times a day  hydrALAZINE 25 mg oral tablet: 3 tab(s) orally 3 times a day  ipratropium-albuterol 0.5 mg-2.5 mg/3 mLinhalation solution: 3 milliliter(s) inhaled every 6 hours  isosorbide mononitrate 30 mg oral tablet, extended release: 1 tab(s) orally once a day (in the morning)  Lasix 20 mg oral tablet: 1 tab(s) orally once a day, As Needed  pantoprazole 40 mg oral delayed release tablet: 1 tab(s) orally once a day (before a meal)  predniSONE 10 mg oral tablet: 3 tab(s) orally once a day  predniSONE 20 mg oral tablet: 1 tab(s) orally once a day  start 4/9 until pulmonary appointment. If appointment not made by 4/23 please call primary care provider  simvastatin 20 mg oral tablet: 1 tab(s) orally once a day (at bedtime)  sodium chloride 0.65% nasal spray: 1 spray(s) nasal 4 times a day, As Needed  for nasal congestion   tiotropium 18 mcg inhalation capsule: 1 cap(s) inhaled once a day

## 2021-04-02 NOTE — DISCHARGE NOTE NURSING/CASE MANAGEMENT/SOCIAL WORK - NSDCCRTYPESERV_GEN_ALL_CORE_FT
Ambulance transport for Saturday 4/3 4pm pickup; trip# [id number]  Home attendant to resume upon discharge Ambulance transport for Saturday 4/3 4pm pickup; trip# [id number]  Home attendant to resume upon discharge

## 2021-04-02 NOTE — DISCHARGE NOTE NURSING/CASE MANAGEMENT/SOCIAL WORK - PATIENT PORTAL LINK FT
You can access the FollowMyHealth Patient Portal offered by [facility] by registering at the following website: [email or web address]. By joining [employer]’s FollowMyHealth portal, you will also be able to view your health information using other applications (apps) compatible with our system.

## 2021-04-02 NOTE — PROGRESS NOTE ADULT - ATTENDING COMMENTS
70 y/o F with PMH as above here with acute on chronic hypoxic respiratory failure  Became more dyspneic this morning, possible AE-COPD  Cont duonebs q6 atc, prednisone 40 mg QD for five days  CXR with no active infiltrate, no abx at this time is necessary   Agree with triple therapy for COPD with Symbicort and Spiriva   Rest of plan as above.
72 y/o F with PMH as above here with acute on chronic hypoxic respiratory failure  Feels improved, has an AE-COPD  Cont duonebs q6 atc, prednisone 40 mg QD for an extended taper as above  CXR with no active infiltrate, no abx at this time is necessary   Agree with triple therapy for COPD with Symbicort and Spiriva   Rest of plan as above.

## 2021-04-02 NOTE — PROGRESS NOTE ADULT - PROBLEM SELECTOR PLAN 4
- H/H: 11.1 on admission, currently 10.0 iron studies added on wnl. B12/folate wnl. Unclear etiology. Hypoproliferative.   - No h/o Bleeding,  No Melena , Hemoptysis , hematochezia , epistaxis  - can have outpatient heme anemia workup

## 2021-04-02 NOTE — PROGRESS NOTE ADULT - ASSESSMENT
71 year old Female with past medical history of HTN, HFrEF s/p ICD, CAD,  COPD (3L home O2) recent admission in February, Pulm hypertension, CARLOTA on nocturnal BiPAP presents to ED for worsening SOB and DOMÍNGUEZ 2/2 COPD exacerbation. Patient on home 3L, however w/ subjective dyspnea. Pulm re-evaluated recommending steroid taper. Patient remains admitted for continued dyspnea 2/2 to COPD exacerbation.

## 2021-04-02 NOTE — DISCHARGE NOTE PROVIDER - NSDCCPCAREPLAN_GEN_ALL_CORE_FT
PRINCIPAL DISCHARGE DIAGNOSIS  Diagnosis: COPD (chronic obstructive pulmonary disease)  Assessment and Plan of Treatment: Continue home Oxygen during the day and BiPAP at night. Continue Prednisone taper as directed. Continue budesonide, formoterol, tiotropium as prescribed. Follow up with your Pulmonologist, Dr. Teran, within 1 week.       PRINCIPAL DISCHARGE DIAGNOSIS  Diagnosis: COPD (chronic obstructive pulmonary disease)  Assessment and Plan of Treatment: Continue home Oxygen during the day and BiPAP at night. Continue Prednisone taper as directed. Continue budesonide, formoterol, tiotropium as prescribed. Follow up with your Pulmonologist, Dr. Teran, within 1 week.      SECONDARY DISCHARGE DIAGNOSES  Diagnosis: Chronic systolic CHF (congestive heart failure)  Assessment and Plan of Treatment: Please follow a heart healthy diet. Monitor weight daily and if you are gaining unexpected weight, develop severe lower swelling or pain, shortness of breath, chest pain, weakness, dizziness, abdominal pain, nausea, or vomiting please seek medial attention. Please follow up with your primary care and cardiologist for further evaluation and managment within 1 to 2 weeks. Please call to make an appointment.    Diagnosis: Anemia, unspecified type  Assessment and Plan of Treatment: During admission you were found to be slightly anemic. Your blood count has remained stable. Please follow-up with your outpatient provider for further monitoring of your blood counts in 1 to 2 weeks. Monitor for signs/symptoms indicating worsening of disease, such as headache, dizziness, blurry vision, easy bleeding/bruising, pale skin, fatigue, dizziness, increased heart rate, or chest pain and return to ER if any such symptoms develop    Diagnosis: Hypertension, unspecified type  Assessment and Plan of Treatment: Continue blood pressure medication regimen as prescribed. Please note you are now taking an increased dose of coreg. Monitor for any visual changes, headachesz dizziness, chest pain or shortness of breath and return to ER if any such symptoms arise. Monitor blood pressure regularly. Please follow up with your primary care provider in 2 weeks    Diagnosis: Coronary artery disease involving native coronary artery of native heart without angina pectoris  Assessment and Plan of Treatment: Continue your aspirin and Please follow up with your primary care provider or cardiologist in 1 to 2 weeks for further care. If you don't have a primary care provider please follow up at our Medicine Clinic at  [address] [place] [address] [phone] or [phone]  (please call to make appointment)    Diagnosis: History of nasal congestion  Assessment and Plan of Treatment: Continue your nasal sprays for nasal congestion     PRINCIPAL DISCHARGE DIAGNOSIS  Diagnosis: COPD (chronic obstructive pulmonary disease)  Assessment and Plan of Treatment: Continue home Oxygen during the day and BiPAP at night. Continue Prednisone taper as directed (30 mg orally once a day for 5 days starting 4/4/21, THEN starting 4/9/21 take 20mg orally once daily UNTIL YOUR PULMONARY APPOINTMENT) If you do not have an appointment scheduled by 4/23 (when your steroids run out, please call your primary care provider or go to an urgent care). Continue your inhalers as prescribed. Follow up with your Pulmonologist, Dr. Teran, within 1 week.  Please follow up with your primary care provider in 1 to 2 weeks for further care. If you don't have a primary care provider please follow up at our Medicine Clinic at  [address] [place] [address] [phone] or [phone]  (please call to make appointment)      SECONDARY DISCHARGE DIAGNOSES  Diagnosis: Chronic systolic CHF (congestive heart failure)  Assessment and Plan of Treatment: Please follow a heart healthy diet. Monitor weight daily and if you are gaining unexpected weight, develop severe lower swelling or pain, shortness of breath, chest pain, weakness, dizziness, abdominal pain, nausea, or vomiting please seek medial attention. Please follow up with your primary care and cardiologist for further evaluation and managment within 1 to 2 weeks. Please call to make an appointment.    Diagnosis: Anemia, unspecified type  Assessment and Plan of Treatment: During admission you were found to be slightly anemic. Your blood count has remained stable. Please follow-up with your outpatient provider for further monitoring of your blood counts in 1 to 2 weeks. Monitor for signs/symptoms indicating worsening of disease, such as headache, dizziness, blurry vision, easy bleeding/bruising, pale skin, fatigue, dizziness, increased heart rate, or chest pain and return to ER if any such symptoms develop    Diagnosis: Hypertension, unspecified type  Assessment and Plan of Treatment: Continue blood pressure medication regimen as prescribed. Please note you are now taking an increased dose of coreg. Monitor for any visual changes, headachesz dizziness, chest pain or shortness of breath and return to ER if any such symptoms arise. Monitor blood pressure regularly. Please follow up with your primary care provider in 2 weeks    Diagnosis: Coronary artery disease involving native coronary artery of native heart without angina pectoris  Assessment and Plan of Treatment: Continue your aspirin and Please follow up with your primary care provider or cardiologist in 1 to 2 weeks for further care. If you don't have a primary care provider please follow up at our Medicine Clinic at  [facility] [place] [address] [phone] or [phone]  (please call to make appointment)    Diagnosis: History of nasal congestion  Assessment and Plan of Treatment: Continue your nasal sprays for nasal congestion

## 2021-04-02 NOTE — DISCHARGE NOTE PROVIDER - NSDCFUSCHEDAPPT_GEN_ALL_CORE_FT
TJ MELENDEZ S ; 04/05/2021 ; [facility] [id number]  TJ MELENDEZ ; 06/03/2021 ; [facility] [phone]

## 2021-04-02 NOTE — DISCHARGE NOTE PROVIDER - HOSPITAL COURSE
71 year old Female with PMH of HTN, HFrEF s/p ICD, CAD,  COPD (3L home O2) recent admission in February, Pulm hypertension, CARLOTA on nocturnal BiPAP presents to ED for worsening SOB and DOMÍNGUEZ 2/2 COPD exacerbation. Patient on home 3L and BiPAP at night, however w/ subjective dyspnea. Pulm consulted and recommended a slow steroid taper and outpatient follow up with patient's pulmonologist. Low suspicion for PE per Pulm. CXR w/o infiltrate or pulmonary edema, continued dyspnea likely 2/2 to COPD exacerbation. Duonebs around the clock. Continue budesonide, formoterol, tiotropium. Patient also c/o nasal congestion with improvement with fluticasone spray and ocean spray. Patient also anemic, Hgb 10.0 (11.1 on admission), iron studies wnl. B12/folate wnl. Unclear etiology. No evidence of bleeding. Outpatient anemia workup. PT recommended home no skilled PT needs. 71 year old Female with past medical history of HTN, HFrEF s/p ICD, CAD,  COPD (3L home O2) recent admission in February, Pulm hypertension, CARLOTA on nocturnal BiPAP presents to ED for worsening SOB and DOMÍNGUEZ 2/2 COPD exacerbation. Patient on home 3L, however w/ subjective dyspnea. Pulm re-evaluated recommending steroid taper. Patient remains admitted for continued dyspnea 2/2 to COPD exacerbation.     Acute respiratory failure with hypoxia and hypercapnia.   likely 2/2 COPD exacerbation   BiPAP PRN and at night  f/u pulm recs-- recommending steroid taper 40mg x 5 days, 30mg x 5 days, 20mg x 5 days until follow up w/ pulmonary   pro-bnp--- 777   low suspicion for PE  supplemental O2 as needed while at rest; goal O2 sat 88-92% while on supplemental O2.  repeat x-ray w/o infiltrate or pulmonary edema, continued dyspnea likely 2/2 to COPD exacerbation     Nasal Congestion  c/w fluticasone spray-- patient reports improvement  encourage ocean spray use.     Pulmonary emphysema, unspecified emphysema type.    CXR w/ lung hyperinflation, cephalization, no pulm congestion   prednisone 40 mg daily x 5 days, 30mg x 5 days, 20mg x 5 days until follow up w/ pulmonary   Duonebs q6h    continue budesonide, formoterol, tiotropium  continuous pulse ox  O2 PRN   Pulm c/s- prednisone 40mg x 5 days, 30mg x 5 days, 20mg x 5 days until follow up w/ pulmonary.     Chronic systolic CHF (congestive heart failure).    restarting home lasix today 20mg daily   pro-BNP mildly elevated  Last TTE 2019 w/ Right/left systolic dysfunction, EF: 25%  CXR: without pulm congestion.     Anemia, unspecified type.    H/H: 11.1 on admission, currently 10.0 iron studies added on wnl. B12/folate wnl. Unclear etiology. Hypoproliferative.   No h/o Bleeding,  No Melena , Hemoptysis , hematochezia , epistaxis  can have outpatient heme anemia workup.     Hypertension, unspecified type.    c/w coreg, hydral, imdur; added on back home lasix 20mg daily     Coronary artery disease involving native coronary artery of native heart without angina pectoris.   Continue ASA, statin   Low fat, low cholesterol diet.    On 4/3/21 this case was reviewed with Dr. Hill, the patient is medically stable and optimized for discharge. All medications were reviewed and prescriptions were sent to mutually agreed upon pharmacy. [email or web address] emailed prior to discharge to assist in setting up appointment.

## 2021-04-03 NOTE — PROGRESS NOTE ADULT - SUBJECTIVE AND OBJECTIVE BOX
INTERVAL HPI/OVERNIGHT EVENTS:  Patient states nasal congestion is much improved. Patient breathing is improved as well. Patient on her home 3L NC. ROS otherwise negative.     Vital Signs Last 24 Hrs  T(C): 36.9 (03 Apr 2021 13:32), Max: 36.9 (03 Apr 2021 13:32)  T(F): 98.4 (03 Apr 2021 13:32), Max: 98.4 (03 Apr 2021 13:32)  HR: 97 (03 Apr 2021 16:11) (80 - 99)  BP: 115/69 (03 Apr 2021 13:32) (111/75 - 155/88)  BP(mean): --  RR: 18 (03 Apr 2021 13:32) (18 - 18)  SpO2: 96% (03 Apr 2021 16:11) (96% - 100%)    PHYSICAL EXAM:  Constitutional: AA female  resting in bed, NAD  HEENT: sclera non-icteric, neck supple, trachea midline, no masses, no JVD, MMM   Respiratory: decreased breath sounds diffusely w/ prolonged expiratory phase, no wheezes, rales, or rhonchi, on her home 3L NC  Cardiovascular: RRR, normal S1S2, no M/R/G  Gastrointestinal: soft, NTND, no masses palpable, BS normal  Extremities: Warm, well perfused, pulses equal bilateral upper and lower extremities, no edema, no clubbing  Neurological: AAOx3, CN Grossly intact  Skin: Normal temperature, warm, dry    MEDICATIONS  (STANDING):  albuterol/ipratropium for Nebulization 3 milliLiter(s) Nebulizer every 6 hours  aspirin enteric coated 81 milliGRAM(s) Oral daily  budesonide 160 MICROgram(s)/formoterol 4.5 MICROgram(s) Inhaler 2 Puff(s) Inhalation two times a day  carvedilol 37.5 milliGRAM(s) Oral every 12 hours  coronavirus (EUA) Vaccine (VANIA) 0.5 milliLiter(s) IntraMuscular once  enoxaparin Injectable 40 milliGRAM(s) SubCutaneous daily  fluticasone propionate 50 MICROgram(s)/spray Nasal Spray 1 Spray(s) Both Nostrils two times a day  furosemide    Tablet 20 milliGRAM(s) Oral daily  hydrALAZINE 75 milliGRAM(s) Oral three times a day  isosorbide   mononitrate ER Tablet (IMDUR) 30 milliGRAM(s) Oral daily  pantoprazole    Tablet 40 milliGRAM(s) Oral before breakfast  simvastatin 20 milliGRAM(s) Oral at bedtime  tiotropium 18 MICROgram(s) Capsule 1 Capsule(s) Inhalation daily    MEDICATIONS  (PRN):  sodium chloride 0.65% Nasal 1 Spray(s) Both Nostrils every 4 hours PRN Nasal Congestion    Allergies  No Known Allergies  Intolerances    LABS:                         10.1   5.62  )-----------( 177      ( 03 Apr 2021 07:18 )             32.2     04-03    141  |  96<L>  |  22  ----------------------------<  91  3.6   |  37<H>  |  0.71    Ca    9.1      03 Apr 2021 07:18  Phos  3.9     04-03  Mg     1.8     04-03                    RADIOLOGY, EKG & ADDITIONAL TESTS: Reviewed.     CXR:   Hyperinflation of the lungs as on prior studies. The lungs are clear.

## 2021-04-03 NOTE — PROGRESS NOTE ADULT - PROBLEM SELECTOR PLAN 3
- euvolemic on exam, hold diuretics for now   - pro-BNP mildly elevated  - strict I&Os  - fluid restriction   - low sodium intake   - last TTE 2019 w/ Right/left systolic dysfunction, EF: 25%  - CXR: without pulm congestion
- c/w home lasix today 20mg daily   - pro-BNP mildly elevated  - strict I&Os  - fluid restriction   - low sodium intake   - last TTE 2019 w/ Right/left systolic dysfunction, EF: 25%  - CXR: without pulm congestion
- euvolemic on exam, hold diuretics for now   - pro-BNP mildly elevated  - strict I&Os  - fluid restriction   - low sodium intake   - last TTE 2019 w/ Right/left systolic dysfunction, EF: 25%  - CXR: without pulm congestion
- euvolemic on exam, hold diuretics for now   - pro-BNP mildly elevated  - strict I&Os  - fluid restriction   - low sodium intake   - last TTE 2019 w/ Right/left systolic dysfunction, EF: 25%  - CXR: without pulm congestion
- euvolemic to dry on exam, hold diuretics for now   - pro-BNP mildly elevated  - strict I&Os  - fluid restriction   - low sodium intake   - last TTE 2019 w/ Right/left systolic dysfunction, EF: 25%  - CXR: without pulm congestion
- restarting home lasix today 20mg daily   - pro-BNP mildly elevated  - strict I&Os  - fluid restriction   - low sodium intake   - last TTE 2019 w/ Right/left systolic dysfunction, EF: 25%  - CXR: without pulm congestion

## 2021-04-03 NOTE — PROGRESS NOTE ADULT - PROBLEM SELECTOR PLAN 1
- likely 2/2 COPD exacerbation   - BiPAP PRN and at night  - f/u pulm recs-- recommending steroid taper 40mg x 5 days, 30mg x 5 days, 20mg x 5 days until follow up w/ pulmonary   -check pro-bnp--- 777   - low suspicion for PE  - supplemental O2 as needed while at rest; goal O2 sat 88-92% while on supplemental O2.  - x-ray w/o infiltrate or pulmonary edema  #Nasal Congestion  - c/w fluticasone spray-- patient reports improvement  - encourage ocean spray use  - both sprays to be prescribed on d'c

## 2021-04-03 NOTE — PROGRESS NOTE ADULT - PROBLEM SELECTOR PLAN 4
- H/H: 11.1 on admission, currently 10.1 iron studies added on wnl. B12/folate wnl. Unclear etiology. Hypoproliferative.   - No h/o Bleeding,  No Melena , Hemoptysis , hematochezia , epistaxis  - can have outpatient heme anemia workup

## 2021-04-03 NOTE — PROGRESS NOTE ADULT - PROBLEM SELECTOR PLAN 2
- CXR w/ lung hyperinflation, cephalization, no pulm congestion   - prednisone 40 mg daily x 5 days, 30mg x 5 days, 20mg x 5 days until follow up w/ pulmonary   - Duonebs q6h    - continue budesonide, formoterol, tiotropium  - continuous pulse ox  - O2 PRN   - Pulm c/s-- prednisone 40mg x 5 days, 30mg x 5 days, 20mg x 5 days until follow up w/ pulmonary
- CXR w/ lung hyperinflation, cephalization, no pulm congestion   - prednisone 40 mg daily x 5 days, 30mg x 5 days, 20mg x 5 days until follow up w/ pulmonary   - Duonebs q6h prn, will send refills to pharmacy patient w/o ampules  - continue budesonide, formoterol, tiotropium  - continuous pulse ox  - O2 PRN   - Pulm c/s-- prednisone 40mg x 5 days, 30mg x 5 days, 20mg x 5 days until follow up w/ pulmonary
- CXR w/ lung hyperinflation, cephalization, no pulm congestion   - prednisone 40 mg daily x 5 days  - Duonebs q6h prn   - continue budesonide, formoterol, tiotropium  - continuous pulse ox  - O2 PRN   - Pulm c/s-- prednisone 40mg x 5 days
- CXR w/ lung hyperinflation, cephalization, no pulm congestion   - prednisone 40 mg daily   - Duonebs q6h prn   - continue budesonide, formoterol, tiotropium  - continuous pulse ox  - O2 PRN   - Pulm c/s awaiting recs
- CXR w/ lung hyperinflation, cephalization, no pulm congestion   - prednisone 40 mg daily x 5 days  - Duonebs q6h prn   - continue budesonide, formoterol, tiotropium  - continuous pulse ox  - O2 PRN   - Pulm c/s-- prednisone 40mg x 5 days
- CXR w/ lung hyperinflation, cephalization, no pulm congestion   - prednisone 40 mg daily x 5 days  - Duonebs q6h prn   - continue budesonide, formoterol, tiotropium  - continuous pulse ox  - O2 PRN   - Pulm c/s-- prednisone 40mg x 5 days

## 2021-04-03 NOTE — PROGRESS NOTE ADULT - PROBLEM SELECTOR PROBLEM 2
Pulmonary emphysema, unspecified emphysema type

## 2021-04-03 NOTE — PROGRESS NOTE ADULT - PROBLEM SELECTOR PLAN 6
- Continue ASA, statin   - Low fat, low cholesterol diet

## 2021-04-03 NOTE — PROGRESS NOTE ADULT - ASSESSMENT
71 year old Female with past medical history of HTN, HFrEF s/p ICD, CAD,  COPD (3L home O2) recent admission in February, Pulm hypertension, CARLOTA on nocturnal BiPAP presents to ED for worsening SOB and DOMÍNGUEZ 2/2 COPD exacerbation. Patient on home 3L. To be discharged on prednisone taper.

## 2021-04-03 NOTE — PROGRESS NOTE ADULT - PROBLEM SELECTOR PROBLEM 3
Chronic systolic CHF (congestive heart failure)

## 2021-04-20 PROBLEM — D64.9 ANEMIA: Status: ACTIVE | Noted: 2021-01-01

## 2021-04-20 PROBLEM — R06.00 DYSPNEA: Status: ACTIVE | Noted: 2017-11-16

## 2021-04-20 PROBLEM — J96.90 RESPIRATORY FAILURE: Status: ACTIVE | Noted: 2020-01-01

## 2021-04-20 PROBLEM — J44.9 COPD (CHRONIC OBSTRUCTIVE PULMONARY DISEASE): Status: ACTIVE | Noted: 2017-09-18

## 2021-04-20 NOTE — HISTORY OF PRESENT ILLNESS
[Former] : former [TextBox_4] : The patient with severe COPD is here for follow up. She is on home oxygen. she uses it all the time. She was hospitalized in February for COPD, hospitalized with pulmonary edema treated with IV lasix. Later treated with azithromycin and prednisone. \par She has been doing well since discharge. She has baseline severe dyspnea. She cannot get up from the floor. She has her nephew helping her.\par She eats well. She sleeps well.

## 2021-04-20 NOTE — PHYSICAL EXAM
[No Acute Distress] : no acute distress [Normal Oropharynx] : normal oropharynx [Normal Appearance] : normal appearance [No Neck Mass] : no neck mass [Normal Rate/Rhythm] : normal rate/rhythm [Normal S1, S2] : normal s1, s2 [No Murmurs] : no murmurs [No Resp Distress] : no resp distress [Clear to Auscultation Bilaterally] : clear to auscultation bilaterally [No Abnormalities] : no abnormalities [Benign] : benign [Normal Gait] : normal gait [No Clubbing] : no clubbing [No Cyanosis] : no cyanosis [No Edema] : no edema [FROM] : FROM [Normal Color/ Pigmentation] : normal color/ pigmentation [No Focal Deficits] : no focal deficits [Oriented x3] : oriented x3 [Normal Affect] : normal affect [TextBox_68] : Decreased air entry.

## 2021-04-20 NOTE — HISTORY OF PRESENT ILLNESS
[FreeTextEntry1] : hospital f/u [de-identified] : 70 yo F with O2 dependent COPD, CHF, anemia, HTN, CARLOTA, CAD presents for hospital f/u.\par \par [facility] from 3/29 - 4/3/21 for COPD exacerbation. She was given steroids during hospitalization. Since hospitalization, she has been doing well. Had f/u with pulm today. Only med that was changed was carvedilol, lowered to 12.5mg BID from 25mg BID.\par \par

## 2021-05-03 NOTE — ED PROVIDER NOTE - OBJECTIVE STATEMENT
70 yo female with pmhx HTN, HFrEF s/p ICD, CAD,  COPD (3L home O2), Pulm hypertension, CARLOTA on nocturnal BiPAP, p/w SOB for 3 days. Pt states she has been persistently SOB at rest for 3 days, unrelieved by breathing tx, 3L O2 NC, and bipap overnight. came to ED for further evaluation. Had similar sx 1 mo and 3 mo ago, was hospitalized for copd exacerbation. BIBEMS for further evaluation, increased to 4L NC.    Denies sick contacts, fevers, CP, cough, LE swelling. Denies getting covid vaccine yet.

## 2021-05-03 NOTE — ED ADULT NURSE NOTE - CHIEF COMPLAINT QUOTE
Pt. c/o SOB and cough that began Friday. Pt. states she is on 2L O2 at home. Pt. currently on 4L O2 placed by EMS. Denies fever, chills, chest pain, sick contacts. PMHx: CHF, COPD, HTN, CAD

## 2021-05-03 NOTE — ED ADULT NURSE NOTE - OBJECTIVE STATEMENT
Break coverage RN- Received pt in room 6. pt A&OX3. pt with hx of CHF, HTN, COPD, CAD. Pt c/o cough and sob beginning Friday worsening. Pt states she is on 2L O2 at home. Pt currently on 4L O2 placed by EMS. pt sating 100% on 4L, pt appears slightly tachypneic, but in no respiratory distress at this time. pt placed on cardiac monitor, NSR. vitals as noted. pacemaker noted to the left chest wall. pt denies any pain, fever/chills, headache, dizziness, n/v/d. 20 gauge iv placed in the left ac, labs sent. pt medicated as per orders. safety maintained. awaiting further plan. will reassess and monitor.

## 2021-05-03 NOTE — ED PROVIDER NOTE - ATTENDING CONTRIBUTION TO CARE
agree with resident note    " 70 yo female with pmhx HTN, HFrEF s/p ICD, CAD,  COPD (3L home O2), Pulm hypertension, CARLOTA on nocturnal BiPAP, p/w SOB for 3 days. Pt states she has been persistently SOB at rest for 3 days, unrelieved by breathing tx, 3L O2 NC, and bipap overnight. came to ED for further evaluation. Had similar sx 1 mo and 3 mo ago, was hospitalized for copd exacerbation. BIBEMS for further evaluation, increased to 4L NC."    PE: dyspneic; pursed lip breathing; decreased air movement; normotensive; s1 s2 no m/r/g abd soft/NT/ND ext: no edema    Imp: COPD exacerbation; nebs/steroids and admit

## 2021-05-03 NOTE — ED PROVIDER NOTE - RESPIRATORY, MLM
mild increased work of breathing, pursed lips breathing. tachypneic, decreased air movement BL lung fields.

## 2021-05-03 NOTE — ED PROVIDER NOTE - PMH
CAD (coronary artery disease)    Chronic systolic CHF (congestive heart failure)  EF 20-25% s/p [employer] ICD (9/2018)  COPD (chronic obstructive pulmonary disease)  not on home O2  HTN (hypertension)    Pulmonary hypertension  moderate  Right-sided heart failure    Sleep apnea  cannot tolerate CPAP

## 2021-05-03 NOTE — ED PROVIDER NOTE - CLINICAL SUMMARY MEDICAL DECISION MAKING FREE TEXT BOX
72 yo female with pmhx HTN, HFrEF s/p ICD, CAD,  COPD (3L home O2), Pulm hypertension, CARLOTA on nocturnal BiPAP, p/w SOB for 3 days. suggestive of CHF vs COPD exacerbation vs infectious etiology. will evaluate with labs, cxr, ekg, will give nebs, steroids, likely admit for further management of dyspnea. will reassess.

## 2021-05-03 NOTE — ED ADULT TRIAGE NOTE - CHIEF COMPLAINT QUOTE
Pt. c/o SOB and cough that began Friday. Pt. states she is on 2L O2 at home. Pt. currently on 4L O2 placed by EMS. PMHx: CHF, COPD, HTN, CAD Pt. c/o SOB and cough that began Friday. Pt. states she is on 2L O2 at home. Pt. currently on 4L O2 placed by EMS. Denies fever, chills, chest pain, sick contacts. PMHx: CHF, COPD, HTN, CAD

## 2021-05-04 NOTE — PROVIDER CONTACT NOTE (CRITICAL VALUE NOTIFICATION) - ASSESSMENT
Patient is alert and oriented, currently on BiPap. No signs of acute distress noted upon assessment. NSR on telemetry.

## 2021-05-04 NOTE — ED ADULT NURSE REASSESSMENT NOTE - NS ED NURSE REASSESS COMMENT FT1
Patient received from [facility] nurse. Patient requesting juice. ok for juice while patient is not on bipap. Patient breathing even and non labored on bipap. Cardiac monitor in place- sinus tachycardia. Safety maintained. Patient stable upon exiting the room.

## 2021-05-04 NOTE — H&P ADULT - NSICDXPASTMEDICALHX_GEN_ALL_CORE_FT
PAST MEDICAL HISTORY:  CAD (coronary artery disease)     Chronic systolic CHF (congestive heart failure) EF 20-25% s/p [employer] ICD (9/2018)    COPD (chronic obstructive pulmonary disease) on 3L NC    HTN (hypertension)     Pulmonary hypertension moderate    Right-sided heart failure     Sleep apnea cannot tolerate CPAP

## 2021-05-04 NOTE — PROVIDER CONTACT NOTE (OTHER) - RECOMMENDATIONS
Verbal order from md for gram po tylenol from md Jefferson Seymour, RN  07/22/18 [id number] RN administered hydralazine as ordered.

## 2021-05-04 NOTE — H&P ADULT - NSHPPHYSICALEXAM_GEN_ALL_CORE
VITALS:   T(C): 37.1 (05-04-21 @ 05:11), Max: 37.3 (05-03-21 @ 21:18)  HR: 90 (05-04-21 @ 07:36) (90 - 102)  BP: 127/78 (05-04-21 @ 05:11) (127/78 - 169/78)  RR: 17 (05-04-21 @ 05:11) (17 - 20)  SpO2: 96% (05-04-21 @ 07:36) (96% - 100%)    GENERAL: emaciated female breathing comfortably on Bipap  HEAD:  Atraumatic, Normocephalic  EYES: EOMI, PERRLA, conjunctiva and sclera clear  ENT: Moist mucous membranes  NECK: Supple, No JVD  CHEST/LUNG: Clear to auscultation bilaterally; No rales, rhonchi, wheezing, or rubs. Unlabored respirations  HEART: Regular rate and rhythm; No murmurs, rubs, or gallops  ABDOMEN: BSx4; Soft, nontender, nondistended  EXTREMITIES:  2+ Peripheral Pulses, brisk capillary refill. No clubbing, cyanosis, or edema  NERVOUS SYSTEM:  A&Ox1-2, no focal deficits   SKIN: No rashes or lesions  psych: normal behavior, normal affect VITALS:   T(C): 37.1 (05-04-21 @ 05:11), Max: 37.3 (05-03-21 @ 21:18)  HR: 90 (05-04-21 @ 07:36) (90 - 102)  BP: 127/78 (05-04-21 @ 05:11) (127/78 - 169/78)  RR: 17 (05-04-21 @ 05:11) (17 - 20)  SpO2: 96% (05-04-21 @ 07:36) (96% - 100%)    GENERAL: emaciated female breathing comfortably on Bipap  HEAD:  Atraumatic, Normocephalic  EYES: EOMI, PERRLA, conjunctiva and sclera clear  ENT: Moist mucous membranes  NECK: Supple, No JVD  CHEST/LUNG: decreased breath sounds b/l. No wheezing or crackles. Unlabored respirations  HEART: Regular rate and rhythm; No murmurs, rubs, or gallops  ABDOMEN: BSx4; Soft, nontender, nondistended  EXTREMITIES:  2+ Peripheral Pulses, brisk capillary refill. No clubbing, cyanosis, or edema  NERVOUS SYSTEM:  A&Ox1-2, no focal deficits   SKIN: No rashes or lesions  psych: normal behavior, normal affect

## 2021-05-04 NOTE — PATIENT PROFILE ADULT - NS PRO AD PATIENT TYPE
MOLST at home. Patient does not wish to bring copy to hospital./Health Care Proxy (HCP)/Do Not Resuscitate (DNR)

## 2021-05-04 NOTE — H&P ADULT - ATTENDING COMMENTS
Patient seen and evaluated with team in ED  Agree with Above A/p by Dr Flanagan  71 year old female with pmhx HTN, HFrEF s/p ICD, CAD,  COPD (3L home O2), pulm hypertension who is presenting with COPD exacerbation   Seen in ED on Bipap  Patient states she is hungry  PE Vital Signs  T 98.7, HR 92, /78, R 17, Sat 95 % on BIPAP  Lungs Cta b/l, Cor rrr, Abd soft n/t, Ext neg e/c/c  LABS: CBC - WNL, Na 146, Hco2 43,   pH: 7.25/pCO2: >110 /pO2: 38/HCO3: 40/lactate: 1.0  cxr< from: Xray Chest 1 View- PORTABLE-Urgent (Xray Chest 1 View- PORTABLE-Urgent .) (05.03.21 @ 23:00) >  IMPRESSION: Hyperinflated lungs without focal pulmonary abnormalities consistent with emphysema.  A/p Hypercapnic resp failure sec to COPD Exb req BIPAP, no acute CHF exb  # COPD Exb- c/w Bipap- house Pulm Eval, c/w solumedrol started in ED. c/w Ipratropium/ albuterol, budesonide/ fluticasone   # CHF- mon echo . resume home medications of coreg/ lasix  # Na46- monitor BMP Qd  SW/ CM eval for ?? HHA at home- Pt/nutrition eval  Santillan d/w patient / team/ son Maribel Jalloh called at [phone]- left VM that mother is in hospital. Patient seen and evaluated with team in ED  Agree with Above A/p by Dr Flanagan  71 year old female with pmhx HTN, HFrEF s/p ICD, CAD,  COPD (3L home O2), pulm hypertension who is presenting with COPD exacerbation   Seen in ED on Bipap  Patient states she is hungry  PE Vital Signs  T 98.7, HR 92, /78, R 17, Sat 95 % on BIPAP  Lungs Cta b/l, Cor rrr, Abd soft n/t, Ext neg e/c/c  LABS: CBC - WNL, Na 146, Hco2 43,   pH: 7.25/pCO2: >110 /pO2: 38/HCO3: 40/lactate: 1.0  cxr< from: Xray Chest 1 View- PORTABLE-Urgent (Xray Chest 1 View- PORTABLE-Urgent .) (05.03.21 @ 23:00) >  IMPRESSION: Hyperinflated lungs without focal pulmonary abnormalities consistent with emphysema.  A/p Hypercapnic resp failure sec to COPD Exb req BIPAP, no acute CHF exb  # COPD Exb- c/w Bipap- house Pulm Eval, c/w solumedrol started in ED. c/w Ipratropium/ albuterol, budesonide/ fluticasone   # CHF- mon echo . resume home medications of coreg/ lasix  # HTN c/w coreg/ lasix, hydralazine with hold SBP <100, hr <60  # Na 146- monitor BMP Qd  DVT proph- lovenox sq  SW/ CM eval for ?? HHA at home- Pt/nutrition eval  Santillan d/w patient / team/ son Maribel Jalloh called at [phone]- left VM that mother is in hospital. Patient seen and evaluated with team in ED  Agree with Above A/p by Dr Flanagan  71 year old female with pmhx HTN, HFrEF s/p ICD, CAD,  COPD (3L home O2), pulm hypertension who is presenting with COPD exacerbation   Seen in ED on Bipap  Patient states she is hungry  PE Vital Signs  T 98.7, HR 92, /78, R 17, Sat 95 % on BIPAP  Lungs Cta b/l, Cor rrr, Abd soft n/t, Ext neg e/c/c  LABS: CBC - WNL, Na 146, Hco2 43,   pH: 7.25/pCO2: >110 /pO2: 38/HCO3: 40/lactate: 1.0  cxr< from: Xray Chest 1 View- PORTABLE-Urgent (Xray Chest 1 View- PORTABLE-Urgent .) (05.03.21 @ 23:00) >  IMPRESSION: Hyperinflated lungs without focal pulmonary abnormalities consistent with emphysema.  A/p Hypercapnic resp failure sec to COPD Exb req BIPAP, no acute CHF exb  # COPD Exb- c/w Bipap- house Pulm Eval, c/w solumedrol started in ED. c/w Ipratropium/ albuterol, budesonide/ fluticasone   # CHF- mon echo . resume home medications of coreg/ lasix  # HTN c/w coreg/ lasix, hydralazine with hold SBP <100, hr <60  # Na 146- monitor BMP Qd  DVT proph- lovenox sq  SW/ CM eval for ?? HHA at home- Pt/nutrition eval, patient is already DNR  Pan d/w patient / team/ son Maribel Jalloh called at [phone]- left VM that mother is in hospital.

## 2021-05-04 NOTE — H&P ADULT - NSHPREVIEWOFSYSTEMS_GEN_ALL_CORE
REVIEW OF SYSTEMS:    CONSTITUTIONAL: No weakness, fevers or chills  EYES/ENT: No visual changes;  No vertigo or throat pain   NECK: No pain or stiffness  RESPIRATORY: No cough, wheezing, hemoptysis; No shortness of breath  CARDIOVASCULAR: No chest pain or palpitations  GASTROINTESTINAL: No abdominal or epigastric pain. No nausea, vomiting, or hematemesis; No diarrhea or constipation. No melena or hematochezia.  GENITOURINARY: No dysuria, frequency or hematuria  NEUROLOGICAL: No numbness or weakness  SKIN: No itching, rashes  psych: no anxiety or depression REVIEW OF SYSTEMS:    CONSTITUTIONAL: No weakness, fevers or chills  EYES/ENT: No visual changes;  No vertigo or throat pain   NECK: No pain or stiffness  RESPIRATORY: No cough, wheezing, hemoptysis; + shortness of breath  CARDIOVASCULAR: No chest pain or palpitations  GASTROINTESTINAL: No abdominal or epigastric pain. No nausea, vomiting, or hematemesis; No diarrhea or constipation. No melena or hematochezia.  GENITOURINARY: No dysuria, frequency or hematuria  NEUROLOGICAL: No numbness or weakness  SKIN: No itching, rashes  psych: no anxiety or depression

## 2021-05-04 NOTE — H&P ADULT - PROBLEM SELECTOR PLAN 1
On home oxygen 3L. On bipap in the ED for hypercapnic resp failure   -s/p 125mg solumedrol in the ED  -Start IV solumedrol IV 40mg qd, then switch to prednisone 40mg   -CXR hyperinflated lungs without focal pulmonary abnormalities consistent with emphysema. No role for IV zithromax  -Spiriva and Pulmicort  -duonebs prn for SOB and/or wheezing   -bipap prn   -wean to NC as tolerated

## 2021-05-04 NOTE — H&P ADULT - PROBLEM SELECTOR PLAN 3
Chronic systolic CHF (congestive heart failure) s/p AICD. Last TTE 2019 w/ Right/left systolic dysfunction, EF: 20- 25%. Patient appears euvolemic. No pulm edema on CXR. . EKG reviewed no signs of ischemia.   -c/w coreg 25mg bid  -c/w Imdur 30mg qd  c/w hydralazine 75mg tid

## 2021-05-04 NOTE — H&P ADULT - PROBLEM SELECTOR PLAN 7
DASH/TLC diet  lovenox  GOC: call HCP son DASH/TLC diet  lovenox  GOC: 11/11/20 GO chart note that states HCP is tulio López. Note states DNR/DNI no feeding tubes

## 2021-05-04 NOTE — H&P ADULT - PROBLEM SELECTOR PLAN 6
DASH/TLC diet  lovenox  GOC: call HCP son Endorses difficulty with ADLs, lives alone.   PT/OT consult  Call HCP son

## 2021-05-04 NOTE — H&P ADULT - HISTORY OF PRESENT ILLNESS
71 year old female with pmhx HTN, HFrEF s/p ICD, CAD,  COPD (3L home O2), pulm hypertension who is presenting with SOB. The patient was recently admitted 3/29/21 - 4/3/21 for COPD exacerbation discharged home on prednisone taper. The patient reports 2-3 days of SOB. Denies cough, fevers, chills, nausea, vomiting or diarrhea.  71 year old female with pmhx HTN, HFrEF s/p ICD, CAD,  COPD (3L home O2), pulm hypertension who is presenting with SOB. The patient was recently admitted 3/29/21 - 4/3/21 for COPD exacerbation discharged home on prednisone taper. The patient reports 2-3 days of SOB. Denies cough, fevers, chills, nausea, vomiting or diarrhea.

## 2021-05-04 NOTE — H&P ADULT - ASSESSMENT
71 year old female with pmhx HTN, HFrEF s/p ICD, CAD,  COPD (3L home O2), pulm hypertension who is presenting with COPD exacerbation

## 2021-05-04 NOTE — H&P ADULT - NSHPLABSRESULTS_GEN_ALL_CORE
11.1   4.05  )-----------( 180      ( 04 May 2021 09:38 )             36.0       05-04    146<H>  |  97<L>  |  22  ----------------------------<  117<H>  3.9   |  43<H>  |  0.69    Ca    9.5      04 May 2021 09:38  Phos  3.5     05-04  Mg     1.7     05-04    TPro  6.3  /  Alb  3.7  /  TBili  0.4  /  DBili  x   /  AST  14  /  ALT  12  /  AlkPhos  51  05-04                  PT/INR - ( 04 May 2021 00:02 )   PT: 11.9 sec;   INR: 1.04 ratio         PTT - ( 04 May 2021 00:02 )  PTT:27.7 sec    Lactate Trend            CAPILLARY BLOOD GLUCOSE 11.1   4.05  )-----------( 180      ( 04 May 2021 09:38 )             36.0       05-04    146<H>  |  97<L>  |  22  ----------------------------<  117<H>  3.9   |  43<H>  |  0.69    Ca    9.5      04 May 2021 09:38  Phos  3.5     05-04  Mg     1.7     05-04    TPro  6.3  /  Alb  3.7  /  TBili  0.4  /  DBili  x   /  AST  14  /  ALT  12  /  AlkPhos  51  05-04  \    IMPRESSION: Hyperinflated lungs without focal pulmonary abnormalities consistent with emphysema.                    PT/INR - ( 04 May 2021 00:02 )   PT: 11.9 sec;   INR: 1.04 ratio         PTT - ( 04 May 2021 00:02 )  PTT:27.7 sec    Lactate Trend            CAPILLARY BLOOD GLUCOSE

## 2021-05-04 NOTE — H&P ADULT - PROBLEM SELECTOR PLAN 2
In the setting of COPD exacerbation. 7.32/99/30/39  -c/w bipap 12/6  -tx of COPD exacerbation as above In the setting of COPD exacerbation. 7.32/99/30/39 on Bipap in the ED  -Repeat pCO2 still elevated 96. Bipap increased to 12/6  -Check VBG this evening   -tx of COPD exacerbation as above

## 2021-05-05 NOTE — OCCUPATIONAL THERAPY INITIAL EVALUATION ADULT - ANTICIPATED DISCHARGE DISPOSITION, OT EVAL
TBD pending further assessment of functional mobility when appropriate. Patient deferred at this time. OT to continue to follow

## 2021-05-05 NOTE — PROGRESS NOTE ADULT - SUBJECTIVE AND OBJECTIVE BOX
***************************************************************  Adriel Flanagan, PGY2  Internal Medicine   pager: [phone]  ***************************************************************    TJ MELENDEZ  71y  MRN: [id number]    Patient is a 71y old  Female who presents with a chief complaint of COPD exacerbation (04 May 2021 10:50)      Subjective: Bipap overnight. This morning on 4L NC saturating at 100%.     MEDICATIONS  (STANDING):  budesonide 160 MICROgram(s)/formoterol 4.5 MICROgram(s) Inhaler 2 Puff(s) Inhalation two times a day  carvedilol 25 milliGRAM(s) Oral every 12 hours  enoxaparin Injectable 40 milliGRAM(s) SubCutaneous daily  hydrALAZINE 75 milliGRAM(s) Oral three times a day  isosorbide   mononitrate ER Tablet (IMDUR) 30 milliGRAM(s) Oral daily  pantoprazole    Tablet 40 milliGRAM(s) Oral before breakfast  simvastatin 20 milliGRAM(s) Oral at bedtime  tiotropium 18 MICROgram(s) Capsule 1 Capsule(s) Inhalation daily    MEDICATIONS  (PRN):  albuterol/ipratropium for Nebulization 3 milliLiter(s) Nebulizer every 6 hours PRN Shortness of Breath and/or Wheezing      Objective:    Vitals: Vital Signs Last 24 Hrs  T(C): 36.7 (05-05-21 @ 05:09), Max: 37 (05-04-21 @ 20:23)  T(F): 98.1 (05-05-21 @ 05:09), Max: 98.6 (05-04-21 @ 20:23)  HR: 83 (05-05-21 @ 06:09) (66 - 92)  BP: 128/69 (05-05-21 @ 06:09) (124/56 - 140/77)  BP(mean): --  RR: 18 (05-05-21 @ 06:09) (17 - 20)  SpO2: 100% (05-05-21 @ 06:09) (95% - 100%)            I&O's Summary      PHYSICAL EXAM:  GENERAL: emaciated female   HEAD:  Atraumatic, Normocephalic  EYES: EOMI, conjunctiva and sclera clear  CHEST/LUNG: Clear to percussion bilaterally; No rales, rhonchi, wheezing, or rubs  HEART: Regular rate and rhythm; No murmurs, rubs, or gallops  ABDOMEN: Soft, Nontender, Nondistended;   SKIN: No rashes or lesions  NERVOUS SYSTEM:  Alert & Oriented X3, no focal deficit    LABS:  05-04    146<H>  |  97<L>  |  22  ----------------------------<  117<H>  3.9   |  43<H>  |  0.69  05-04    148<H>  |  98  |  18  ----------------------------<  106<H>  3.6   |  42<H>  |  0.73    Ca    9.5      04 May 2021 09:38  Ca    9.7      04 May 2021 00:02  Phos  3.5     05-04  Mg     1.7     05-04    TPro  6.3  /  Alb  3.7  /  TBili  0.4  /  DBili  x   /  AST  14  /  ALT  12  /  AlkPhos  51  05-04      PT/INR - ( 04 May 2021 00:02 )   PT: 11.9 sec;   INR: 1.04 ratio         PTT - ( 04 May 2021 00:02 )  PTT:27.7 sec                                        11.1   4.05  )-----------( 180      ( 04 May 2021 09:38 )             36.0                         12.5   6.49  )-----------( 206      ( 04 May 2021 00:02 )             40.1     CAPILLARY BLOOD GLUCOSE          RADIOLOGY & ADDITIONAL TESTS:    Imaging Personally Reviewed:  [x ] YES  [ ] NO    Consultants involved in case:   Consultant(s) Notes Reviewed:  [ x] YES  [ ] NO:   Care Discussed with Consultants/Other Providers [x ] YES  [ ] NO

## 2021-05-05 NOTE — OCCUPATIONAL THERAPY INITIAL EVALUATION ADULT - PERTINENT HX OF CURRENT PROBLEM, REHAB EVAL
71 year old female with history of HTN, HFrEF s/p ICD, CAD,  COPD (3L home O2), pulm hypertension who is presenting with COPD exacerbation.

## 2021-05-05 NOTE — PROGRESS NOTE ADULT - ATTENDING COMMENTS
Patient seen and examined with team.  Agree with Above A/p by Dr Flanagan  71 year old female with pmhx HTN, HFrEF s/p ICD, CAD,  COPD (3L home O2), pulm hypertension who is presenting with COPD exacerbation   Did well on Bipap overnight now on 4L NC  Had Lunch . notes she is doing better . Wt ws the most 115 lbs, was sown in the 70s now improving slowly to 87 LBS- agrees to adding ensures-lik chocolate,  Pe VS T 98, HR 82, P 99/60, R 20 Sat 1005 on 4L NC  Lungs- distant BS b/l, cor rrr, Abd soft n/t, ext neg e/c/c  Labs sig wbc 3.6, Na 146  # COPD Exp c/w 4 L nc, c/w Bipap qhs. c/w nebs. Pulmonary consult requested Patient seen and examined with team.  Agree with Above A/p by Dr Flanagan  71 year old female with pmhx HTN, HFrEF s/p ICD, CAD,  COPD (3L home O2), pulm hypertension who is presenting with COPD exacerbation   Did well on Bipap overnight now on 4L NC  Had Lunch . notes she is doing better . Wt ws the most 115 lbs, was sown in the 70s now improving slowly to 87 LBS- agrees to adding ensures-lik chocolate,  Pe VS T 98, HR 82, P 99/60, R 20 Sat 1005 on 4L NC  Lungs- distant BS b/l, cor rrr, Abd soft n/t, ext neg e/c/c  Labs sig wbc 3.6, Na 146  Hypercapnic resp failure sec to COPD Exb req BIPAP, no acute CHF exb  # COPD Exb- c/w Bipap- QHS and 4 L Nc during the day.  house Pulm Eval, c/w solumedrol started in ED. c/w Ipratropium/ albuterol, budesonide/ fluticasone   # CHF- mon echo . resume home medications of coreg/ lasix  # HTN c/w coreg/ lasix, hydralazine with hold SBP <100, hr <60  # Na 146- monitor BMP Qd. Encourage Po hydration.  DVT proph- lovenox sq  SW/ CM eval for ?? HHA at home- Pt/nutrition eval, patient is already DNR

## 2021-05-05 NOTE — PHYSICAL THERAPY INITIAL EVALUATION ADULT - MANUAL MUSCLE TESTING RESULTS, REHAB EVAL
bilateral UEs tested by OT at bedside, bilateral LEs grossly 4+/5 throughout/grossly assessed due to

## 2021-05-05 NOTE — PHYSICAL THERAPY INITIAL EVALUATION ADULT - DISCHARGE DISPOSITION, PT EVAL
Anticipate d/c home; no PT needs however formal recommendation to be determined after gait assessment-->please follow PT notes

## 2021-05-05 NOTE — PROGRESS NOTE ADULT - PROBLEM SELECTOR PLAN 7
DASH/TLC diet  lovenox  GOC: 11/11/20 GO chart note that states HCP is tulio López. Note states DNR/DNI no feeding tubes

## 2021-05-05 NOTE — PROGRESS NOTE ADULT - PROBLEM SELECTOR PLAN 1
On home oxygen 3L. On bipap in the ED for hypercapnic resp failure   -s/p 125mg solumedrol in the ED  -c/w IV solumedrol IV 40mg qd, then switch to prednisone 40mg   -CXR hyperinflated lungs without focal pulmonary abnormalities consistent with emphysema. No role for IV zithromax  -Spiriva and Pulmicort  -duonebs prn for SOB and/or wheezing   -bipap at night   -wean to NC as tolerated

## 2021-05-05 NOTE — PROGRESS NOTE ADULT - PROBLEM SELECTOR PLAN 2
In the setting of COPD exacerbation. 7.32/99/30/39 on Bipap in the ED  -Repeat pCO2 still elevated 96. Bipap increased to 12/6 with pCO2 improvement to 74  -trend pCO2  -tx of COPD exacerbation as above  -Bipap overnight

## 2021-05-05 NOTE — OCCUPATIONAL THERAPY INITIAL EVALUATION ADULT - ADDITIONAL COMMENTS
Patient reports owning a rollator/cane as well as a commode, however, does not normally use them. Patient is on 3L/min of home O2. Patient reports performing ADLs slowly due to decreased endurance.

## 2021-05-05 NOTE — PHYSICAL THERAPY INITIAL EVALUATION ADULT - PERTINENT HX OF CURRENT PROBLEM, REHAB EVAL
Patient is a 71 year old female admitted to [facility] with COPD exacerbation. PMH: HTN, HFrEF s/p ICD, CAD,  COPD (3L home O2), pulm hypertension.

## 2021-05-05 NOTE — OCCUPATIONAL THERAPY INITIAL EVALUATION ADULT - GENERAL OBSERVATIONS, REHAB EVAL
Patient received semisupine in bed in NAD. Per RN Gail, patient okay to participate in OT evaluation. +tele. +4L oxygen via nasal cannula.

## 2021-05-05 NOTE — PHYSICAL THERAPY INITIAL EVALUATION ADULT - ADDITIONAL COMMENTS
Patient reports she lives alone in a private house, 5 steps to enter. Patient reports she was previously independent in all ADLs and did not use an assistive device for ambulation, however has cane, rollator and wheelchair at home.    Patient was left safely in bed as found, all lines/tubes intact and call bell within reach, RN aware

## 2021-05-06 NOTE — PROGRESS NOTE ADULT - ATTENDING COMMENTS
Patient seen and examined with team.  Agree with Above A/p by Dr Flanagan  71 year old female with pmhx HTN, HFrEF s/p ICD, CAD,  COPD (3L home O2), pulm hypertension who is presenting with COPD exacerbation   Did well on Bipap overnight now on 4L NC  Had Lunch . notes she is doing better . Wt ws the most 115 lbs, was sown in the 70s now improving slowly to 87 LBS- agrees to adding ensures-lik chocolate,  Pe VS T 98, HR 82, P 99/60, R 20 Sat 1005 on 4L NC  Lungs- distant BS b/l, cor rrr, Abd soft n/t, ext neg e/c/c  Labs sig wbc 3.6, Na 146  Hypercapnic resp failure sec to COPD Exb req BIPAP, no acute CHF exb  # COPD Exb- c/w Bipap- QHS and 4 L Nc during the day.  house Pulm Eval, c/w solumedrol started in ED. c/w Ipratropium/ albuterol, budesonide/ fluticasone   # CHF- mon echo . resume home medications of coreg/ lasix  # HTN c/w coreg/ lasix, hydralazine with hold SBP <100, hr <60  # Na 148- D5w at 50 cc/ hr x 10 hrs. f/u BMP in AM  DVT proph- lovenox sq  SW/ CM eval for ?? HHA at home- Pt/nutrition eval, patient is already DNR Patient seen and examined with team.  Agree with Above A/p by Dr Flanagan  71 year old female with pmhx HTN, HFrEF s/p ICD, CAD,  COPD (3L home O2), pulm hypertension who is presenting with COPD exacerbation   Did well on Bipap overnight now on 4L NC  Had Lunch . notes she is doing better . Wt ws the most 115 lbs, was sown in the 70s now improving slowly to 87 LBS- agrees to adding ensures-lik chocolate,  Pe VS T 98, HR 82, P 99/60, R 20 Sat 1005 on 4L NC  Lungs- distant BS b/l, cor rrr, Abd soft n/t, ext neg e/c/c  Labs sig wbc 3.6, Na 146  Hypercapnic resp failure sec to COPD Exb req BIPAP, no acute CHF exb  # COPD Exb- c/w Bipap- QHS and 4 L Nc during the day.  house  solumedrol started in ED. c/w Ipratropium/ albuterol, budesonide/ fluticasone . now off solumedrol. Will ask for Pulmonary consult.  # CHF- mon echo . resume home medications of coreg/ lasix  # HTN c/w coreg/ lasix, hydralazine with hold SBP <100, hr <60  # Na 148- D5w at 50 cc/ hr x 10 hrs. f/u BMP in AM  DVT proph- lovenox sq  SW/ CM eval for ?? HHA at home- Pt/nutrition eval, patient is already DNR Patient seen and examined with team.  Agree with Above A/p by Dr Flanagan  71 year old female with pmhx HTN, HFrEF s/p ICD, CAD,  COPD (3L home O2), pulm hypertension who is presenting with COPD exacerbation   Did well on Bipap overnight now on 4L NC  Had Lunch . notes she is doing better . Wt ws the most 115 lbs, was sown in the 70s now improving slowly to 87 LBS- agrees to adding ensures-lik chocolate,  Pe VS T 98, HR 82, P 99/60, R 20 Sat 1005 on 4L NC  Lungs- distant BS b/l, cor rrr, Abd soft n/t, ext neg e/c/c  Labs sig wbc 3.6, Na 146  Hypercapnic resp failure sec to COPD Exb req BIPAP, no acute CHF exb  # COPD Exb- c/w Bipap- QHS and 4 L Nc during the day.  house  solumedrol started in ED. c/w Ipratropium/ albuterol, budesonide/ fluticasone . now on prednisone. Will ask for Pulmonary consult.  # CHF- mon echo . resume home medications of coreg/ lasix  # HTN c/w coreg/ lasix, hydralazine with hold SBP <100, hr <60  # Na 148- D5w at 50 cc/ hr x 10 hrs. f/u BMP in AM  DVT proph- lovenox sq  SW/ CM eval for ?? HHA at home- Pt/nutrition eval, patient is already DNR Patient seen and examined with team.  Agree with Above A/p by Dr Flanagan  71 year old female with pmhx HTN, HFrEF s/p ICD, CAD,  COPD (3L home O2), pulm hypertension who is presenting with COPD exacerbation   Did well on Bipap overnight now on 4L NC  Had Lunch . notes she is doing better . Wt ws the most 115 lbs, was sown in the 70s now improving slowly to 87 LBS- agrees to adding ensures-lik chocolate,  Pe VS T 98, HR 82, P 99/60, R 20 Sat 1005 on 4L NC  Lungs- distant BS b/l, cor rrr, Abd soft n/t, ext neg e/c/c  Labs sig wbc 3.6, Na 146  Hypercapnic resp failure sec to COPD Exb req BIPAP, no acute CHF exb  # COPD Exb- c/w Bipap- QHS and 4 L Nc during the day.  house  solumedrol started in ED. c/w Ipratropium/ albuterol, budesonide/ fluticasone . now on prednisone. Will ask for Pulmonary consult.  # CHF- mon echo . resume home medications of coreg/ lasix  # HTN c/w coreg/ lasix, hydralazine with hold SBP <100, hr <60  # Na 148- D5w at 50 cc/ hr x 10 hrs. f/u BMP in AM  DVT proph- lovenox sq  SW/ CM eval for ?? HHA at home- Pt revoked her DNR- see GOC note

## 2021-05-06 NOTE — CONSULT NOTE ADULT - ASSESSMENT
71F PMH Former Smoker, COPD (on 3L NC home O2 and bilevel QHS, frequent COPD exacerbations 04/21. 02/21,11/20, 8/20), CARLOTA on CPAP, moderate PulmHTN, HFrEF 20-25 s/p PPM/ ICD placement, and HTN p/w SOB and cough found to be in acute on chronic hypercapnic resp failure likely 2/2 to COPD exacerbation.     Problem: Acute on chronic hypercapnic resp failure  Assessment: Likely 2/2 to COPD exacerbation. Patient has had multiple exacerbations in the last year, and her disease is quite advanced. It is unclear if these frequent exacerbations are simple due to her disease progression / advanced illness or it is possible that she is non compliance with medications and/or home O2 or nocturnal bilevel. This exacerbation does not appear to be provoked by an infection.  71F PMH Former Smoker, COPD (on 3L NC home O2 and bilevel QHS, frequent COPD exacerbations . ,, ), CARLOTA on CPAP, moderate PulmHTN, HFrEF 20-25 s/p PPM/ ICD placement, and HTN p/w SOB and cough found to be in acute on chronic hypercapnic resp failure likely 2/2 to COPD exacerbation.     Problem: Acute on chronic hypercapnic resp failure  Assessment: Likely 2/2 to COPD exacerbation. Patient has had multiple exacerbations in the last year, and her disease is quite advanced. The frequent exacerbations may be due to her disease progression / advanced illness. She is on optimal therapy based on her GOLD criteria and  she endorses compliance with medications, home O2 or nocturnal bilevel. This exacerbation does not appear to be provoked by an infection.   Plan:   C/w prednisone for a 5 day course  Continue with current pulm med regiment  Please have patient follow up with her pulmonologist within one week of discharge. Alternatively, patient can follow up with House Pulm Team at 410:  Please email: [email or web address] to setup an appointment prior to discharge. Include the patient's name, , MRN and contact information in the email.      Pulmonary/Sleep Clinic  [address]  [phone]    Appreciate excellent care by Medicine Primary Team

## 2021-05-06 NOTE — PROGRESS NOTE ADULT - SUBJECTIVE AND OBJECTIVE BOX
***************************************************************  Adriel Flanagan, PGY2  Internal Medicine   pager: [phone]  ***************************************************************    TJ MELENDEZ  71y  MRN: [id number]    Patient is a 71y old  Female who presents with a chief complaint of COPD exacerbation (05 May 2021 07:36)      Subjective: bipap overnight.  Denies fever, CP, SOB, abn pain, N/V, dysuria. Tolerating diet.      MEDICATIONS  (STANDING):  budesonide 160 MICROgram(s)/formoterol 4.5 MICROgram(s) Inhaler 2 Puff(s) Inhalation two times a day  carvedilol 25 milliGRAM(s) Oral every 12 hours  enoxaparin Injectable 30 milliGRAM(s) SubCutaneous daily  hydrALAZINE 75 milliGRAM(s) Oral three times a day  isosorbide   mononitrate ER Tablet (IMDUR) 30 milliGRAM(s) Oral daily  pantoprazole    Tablet 40 milliGRAM(s) Oral before breakfast  simvastatin 20 milliGRAM(s) Oral at bedtime  tiotropium 18 MICROgram(s) Capsule 1 Capsule(s) Inhalation daily    MEDICATIONS  (PRN):  albuterol/ipratropium for Nebulization 3 milliLiter(s) Nebulizer every 6 hours PRN Shortness of Breath and/or Wheezing  sodium chloride 0.65% Nasal 1 Spray(s) Both Nostrils daily PRN Nasal Congestion      Objective:    Vitals: Vital Signs Last 24 Hrs  T(C): 36.8 (05-06-21 @ 06:10), Max: 36.8 (05-05-21 @ 21:22)  T(F): 98.2 (05-06-21 @ 06:10), Max: 98.2 (05-05-21 @ 21:22)  HR: 82 (05-06-21 @ 06:10) (66 - 91)  BP: 142/88 (05-06-21 @ 06:10) (99/60 - 143/76)  BP(mean): --  RR: 18 (05-06-21 @ 06:10) (18 - 20)  SpO2: 100% (05-06-21 @ 06:10) (95% - 100%)            I&O's Summary      PHYSICAL EXAM:  GENERAL: emaciated female in NAD  HEAD:  Atraumatic, Normocephalic  EYES: EOMI, conjunctiva and sclera clear  CHEST/LUNG: Clear to percussion bilaterally; No rales, rhonchi, wheezing, or rubs  HEART: Regular rate and rhythm; No murmurs, rubs, or gallops  ABDOMEN: Soft, Nontender, Nondistended;   SKIN: No rashes or lesions  NERVOUS SYSTEM:  Alert & Oriented X3, no focal deficit    LABS:  05-05    146<H>  |  98  |  26<H>  ----------------------------<  95  3.6   |  41<H>  |  0.71  05-04    146<H>  |  97<L>  |  22  ----------------------------<  117<H>  3.9   |  43<H>  |  0.69  05-04    148<H>  |  98  |  18  ----------------------------<  106<H>  3.6   |  42<H>  |  0.73    Ca    9.1      05 May 2021 08:07  Ca    9.5      04 May 2021 09:38  Ca    9.7      04 May 2021 00:02  Phos  2.2     05-05  Mg     1.8     05-05    TPro  6.3  /  Alb  3.7  /  TBili  0.4  /  DBili  x   /  AST  14  /  ALT  12  /  AlkPhos  51  05-04                                              10.2   3.61  )-----------( 181      ( 05 May 2021 08:07 )             32.9                         11.1   4.05  )-----------( 180      ( 04 May 2021 09:38 )             36.0                         12.5   6.49  )-----------( 206      ( 04 May 2021 00:02 )             40.1     CAPILLARY BLOOD GLUCOSE          RADIOLOGY & ADDITIONAL TESTS:    Imaging Personally Reviewed:  [x ] YES  [ ] NO    Consultants involved in case:   Consultant(s) Notes Reviewed:  [ x] YES  [ ] NO:   Care Discussed with Consultants/Other Providers [x ] YES  [ ] NO         ***************************************************************  Adriel Flanagan, PGY2  Internal Medicine   pager: [phone]  ***************************************************************    TJ MELENDEZ  71y  MRN: [id number]    Patient is a 71y old  Female who presents with a chief complaint of COPD exacerbation (05 May 2021 07:36)      Subjective: bipap overnight. This morning patient upset that the room is cold. Says she is occasionally short of breathe but that is related to stress. Patient would like to go home. She would not like any pulmonary rehab. Denies fever, CP, abn pain, N/V, dysuria. Tolerating diet, had [employer] last night.     MEDICATIONS  (STANDING):  budesonide 160 MICROgram(s)/formoterol 4.5 MICROgram(s) Inhaler 2 Puff(s) Inhalation two times a day  carvedilol 25 milliGRAM(s) Oral every 12 hours  enoxaparin Injectable 30 milliGRAM(s) SubCutaneous daily  hydrALAZINE 75 milliGRAM(s) Oral three times a day  isosorbide   mononitrate ER Tablet (IMDUR) 30 milliGRAM(s) Oral daily  pantoprazole    Tablet 40 milliGRAM(s) Oral before breakfast  simvastatin 20 milliGRAM(s) Oral at bedtime  tiotropium 18 MICROgram(s) Capsule 1 Capsule(s) Inhalation daily    MEDICATIONS  (PRN):  albuterol/ipratropium for Nebulization 3 milliLiter(s) Nebulizer every 6 hours PRN Shortness of Breath and/or Wheezing  sodium chloride 0.65% Nasal 1 Spray(s) Both Nostrils daily PRN Nasal Congestion      Objective:    Vitals: Vital Signs Last 24 Hrs  T(C): 36.8 (05-06-21 @ 06:10), Max: 36.8 (05-05-21 @ 21:22)  T(F): 98.2 (05-06-21 @ 06:10), Max: 98.2 (05-05-21 @ 21:22)  HR: 82 (05-06-21 @ 06:10) (66 - 91)  BP: 142/88 (05-06-21 @ 06:10) (99/60 - 143/76)  BP(mean): --  RR: 18 (05-06-21 @ 06:10) (18 - 20)  SpO2: 100% (05-06-21 @ 06:10) (95% - 100%)            I&O's Summary      PHYSICAL EXAM:  GENERAL: emaciated female in NAD  HEAD:  Atraumatic, Normocephalic  EYES: EOMI, conjunctiva and sclera clear  CHEST/LUNG: decreased breath sounds b/lNo rales, rhonchi, wheezing, or rubs  HEART: Regular rate and rhythm; No murmurs, rubs, or gallops  ABDOMEN: Soft, Nontender, Nondistended;   SKIN: No rashes or lesions  NERVOUS SYSTEM:  Alert & Oriented X3, no focal deficit    LABS:  05-05    146<H>  |  98  |  26<H>  ----------------------------<  95  3.6   |  41<H>  |  0.71  05-04    146<H>  |  97<L>  |  22  ----------------------------<  117<H>  3.9   |  43<H>  |  0.69  05-04    148<H>  |  98  |  18  ----------------------------<  106<H>  3.6   |  42<H>  |  0.73    Ca    9.1      05 May 2021 08:07  Ca    9.5      04 May 2021 09:38  Ca    9.7      04 May 2021 00:02  Phos  2.2     05-05  Mg     1.8     05-05    TPro  6.3  /  Alb  3.7  /  TBili  0.4  /  DBili  x   /  AST  14  /  ALT  12  /  AlkPhos  51  05-04                                              10.2   3.61  )-----------( 181      ( 05 May 2021 08:07 )             32.9                         11.1   4.05  )-----------( 180      ( 04 May 2021 09:38 )             36.0                         12.5   6.49  )-----------( 206      ( 04 May 2021 00:02 )             40.1     CAPILLARY BLOOD GLUCOSE          RADIOLOGY & ADDITIONAL TESTS:    Imaging Personally Reviewed:  [x ] YES  [ ] NO    Consultants involved in case:   Consultant(s) Notes Reviewed:  [ x] YES  [ ] NO:   Care Discussed with Consultants/Other Providers [x ] YES  [ ] NO

## 2021-05-06 NOTE — DISCHARGE NOTE PROVIDER - HOSPITAL COURSE
71 year old female with pmhx HTN, HFrEF s/p ICD, CAD,  COPD (3L home O2), pulm hypertension who is presenting with SOB. The patient was recently admitted 3/29/21 - 4/3/21 for COPD exacerbation discharged home on prednisone taper. The patient presented with 2-3 days of SOB. No cough, fevers, chills, nausea, vomiting or diarrhea.    ED course: VSS afebrile. No leukocytosis. Hypercapnic pCO2 > 100. Patient AO x 2. Placed on Bipap 10/5. s/p 125mg IV solumedrol. Repeat VBG pCO2 mildly improved therefore Bipap settings increased 12/5.     Hospital course: Patient's hypercapnia improved with Bipap. No infectious etiology for COPD exacerbation. Spiriva, symbicort and prednisone 40mg started. Mental status returned to baseline AOx4. Weaned to 4L NC and eventually back to home oxygen requirements of 3L NC during the day and bipap at night. Pulmonology consulted agreed with plan. DNI/DNI was reversed per [facility] discussion (Chart note [facility] 5/5/21).       PT rec _____. Patient was discharged home with home care on prednisone taper.        71 year old female with pmhx HTN, HFrEF s/p ICD, CAD,  COPD (3L home O2), pulm hypertension who is presenting with SOB. The patient was recently admitted 3/29/21 - 4/3/21 for COPD exacerbation discharged home on prednisone taper. The patient presented with 2-3 days of SOB. No cough, fevers, chills, nausea, vomiting or diarrhea.    ED course: VSS afebrile. No leukocytosis. Hypercapnic pCO2 > 100. Patient AO x 2. Placed on Bipap 10/5. s/p 125mg IV solumedrol. Repeat VBG pCO2 mildly improved therefore Bipap settings increased 12/5.     Hospital course: Patient's hypercapnia improved with Bipap. No infectious etiology for COPD exacerbation. Spiriva, symbicort and prednisone 40mg started. Mental status returned to baseline AOx4. Weaned to 4L NC and eventually back to home oxygen requirements of 3L NC during the day and bipap at night. Pulmonology consulted agreed with plan. DNI/DNI was reversed per [facility] discussion (Chart note [facility] 5/5/21).       PT rec home with no needs. Patient was discharged home with home care on prednisone      71 year old female with pmhx HTN, HFrEF s/p ICD, CAD,  COPD (3L home O2), pulm hypertension who is presenting with SOB. The patient was recently admitted 3/29/21 - 4/3/21 for COPD exacerbation discharged home on prednisone taper. The patient presented with 2-3 days of SOB. No cough, fevers, chills, nausea, vomiting or diarrhea.    ED course: VSS afebrile. No leukocytosis. Hypercapnic pCO2 > 100. Patient AO x 2. Placed on Bipap 10/5. s/p 125mg IV solumedrol. Repeat VBG pCO2 mildly improved therefore Bipap settings increased 12/5.     Hospital course: Patient's hypercapnia improved with Bipap. No infectious etiology for COPD exacerbation. Spiriva, symbicort and prednisone 40mg started. Mental status returned to baseline AOx4. Weaned to 4L NC and eventually back to home oxygen requirements of 3L NC during the day and bipap at night. Pulmonology consulted agreed with plan. DNR/DNI was reversed per [facility] discussion (Chart note [facility] 5/5/21).       PT rec home with no needs. Patient was discharged home with home care on prednisone      71 year old female with pmhx HTN, HFrEF s/p ICD, CAD,  COPD (3L home O2), pulm hypertension who is presenting with SOB. The patient was recently admitted 3/29/21 - 4/3/21 for COPD exacerbation discharged home on prednisone taper. The patient presented with 2-3 days of SOB. No cough, fevers, chills, nausea, vomiting or diarrhea.    ED course: VSS afebrile. No leukocytosis. Hypercapnic pCO2 > 100. Patient AO x 2. Placed on Bipap 10/5. s/p 125mg IV solumedrol. Repeat VBG pCO2 mildly improved therefore Bipap settings increased 12/5.     Hospital course: Patient's hypercapnia improved with Bipap. No infectious etiology for COPD exacerbation. Spiriva, symbicort and prednisone 40mg started. She completed her prednisone course while admitted. Mental status returned to baseline AOx4. Weaned to 4L NC and eventually back to home oxygen requirements of 3L NC during the day and bipap at night. Pulmonology consulted agreed with plan. DNR/DNI was reversed per [facility] discussion (Chart note [facility] 5/5/21).       PT rec home with no needs. Patient was discharged home with home care.

## 2021-05-06 NOTE — DISCHARGE NOTE PROVIDER - CARE PROVIDERS DIRECT ADDRESSES
,[email or web address] ,DirectAddress_Unknown

## 2021-05-06 NOTE — DIETITIAN INITIAL EVALUATION ADULT. - PROBLEM SELECTOR PLAN 2
In the setting of COPD exacerbation. 7.32/99/30/39 on Bipap in the ED  -Repeat pCO2 still elevated 96. Bipap increased to 12/6  -Check VBG this evening   -tx of COPD exacerbation as above
Admission

## 2021-05-06 NOTE — DISCHARGE NOTE PROVIDER - NSFOLLOWUPCLINICS_GEN_ALL_ED_FT
[facility] Pulmonolgy and Sleep Medicine  Pulmonology  [address] [place] [address], [place] [address]  Phone: [phone]  Fax:

## 2021-05-06 NOTE — GOALS OF CARE CONVERSATION - ADVANCED CARE PLANNING - CONVERSATION DETAILS
71 year old female with pmhx HTN, HFrEF s/p ICD, CAD,  COPD (3L home O2), pulm hypertension who is presenting with hypercapnic respiratory failure in the setting of COPD exacerbation.  patient on this hospital stay resend DNR  Patient wants to have good follow up with PCP and Pulmonary for her lungs.  patient lives but has family near by.  Alexandru - brother is taking care of patients mother who lives on 2 rnd floor .  Patient lives on first floor and wish to remain independent in her home.  Son Maribel lives in [place]- if "anything happen to sick - bedbound mother" son will take take patient to live with him in [place].  Wants to do all her ALDs and be independent.  Se wants to be full code at this time

## 2021-05-06 NOTE — DISCHARGE NOTE PROVIDER - CARE PROVIDER_API CALL
Marisela Teran)  Critical Care Medicine; Internal Medicine; Pulmonary Disease  [address] [place] [address]  Phone: [phone]  Fax: [phone]  Follow Up Time:    Marisela Teran)  Critical Care Medicine; Internal Medicine; Pulmonary Disease  [address] [place] [address]  Phone: [phone]  Fax: [phone]  Established Patient  Scheduled Appointment: 05/20/2021 02:30 PM   Bao Allen (DO)  Critical Care Medicine; Internal Medicine; Pulmonary Disease  [address] [place] [address]  Phone: [phone]  Fax: [phone]  Scheduled Appointment: 05/24/2021 11:30 AM

## 2021-05-06 NOTE — DISCHARGE NOTE PROVIDER - NSDCMRMEDTOKEN_GEN_ALL_CORE_FT
aspirin 81 mg oral delayed release tablet: 1 tab(s) orally once a day  budesonide-formoterol 160 mcg-4.5 mcg/inh inhalation aerosol: 2 puff(s) inhaled 2 times a day   carvedilol 12.5 mg oral tablet: 3 tab(s) orally every 12 hours  fluticasone 50 mcg/inh nasal spray: 1 spray(s) nasal 2 times a day  hydrALAZINE 25 mg oral tablet: 3 tab(s) orally 3 times a day  ipratropium-albuterol 0.5 mg-2.5 mg/3 mLinhalation solution: 3 milliliter(s) inhaled every 6 hours  isosorbide mononitrate 30 mg oral tablet, extended release: 1 tab(s) orally once a day (in the morning)  Lasix 20 mg oral tablet: 1 tab(s) orally once a day, As Needed  pantoprazole 40 mg oral delayed release tablet: 1 tab(s) orally once a day (before a meal)  simvastatin 20 mg oral tablet: 1 tab(s) orally once a day (at bedtime)  sodium chloride 0.65% nasal spray: 1 spray(s) nasal 4 times a day, As Needed  for nasal congestion   tiotropium 18 mcg inhalation capsule: 1 cap(s) inhaled once a day   aspirin 81 mg oral delayed release tablet: 1 tab(s) orally once a day  budesonide-formoterol 160 mcg-4.5 mcg/inh inhalation aerosol: 2 puff(s) inhaled 2 times a day   carvedilol 12.5 mg oral tablet: 3 tab(s) orally every 12 hours  fluticasone 50 mcg/inh nasal spray: 1 spray(s) nasal 2 times a day  hydrALAZINE 25 mg oral tablet: 3 tab(s) orally 3 times a day  ipratropium-albuterol 0.5 mg-2.5 mg/3 mLinhalation solution: 3 milliliter(s) inhaled every 6 hours  isosorbide mononitrate 30 mg oral tablet, extended release: 1 tab(s) orally once a day (in the morning)  Lasix 20 mg oral tablet: 1 tab(s) orally once a day, As Needed  oxymetazoline 0.05% nasal spray: 1 spray(s) nasal 2 times a day   pantoprazole 40 mg oral delayed release tablet: 1 tab(s) orally once a day (before a meal)  simvastatin 20 mg oral tablet: 1 tab(s) orally once a day (at bedtime)  sodium chloride 0.65% nasal spray: 1 spray(s) nasal 4 times a day, As Needed  for nasal congestion   tiotropium 18 mcg inhalation capsule: 1 cap(s) inhaled once a day

## 2021-05-06 NOTE — PROGRESS NOTE ADULT - PROBLEM SELECTOR PLAN 1
On home oxygen 3L. On bipap in the ED for hypercapnic resp failure   -s/p 125mg solumedrol in the ED  -c/w IV solumedrol IV 40mg qd, then switch to prednisone 40mg   -CXR hyperinflated lungs without focal pulmonary abnormalities consistent with emphysema. No role for IV zithromax  -Spiriva and Pulmicort  -duonebs prn for SOB and/or wheezing   -bipap at night   -wean to NC as tolerated On home oxygen 3L. On bipap in the ED for hypercapnic resp failure   -s/p 125mg solumedrol in the ED  -c/w IV solumedrol IV 40mg qd, then switch to prednisone 40mg   -CXR hyperinflated lungs without focal pulmonary abnormalities consistent with emphysema. No role for IV zithromax  -Spiriva and Pulmicort  -duonebs prn for SOB and/or wheezing   -bipap at night   -o2 weaned to 3L (home o2 settings)

## 2021-05-06 NOTE — PROGRESS NOTE ADULT - ASSESSMENT
71 year old female with pmhx HTN, HFrEF s/p ICD, CAD,  COPD (3L home O2), pulm hypertension who is presenting with hypercapnic respiratory failure in the setting of COPD exacerbation

## 2021-05-06 NOTE — DIETITIAN INITIAL EVALUATION ADULT. - ORAL INTAKE PTA/DIET HISTORY
Pt. endorses good appetite/PO intake PTA.  Usually eats brunch, snacks throughout the day and dinner, which are cooked at home.  Does not add salt to food.  Drinks primarily water or juice throughout day.

## 2021-05-06 NOTE — PROGRESS NOTE ADULT - PROBLEM SELECTOR PLAN 7
DASH/TLC diet  lovenox  GOC: see chart note from 5/5/21, patient full code at this time. GOC discussion ongoing

## 2021-05-06 NOTE — DIETITIAN INITIAL EVALUATION ADULT. - OTHER INFO
Pt. w PMH COPD (3L home O2), HTN, HF s/p ICD, CAD, pulmonary HTN.  Admitted with acute on chronic respiratory failure w hypercapnia.      Pt. denies food allergies, nausea/vomiting/diarrhea/constipation, or issues with chewing/swallowing.    Pt. reports good PO intake at breakfast.  Has not consumed Ensure Enlive as of yet.  Encouraged to drink between meals and not in place of a meal.  Discussed high calorie/nutrient dense food intake given increased energy needs.  Also advised on heart healthy modifications, specifically sodium restriction.      Reports 20lb weight loss since death of her  in 2017.    States her current weight is ~87lbs.    Current weight of 76.7lb (34.8kg) and is consistent with significant weight loss  (based on historical chart documentation):  41.3kg (3/7/2021)  39.5kg (4/20/2021)  34.8kg (5/4/2021 on admission).

## 2021-05-06 NOTE — CONSULT NOTE ADULT - SUBJECTIVE AND OBJECTIVE BOX
CHIEF COMPLAINT:    HPI: 71F PMH Former Smoker, COPD (on 3L NC home O2, frequent COPD exacerbations 04/21. 02/21,11/20, 8/20), CARLOTA on CPAP, moderate PulmHTN, HFrEF 20-25 s/p PPM/ ICD placement, and HTN p/w SOB found to be in acute on chronic hypercapnic resp failure likely 2/2 to COPD exacerbation.     PAST MEDICAL & SURGICAL HISTORY:  CAD (coronary artery disease)    COPD (chronic obstructive pulmonary disease)  on 3L NC    HTN (hypertension)    Chronic systolic CHF (congestive heart failure)  EF 20-25% s/p Medtronic ICD (9/2018)    Sleep apnea  cannot tolerate CPAP    Pulmonary hypertension  moderate    Right-sided heart failure    Presence of cardioverter defibrillator  Medtronic PPM/ICD placed 9/2018        FAMILY HISTORY:  Family history of colon cancer        SOCIAL HISTORY:  Smoking: [ ] Never Smoked [ ] Former Smoker (__ packs x ___ years) [ ] Current Smoker  (__ packs x ___ years)  Substance Use: [ ] Never Used [ ] Used ____  EtOH Use:  Marital Status: [ ] Single [ ]  [ ]  [ ]   Sexual History:   Occupation:  Recent Travel:  Country of Birth:  Advance Directives:    Allergies    No Known Allergies    Intolerances        HOME MEDICATIONS:    REVIEW OF SYSTEMS:  Constitutional: [ ] negative [ ] fevers [ ] chills [ ] weight loss [ ] weight gain  HEENT: [ ] negative [ ] dry eyes [ ] eye irritation [ ] postnasal drip [ ] nasal congestion  CV: [ ] negative  [ ] chest pain [ ] orthopnea [ ] palpitations [ ] murmur  Resp: [ ] negative [ ] cough [ ] shortness of breath [ ] dyspnea [ ] wheezing [ ] sputum [ ] hemoptysis  GI: [ ] negative [ ] nausea [ ] vomiting [ ] diarrhea [ ] constipation [ ] abd pain [ ] dysphagia   : [ ] negative [ ] dysuria [ ] nocturia [ ] hematuria [ ] increased urinary frequency  Musculoskeletal: [ ] negative [ ] back pain [ ] myalgias [ ] arthralgias [ ] fracture  Skin: [ ] negative [ ] rash [ ] itch  Neurological: [ ] negative [ ] headache [ ] dizziness [ ] syncope [ ] weakness [ ] numbness  Psychiatric: [ ] negative [ ] anxiety [ ] depression  Endocrine: [ ] negative [ ] diabetes [ ] thyroid problem  Hematologic/Lymphatic: [ ] negative [ ] anemia [ ] bleeding problem  Allergic/Immunologic: [ ] negative [ ] itchy eyes [ ] nasal discharge [ ] hives [ ] angioedema  [ ] All other systems negative  [ ] Unable to assess ROS because ________    OBJECTIVE:  ICU Vital Signs Last 24 Hrs  T(C): 36.8 (06 May 2021 09:42), Max: 36.8 (05 May 2021 21:22)  T(F): 98.2 (06 May 2021 09:42), Max: 98.2 (05 May 2021 21:22)  HR: 88 (06 May 2021 13:40) (74 - 91)  BP: 115/69 (06 May 2021 13:40) (102/78 - 143/76)  BP(mean): --  ABP: --  ABP(mean): --  RR: 18 (06 May 2021 09:42) (18 - 18)  SpO2: 100% (06 May 2021 09:50) (97% - 100%)        CAPILLARY BLOOD GLUCOSE          PHYSICAL EXAM:  General:   HEENT:   Lymph Nodes:  Neck:   Respiratory:   Cardiovascular:   Abdomen:   Extremities:   Skin:   Neurological:  Psychiatry:    HOSPITAL MEDICATIONS:  enoxaparin Injectable 30 milliGRAM(s) SubCutaneous daily      carvedilol 25 milliGRAM(s) Oral every 12 hours  hydrALAZINE 75 milliGRAM(s) Oral three times a day  isosorbide   mononitrate ER Tablet (IMDUR) 30 milliGRAM(s) Oral daily    predniSONE   Tablet 40 milliGRAM(s) Oral daily  simvastatin 20 milliGRAM(s) Oral at bedtime    albuterol/ipratropium for Nebulization 3 milliLiter(s) Nebulizer every 6 hours PRN  budesonide 160 MICROgram(s)/formoterol 4.5 MICROgram(s) Inhaler 2 Puff(s) Inhalation two times a day  tiotropium 18 MICROgram(s) Capsule 1 Capsule(s) Inhalation daily      pantoprazole    Tablet 40 milliGRAM(s) Oral before breakfast            sodium chloride 0.65% Nasal 1 Spray(s) Both Nostrils daily PRN        LABS:                        10.3   4.46  )-----------( 170      ( 06 May 2021 07:46 )             32.4     Hgb Trend: 10.3<--, 10.2<--, 11.1<--, 12.5<--  05-06    148<H>  |  101  |  26<H>  ----------------------------<  86  3.9   |  39<H>  |  0.70    Ca    9.4      06 May 2021 07:46  Phos  3.1     05-06  Mg     1.8     05-06      Creatinine Trend: 0.70<--, 0.71<--, 0.69<--, 0.73<--        Venous Blood Gas:  05-05 @ 07:31  7.42/74/60/43/90.9  VBG Lactate: --  Venous Blood Gas:  05-04 @ 19:06  7.40/74/30/41/55.8  VBG Lactate: --      MICROBIOLOGY:     RADIOLOGY:  [ ] Reviewed and interpreted by me    PULMONARY FUNCTION TESTS:    EKG: CHIEF COMPLAINT:    HPI: 71F PMH Former Smoker, COPD (on 3L NC home O2, frequent COPD exacerbations 04/21. 02/21,11/20, 8/20), CARLOTA on CPAP, moderate PulmHTN, HFrEF 20-25 s/p PPM/ ICD placement, and HTN p/w SOB found to be in acute on chronic hypercapnic resp failure likely 2/2 to COPD exacerbation.     PAST MEDICAL & SURGICAL HISTORY:  CAD (coronary artery disease)    COPD (chronic obstructive pulmonary disease)  on 3L NC    HTN (hypertension)    Chronic systolic CHF (congestive heart failure)  EF 20-25% s/p Medtronic ICD (9/2018)    Sleep apnea  cannot tolerate CPAP    Pulmonary hypertension  moderate    Right-sided heart failure    Presence of cardioverter defibrillator  Medtronic PPM/ICD placed 9/2018        FAMILY HISTORY:  Family history of colon cancer        SOCIAL HISTORY:  Smoking: [ ] Never Smoked [ ] Former Smoker (__ packs x ___ years) [ ] Current Smoker  (__ packs x ___ years)  Substance Use: [ ] Never Used [ ] Used ____  EtOH Use:  Marital Status: [ ] Single [ ]  [ ]  [ ]   Sexual History:   Occupation:  Recent Travel:  Country of Birth:  Advance Directives:    Allergies    No Known Allergies    Intolerances        HOME MEDICATIONS:    REVIEW OF SYSTEMS:  Constitutional: [ ] negative [ ] fevers [ ] chills [ ] weight loss [ ] weight gain  HEENT: [ ] negative [ ] dry eyes [ ] eye irritation [ ] postnasal drip [ ] nasal congestion  CV: [ ] negative  [ ] chest pain [ ] orthopnea [ ] palpitations [ ] murmur  Resp: [ ] negative [ ] cough [ ] shortness of breath [ ] dyspnea [ ] wheezing [ ] sputum [ ] hemoptysis  GI: [ ] negative [ ] nausea [ ] vomiting [ ] diarrhea [ ] constipation [ ] abd pain [ ] dysphagia   : [ ] negative [ ] dysuria [ ] nocturia [ ] hematuria [ ] increased urinary frequency  Musculoskeletal: [ ] negative [ ] back pain [ ] myalgias [ ] arthralgias [ ] fracture  Skin: [ ] negative [ ] rash [ ] itch  Neurological: [ ] negative [ ] headache [ ] dizziness [ ] syncope [ ] weakness [ ] numbness  Psychiatric: [ ] negative [ ] anxiety [ ] depression  Endocrine: [ ] negative [ ] diabetes [ ] thyroid problem  Hematologic/Lymphatic: [ ] negative [ ] anemia [ ] bleeding problem  Allergic/Immunologic: [ ] negative [ ] itchy eyes [ ] nasal discharge [ ] hives [ ] angioedema  [ ] All other systems negative  [ ] Unable to assess ROS because ________    OBJECTIVE:  ICU Vital Signs Last 24 Hrs  T(C): 36.8 (06 May 2021 09:42), Max: 36.8 (05 May 2021 21:22)  T(F): 98.2 (06 May 2021 09:42), Max: 98.2 (05 May 2021 21:22)  HR: 88 (06 May 2021 13:40) (74 - 91)  BP: 115/69 (06 May 2021 13:40) (102/78 - 143/76)  BP(mean): --  ABP: --  ABP(mean): --  RR: 18 (06 May 2021 09:42) (18 - 18)  SpO2: 100% (06 May 2021 09:50) (97% - 100%)        CAPILLARY BLOOD GLUCOSE      PHYSICAL EXAM:   GEN: Age appropriate, resting comfortably in bed, no acute distress, non toxic appearing, speaking in complete sentences.  on 3LNC  HEENT: Conjunctiva and sclera normal  PULM: Lungs decreased air flow bilaterally. Distant breath sounds. No wheezing or crackles.   CV: RRR, S1S2, no MRG  MSK: no stiffness or joint effusions  Abdominal: Soft, nontender to palpation, non-distended, +BS  Extremities: No edema or cyanosis  NEURO: AAOx3  Psych: normal affect, normal behavior  Skin: No rashes, lesions    T(C): 37.3 (05-06-21 @ 15:53), Max: 37.3 (05-06-21 @ 15:53)  HR: 82 (05-06-21 @ 15:53) (74 - 91)  BP: 116/67 (05-06-21 @ 15:53) (102/78 - 143/76)  RR: 17 (05-06-21 @ 15:53) (17 - 18)  SpO2: 100% (05-06-21 @ 15:53) (96% - 100%)    HOSPITAL MEDICATIONS:  enoxaparin Injectable 30 milliGRAM(s) SubCutaneous daily      carvedilol 25 milliGRAM(s) Oral every 12 hours  hydrALAZINE 75 milliGRAM(s) Oral three times a day  isosorbide   mononitrate ER Tablet (IMDUR) 30 milliGRAM(s) Oral daily    predniSONE   Tablet 40 milliGRAM(s) Oral daily  simvastatin 20 milliGRAM(s) Oral at bedtime    albuterol/ipratropium for Nebulization 3 milliLiter(s) Nebulizer every 6 hours PRN  budesonide 160 MICROgram(s)/formoterol 4.5 MICROgram(s) Inhaler 2 Puff(s) Inhalation two times a day  tiotropium 18 MICROgram(s) Capsule 1 Capsule(s) Inhalation daily      pantoprazole    Tablet 40 milliGRAM(s) Oral before breakfast            sodium chloride 0.65% Nasal 1 Spray(s) Both Nostrils daily PRN        LABS:                        10.3   4.46  )-----------( 170      ( 06 May 2021 07:46 )             32.4     Hgb Trend: 10.3<--, 10.2<--, 11.1<--, 12.5<--  05-06    148<H>  |  101  |  26<H>  ----------------------------<  86  3.9   |  39<H>  |  0.70    Ca    9.4      06 May 2021 07:46  Phos  3.1     05-06  Mg     1.8     05-06      Creatinine Trend: 0.70<--, 0.71<--, 0.69<--, 0.73<--        Venous Blood Gas:  05-05 @ 07:31  7.42/74/60/43/90.9  VBG Lactate: --  Venous Blood Gas:  05-04 @ 19:06  7.40/74/30/41/55.8  VBG Lactate: --      MICROBIOLOGY:     RADIOLOGY:  [ ] Reviewed and interpreted by me    PULMONARY FUNCTION TESTS:    EKG:

## 2021-05-06 NOTE — DISCHARGE NOTE PROVIDER - DETAILS OF MALNUTRITION DIAGNOSIS/DIAGNOSES
This patient has been assessed with a concern for Malnutrition and was treated during this hospitalization for the following Nutrition diagnosis/diagnoses:     -  05/06/2021: Severe protein-calorie malnutrition   -  05/06/2021: Underweight (BMI < 19)

## 2021-05-06 NOTE — DISCHARGE NOTE PROVIDER - NSDCFUSCHEDAPPT_GEN_ALL_CORE_FT
TJ MELENDEZ ; 06/03/2021 ; P Cardio [address] TJ Hennessy ; 07/06/2021 ; [facility] Cardio Electro [address] TJ MELENDEZ S ; 05/20/2021 ; [facility] Pulm[facility] [id number] [facility]  TJ MELENDEZ S ; 06/03/2021 ; [facility] Card[facility] [address]  TJ MELENDEZ ; 07/06/2021 ; [facility]"" Cardio Electro [address] TJ MELENDEZ S ; 05/20/2021 ; [facility] PulmMed [id number] [facility]  TJ MELENDEZ S ; 05/24/2021 ; [facility]PP Med Pulm [address] [facility]  TJ MELENDEZ S ; 06/03/2021 ; [facility] Card[facility] [address]  TJ MELENDEZ S ; 07/06/2021 ; [facility] Cardio Electro [address]

## 2021-05-06 NOTE — DISCHARGE NOTE PROVIDER - PROVIDER TOKENS
PROVIDER:[TOKEN:[[id number]]] PROVIDER:[TOKEN:[[id number]],SCHEDULEDAPPT:[05/20/2021],SCHEDULEDAPPTTIME:[02:30 PM],ESTABLISHEDPATIENT:[T]] PROVIDER:[TOKEN:[[id number]],SCHEDULEDAPPT:[05/24/2021],SCHEDULEDAPPTTIME:[11:30 AM]]

## 2021-05-06 NOTE — DIETITIAN NUTRITION RISK NOTIFICATION - TREATMENT: THE FOLLOWING DIET HAS BEEN RECOMMENDED
Diet, DASH/TLC:   Sodium & Cholesterol Restricted  Supplement Feeding Modality:  Oral  Ensure Enlive Cans or Servings Per Day:  1       Frequency:  Three Times a day (05-05-21 @ 15:28) [Active]

## 2021-05-06 NOTE — DIETITIAN INITIAL EVALUATION ADULT. - PERTINENT LABORATORY DATA
05-06    148<H>  |  101  |  26<H>  ----------------------------<  86  3.9   |  39<H>  |  0.70    Ca    9.4      06 May 2021 07:46  Phos  3.1     05-06  Mg     1.8     05-06

## 2021-05-06 NOTE — PROGRESS NOTE ADULT - PROBLEM SELECTOR PLAN 2
In the setting of COPD exacerbation. Patient AOx1-2 in the ED. VBG 7.32/99/30/39 placed on Bipap  -pCO2 >110 --> 99 -->96 -->74  -Patients mental status back to baseline AO x 4  -trend pCO2  -tx of COPD exacerbation as above  -Bipap overnight

## 2021-05-06 NOTE — DIETITIAN INITIAL EVALUATION ADULT. - PERTINENT MEDS FT
MEDICATIONS  (STANDING):  budesonide 160 MICROgram(s)/formoterol 4.5 MICROgram(s) Inhaler 2 Puff(s) Inhalation two times a day  carvedilol 25 milliGRAM(s) Oral every 12 hours  enoxaparin Injectable 30 milliGRAM(s) SubCutaneous daily  hydrALAZINE 75 milliGRAM(s) Oral three times a day  isosorbide   mononitrate ER Tablet (IMDUR) 30 milliGRAM(s) Oral daily  pantoprazole    Tablet 40 milliGRAM(s) Oral before breakfast  simvastatin 20 milliGRAM(s) Oral at bedtime  tiotropium 18 MICROgram(s) Capsule 1 Capsule(s) Inhalation daily

## 2021-05-06 NOTE — DISCHARGE NOTE PROVIDER - NSDCCPCAREPLAN_GEN_ALL_CORE_FT
PRINCIPAL DISCHARGE DIAGNOSIS  Diagnosis: COPD exacerbation  Assessment and Plan of Treatment: A COPD exacerbation, or flare-up, occurs when your COPD respiratory symptoms become much more severe. It is the reason you became short of breath and required admission to the hospital. Your COPD exacerbation was not caused by an infection. You were treated with steroids in the hospital to reduce inflammation and severity of your symptoms.   Please continue to take prednisone once dishcarged to the hospital  Please use 3L oxygen during the day and Bipap when sleeping  Please follow-up with your pulmonologist once discharged      SECONDARY DISCHARGE DIAGNOSES  Diagnosis: Hypercapnic respiratory failure  Assessment and Plan of Treatment: Hypercapnia is a buildup of carbon dioxide in your bloodstream. It affects people who have chronic obstructive pulmonary disease (COPD).  If you have COPD, you can't breathe as easily as other people do. Your inflamed airways and damaged lung tissue make it harder for you to breathe in the oxygen you need and breathe out the carbon dioxide that your body wants to get rid of. This build up in carbon dioxide is what caused you to be initially confused when you arrived to the hospital.   Please use your Bipap at night to prevent build up of CO2 in your blood and future episode of hypercapnic respiratory failure    Diagnosis: HFrEF (heart failure with reduced ejection fraction)  Assessment and Plan of Treatment: Heart failure with reduced ejection fraction happens when the muscle of the left ventricle is not pumping as well as normal. The ejection fraction is 40% or less.  Please take the following medications:  -coreg 25mg bid  -Imdur 30mg qd  -hydralazine 75mg tid.  Please follow-up with your cardiologist for further management     PRINCIPAL DISCHARGE DIAGNOSIS  Diagnosis: COPD exacerbation  Assessment and Plan of Treatment: A COPD exacerbation, or flare-up, occurs when your COPD respiratory symptoms become much more severe. It is the reason you became short of breath and required admission to the hospital. Your COPD exacerbation was not caused by an infection. You were treated with steroids in the hospital to reduce inflammation and severity of your symptoms.   Please continue to take prednisone once dishcarged to the hospital  Please use oxygen during the day and Bipap when sleeping  Please follow-up with your pulmonologist once discharged  Please take prednisone as directed      SECONDARY DISCHARGE DIAGNOSES  Diagnosis: Hypercapnic respiratory failure  Assessment and Plan of Treatment: Hypercapnia is a buildup of carbon dioxide in your bloodstream. It affects people who have chronic obstructive pulmonary disease (COPD).  If you have COPD, you can't breathe as easily as other people do. Your inflamed airways and damaged lung tissue make it harder for you to breathe in the oxygen you need and breathe out the carbon dioxide that your body wants to get rid of. This build up in carbon dioxide is what caused you to be initially confused when you arrived to the hospital.   Please use your Bipap at night to prevent build up of CO2 in your blood and future episode of hypercapnic respiratory failure    Diagnosis: HFrEF (heart failure with reduced ejection fraction)  Assessment and Plan of Treatment: Heart failure with reduced ejection fraction happens when the muscle of the left ventricle is not pumping as well as normal. The ejection fraction is 40% or less.  Please take the following medications:  -coreg 25mg bid  -Imdur 30mg qd  -hydralazine 75mg tid.  Please follow-up with your cardiologist for further management     PRINCIPAL DISCHARGE DIAGNOSIS  Diagnosis: COPD exacerbation  Assessment and Plan of Treatment: A COPD exacerbation, or flare-up, occurs when your COPD respiratory symptoms become much more severe. It is the reason you became short of breath and required admission to the hospital. Your COPD exacerbation was not caused by an infection. You were treated with steroids in the hospital to reduce inflammation and severity of your symptoms.   Please continue to take prednisone once discharged to the hospital.  Please use oxygen during the day and Bipap when sleeping.  Please follow-up with a pulmonologist once discharged. An appointment has been scheduled for you.  Please take prednisone as directed.      SECONDARY DISCHARGE DIAGNOSES  Diagnosis: Hypercapnic respiratory failure  Assessment and Plan of Treatment: Hypercapnia is a buildup of carbon dioxide in your bloodstream. It affects people who have chronic obstructive pulmonary disease (COPD).  If you have COPD, you can't breathe as easily as other people do. Your inflamed airways and damaged lung tissue make it harder for you to breathe in the oxygen you need and breathe out the carbon dioxide that your body wants to get rid of. This build up in carbon dioxide is what caused you to be initially confused when you arrived to the hospital.   Please use your Bipap at night to prevent build up of CO2 in your blood and future episode of hypercapnic respiratory failure    Diagnosis: HFrEF (heart failure with reduced ejection fraction)  Assessment and Plan of Treatment: Heart failure with reduced ejection fraction happens when the muscle of the left ventricle is not pumping as well as normal. The ejection fraction is 40% or less.  Please take the following medications:  -coreg 25mg bid  -Imdur 30mg qd  -hydralazine 75mg tid.  Please follow-up with your cardiologist for further management     PRINCIPAL DISCHARGE DIAGNOSIS  Diagnosis: COPD exacerbation  Assessment and Plan of Treatment: A COPD exacerbation, or flare-up, occurs when your COPD respiratory symptoms become much more severe. It is the reason you became short of breath and required admission to the hospital. Your COPD exacerbation was not caused by an infection. You were treated with a complete course of steroids in the hospital to reduce inflammation and the severity of your symptoms.   Please continue with your home COPD medications.  Please use oxygen during the day and BiPAP when sleeping.  Please follow-up with a pulmonologist once discharged. An appointment has been scheduled for you.      SECONDARY DISCHARGE DIAGNOSES  Diagnosis: Hypercapnic respiratory failure  Assessment and Plan of Treatment: Hypercapnia is a buildup of carbon dioxide in your bloodstream. It affects people who have chronic obstructive pulmonary disease (COPD).  If you have COPD, you can't breathe as easily as other people do. Your inflamed airways and damaged lung tissue make it harder for you to breathe in the oxygen you need and breathe out the carbon dioxide that your body wants to get rid of. This build up in carbon dioxide is what caused you to be initially confused when you arrived to the hospital.   Please use your BiPAP at night to prevent build up of CO2 in your blood and future episodes of hypercapnic respiratory failure    Diagnosis: HFrEF (heart failure with reduced ejection fraction)  Assessment and Plan of Treatment: Heart failure with reduced ejection fraction happens when the muscle of the left ventricle is not pumping as well as normal. The ejection fraction is 40% or less.  Please continue to take the following medications:  -coreg 25mg bid  -Imdur 30mg qd  -hydralazine 75mg tid.  Please follow-up with your cardiologist for further management.

## 2021-05-06 NOTE — CONSULT NOTE ADULT - ATTENDING COMMENTS
COPD exacerbation.  Complete prednisone course.  Continue oxygen and bilevel support at night.  Outpatient follow up with pulmonary.
Strong peripheral pulses

## 2021-05-07 NOTE — PROGRESS NOTE ADULT - PROBLEM SELECTOR PLAN 2
In the setting of COPD exacerbation. Patient AOx1-2 in the ED. VBG 7.32/99/30/39 placed on Bipap  -pCO2 >110 --> 99 -->96 -->74  -Patients mental status back to baseline AO x 4  -trend pCO2  -tx of COPD exacerbation as above  -Bipap overnight In the setting of COPD exacerbation. Patient AOx1-2 in the ED. VBG 7.32/99/30/39 placed on Bipap  -pCO2 >110 --> 99 -->96 -->74 --> 63  -Patients mental status back to baseline AO x 4  -trend pCO2  -tx of COPD exacerbation as above  -Bipap overnight

## 2021-05-07 NOTE — PROGRESS NOTE ADULT - PROBLEM SELECTOR PLAN 3
Chronic systolic CHF (congestive heart failure) s/p AICD. Last TTE 2019 w/ Right/left systolic dysfunction, EF: 20- 25%. Patient appears euvolemic. No pulm edema on CXR. . EKG reviewed no signs of ischemia.   -c/w coreg 25mg bid  -c/w Imdur 30mg qd  c/w hydralazine 75mg tid Chronic systolic CHF (congestive heart failure) s/p AICD. Last TTE 2019 w/ Right/left systolic dysfunction, EF: 20- 25%. Patient appears euvolemic. No pulm edema on CXR. . EKG reviewed no signs of ischemia.   -c/w coreg 25mg bid  -c/w Imdur 30mg qd  -c/w hydralazine 75mg tid

## 2021-05-07 NOTE — PROGRESS NOTE ADULT - ASSESSMENT
71 year old female with pmhx HTN, HFrEF s/p ICD, CAD,  COPD (3L home O2), pulm hypertension who is presenting with hypercapnic respiratory failure in the setting of COPD exacerbation  71 year old female with pmhx HTN, HFrEF s/p ICD, CAD,  COPD (3L home O2), pulm hypertension who is presenting with hypercapnic respiratory failure in the setting of COPD exacerbation.

## 2021-05-07 NOTE — PROGRESS NOTE ADULT - PROBLEM SELECTOR PLAN 1
On home oxygen 3L. On bipap in the ED for hypercapnic resp failure   -s/p 125mg solumedrol in the ED  -c/w IV solumedrol IV 40mg qd, then switch to prednisone 40mg   -CXR hyperinflated lungs without focal pulmonary abnormalities consistent with emphysema. No role for IV zithromax  -Spiriva and Pulmicort  -duonebs prn for SOB and/or wheezing   -bipap at night   -o2 weaned to 3L (home o2 settings) On home oxygen 3L. On bipap in the ED for hypercapnic resp failure   -s/p 125mg solumedrol in the ED  -c/w IV solumedrol IV 40mg qd, then switch to prednisone 40mg (5/6-5/10)   -CXR hyperinflated lungs without focal pulmonary abnormalities consistent with emphysema. No role for IV zithromax  -Spiriva and Pulmicort  -duonebs prn for SOB and/or wheezing   -bipap at night   -o2 weaned to 3L (home o2 settings)  -appreciate pulm recs, pt on appropriate regimen, c/w prednisone for 5D course. Can f/u outpt with Dr. Teran or derrek velasquez On home oxygen 3L. On bipap in the ED for hypercapnic resp failure   -s/p 125mg solumedrol in the ED  -prednisone 40mg (5/7-5/11)   -CXR hyperinflated lungs without focal pulmonary abnormalities consistent with emphysema. No role for IV zithromax  -Spiriva and Pulmicort  -duonebs prn for SOB and/or wheezing   -bipap at night   -o2 weaned to 3L (home o2 settings)  -appreciate pulm recs, pt on appropriate regimen, c/w prednisone for 5D course. Can f/u outpt with Dr. Teran or derrek pulm

## 2021-05-07 NOTE — PROGRESS NOTE ADULT - ATTENDING COMMENTS
Patient seen and examined with team.  Agree with Above A/p by Dr Guan  71 year old female with pmhx HTN, HFrEF s/p ICD, CAD,  COPD (3L home O2), pulm hypertension who is presenting with COPD exacerbation   Did well on Bipap overnight now on 4L NC  Had Lunch . notes she is doing better . Wt ws the most 115 lbs, was  70lbs now improving slowly to 87 LBS- agrees to adding ensures-like chocolate,  Pe VS T 98, HR 82, P 99/60, R 20 Sat 1005 on 4L NC  Lungs- distant BS b/l, cor rrr, Abd soft n/t, ext neg e/c/c  Labs sig Na 148 on 5/6- need f/u BMP  Hypercapnic resp failure sec to COPD Exb req BIPAP now RESOLVED- patient back on basline home meds and transitioned to Po prednisone.  no acute CHF exb  # COPD Exb- c/w Bipap- QHS and 4 L Nc during the day.  house  solumedrol started in ED. c/w Ipratropium/ albuterol, budesonide/ fluticasone . now on prednisone.  Pulmonary consult appreciated- now on po pred and to c/w home meds/ oxygen and BiPAP  # CHF- mon echo . resume home medications of coreg/ lasix  # HTN c/w coreg/ lasix, hydralazine with hold SBP <100, hr <60  # Na 148- D5w at 50 cc/ hr x 10 hrs. f/u BMP in AM  DVT proph- lovenox sq  SW/ CM eval for  HHA at home- patient Lives Alone. Patient seen and examined with team.  Agree with Above A/p by Dr Guan  71 year old female with pmhx HTN, HFrEF s/p ICD, CAD,  COPD (3L home O2), pulm hypertension who is presenting with COPD exacerbation   Did well on Bipap overnight now on 4L NC  Had Lunch . notes she is doing better . Wt ws the most 115 lbs, was  70lbs now improving slowly to 87 LBS- agrees to adding ensures-like chocolate,  Pe VS T 98, HR 82, P 99/60, R 20 Sat 1005 on 4L NC  Lungs- distant BS b/l, cor rrr, Abd soft n/t, ext neg e/c/c  Labs sig Na 148 on 5/6- need f/u BMP  Hypercapnic resp failure sec to COPD Exb req BIPAP now RESOLVED- patient back on basline home meds and transitioned to Po prednisone.  no acute CHF exb  # COPD Exb- c/w Bipap- QHS and 4 L Nc during the day.  house  solumedrol started in ED. c/w Ipratropium/ albuterol, budesonide/ fluticasone . now on prednisone.  Pulmonary consult appreciated- now on po pred and to c/w home meds/ oxygen and BiPAP  # CHF- mon echo . resume home medications of coreg/ lasix  # HTN c/w coreg/ lasix, hydralazine with hold SBP <100, hr <60  # Na 148- D5w at 50 cc/ hr x 10 hrs. f/u BMP in AM  DVT proph- lovenox sq  SW/ CM eval for  HHA at home- patient Lives Alone., Need out patient f/u with Pulmonary Dr Do/ derrek Pulskip and with PCP Dr Naqvi.

## 2021-05-07 NOTE — PROGRESS NOTE ADULT - SUBJECTIVE AND OBJECTIVE BOX
PROGRESS NOTE:   Authored by Dr. Lyudmila Guan MD (PGY-1). Pager [facility] [phone] / [facility] [id number]    Patient is a 71y old  Female who presents with a chief complaint of COPD exacerbation (06 May 2021 15:29)      SUBJECTIVE / OVERNIGHT EVENTS:  No acute events overnight.     ADDITIONAL REVIEW OF SYSTEMS:  Patient denies fevers, chills, chest pain, shortness of breath, nausea, abdominal pain, diarrhea, constipation, dysuria, leg swelling, headache, light headedness.    MEDICATIONS  (STANDING):  budesonide 160 MICROgram(s)/formoterol 4.5 MICROgram(s) Inhaler 2 Puff(s) Inhalation two times a day  carvedilol 25 milliGRAM(s) Oral every 12 hours  dextrose 5%. 1000 milliLiter(s) (50 mL/Hr) IV Continuous <Continuous>  enoxaparin Injectable 30 milliGRAM(s) SubCutaneous daily  hydrALAZINE 75 milliGRAM(s) Oral three times a day  isosorbide   mononitrate ER Tablet (IMDUR) 30 milliGRAM(s) Oral daily  pantoprazole    Tablet 40 milliGRAM(s) Oral before breakfast  predniSONE   Tablet 40 milliGRAM(s) Oral daily  simvastatin 20 milliGRAM(s) Oral at bedtime  tiotropium 18 MICROgram(s) Capsule 1 Capsule(s) Inhalation daily    MEDICATIONS  (PRN):  albuterol/ipratropium for Nebulization 3 milliLiter(s) Nebulizer every 6 hours PRN Shortness of Breath and/or Wheezing  sodium chloride 0.65% Nasal 1 Spray(s) Both Nostrils daily PRN Nasal Congestion      CAPILLARY BLOOD GLUCOSE        I&O's Summary      PHYSICAL EXAM:  Vital Signs Last 24 Hrs  T(C): 36.8 (07 May 2021 06:26), Max: 37.3 (06 May 2021 15:53)  T(F): 98.2 (07 May 2021 06:26), Max: 99.1 (06 May 2021 15:53)  HR: 80 (07 May 2021 07:04) (75 - 98)  BP: 127/86 (07 May 2021 06:26) (94/62 - 127/86)  BP(mean): --  RR: 20 (07 May 2021 06:26) (17 - 20)  SpO2: 98% (07 May 2021 07:04) (96% - 100%)    CONSTITUTIONAL: NAD, well-developed  RESPIRATORY: Normal respiratory effort; lungs are clear to auscultation bilaterally  CARDIOVASCULAR: Regular rate and rhythm, normal S1 and S2, no murmur/rub/gallop; No lower extremity edema; Peripheral pulses are 2+ bilaterally  ABDOMEN: Nontender to palpation, normoactive bowel sounds, no rebound/guarding; No hepatosplenomegaly  MUSCLOSKELETAL: no clubbing or cyanosis of digits; no joint swelling or tenderness to palpation  PSYCH: A+O to person, place, and time; affect appropriate    LABS:                        10.3   4.46  )-----------( 170      ( 06 May 2021 07:46 )             32.4     05-06    148<H>  |  101  |  26<H>  ----------------------------<  86  3.9   |  39<H>  |  0.70    Ca    9.4      06 May 2021 07:46  Phos  3.1     05-06  Mg     1.8     05-06                  Tele Reviewed:    RADIOLOGY & ADDITIONAL TESTS:  Results Reviewed:   Imaging Personally Reviewed:  Electrocardiogram Personally Reviewed:     PROGRESS NOTE:   Authored by Dr. Lyudmila Guan MD (PGY-1). Pager [facility] [phone] / [facility] [id number]    Patient is a 71y old  Female who presents with a chief complaint of COPD exacerbation (06 May 2021 15:29)      SUBJECTIVE / OVERNIGHT EVENTS:  No acute events overnight. Pt states she is extremely worried to go home, specifically worried that she may die if she returns home. Explained team is working with CM closely to try to maximize support at home and ensure safe/smooth transition back home as much as possible. Patient continues to remain anxious.     ADDITIONAL REVIEW OF SYSTEMS:  Patient denies fevers, chills, chest pain, shortness of breath, nausea, abdominal pain, diarrhea, constipation, dysuria, leg swelling, headache, light headedness.    MEDICATIONS  (STANDING):  budesonide 160 MICROgram(s)/formoterol 4.5 MICROgram(s) Inhaler 2 Puff(s) Inhalation two times a day  carvedilol 25 milliGRAM(s) Oral every 12 hours  dextrose 5%. 1000 milliLiter(s) (50 mL/Hr) IV Continuous <Continuous>  enoxaparin Injectable 30 milliGRAM(s) SubCutaneous daily  hydrALAZINE 75 milliGRAM(s) Oral three times a day  isosorbide   mononitrate ER Tablet (IMDUR) 30 milliGRAM(s) Oral daily  pantoprazole    Tablet 40 milliGRAM(s) Oral before breakfast  predniSONE   Tablet 40 milliGRAM(s) Oral daily  simvastatin 20 milliGRAM(s) Oral at bedtime  tiotropium 18 MICROgram(s) Capsule 1 Capsule(s) Inhalation daily    MEDICATIONS  (PRN):  albuterol/ipratropium for Nebulization 3 milliLiter(s) Nebulizer every 6 hours PRN Shortness of Breath and/or Wheezing  sodium chloride 0.65% Nasal 1 Spray(s) Both Nostrils daily PRN Nasal Congestion      CAPILLARY BLOOD GLUCOSE        I&O's Summary      PHYSICAL EXAM:  Vital Signs Last 24 Hrs  T(C): 36.8 (07 May 2021 06:26), Max: 37.3 (06 May 2021 15:53)  T(F): 98.2 (07 May 2021 06:26), Max: 99.1 (06 May 2021 15:53)  HR: 80 (07 May 2021 07:04) (75 - 98)  BP: 127/86 (07 May 2021 06:26) (94/62 - 127/86)  BP(mean): --  RR: 20 (07 May 2021 06:26) (17 - 20)  SpO2: 98% (07 May 2021 07:04) (96% - 100%)    CONSTITUTIONAL: NAD, well-developed, very thin elderly female, appears anxious   RESPIRATORY: Normal respiratory effort; lungs are clear to auscultation with reduced breath sounds bilaterally  CARDIOVASCULAR: Regular rate and rhythm, normal S1 and S2, no murmur/rub/gallop; No lower extremity edema; Peripheral pulses are 2+ bilaterally  ABDOMEN: Nontender to palpation, normoactive bowel sounds, no rebound/guarding  MUSCLOSKELETAL: no joint swelling or tenderness to palpation  PSYCH: A+O to person, place, and time; affect appropriate    LABS:                        10.3   4.46  )-----------( 170      ( 06 May 2021 07:46 )             32.4     05-06    148<H>  |  101  |  26<H>  ----------------------------<  86  3.9   |  39<H>  |  0.70    Ca    9.4      06 May 2021 07:46  Phos  3.1     05-06  Mg     1.8     05-06

## 2021-05-07 NOTE — PROGRESS NOTE ADULT - PROBLEM SELECTOR PLAN 6
Endorses difficulty with ADLs, lives alone.   PT/OT consult  Talk to CM about home health aide Endorses difficulty with ADLs, lives alone.   PT/OT consulted  Discussed with CM. Plan to dc pt home on Monday with immediately available visiting RN to initiate assessment for HHA on Tuesday

## 2021-05-08 NOTE — PROGRESS NOTE ADULT - PROBLEM SELECTOR PLAN 1
On home oxygen 3L. On bipap in the ED for hypercapnic resp failure   -s/p 125mg solumedrol in the ED  -prednisone 40mg (5/7-5/11)   -CXR hyperinflated lungs without focal pulmonary abnormalities consistent with emphysema. No role for IV zithromax  -Spiriva and Pulmicort  -duonebs prn for SOB and/or wheezing   -bipap at night   -o2 weaned to 3L (home o2 settings)  -appreciate pulm recs, pt on appropriate regimen, c/w prednisone for 5D course. Can f/u outpt with Dr. Teran or derrek pulm

## 2021-05-08 NOTE — PROGRESS NOTE ADULT - ATTENDING COMMENTS
71 year old female with pmhx HTN, HFrEF s/p ICD, CAD,  COPD (3L home O2), pulm hypertension who is presenting with COPD exacerbation   # COPD Exb- c/w Bipap- QHS and 4 L NC O2. Appreciate pulm recs  d/c planning

## 2021-05-08 NOTE — PROGRESS NOTE ADULT - SUBJECTIVE AND OBJECTIVE BOX
***************************************************************  dAriel Flanagan, PGY2  Internal Medicine   pager: [phone]  ***************************************************************    TJ MELENDEZ  71y  MRN: [id number]    Patient is a 71y old  Female who presents with a chief complaint of COPD exacerbation (07 May 2021 07:13)      Subjective: no events ON. Denies fever, CP, SOB, abn pain, N/V, dysuria. Tolerating diet.      MEDICATIONS  (STANDING):  budesonide 160 MICROgram(s)/formoterol 4.5 MICROgram(s) Inhaler 2 Puff(s) Inhalation two times a day  carvedilol 25 milliGRAM(s) Oral every 12 hours  dextrose 5%. 1000 milliLiter(s) (50 mL/Hr) IV Continuous <Continuous>  enoxaparin Injectable 30 milliGRAM(s) SubCutaneous daily  hydrALAZINE 75 milliGRAM(s) Oral three times a day  isosorbide   mononitrate ER Tablet (IMDUR) 30 milliGRAM(s) Oral daily  pantoprazole    Tablet 40 milliGRAM(s) Oral before breakfast  predniSONE   Tablet 40 milliGRAM(s) Oral daily  simvastatin 20 milliGRAM(s) Oral at bedtime  tiotropium 18 MICROgram(s) Capsule 1 Capsule(s) Inhalation daily    MEDICATIONS  (PRN):  albuterol/ipratropium for Nebulization 3 milliLiter(s) Nebulizer every 6 hours PRN Shortness of Breath and/or Wheezing  sodium chloride 0.65% Nasal 1 Spray(s) Both Nostrils daily PRN Nasal Congestion      Objective:    Vitals: Vital Signs Last 24 Hrs  T(C): 36.7 (05-08-21 @ 05:11), Max: 37.2 (05-07-21 @ 12:38)  T(F): 98 (05-08-21 @ 05:11), Max: 98.9 (05-07-21 @ 12:38)  HR: 73 (05-08-21 @ 07:29) (73 - 101)  BP: 126/65 (05-08-21 @ 05:11) (106/64 - 126/65)  BP(mean): --  RR: 16 (05-08-21 @ 05:11) (16 - 18)  SpO2: 100% (05-08-21 @ 07:29) (96% - 100%)            I&O's Summary      PHYSICAL EXAM:  GENERAL: emaciated female in NAD  HEAD:  Atraumatic, Normocephalic  EYES: EOMI, conjunctiva and sclera clear  CHEST/LUNG: decreased breath sounds bilaterally; No rales, rhonchi, wheezing, or rubs  HEART: Regular rate and rhythm; No murmurs, rubs, or gallops  ABDOMEN: Soft, Nontender, Nondistended;   SKIN: No rashes or lesions  NERVOUS SYSTEM:  Alert & Oriented X3, no focal deficit    LABS:  05-08    142  |  100  |  22  ----------------------------<  121<H>  4.3   |  35<H>  |  0.70  05-07    141  |  97<L>  |  17  ----------------------------<  138<H>  4.3   |  37<H>  |  0.59  05-06    148<H>  |  101  |  26<H>  ----------------------------<  86  3.9   |  39<H>  |  0.70    Ca    9.1      08 May 2021 07:30  Ca    9.3      07 May 2021 12:23  Ca    9.4      06 May 2021 07:46  Phos  2.9     05-08  Mg     1.8     05-08                                                10.5   5.82  )-----------( 176      ( 08 May 2021 07:30 )             32.7                         11.0   6.41  )-----------( 192      ( 07 May 2021 12:23 )             35.1                         10.3   4.46  )-----------( 170      ( 06 May 2021 07:46 )             32.4     CAPILLARY BLOOD GLUCOSE          RADIOLOGY & ADDITIONAL TESTS:    Imaging Personally Reviewed:  [x ] YES  [ ] NO    Consultants involved in case:   Consultant(s) Notes Reviewed:  [ x] YES  [ ] NO:   Care Discussed with Consultants/Other Providers [x ] YES  [ ] NO         ***************************************************************  Adriel Flanagan, PGY2  Internal Medicine   pager: [phone]  ***************************************************************    TJ MELENDEZ  71y  MRN: [id number]    Patient is a 71y old  Female who presents with a chief complaint of COPD exacerbation (07 May 2021 07:13)      Subjective: no events ON. This morning feels a little SOB. Patient had not taken morning Spiriva or symbicort yet. Denies fever, CP, abn pain, N/V, dysuria. Tolerating diet.      MEDICATIONS  (STANDING):  budesonide 160 MICROgram(s)/formoterol 4.5 MICROgram(s) Inhaler 2 Puff(s) Inhalation two times a day  carvedilol 25 milliGRAM(s) Oral every 12 hours  dextrose 5%. 1000 milliLiter(s) (50 mL/Hr) IV Continuous <Continuous>  enoxaparin Injectable 30 milliGRAM(s) SubCutaneous daily  hydrALAZINE 75 milliGRAM(s) Oral three times a day  isosorbide   mononitrate ER Tablet (IMDUR) 30 milliGRAM(s) Oral daily  pantoprazole    Tablet 40 milliGRAM(s) Oral before breakfast  predniSONE   Tablet 40 milliGRAM(s) Oral daily  simvastatin 20 milliGRAM(s) Oral at bedtime  tiotropium 18 MICROgram(s) Capsule 1 Capsule(s) Inhalation daily    MEDICATIONS  (PRN):  albuterol/ipratropium for Nebulization 3 milliLiter(s) Nebulizer every 6 hours PRN Shortness of Breath and/or Wheezing  sodium chloride 0.65% Nasal 1 Spray(s) Both Nostrils daily PRN Nasal Congestion      Objective:    Vitals: Vital Signs Last 24 Hrs  T(C): 36.7 (05-08-21 @ 05:11), Max: 37.2 (05-07-21 @ 12:38)  T(F): 98 (05-08-21 @ 05:11), Max: 98.9 (05-07-21 @ 12:38)  HR: 73 (05-08-21 @ 07:29) (73 - 101)  BP: 126/65 (05-08-21 @ 05:11) (106/64 - 126/65)  BP(mean): --  RR: 16 (05-08-21 @ 05:11) (16 - 18)  SpO2: 100% (05-08-21 @ 07:29) (96% - 100%)            I&O's Summary      PHYSICAL EXAM:  GENERAL: emaciated female in NAD  HEAD:  Atraumatic, Normocephalic  EYES: EOMI, conjunctiva and sclera clear  CHEST/LUNG: decreased breath sounds bilaterally; No rales, rhonchi, wheezing, or rubs  HEART: Regular rate and rhythm; No murmurs, rubs, or gallops  ABDOMEN: Soft, Nontender, Nondistended;   SKIN: No rashes or lesions  NERVOUS SYSTEM:  Alert & Oriented X3, no focal deficit    LABS:  05-08    142  |  100  |  22  ----------------------------<  121<H>  4.3   |  35<H>  |  0.70  05-07    141  |  97<L>  |  17  ----------------------------<  138<H>  4.3   |  37<H>  |  0.59  05-06    148<H>  |  101  |  26<H>  ----------------------------<  86  3.9   |  39<H>  |  0.70    Ca    9.1      08 May 2021 07:30  Ca    9.3      07 May 2021 12:23  Ca    9.4      06 May 2021 07:46  Phos  2.9     05-08  Mg     1.8     05-08                                                10.5   5.82  )-----------( 176      ( 08 May 2021 07:30 )             32.7                         11.0   6.41  )-----------( 192      ( 07 May 2021 12:23 )             35.1                         10.3   4.46  )-----------( 170      ( 06 May 2021 07:46 )             32.4     CAPILLARY BLOOD GLUCOSE          RADIOLOGY & ADDITIONAL TESTS:    Imaging Personally Reviewed:  [x ] YES  [ ] NO    Consultants involved in case:   Consultant(s) Notes Reviewed:  [ x] YES  [ ] NO:   Care Discussed with Consultants/Other Providers [x ] YES  [ ] NO

## 2021-05-08 NOTE — PROGRESS NOTE ADULT - PROBLEM SELECTOR PLAN 3
Chronic systolic CHF (congestive heart failure) s/p AICD. Last TTE 2019 w/ Right/left systolic dysfunction, EF: 20- 25%. Patient appears euvolemic. No pulm edema on CXR. . EKG reviewed no signs of ischemia.   -c/w coreg 25mg bid  -c/w Imdur 30mg qd  -c/w hydralazine 75mg tid

## 2021-05-08 NOTE — PROGRESS NOTE ADULT - PROBLEM SELECTOR PLAN 6
Endorses difficulty with ADLs, lives alone.   PT/OT consulted  Discussed with CM. Plan to dc pt home on Monday with immediately available visiting RN to initiate assessment for HHA on Tuesday

## 2021-05-08 NOTE — PROGRESS NOTE ADULT - ASSESSMENT
71 year old female with pmhx HTN, HFrEF s/p ICD, CAD,  COPD (3L home O2), pulm hypertension who is presenting with hypercapnic respiratory failure in the setting of COPD exacerbation.

## 2021-05-08 NOTE — PROGRESS NOTE ADULT - PROBLEM SELECTOR PLAN 2
In the setting of COPD exacerbation. Patient AOx1-2 in the ED. VBG 7.32/99/30/39 placed on Bipap  -pCO2 >110 --> 99 -->96 -->74 --> 63  -Patients mental status back to baseline AO x 4  -trend pCO2  -tx of COPD exacerbation as above  -Bipap overnight

## 2021-05-09 NOTE — PROVIDER CONTACT NOTE (OTHER) - ACTION/TREATMENT ORDERED:
No actions ordered. Continue to monitor.
continue to monitor
No further actions ordered. Continue to monitor.
EKG, repeat cardiac enzymes

## 2021-05-09 NOTE — PROVIDER CONTACT NOTE (OTHER) - BACKGROUND
Admitted with COPD exacerbation.
DX: COPD on home O2 3L, CHF
pt admitted with COPD
Admitted with COPD exacerbation.

## 2021-05-09 NOTE — PROVIDER CONTACT NOTE (OTHER) - SITUATION
Patient /103
Patient had episode of vent bigeminy.
ST elevation noted on cardiac monitor
pt with non sustaining vtach and pvcs

## 2021-05-09 NOTE — PROGRESS NOTE ADULT - ASSESSMENT
71 year old female with pmhx HTN, HFrEF s/p ICD, CAD,  COPD (3L home O2), pulm hypertension who is presenting with hypercapnic respiratory failure in the setting of COPD exacerbation.  Neck pain

## 2021-05-09 NOTE — PROVIDER CONTACT NOTE (OTHER) - REASON
pt with non sustaining vtach and pvcs
Patient had episode of vent bigeminy
St elevation on monitor
Patient /103

## 2021-05-09 NOTE — PROGRESS NOTE ADULT - PROBLEM SELECTOR PLAN 2
In the setting of COPD exacerbation. Patient AOx1-2 in the ED. VBG 7.32/99/30/39 placed on Bipap  -pCO2 >110 --> 99 -->96 -->74 --> 63 --> 66  -Patients mental status back to baseline AO x 4  -trend pCO2  -tx of COPD exacerbation as above  -Bipap overnight

## 2021-05-09 NOTE — PROGRESS NOTE ADULT - PROBLEM SELECTOR PLAN 6
Endorses difficulty with ADLs, lives alone.   PT/OT consulted  Discussed with CM. Plan to dc pt home on Monday with immediately available visiting RN to initiate assessment for HHA on Tuesday Endorses difficulty with ADLs, lives alone.   PT rec home w no needs  OT rec no needs  Discussed with CM. Plan to dc pt home on Monday with immediately available visiting RN to initiate assessment for HHA on Tuesday

## 2021-05-09 NOTE — PROGRESS NOTE ADULT - SUBJECTIVE AND OBJECTIVE BOX
***************************************************************  Adriel Flanagan, PGY2  Internal Medicine   pager: [phone]  ***************************************************************    TJ MELENDEZ  71y  MRN: [id number]    Patient is a 71y old  Female who presents with a chief complaint of COPD exacerbation (08 May 2021 08:19)      Subjective: ST elevation noted on telemetry, EKG performed by NF team showed no ST changes. Patient asymptomatic at the time. Trop T negative.   MEDICATIONS  (STANDING):  budesonide 160 MICROgram(s)/formoterol 4.5 MICROgram(s) Inhaler 2 Puff(s) Inhalation two times a day  carvedilol 25 milliGRAM(s) Oral every 12 hours  dextrose 5%. 1000 milliLiter(s) (50 mL/Hr) IV Continuous <Continuous>  enoxaparin Injectable 30 milliGRAM(s) SubCutaneous daily  hydrALAZINE 75 milliGRAM(s) Oral three times a day  isosorbide   mononitrate ER Tablet (IMDUR) 30 milliGRAM(s) Oral daily  pantoprazole    Tablet 40 milliGRAM(s) Oral before breakfast  predniSONE   Tablet 40 milliGRAM(s) Oral daily  simvastatin 20 milliGRAM(s) Oral at bedtime  tiotropium 18 MICROgram(s) Capsule 1 Capsule(s) Inhalation daily    MEDICATIONS  (PRN):  albuterol/ipratropium for Nebulization 3 milliLiter(s) Nebulizer every 6 hours PRN Shortness of Breath and/or Wheezing  sodium chloride 0.65% Nasal 1 Spray(s) Both Nostrils daily PRN Nasal Congestion      Objective:    Vitals: Vital Signs Last 24 Hrs  T(C): 36.7 (05-09-21 @ 05:14), Max: 36.7 (05-09-21 @ 05:14)  T(F): 98.1 (05-09-21 @ 05:14), Max: 98.1 (05-09-21 @ 05:14)  HR: 85 (05-09-21 @ 05:14) (71 - 99)  BP: 136/74 (05-09-21 @ 05:14) (99/50 - 150/60)  BP(mean): --  RR: 18 (05-09-21 @ 05:14) (18 - 20)  SpO2: 100% (05-09-21 @ 05:14) (95% - 100%)            I&O's Summary      PHYSICAL EXAM:  GENERAL: cachetic appearing female in NAD  HEAD:  Atraumatic, Normocephalic  EYES: EOMI, conjunctiva and sclera clear  CHEST/LUNG: Clear to percussion bilaterally; No rales, rhonchi, wheezing, or rubs  HEART: Regular rate and rhythm; No murmurs, rubs, or gallops  ABDOMEN: Soft, Nontender, Nondistended;   SKIN: No rashes or lesions  NERVOUS SYSTEM:  Alert & Oriented X3, no focal deficit    LABS:  05-08    142  |  100  |  22  ----------------------------<  121<H>  4.3   |  35<H>  |  0.70  05-07    141  |  97<L>  |  17  ----------------------------<  138<H>  4.3   |  37<H>  |  0.59  05-06    148<H>  |  101  |  26<H>  ----------------------------<  86  3.9   |  39<H>  |  0.70    Ca    9.1      08 May 2021 07:30  Ca    9.3      07 May 2021 12:23  Ca    9.4      06 May 2021 07:46  Phos  2.9     05-08  Mg     1.8     05-08                                                10.1   5.48  )-----------( 181      ( 09 May 2021 06:55 )             32.8                         10.5   5.82  )-----------( 176      ( 08 May 2021 07:30 )             32.7                         11.0   6.41  )-----------( 192      ( 07 May 2021 12:23 )             35.1     CAPILLARY BLOOD GLUCOSE          RADIOLOGY & ADDITIONAL TESTS:    Imaging Personally Reviewed:  [x ] YES  [ ] NO    Consultants involved in case:   Consultant(s) Notes Reviewed:  [ x] YES  [ ] NO:   Care Discussed with Consultants/Other Providers [x ] YES  [ ] NO         ***************************************************************  Adriel Flanagan, PGY2  Internal Medicine   pager: [phone]  ***************************************************************    TJ MELENDEZ  71y  MRN: [id number]    Patient is a 71y old  Female who presents with a chief complaint of COPD exacerbation (08 May 2021 08:19)      Subjective: ST elevation noted on telemetry, EKG performed by NF team showed no ST changes. Patient asymptomatic at the time. Trop T negative.     This morning the patient is asking what stage of COPD she has. She is unhappy with her current pulmonologist and is asking for a new pulmonologist once discharged. Breathing is ok this morning on 3L NC. Denies cough, SOB, fevers, chills, n/v/d. Denies any CP this morning or palpitations. Tolerating diet.          MEDICATIONS  (STANDING):  budesonide 160 MICROgram(s)/formoterol 4.5 MICROgram(s) Inhaler 2 Puff(s) Inhalation two times a day  carvedilol 25 milliGRAM(s) Oral every 12 hours  dextrose 5%. 1000 milliLiter(s) (50 mL/Hr) IV Continuous <Continuous>  enoxaparin Injectable 30 milliGRAM(s) SubCutaneous daily  hydrALAZINE 75 milliGRAM(s) Oral three times a day  isosorbide   mononitrate ER Tablet (IMDUR) 30 milliGRAM(s) Oral daily  pantoprazole    Tablet 40 milliGRAM(s) Oral before breakfast  predniSONE   Tablet 40 milliGRAM(s) Oral daily  simvastatin 20 milliGRAM(s) Oral at bedtime  tiotropium 18 MICROgram(s) Capsule 1 Capsule(s) Inhalation daily    MEDICATIONS  (PRN):  albuterol/ipratropium for Nebulization 3 milliLiter(s) Nebulizer every 6 hours PRN Shortness of Breath and/or Wheezing  sodium chloride 0.65% Nasal 1 Spray(s) Both Nostrils daily PRN Nasal Congestion      Objective:    Vitals: Vital Signs Last 24 Hrs  T(C): 36.7 (05-09-21 @ 05:14), Max: 36.7 (05-09-21 @ 05:14)  T(F): 98.1 (05-09-21 @ 05:14), Max: 98.1 (05-09-21 @ 05:14)  HR: 85 (05-09-21 @ 05:14) (71 - 99)  BP: 136/74 (05-09-21 @ 05:14) (99/50 - 150/60)  BP(mean): --  RR: 18 (05-09-21 @ 05:14) (18 - 20)  SpO2: 100% (05-09-21 @ 05:14) (95% - 100%)            I&O's Summary      PHYSICAL EXAM:  GENERAL: emaciated female in NAD  HEAD:  Atraumatic, Normocephalic  EYES: EOMI, conjunctiva and sclera clear  CHEST/LUNG: decreased breath sounds diffusely b/l.  No rales, rhonchi, wheezing, or rubs  HEART: Regular rate and rhythm; No murmurs, rubs, or gallops  ABDOMEN: Soft, Nontender, Nondistended;   SKIN: No rashes or lesions  NERVOUS SYSTEM:  Alert & Oriented X3, no focal deficit    LABS:  05-08    142  |  100  |  22  ----------------------------<  121<H>  4.3   |  35<H>  |  0.70  05-07    141  |  97<L>  |  17  ----------------------------<  138<H>  4.3   |  37<H>  |  0.59  05-06    148<H>  |  101  |  26<H>  ----------------------------<  86  3.9   |  39<H>  |  0.70    Ca    9.1      08 May 2021 07:30  Ca    9.3      07 May 2021 12:23  Ca    9.4      06 May 2021 07:46  Phos  2.9     05-08  Mg     1.8     05-08                                                10.1   5.48  )-----------( 181      ( 09 May 2021 06:55 )             32.8                         10.5   5.82  )-----------( 176      ( 08 May 2021 07:30 )             32.7                         11.0   6.41  )-----------( 192      ( 07 May 2021 12:23 )             35.1     CAPILLARY BLOOD GLUCOSE          RADIOLOGY & ADDITIONAL TESTS:    Imaging Personally Reviewed:  [x ] YES  [ ] NO    Consultants involved in case:   Consultant(s) Notes Reviewed:  [ x] YES  [ ] NO:   Care Discussed with Consultants/Other Providers [x ] YES  [ ] NO

## 2021-05-09 NOTE — PROGRESS NOTE ADULT - PROBLEM SELECTOR PLAN 1
On home oxygen 3L. On bipap in the ED for hypercapnic resp failure   -s/p 125mg solumedrol in the ED  -prednisone 40mg (5/7-5/11)   -CXR hyperinflated lungs without focal pulmonary abnormalities consistent with emphysema. No role for IV zithromax  -Spiriva and Pulmicort  -duonebs prn for SOB and/or wheezing   -bipap at night   -o2 weaned to 3L (home o2 settings)  -appreciate pulm recs, pt on appropriate regimen, c/w prednisone for 5D course. Can f/u outpt with Dr. Teran or derrek pulm On home oxygen 3L. On bipap in the ED for hypercapnic resp failure   -s/p 125mg solumedrol in the ED  -prednisone 40mg (5/7-5/11)   -CXR hyperinflated lungs without focal pulmonary abnormalities consistent with emphysema. No role for IV zithromax  -Spiriva and Pulmicort  -duonebs prn for SOB and/or wheezing   -bipap at night   -On 3L (home o2 settings), however saturating 100%. Oxygen weaned to 2L NC today. Wean as tolerated  -appreciate pulm recs, pt on appropriate regimen, c/w prednisone for 5D course. Can f/u outpt with house pulm. requesting new pulmonologist for discharge

## 2021-05-09 NOTE — PROVIDER CONTACT NOTE (OTHER) - ASSESSMENT
Patient /103. Patient endorses mild headache. Patient denies dizziness, shortness of breath, chest pain. On bipap.
Pt is asymptomatic, resting comfortably in bed with Bi-pap machine
vital signs stable. no complaints offered.
Patient had episode of vent bigeminy. Patient denies palpitations, asymptomatic. Patient on bipap for COPD exacerbation. Patient NSR on monitor with frequent PVCs.

## 2021-05-10 NOTE — PROGRESS NOTE ADULT - SUBJECTIVE AND OBJECTIVE BOX
Patient is a 71y old  Female who presents with a chief complaint of COPD exacerbation (09 May 2021 07:20)      SUBJECTIVE / OVERNIGHT EVENTS:  There were no acute events overnight.  Patient feels well. No complaints.    MEDICATIONS  (STANDING):  budesonide 160 MICROgram(s)/formoterol 4.5 MICROgram(s) Inhaler 2 Puff(s) Inhalation two times a day  carvedilol 25 milliGRAM(s) Oral every 12 hours  dextrose 5%. 1000 milliLiter(s) (50 mL/Hr) IV Continuous <Continuous>  enoxaparin Injectable 30 milliGRAM(s) SubCutaneous daily  hydrALAZINE 75 milliGRAM(s) Oral three times a day  isosorbide   mononitrate ER Tablet (IMDUR) 30 milliGRAM(s) Oral daily  pantoprazole    Tablet 40 milliGRAM(s) Oral before breakfast  predniSONE   Tablet 40 milliGRAM(s) Oral daily  simvastatin 20 milliGRAM(s) Oral at bedtime  tiotropium 18 MICROgram(s) Capsule 1 Capsule(s) Inhalation daily    MEDICATIONS  (PRN):  albuterol/ipratropium for Nebulization 3 milliLiter(s) Nebulizer every 6 hours PRN Shortness of Breath and/or Wheezing  sodium chloride 0.65% Nasal 1 Spray(s) Both Nostrils daily PRN Nasal Congestion      Vital Signs Last 24 Hrs  T(C): 36.9 (10 May 2021 06:50), Max: 37.1 (09 May 2021 21:35)  T(F): 98.5 (10 May 2021 06:50), Max: 98.8 (09 May 2021 21:35)  HR: 85 (10 May 2021 07:37) (72 - 102)  BP: 142/70 (10 May 2021 06:50) (121/70 - 142/70)  BP(mean): --  RR: 20 (10 May 2021 06:50) (18 - 20)  SpO2: 100% (10 May 2021 07:37) (97% - 100%)  CAPILLARY BLOOD GLUCOSE        I&O's Summary      PHYSICAL EXAM:  GENERAL: NAD, well-developed  HEAD:  Atraumatic, Normocephalic  EYES: EOMI, PERRLA, conjunctiva and sclera clear  NECK: Supple, No JVD  CHEST/LUNG: Clear to auscultation bilaterally; No wheezes  HEART: Regular rate and rhythm; No murmurs, rubs, or gallops  ABDOMEN: Soft, Nontender, Nondistended; Bowel sounds present  EXTREMITIES:  2+ Peripheral Pulses, No clubbing, cyanosis, or edema  PSYCH: AAOx3  NEUROLOGY: non-focal  SKIN: No rashes or lesions    LABS:                        10.1   5.48  )-----------( 181      ( 09 May 2021 06:55 )             32.8     05-09    144  |  101  |  22  ----------------------------<  95  4.3   |  35<H>  |  0.73    Ca    9.0      09 May 2021 06:55  Phos  3.2     05-09  Mg     1.9     05-09        CARDIAC MARKERS ( 09 May 2021 00:42 )  x     / x     / 29 U/L / x     / 1.7 ng/mL          RADIOLOGY & ADDITIONAL TESTS:    Imaging Personally Reviewed:    Consultant(s) Notes Reviewed:      Care Discussed with Consultants/Other Providers:   Patient is a 71y old  Female who presents with a chief complaint of COPD exacerbation (09 May 2021 07:20)      SUBJECTIVE / OVERNIGHT EVENTS:  There were no acute events overnight.  Patient feels well. No complaints.    MEDICATIONS  (STANDING):  budesonide 160 MICROgram(s)/formoterol 4.5 MICROgram(s) Inhaler 2 Puff(s) Inhalation two times a day  carvedilol 25 milliGRAM(s) Oral every 12 hours  dextrose 5%. 1000 milliLiter(s) (50 mL/Hr) IV Continuous <Continuous>  enoxaparin Injectable 30 milliGRAM(s) SubCutaneous daily  hydrALAZINE 75 milliGRAM(s) Oral three times a day  isosorbide   mononitrate ER Tablet (IMDUR) 30 milliGRAM(s) Oral daily  pantoprazole    Tablet 40 milliGRAM(s) Oral before breakfast  predniSONE   Tablet 40 milliGRAM(s) Oral daily  simvastatin 20 milliGRAM(s) Oral at bedtime  tiotropium 18 MICROgram(s) Capsule 1 Capsule(s) Inhalation daily    MEDICATIONS  (PRN):  albuterol/ipratropium for Nebulization 3 milliLiter(s) Nebulizer every 6 hours PRN Shortness of Breath and/or Wheezing  sodium chloride 0.65% Nasal 1 Spray(s) Both Nostrils daily PRN Nasal Congestion      Vital Signs Last 24 Hrs  T(C): 36.9 (10 May 2021 06:50), Max: 37.1 (09 May 2021 21:35)  T(F): 98.5 (10 May 2021 06:50), Max: 98.8 (09 May 2021 21:35)  HR: 85 (10 May 2021 07:37) (72 - 102)  BP: 142/70 (10 May 2021 06:50) (121/70 - 142/70)  BP(mean): --  RR: 20 (10 May 2021 06:50) (18 - 20)  SpO2: 100% (10 May 2021 07:37) (97% - 100%)  CAPILLARY BLOOD GLUCOSE        I&O's Summary      PHYSICAL EXAM:  GENERAL: NAD, well-developed  HEAD:  Atraumatic, Normocephalic  EYES: EOMI, PERRLA, conjunctiva and sclera clear  NECK: Supple, No JVD  CHEST/LUNG: Clear to auscultation bilaterally; No wheezes, increased WOB  HEART: Regular rate and rhythm; No murmurs, rubs, or gallops  ABDOMEN: Soft, Nontender, Nondistended; Bowel sounds present  EXTREMITIES:  2+ Peripheral Pulses, No clubbing, cyanosis, or edema  PSYCH: AAOx3  NEUROLOGY: non-focal  SKIN: No rashes or lesions    LABS:                        10.1   5.48  )-----------( 181      ( 09 May 2021 06:55 )             32.8     05-09    144  |  101  |  22  ----------------------------<  95  4.3   |  35<H>  |  0.73    Ca    9.0      09 May 2021 06:55  Phos  3.2     05-09  Mg     1.9     05-09        CARDIAC MARKERS ( 09 May 2021 00:42 )  x     / x     / 29 U/L / x     / 1.7 ng/mL          RADIOLOGY & ADDITIONAL TESTS:    Imaging Personally Reviewed:    Consultant(s) Notes Reviewed:      Care Discussed with Consultants/Other Providers:

## 2021-05-10 NOTE — PROGRESS NOTE ADULT - PROBLEM SELECTOR PLAN 7
DASH/TLC diet  lovenox  GOC: see chart note from 5/5/21, patient full code at this time. GOC discussion ongoing DASH/TLC diet  lovenox  GOC: see chart note from 5/5/21, patient full code at this time.

## 2021-05-10 NOTE — PROGRESS NOTE ADULT - ATTENDING COMMENTS
Pt seen at bedside, mildly tachypneic, but no resp distress. Overall feels improved, but still anxious about going home.  71F w/HTN, HFrEF s/p ICD, CAD, COPD (3L home O2), pulm hypertension a/w COPD exacerbation   #COPD Exb: C/w Bipap QHS and 4 L NC O2. Appreciate pulm recs, will need appt with pulm outpt.  To complete PO Pred 5/11  Appears tachypneic today, O2 levels stable. Will monitor overnight, DC in AM with VNS if stable.     Attempted to call allison Young ([phone]) to update, no answer, VM left.

## 2021-05-10 NOTE — PROGRESS NOTE ADULT - PROBLEM SELECTOR PLAN 6
Endorses difficulty with ADLs, lives alone.   PT rec home w no needs  OT rec no needs  Discussed with CM. Plan to dc pt home on Monday with immediately available visiting RN to initiate assessment for HHA on Tuesday

## 2021-05-10 NOTE — PROGRESS NOTE ADULT - PROBLEM SELECTOR PLAN 1
On home oxygen 3L. On bipap in the ED for hypercapnic resp failure   -s/p 125mg solumedrol in the ED  -prednisone 40mg (5/7-5/11)   -CXR hyperinflated lungs without focal pulmonary abnormalities consistent with emphysema. No role for IV zithromax  -Spiriva and Pulmicort  -duonebs prn for SOB and/or wheezing   -bipap at night   -On 3L (home o2 settings), however saturating 100%. Oxygen weaned to 2L NC on 5/9. Wean as tolerated  -appreciate pulm recs, pt on appropriate regimen, c/w prednisone for 5D course. Can f/u outpt with house pulm. requesting new pulmonologist for discharge On home oxygen 3L. On bipap in the ED for hypercapnic resp failure   -s/p 125mg solumedrol in the ED  -prednisone 40mg (5/7-5/11)   -Spiriva and Pulmicort  -duonebs prn for SOB and/or wheezing   -bipap at night   -On 3L (home o2 settings), however saturating 100%. Oxygen weaned to 2L NC on 5/9. Wean as tolerated  -appreciate pulm recs, pt on appropriate regimen, c/w prednisone for 5D course. Can f/u outpt with house pulm. requesting new pulmonologist for discharge.  -patient is more tachypneic today. Will monitor the patient overnight before possible discharge tomorrow.

## 2021-05-11 NOTE — PROGRESS NOTE ADULT - SUBJECTIVE AND OBJECTIVE BOX
Patient is a 71y old  Female who presents with a chief complaint of COPD exacerbation (10 May 2021 07:40)      SUBJECTIVE / OVERNIGHT EVENTS:  There were no acute events overnight.  Patient has no complaints.    MEDICATIONS  (STANDING):  budesonide 160 MICROgram(s)/formoterol 4.5 MICROgram(s) Inhaler 2 Puff(s) Inhalation two times a day  carvedilol 25 milliGRAM(s) Oral every 12 hours  enoxaparin Injectable 30 milliGRAM(s) SubCutaneous daily  hydrALAZINE 75 milliGRAM(s) Oral three times a day  isosorbide   mononitrate ER Tablet (IMDUR) 30 milliGRAM(s) Oral daily  oxymetazoline 0.05% Nasal Spray 1 Spray(s) Both Nostrils two times a day  pantoprazole    Tablet 40 milliGRAM(s) Oral before breakfast  simvastatin 20 milliGRAM(s) Oral at bedtime  tiotropium 18 MICROgram(s) Capsule 1 Capsule(s) Inhalation daily    MEDICATIONS  (PRN):  albuterol/ipratropium for Nebulization 3 milliLiter(s) Nebulizer every 6 hours PRN Shortness of Breath and/or Wheezing  sodium chloride 0.65% Nasal 1 Spray(s) Both Nostrils daily PRN Nasal Congestion      Vital Signs Last 24 Hrs  T(C): 36.8 (11 May 2021 04:47), Max: 36.9 (10 May 2021 14:36)  T(F): 98.3 (11 May 2021 04:47), Max: 98.5 (10 May 2021 14:36)  HR: 77 (11 May 2021 04:47) (66 - 95)  BP: 130/67 (11 May 2021 04:47) (110/61 - 138/71)  BP(mean): --  RR: 17 (11 May 2021 04:47) (17 - 18)  SpO2: 100% (11 May 2021 04:47) (100% - 100%)  CAPILLARY BLOOD GLUCOSE        I&O's Summary      PHYSICAL EXAM:  GENERAL: NAD, well-developed, elderly  HEAD:  Atraumatic, Normocephalic, nasal cannula  EYES: EOMI, PERRLA, conjunctiva and sclera clear  NECK: Supple, No JVD  CHEST/LUNG: Clear to auscultation bilaterally; No wheeze  HEART: Regular rate and rhythm; No murmurs, rubs, or gallops  ABDOMEN: Soft, Nontender, Nondistended; Bowel sounds present  EXTREMITIES:  2+ Peripheral Pulses, No clubbing, cyanosis, or edema  PSYCH: AAOx3  NEUROLOGY: non-focal  SKIN: No rashes or lesions    LABS:                        10.2   7.46  )-----------( 206      ( 10 May 2021 07:42 )             33.6     05-10    145  |  100  |  23  ----------------------------<  90  4.1   |  36<H>  |  0.73    Ca    9.2      10 May 2021 07:42  Phos  3.1     05-10  Mg     1.8     05-10                RADIOLOGY & ADDITIONAL TESTS:    Imaging Personally Reviewed:    Consultant(s) Notes Reviewed:      Care Discussed with Consultants/Other Providers:

## 2021-05-11 NOTE — PROGRESS NOTE ADULT - PROBLEM SELECTOR PLAN 6
Endorses difficulty with ADLs, lives alone.   PT rec home w no needs  OT rec no needs  Discussed with CM.

## 2021-05-11 NOTE — PROGRESS NOTE ADULT - PROBLEM SELECTOR PLAN 1
On home oxygen 3L. On bipap in the ED for hypercapnic resp failure   -s/p 125mg solumedrol in the ED  -prednisone 40mg (5/7-5/11)   -Spiriva and Pulmicort  -duonebs prn for SOB and/or wheezing   -bipap at night   -On 3L (home o2 settings), however saturating 100%. Oxygen weaned to 2L NC on 5/9. Wean as tolerated  -appreciate pulm recs, pt on appropriate regimen, c/w prednisone for 5D course.   -outpatient follow up with new Pulmonologist scheduled for patient.  -patient will be discharged today. On home oxygen 3L. On bipap in the ED for hypercapnic resp failure   -s/p 125mg solumedrol in the ED  -prednisone 40mg (5/7-5/11); course completed.   -Spiriva and Pulmicort  -duonebs prn for SOB and/or wheezing   -bipap at night   -On 3L (home o2 settings), however saturating 100%. Oxygen weaned to 2L NC on 5/9. Wean as tolerated  -appreciate pulm recs, pt on appropriate regimen, c/w prednisone for 5D course.   -outpatient follow up with new Pulmonologist scheduled for patient.  -patient will be discharged today.

## 2021-05-11 NOTE — PROGRESS NOTE ADULT - PROBLEM SELECTOR PLAN 2
In the setting of COPD exacerbation. Patient AOx1-2 in the ED. VBG 7.32/99/30/39 placed on Bipap  -improved  -Patients mental status back to baseline AO x 4  -trend pCO2  -tx of COPD exacerbation as above  -Bipap overnight

## 2021-05-11 NOTE — PROGRESS NOTE ADULT - PROBLEM SELECTOR PROBLEM 3
HFrEF (heart failure with reduced ejection fraction)

## 2021-05-11 NOTE — DISCHARGE NOTE NURSING/CASE MANAGEMENT/SOCIAL WORK - PATIENT PORTAL LINK FT
You can access the FollowMyHealth Patient Portal offered by [facility] by registering at the following website: [email or web address]. By joining [employer] [facility]’s FollowMyHealth portal, you will also be able to view your health information using other applications (apps) compatible with our system.

## 2021-05-11 NOTE — PROGRESS NOTE ADULT - PROBLEM SELECTOR PROBLEM 5
Failure to thrive in adult

## 2021-05-11 NOTE — PROGRESS NOTE ADULT - PROBLEM SELECTOR PROBLEM 2
Hypercapnic respiratory failure

## 2021-05-11 NOTE — PROGRESS NOTE ADULT - ATTENDING COMMENTS
Pt seen at bedside, feels well, ready to go home today. Nasal congestion has improved with nasal spray.   71F w/HTN, HFrEF s/p ICD, CAD, COPD (3L home O2), pulm hypertension a/w COPD exacerbation   #COPD Exacerbation: resolved, C/w Bipap QHS and 4 L NC O2 at home, Appreciate pulm recs, appt made with Dr. Allen on 5/24.   s/p PO Pred, last day today  Team spoke to pts daughter, Hannah ([phone]), agreeable with plan of care and dc home today. [place] but until then pt lives on 1st floor of a shared home with her mother who is a smoker.    DC today  ~40 minutes

## 2021-05-11 NOTE — CHART NOTE - NSCHARTNOTEFT_GEN_A_CORE
Source: Patient [ ]    Family [ ]     other [X] Review of the patient's medical chart, RN    Current Diet : Diet, DASH/TLC:   Sodium & Cholesterol Restricted  Supplement Feeding Modality:  Oral  Ensure Enlive Cans or Servings Per Day:  1       Frequency:  Three Times a day (05-05-21 @ 15:28)      Patient Hx:  Patient is a 71 year old female with pmhx HTN, HFrEF s/p ICD, CAD,  COPD (3L home O2), pulm hypertension who is presenting with hypercapnic respiratory failure in the setting of COPD exacerbation.     Interval Nutrition Hx:  Nutrition follow-up for LOS, severe malnutrition.  Pt with fair to good PO intake, completed >90% breakfast meal.  Supplements ordered, Ensure Enlive 240mls 3x daily (1050kcal, 60g protein). No GI distress (nausea/vomiting/diarrhea/constipation.) No reported difficulties chewing and swallowing foods and fluids.        Anthropometric Measurements:  Current Weight:41kg 5/11  Weight Hx: 34.8kg on admission.   Height: 167.6cm         __________________ Pertinent Medications__________________   budesonide 160 MICROgram(s)/formoterol 4.5 MICROgram(s) Inhaler  carvedilol  enoxaparin Injectable  hydrALAZINE  isosorbide   mononitrate ER Tablet (IMDUR)  oxymetazoline 0.05% Nasal Spray  pantoprazole    Tablet  simvastatin  tiotropium 18 MICROgram(s) Capsule      __________________ Pertinent Labs__________________   05-10 Na145 mmol/L Glu 90 mg/dL K+ 4.1 mmol/L Cr  0.73 mg/dL BUN 23 mg/dL 05-10 Phos 3.1 mg/dL          Estimated Needs:   [X] no change since previous assessment  [ ] recalculated:     Previous Nutrition Diagnosis:     [ ] Inadequate Energy Intake [ ]Inadequate Oral Intake [ ] Excessive Energy Intake   [ ] Underweight [ ] Increased Nutrient Needs [ ] Overweight/Obesity   [ ] Altered GI Function [ ] Unintended Weight Loss [ ] Food & Nutrition Related Knowledge Deficit [X] Malnutrition, Severe     Nutrition Diagnosis is [X ] ongoing  [ ] resolved [ ] not applicable        Nutrition Recommendations:  1- Continue current diet order, which remains appropriate at this time.   2- Monitor weights, labs, BM's, skin integrity, p.o. intake.   3- Multivitamin   4- Please Encourage po intake, assist with meals and menu selections, provide alternatives PRN.    RD remains available, re-consult as needed.   Barrie Morrissey MS, RDN Pager #[phone]
Patient presented with AMS on arrival to the hospital 2/2 hypercapnia. At that time, I was unable to reach the patient's son who is her designated HCP. Chart review revealed a [facility] note from 11/11/20 that stated the patient's wishes were DNR/DNI no feeding tubes. Given that information the DNR/DNI order was placed. Since admission the patient's mental status has improved and she is now AOx4 with full capacity to make her own medical decisions. The patient expressed her desire to be DNI, however she is not ready to be made DNR. I explained to the patient that should she go into cardiac arrest requiring CPR she would need intubation if ROSC were to be achieved therefore she would need to be both DNR and DNI if she does not want intubation. The patient expressed understanding and stated that she would like to discuss this with her son and daughter-in-law tonight. She understands that means I would have to reverse the DNR/DNI order for tonight and that would make her full code.     C will be reassessed in the morning. Patient is full code at this time.     Adriel Flanagan MD  PGY2, Internal Medicine   pager [phone], [phone]

## 2021-05-11 NOTE — DISCHARGE NOTE NURSING/CASE MANAGEMENT/SOCIAL WORK - NSSCNAMETXT_GEN_ALL_CORE
[facility] [phone]  Nurse to visit on the day after disharge.  Other appropriate services to be arranged thereafter.  Please contact Home Care agency  at the above phone# if you have not heard from them by approximately 12 noon on the day after your hospital discharge.

## 2021-05-11 NOTE — PROGRESS NOTE ADULT - REASON FOR ADMISSION
COPD exacerbation
COPD exacerbation
T H I S   I S    N O  T   A    F I N A L I Z E D   N O T E      SHAWNA, DINA  83y, Female  Allergy: No Known Allergies    Hospital Day: 4d    Patient seen and examined earlier today.       LAST 24-Hr EVENTS:    VITALS:  T(F): 98.8 (02-09-21 @ 08:30), Max: 100.2 (02-09-21 @ 00:00)  HR: 68 (02-09-21 @ 08:30)  BP: 129/60 (02-09-21 @ 08:30) (109/52 - 140/64)  RR: 20 (02-09-21 @ 08:30)      TESTS & MEASUREMENTS:        02-07-21 @ 07:01  -  02-08-21 @ 07:00  --------------------------------------------------------  IN: 75 mL / OUT: 1400 mL / NET: -1325 mL    02-08-21 @ 07:01  -  02-09-21 @ 07:00  --------------------------------------------------------  IN: 1850 mL / OUT: 1800 mL / NET: 50 mL    02-09-21 @ 07:01  -  02-09-21 @ 09:40  --------------------------------------------------------  IN: 150 mL / OUT: 0 mL / NET: 150 mL                            10.2   6.46  )-----------( 147      ( 09 Feb 2021 04:30 )             32.4       INR: 1.40 ratio (02-05-21 @ 08:52)    02-09    136  |  105  |  17  ----------------------------<  92  4.2   |  20  |  0.8    Ca    8.0<L>      09 Feb 2021 04:30  Mg     1.7     02-09    TPro  5.9<L>  /  Alb  2.5<L>  /  TBili  0.3  /  DBili  x   /  AST  15  /  ALT  8   /  AlkPhos  51  02-09    LIVER FUNCTIONS - ( 09 Feb 2021 04:30 )  Alb: 2.5 g/dL / Pro: 5.9 g/dL / ALK PHOS: 51 U/L / ALT: 8 U/L / AST: 15 U/L / GGT: x                 Culture - Blood (collected 02-07-21 @ 08:13)  Source: .Blood None  Preliminary Report (02-08-21 @ 18:01):    No growth to date.    Culture - Blood (collected 02-07-21 @ 08:13)  Source: .Blood None  Preliminary Report (02-08-21 @ 18:01):    No growth to date.    Culture - Urine (collected 02-04-21 @ 20:28)  Source: .Urine Clean Catch (Midstream)  Final Report (02-05-21 @ 23:41):    <10,000 CFU/mL Normal Urogenital Barbara    Culture - Blood (collected 02-04-21 @ 18:55)  Source: .Blood Blood  Preliminary Report (02-06-21 @ 01:03):    No growth to date.    Culture - Blood (collected 02-04-21 @ 18:55)  Source: .Blood Blood  Gram Stain (02-06-21 @ 15:18):    Growth in anaerobic bottle: Gram Negative Rods  Final Report (02-08-21 @ 07:55):    Growth in anaerobic bottle: Klebsiella variicola    ***Blood Panel PCR results on this specimen are available    approximately 3 hours after the Gram stain result.***    Gram stain, PCR, and/or culture results may not always    correspond due to differencein methodologies.    ************************************************************    This PCR assay was performed by multiplex PCR. This    Assay tests for 66 bacterial and resistance gene targets.    Please refer to the [facility] test directory    at [email or web address] for details.  Organism: Blood Culture PCR  Klebsiella variicola (02-08-21 @ 07:55)      COVID-19 IgG Antibody Interpretation: Negative (02-05-21 @ 03:30)            MEDICATIONS:  MEDICATIONS  (STANDING):  cefepime   IVPB 2000 milliGRAM(s) IV Intermittent every 24 hours  chlorhexidine 4% Liquid 1 Application(s) Topical <User Schedule>  enoxaparin Injectable 70 milliGRAM(s) SubCutaneous every 12 hours  gabapentin 100 milliGRAM(s) Oral two times a day  lactated ringers. 1000 milliLiter(s) (75 mL/Hr) IV Continuous <Continuous>      HOME MEDICATIONS:  amLODIPine 10 mg oral tablet (02-05)  ciprofloxacin 500 mg oral tablet (02-05)  gabapentin 100 mg oral capsule (02-05)      PHYSICAL EXAM:  GENERAL:   CHEST/LUNG:   HEART:   ABDOMEN:   EXTREMITIES:        
COPD exacerbation
  SHAWNADINA  83y, Female  Allergy: No Known Allergies    Hospital Day: 4d    Patient seen and examined earlier today.     LAST 24-Hr EVENTS:  NO events reported     VITALS:  T(F): 98.8 (02-09-21 @ 08:30), Max: 100.2 (02-09-21 @ 00:00)  HR: 68 (02-09-21 @ 08:30)  BP: 129/60 (02-09-21 @ 08:30) (109/52 - 140/64)  RR: 20 (02-09-21 @ 08:30) on RA      TESTS & MEASUREMENTS:        02-07-21 @ 07:01  -  02-08-21 @ 07:00  --------------------------------------------------------  IN: 75 mL / OUT: 1400 mL / NET: -1325 mL    02-08-21 @ 07:01  -  02-09-21 @ 07:00  --------------------------------------------------------  IN: 1850 mL / OUT: 1800 mL / NET: 50 mL    02-09-21 @ 07:01  -  02-09-21 @ 09:40  --------------------------------------------------------  IN: 150 mL / OUT: 0 mL / NET: 150 mL                            10.2   6.46  )-----------( 147      ( 09 Feb 2021 04:30 )             32.4       INR: 1.40 ratio (02-05-21 @ 08:52)    02-09    136  |  105  |  17  ----------------------------<  92  4.2   |  20  |  0.8    Ca    8.0<L>      09 Feb 2021 04:30  Mg     1.7     02-09    TPro  5.9<L>  /  Alb  2.5<L>  /  TBili  0.3  /  DBili  x   /  AST  15  /  ALT  8   /  AlkPhos  51  02-09    LIVER FUNCTIONS - ( 09 Feb 2021 04:30 )  Alb: 2.5 g/dL / Pro: 5.9 g/dL / ALK PHOS: 51 U/L / ALT: 8 U/L / AST: 15 U/L / GGT: x                 Culture - Blood (collected 02-07-21 @ 08:13)  Source: .Blood None  Preliminary Report (02-08-21 @ 18:01):    No growth to date.    Culture - Blood (collected 02-07-21 @ 08:13)  Source: .Blood None  Preliminary Report (02-08-21 @ 18:01):    No growth to date.    Culture - Urine (collected 02-04-21 @ 20:28)  Source: .Urine Clean Catch (Midstream)  Final Report (02-05-21 @ 23:41):    <10,000 CFU/mL Normal Urogenital Barbara    Culture - Blood (collected 02-04-21 @ 18:55)  Source: .Blood Blood  Preliminary Report (02-06-21 @ 01:03):    No growth to date.    Culture - Blood (collected 02-04-21 @ 18:55)  Source: .Blood Blood  Gram Stain (02-06-21 @ 15:18):    Growth in anaerobic bottle: Gram Negative Rods  Final Report (02-08-21 @ 07:55):    Growth in anaerobic bottle: Klebsiella variicola    ***Blood Panel PCR results on this specimen are available    approximately 3 hours after the Gram stain result.***    Gram stain, PCR, and/or culture results may not always    correspond due to differencein methodologies.    ************************************************************    This PCR assay was performed by multiplex PCR. This    Assay tests for 66 bacterial and resistance gene targets.    Please refer to the [facility] test directory    at [email or web address] for details.  Organism: Blood Culture PCR  Klebsiella variicola (02-08-21 @ 07:55)      COVID-19 IgG Antibody Interpretation: Negative (02-05-21 @ 03:30)            MEDICATIONS:  MEDICATIONS  (STANDING):  cefepime   IVPB 2000 milliGRAM(s) IV Intermittent every 24 hours  chlorhexidine 4% Liquid 1 Application(s) Topical <User Schedule>  enoxaparin Injectable 70 milliGRAM(s) SubCutaneous every 12 hours  gabapentin 100 milliGRAM(s) Oral two times a day  lactated ringers. 1000 milliLiter(s) (75 mL/Hr) IV Continuous <Continuous>      HOME MEDICATIONS:  amLODIPine 10 mg oral tablet (02-05)  ciprofloxacin 500 mg oral tablet (02-05)  gabapentin 100 mg oral capsule (02-05)      PHYSICAL EXAM:  GENERAL: in NAD/p while at rest  CHEST/LUNG: Clear to auscultation bilaterally (ant auscultation)  HEART: S1S2  ABDOMEN: BS+/ obese/ non tender/ patent nephrostomy without gross hematuria  EXTREMITIES:  No clubbing/ chronic skin changes      
COPD exacerbation

## 2021-05-11 NOTE — PROGRESS NOTE ADULT - NUTRITIONAL ASSESSMENT
This patient has been assessed with a concern for Malnutrition and has been determined to have a diagnosis/diagnoses of Severe protein-calorie malnutrition and Underweight (BMI < 19).    This patient is being managed with:   Diet DASH/TLC-  Sodium & Cholesterol Restricted  Supplement Feeding Modality:  Oral  Ensure Enlive Cans or Servings Per Day:  1       Frequency:  Three Times a day  Entered: May  5 2021  3:28PM    

## 2021-05-11 NOTE — PROGRESS NOTE ADULT - PROBLEM SELECTOR PLAN 4
-'s-160s on admission   -medication as above  -Bp well controlled at this time
's-160s on admission   medication as above
-'s-160s on admission   -medication as above  -Bp well controlled at this time

## 2021-05-12 NOTE — ED PROVIDER NOTE - PATIENT PORTAL LINK FT
Private car You can access the FollowMyHealth Patient Portal offered by [facility] by registering at the following website: [email or web address]. By joining [email or web address]’s FollowMyHealth portal, you will also be able to view your health information using other applications (apps) compatible with our system.

## 2021-06-06 NOTE — ED ADULT NURSE REASSESSMENT NOTE - NS ED NURSE REASSESS COMMENT FT1
Called into pt's room by tele tech, found to be hypoxic to 84% on BiPap. MD made aware, FIO2 increased to 100% now saturating 90% and climbing.

## 2021-06-06 NOTE — ED PROVIDER NOTE - PROGRESS NOTE DETAILS
Patient's CO2 >110- patient seen and examined at bedside. Patient aa03, breathing appears improved, answering all my questions appropriately. Patient was not initially amenable to Bipap because of mask discomfort but now states she is willing to try. Patient has decision making capacity at this time. Asked me to turn on TV stating "if I have to wear that mask, I'm gonna need something to distract me." HAFSA Naqvi (Resident) - pt signed out to me by dr. Farrell, pt stable on bipap, follow up micu consult and rpt vbg. HAFSA Naqvi (Resident) - pt signed out to me by dr. Farrell, pt stable on bipap, follow up micu consult and rpt vbg. Pt reassessed, speaking full sentences, well appearing, good volumes on bipap. SBP 120s. HAFSA Naqvi (Resident) - MICU saw pt, stable for floors.

## 2021-06-06 NOTE — ED ADULT TRIAGE NOTE - CHIEF COMPLAINT QUOTE
pt c/o progressively worsening sob x 3 days. is 02 dependant 3lnc. was found to be 88% on 3l. denies cp/cough/fever/chills.

## 2021-06-06 NOTE — ED PROVIDER NOTE - PHYSICAL EXAMINATION
PHYSICAL EXAM:  GENERAL: thin cachectic female, appears tachypneic and dyspneic   HEAD:  Atraumatic, Normocephalic  EYES: EOMI  NECK: Supple  CHEST/LUNG: decreased breath sounds, no wheezing noted   HEART: Regular rate and rhythm; No murmurs, rubs, or gallops  ABDOMEN: Soft, non-tender, non-distended; normal bowel sounds, no organomegaly  EXTREMITIES:  1+ peripheral pulses b/l, No clubbing, cyanosis, or edema  NEUROLOGY: A&O x 3, no focal deficits  SKIN: No rashes or lesions

## 2021-06-06 NOTE — ED PROVIDER NOTE - PMH
CAD (coronary artery disease)    Chronic systolic CHF (congestive heart failure)  EF 20-25% s/p [employer] ICD (9/2018)  COPD (chronic obstructive pulmonary disease)  on 3L NC  HTN (hypertension)    Pulmonary hypertension  moderate  Right-sided heart failure    Sleep apnea  cannot tolerate CPAP

## 2021-06-06 NOTE — ED ADULT NURSE REASSESSMENT NOTE - NS ED NURSE REASSESS COMMENT FT1
Patient is A&Ox4. Continues on cardiac monitor, sinus rhythm. Continues on BIPAP. Stretcher in lowest position, wheels locked, side rails up, call bell in reach.

## 2021-06-06 NOTE — ED PROVIDER NOTE - ATTENDING CONTRIBUTION TO CARE
70 yo female with PMH HTN, CHF with AICD, CAD, COPD on 3L O2 and nocturnal BiPAP, HTN for evaluation of shortness of breath x 1 day. Patient has recent admission for COPD exacerbation. Reports that shortness of breath began today, exertional dyspnea. Denies leg swelling.  Gen: respiratory distress, awake, alert and oriented x 3  Cardiac: regular rate and rhythm, +S1S2  Pulm: decreased air movement bilaterally, +intercostal retractions noted  Abd: soft, nontender, nondistended, no guarding  A/P labs. imaging, ekg, bipap, adm

## 2021-06-06 NOTE — ED PROVIDER NOTE - OBJECTIVE STATEMENT
70 yo F with PMH of HTN, HFrEF s/p ICD, CAD, COPD on 3L O2 and nocturnal BiPAP, pHTN, p/w SOB x 1 day. Patient was recently admitted 5/4-5/11 for SOB 2/2 COPD exacerbation, started on BIPAP and weaned to home O2, was on prednisone. Patient currently endorses SOB that started today, difficulty with ambulation due to SOB. Denies CP. No fevers, cough, N/V/D/C. Patient currently appears tachpneic/dyspneic.

## 2021-06-06 NOTE — ED PROVIDER NOTE - CLINICAL SUMMARY MEDICAL DECISION MAKING FREE TEXT BOX
71F with HFrEF s/p ICD, COPD on 3L O2, p/w SOB. Has had multiple admissions for similar presentation. Likely 2/2 COPD exacerbation and h/o pHTN. Will get labs, give meds (for COPD exacerbation), likely admit.

## 2021-06-06 NOTE — ED ADULT NURSE NOTE - OBJECTIVE STATEMENT
Pt presents to ED via EMS from home with c/o worsening shortness of breath x 1 day. Pt wears 3LPM NC at baseline. Pt denies chest pain, fever, cough, abdominal pain, N/v/D. Pt was recently admitted for the same and weened from bipap to home O2. Pt changed into gown and placed on cardiac monitor. Pt placed on high flow NC. #20g US IV placed to L Upper arm by MD, lab work collected as ordered, medications ad ministered as ordered. Pt updated on plan of care, verbalized understanidng.

## 2021-06-06 NOTE — ED PROVIDER NOTE - NS ED ROS FT
Limited due to tachypnea/dyspnea  General: Denies dizziness, fatigue  Eyes: Denies blurry vision  ENMT: Denies rhinorrhea  Respiratory: +SOB, no cough   Cardiovascular: no CP

## 2021-06-07 NOTE — H&P ADULT - NSHPPHYSICALEXAM_GEN_ALL_CORE
Vital Signs Last 24 Hrs  T(C): 36.8 (06 Jun 2021 23:59), Max: 36.9 (06 Jun 2021 21:03)  T(F): 98.2 (06 Jun 2021 23:59), Max: 98.5 (06 Jun 2021 21:03)  HR: 85 (07 Jun 2021 02:56) (50 - 108)  BP: 73/46 (07 Jun 2021 03:17) (73/46 - 167/55)  BP(mean): --  RR: 13 (07 Jun 2021 02:56) (13 - 24)  SpO2: 100% (07 Jun 2021 02:56) (92% - 100%)    PHYSICAL EXAM:  GENERAL: NAD, cachectic   HEAD:  Atraumatic, Normocephalic  EYES: EOMI, PERRLA, conjunctiva and sclera clear  ENT: Moist mucous membranes  NECK: Supple, No JVD  CHEST/LUNG: Clear to auscultation bilaterally; No rales, rhonchi, wheezing, or rubs. Unlabored respirations  HEART: Regular rate and rhythm; No murmurs, rubs, or gallops  ABDOMEN: Bowel sounds present; Soft, Nontender, Nondistended. No hepatomegally  EXTREMITIES:  2+ Peripheral Pulses, brisk capillary refill. No clubbing, cyanosis, or edema  NERVOUS SYSTEM:  Alert & Oriented X3, speech clear. No deficits   MSK: FROM all 4 extremities, full and equal strength  SKIN: No rashes or lesions

## 2021-06-07 NOTE — H&P ADULT - PROBLEM SELECTOR PLAN 2
On home oxygen 3L. On bipap in the ED for hypercapnic resp failure   -s/p 125mg solumedrol in the ED  - currently on chronic prednisone; will cw 40mg PO   -Spiriva and Pulmicort  -duonebs prn for SOB and/or wheezing   -bipap at night   -On 3L (home o2 settings), however saturating 100%. Oxygen weaned to 2L NC on 5/9. Wean as tolerated

## 2021-06-07 NOTE — H&P ADULT - ATTENDING COMMENTS
Pt with COPD, and chronic systolic CHF p/w dyspnea 2/2 COPD exacerbation.  Pt currently breathing comfortably on bipap  No wheeze on exam  no edema  Hypotension improving  Will continue AVAPS for now  Trend PCO2  Steroids

## 2021-06-07 NOTE — PROGRESS NOTE ADULT - SUBJECTIVE AND OBJECTIVE BOX
PROGRESS NOTE:     Patient is a 71y old  Female who presents with a chief complaint of Dyspnea (07 Jun 2021 03:20)    SUBJECTIVE / OVERNIGHT EVENTS: Patient seen and evaluated at bedside. Ws reportedly very dsypneic this AM, and was placed on BIPAP, pulmonary team at bedside. Patient alert, awake, responsive to questioning, currently reports improvement in SOB since placement back on BIPAP, denies any current chest pain, abdominal pain, nausea or vomiting.     ADDITIONAL REVIEW OF SYSTEMS:    MEDICATIONS  (STANDING):  albuterol/ipratropium for Nebulization 3 milliLiter(s) Nebulizer every 6 hours  aspirin enteric coated 81 milliGRAM(s) Oral daily  budesonide 160 MICROgram(s)/formoterol 4.5 MICROgram(s) Inhaler 2 Puff(s) Inhalation two times a day  enoxaparin Injectable 30 milliGRAM(s) SubCutaneous daily  fluticasone propionate 50 MICROgram(s)/spray Nasal Spray 1 Spray(s) Both Nostrils two times a day  methylPREDNISolone sodium succinate Injectable 40 milliGRAM(s) IV Push every 12 hours  simvastatin 20 milliGRAM(s) Oral at bedtime    MEDICATIONS  (PRN):    CAPILLARY BLOOD GLUCOSE    POCT Blood Glucose.: 102 mg/dL (07 Jun 2021 03:21)    I&O's Summary    PHYSICAL EXAM:  Vital Signs Last 24 Hrs  T(C): 36.6 (07 Jun 2021 13:58), Max: 36.9 (06 Jun 2021 21:03)  T(F): 97.9 (07 Jun 2021 13:58), Max: 98.5 (06 Jun 2021 21:03)  HR: 89 (07 Jun 2021 13:58) (50 - 108)  BP: 150/78 (07 Jun 2021 13:58) (73/46 - 167/55)  BP(mean): --  RR: 26 (07 Jun 2021 13:58) (13 - 26)  SpO2: 95% (07 Jun 2021 13:58) (92% - 100%)    CONSTITUTIONAL: NAD, well-developed middle aged female.   RESPIRATORY: Normal respiratory effort; coarse breath sounds bilaterally .  CARDIOVASCULAR: Regular rate and rhythm, normal S1 and S2,  No lower extremity edema; Peripheral pulses are 2+ bilaterally  ABDOMEN: Nontender to palpation, normoactive bowel sounds  MUSCLOSKELETAL: no clubbing or cyanosis of digits; no joint swelling or tenderness to palpation  PSYCH: A+O to person, place, and time; affect appropriate    LABS: reviewed                         10.2   4.12  )-----------( 159      ( 07 Jun 2021 10:58 )             33.8     06-07    142  |  93<L>  |  40<H>  ----------------------------<  108<H>  4.6   |  33<H>  |  1.00    Ca    9.2      07 Jun 2021 10:58  Phos  6.1     06-07  Mg     2.3     06-07    TPro  6.9  /  Alb  4.3  /  TBili  0.7  /  DBili  x   /  AST  21  /  ALT  17  /  AlkPhos  57  06-06    RADIOLOGY & ADDITIONAL TESTS:  Results Reviewed:   Imaging Personally Reviewed:  Electrocardiogram Personally Reviewed:    COORDINATION OF CARE:  Care Discussed with Consultants/Other Providers [Y/N]:  Prior or Outpatient Records Reviewed [Y/N]:

## 2021-06-07 NOTE — ED ADULT NURSE REASSESSMENT NOTE - NS ED NURSE REASSESS COMMENT FT1
Patient is A&Ox4. Continues on BIPAP, oxygen saturation 100%. [place]un provided for comfort. Denies pain. Stretcher in lowest position, wheels locked, side rails up, call bell in reach.

## 2021-06-07 NOTE — H&P ADULT - HISTORY OF PRESENT ILLNESS
72 yo F with PMH of HTN, HFrEF s/p ICD, CAD, COPD on 3L O2 and nocturnal BiPAP, pHTN, p/w SOB x 1 day. Patient was recently admitted 5/4-5/11 for SOB 2/2 COPD exacerbation, started on BIPAP and weaned to home O2, was on prednisone. Patient came back to the Ed with worsening SOB for oned ay. States this is similar to her copd exacerbations. No chest pain. Does have cough. NO feers chills chest pain abdominal pain or urinarysymptoms. Patient is alert and oriented though difficult to fullyunderstand because she is on bipap.She is asking for water throughout interview process and states she feels uncomfortable.     MICU consulted for hypercarbia, requiring BIPAP.  Deemed not MICU candidate.

## 2021-06-07 NOTE — H&P ADULT - NSICDXFAMILYHX_GEN_ALL_CORE_FT
FAMILY HISTORY:  Father  Still living? Unknown  Family history of colon cancer, Age at diagnosis: Age Unknown

## 2021-06-07 NOTE — H&P ADULT - ASSESSMENT
70 yo F with PMH of HTN, HFrEF s/p ICD, CAD, COPD on 3L O2 and nocturnal BiPAP, pHTN, p/w SOB x 1 day found to have acute on chronic hypercarbic respiratory failure in setting of advanced COPD.

## 2021-06-07 NOTE — H&P ADULT - PROBLEM SELECTOR PLAN 1
ISO acute on chronic hypercapnia, tolerating bipap and mentating well  - cw AVAPS  - trend pCO2  - nebulizer as below  -steroids as below

## 2021-06-07 NOTE — H&P ADULT - NSCORESITESY/N_GEN_A_CORE_RD
DISPLAY PLAN FREE TEXT DISPLAY PLAN FREE TEXT DISPLAY PLAN FREE TEXT DISPLAY PLAN FREE TEXT DISPLAY PLAN FREE TEXT DISPLAY PLAN FREE TEXT DISPLAY PLAN FREE TEXT DISPLAY PLAN FREE TEXT DISPLAY PLAN FREE TEXT No DISPLAY PLAN FREE TEXT DISPLAY PLAN FREE TEXT

## 2021-06-07 NOTE — PROGRESS NOTE ADULT - SUBJECTIVE AND OBJECTIVE BOX
CHIEF COMPLAINT: Patient is a 71y old  Female who presents with a chief complaint of Dyspnea (07 Jun 2021 03:20)    Interval Events:    REVIEW OF SYSTEMS:  Constitutional:   Eyes:  ENT:  CV:  Resp:  GI:  :  MSK:  Integumentary:  Neurological:  Psychiatric:  Endocrine:  Hematologic/Lymphatic:  Allergic/Immunologic:  [ ] All other systems negative  [ ] Unable to assess ROS because ________    OBJECTIVE:  ICU Vital Signs Last 24 Hrs  T(C): 36.1 (07 Jun 2021 07:43), Max: 36.9 (06 Jun 2021 21:03)  T(F): 97 (07 Jun 2021 07:43), Max: 98.5 (06 Jun 2021 21:03)  HR: 75 (07 Jun 2021 07:43) (50 - 108)  BP: 107/60 (07 Jun 2021 07:43) (73/46 - 167/55)  BP(mean): --  ABP: --  ABP(mean): --  RR: 16 (07 Jun 2021 07:43) (13 - 24)  SpO2: 100% (07 Jun 2021 07:43) (92% - 100%)    Mode: NIV (Noninvasive Ventilation), RR (machine): 16, TV (machine): 400, FiO2: 100, PEEP: 6, ITime: 1, P-High: 25, P-Low: 10, PIP: 17    CAPILLARY BLOOD GLUCOSE      POCT Blood Glucose.: 102 mg/dL (07 Jun 2021 03:21)      PHYSICAL EXAM:  General:   HEENT:   Lymph Nodes:  Neck:   Respiratory:   Cardiovascular:   Abdomen:   Extremities:   Skin:   Neurological:  Psychiatry:    HOSPITAL MEDICATIONS:  MEDICATIONS  (STANDING):  ALBUTerol    90 MICROgram(s) HFA Inhaler 2 Puff(s) Inhalation every 6 hours  aspirin enteric coated 81 milliGRAM(s) Oral daily  budesonide 160 MICROgram(s)/formoterol 4.5 MICROgram(s) Inhaler 2 Puff(s) Inhalation two times a day  enoxaparin Injectable 30 milliGRAM(s) SubCutaneous daily  fluticasone propionate 50 MICROgram(s)/spray Nasal Spray 1 Spray(s) Both Nostrils two times a day  predniSONE   Tablet 40 milliGRAM(s) Oral daily  simvastatin 20 milliGRAM(s) Oral at bedtime  tiotropium 18 MICROgram(s) Capsule 1 Capsule(s) Inhalation daily    MEDICATIONS  (PRN):      LABS:                        11.9   4.64  )-----------( 216      ( 06 Jun 2021 17:34 )             39.7     06-06    147<H>  |  94<L>  |  25<H>  ----------------------------<  90  4.5   |  37<H>  |  0.65    Ca    9.4      06 Jun 2021 17:34  Mg     1.9     06-06    TPro  6.9  /  Alb  4.3  /  TBili  0.7  /  DBili  x   /  AST  21  /  ALT  17  /  AlkPhos  57  06-06          Venous Blood Gas:  06-06 @ 21:33  7.20/>110/51/33/79.7  VBG Lactate: 1.2  Venous Blood Gas:  06-06 @ 19:27  7.18/>110/37/34/57.6  VBG Lactate: 1.1  Venous Blood Gas:  06-06 @ 17:34  7.19/>110/28/33/40.3  VBG Lactate: 1.5      MICROBIOLOGY:     RADIOLOGY:  [ ] Reviewed and interpreted by me    PULMONARY FUNCTION TESTS:    EKG:

## 2021-06-07 NOTE — H&P ADULT - PROBLEM SELECTOR PLAN 3
Chronic systolic CHF (congestive heart failure) s/p AICD. Last TTE 2019 w/ Right/left systolic dysfunction, EF: 20- 25%. Patient appears euvolemic though mildly elevated pro BNP in ED. s/p lasix though then became hypotensive and given 500cc LR. No pulm edema on CXR.EKG reviewed no signs of ischemia.   -c/w coreg 25mg bid when bp tolerates  -c/w Imdur 30mg qd when bp tolerates  -c/w hydralazine 75mg tid when bp tolerated

## 2021-06-07 NOTE — PROGRESS NOTE ADULT - ASSESSMENT
72 yo F with PMH of HTN, HFrEF s/p ICD, CAD, COPD on 3L O2 and nocturnal BiPAP, pHTN, p/w SOB x 1 day found to have acute on chronic hypercarbic respiratory failure in setting of advanced COPD.

## 2021-06-07 NOTE — H&P ADULT - NSHPLABSRESULTS_GEN_ALL_CORE
11.9   4.64  )-----------( 216      ( 06 Jun 2021 17:34 )             39.7       06-06    147<H>  |  94<L>  |  25<H>  ----------------------------<  90  4.5   |  37<H>  |  0.65    Ca    9.4      06 Jun 2021 17:34  Mg     1.9     06-06    TPro  6.9  /  Alb  4.3  /  TBili  0.7  /  DBili  x   /  AST  21  /  ALT  17  /  AlkPhos  57  06-06              < from: Xray Chest 1 View- PORTABLE-Urgent (Xray Chest 1 View- PORTABLE-Urgent .) (06.06.21 @ 17:37) >      COMPARISON: Chest x-ray 5/3/2021.    FINDINGS:    Markedly hyperinflated lungs but similar to past exams consistent with emphysema.  Trace bilateral pleural effusions. The lungs are clear.    Heart size cannot be accurately assessed in this projection. Left chest wall biventricular AICD.    No acute osseous findings.      IMPRESSION:    Clear lungs.    < end of copied text >

## 2021-06-08 NOTE — PROGRESS NOTE ADULT - SUBJECTIVE AND OBJECTIVE BOX
CHIEF COMPLAINT: Patient is a 71y old  Female who presents with a chief complaint of Dyspnea (07 Jun 2021 12:37)      Interval Events: Pt seen and evaluated at bedside Continued on Bipap overnight     REVIEW OF SYSTEMS:  Constitutional:   Eyes:  ENT:  CV:  Resp:  GI:  :  MSK:  Integumentary:  Neurological:  Psychiatric:  Endocrine:  Hematologic/Lymphatic:  Allergic/Immunologic:  [ ] All other systems negative      OBJECTIVE:  ICU Vital Signs Last 24 Hrs  T(C): 36.2 (08 Jun 2021 05:01), Max: 36.6 (07 Jun 2021 13:58)  T(F): 97.2 (08 Jun 2021 05:01), Max: 97.9 (07 Jun 2021 13:58)  HR: 68 (08 Jun 2021 05:01) (59 - 89)  BP: 132/77 (08 Jun 2021 05:01) (99/64 - 150/78)  BP(mean): --  ABP: --  ABP(mean): --  RR: 20 (08 Jun 2021 05:01) (16 - 26)  SpO2: 100% (08 Jun 2021 05:01) (95% - 100%)    Mode: NIV (Noninvasive Ventilation), RR (machine): 16, TV (machine): 400, FiO2: 80, PEEP: 6, ITime: 1, MAP: 16    CAPILLARY BLOOD GLUCOSE      POCT Blood Glucose.: 102 mg/dL (07 Jun 2021 03:21)    HOSPITAL MEDICATIONS:  MEDICATIONS  (STANDING):  albuterol/ipratropium for Nebulization 3 milliLiter(s) Nebulizer every 6 hours  aspirin enteric coated 81 milliGRAM(s) Oral daily  budesonide 160 MICROgram(s)/formoterol 4.5 MICROgram(s) Inhaler 2 Puff(s) Inhalation two times a day  carvedilol 37.5 milliGRAM(s) Oral every 12 hours  enoxaparin Injectable 30 milliGRAM(s) SubCutaneous daily  fluticasone propionate 50 MICROgram(s)/spray Nasal Spray 1 Spray(s) Both Nostrils two times a day  methylPREDNISolone sodium succinate Injectable 40 milliGRAM(s) IV Push every 12 hours  simvastatin 20 milliGRAM(s) Oral at bedtime    MEDICATIONS  (PRN):      LABS:                        9.2    3.85  )-----------( 155      ( 08 Jun 2021 05:29 )             29.9     06-08    140  |  92<L>  |  49<H>  ----------------------------<  115<H>  4.5   |  33<H>  |  0.80    Ca    9.2      08 Jun 2021 05:29  Phos  3.6     06-08  Mg     2.2     06-08    TPro  6.9  /  Alb  4.3  /  TBili  0.7  /  DBili  x   /  AST  21  /  ALT  17  /  AlkPhos  57  06-06        Arterial Blood Gas:  06-08 @ 05:29  7.38/65/138/35/98.7/12.0  ABG lactate: --  Arterial Blood Gas:  06-07 @ 13:52  7.29/84/76/34/95.0/12.1  ABG lactate: --    Venous Blood Gas:  06-07 @ 10:58  7.20/106/29/32/48.2  VBG Lactate: --  Venous Blood Gas:  06-06 @ 21:33  7.20/>110/51/33/79.7  VBG Lactate: 1.2  Venous Blood Gas:  06-06 @ 19:27  7.18/>110/37/34/57.6  VBG Lactate: 1.1  Venous Blood Gas:  06-06 @ 17:34  7.19/>110/28/33/40.3  VBG Lactate: 1.5      MICROBIOLOGY:     RADIOLOGY:  [ ] Reviewed and interpreted by me    PULMONARY FUNCTION TESTS:    EKG: CHIEF COMPLAINT: Patient is a 71y old  Female who presents with a chief complaint of Dyspnea (07 Jun 2021 12:37)      Interval Events: Pt seen and evaluated at bedside Continued on Bipap overnight     REVIEW OF SYSTEMS:  Constitutional: Denies pain /chills   CV: Denies   Resp: + DOMÍNGUEZ off bipap  GI: Hungry  [x ] All other systems negative      OBJECTIVE:  ICU Vital Signs Last 24 Hrs  T(C): 36.2 (08 Jun 2021 05:01), Max: 36.6 (07 Jun 2021 13:58)  T(F): 97.2 (08 Jun 2021 05:01), Max: 97.9 (07 Jun 2021 13:58)  HR: 68 (08 Jun 2021 05:01) (59 - 89)  BP: 132/77 (08 Jun 2021 05:01) (99/64 - 150/78)  BP(mean): --  ABP: --  ABP(mean): --  RR: 20 (08 Jun 2021 05:01) (16 - 26)  SpO2: 100% (08 Jun 2021 05:01) (95% - 100%)    Mode: NIV (Noninvasive Ventilation), RR (machine): 16, TV (machine): 400, FiO2: 80, PEEP: 6, ITime: 1, MAP: 16    CAPILLARY BLOOD GLUCOSE      POCT Blood Glucose.: 102 mg/dL (07 Jun 2021 03:21)    HOSPITAL MEDICATIONS:  MEDICATIONS  (STANDING):  albuterol/ipratropium for Nebulization 3 milliLiter(s) Nebulizer every 6 hours  aspirin enteric coated 81 milliGRAM(s) Oral daily  budesonide 160 MICROgram(s)/formoterol 4.5 MICROgram(s) Inhaler 2 Puff(s) Inhalation two times a day  carvedilol 37.5 milliGRAM(s) Oral every 12 hours  enoxaparin Injectable 30 milliGRAM(s) SubCutaneous daily  fluticasone propionate 50 MICROgram(s)/spray Nasal Spray 1 Spray(s) Both Nostrils two times a day  methylPREDNISolone sodium succinate Injectable 40 milliGRAM(s) IV Push every 12 hours  simvastatin 20 milliGRAM(s) Oral at bedtime    MEDICATIONS  (PRN):      LABS:                        9.2    3.85  )-----------( 155      ( 08 Jun 2021 05:29 )             29.9     06-08    140  |  92<L>  |  49<H>  ----------------------------<  115<H>  4.5   |  33<H>  |  0.80    Ca    9.2      08 Jun 2021 05:29  Phos  3.6     06-08  Mg     2.2     06-08    TPro  6.9  /  Alb  4.3  /  TBili  0.7  /  DBili  x   /  AST  21  /  ALT  17  /  AlkPhos  57  06-06        Arterial Blood Gas:  06-08 @ 05:29  7.38/65/138/35/98.7/12.0  ABG lactate: --  Arterial Blood Gas:  06-07 @ 13:52  7.29/84/76/34/95.0/12.1  ABG lactate: --    Venous Blood Gas:  06-07 @ 10:58  7.20/106/29/32/48.2  VBG Lactate: --  Venous Blood Gas:  06-06 @ 21:33  7.20/>110/51/33/79.7  VBG Lactate: 1.2  Venous Blood Gas:  06-06 @ 19:27  7.18/>110/37/34/57.6  VBG Lactate: 1.1  Venous Blood Gas:  06-06 @ 17:34  7.19/>110/28/33/40.3  VBG Lactate: 1.5      MICROBIOLOGY:     RADIOLOGY:  [ ] Reviewed and interpreted by me    PULMONARY FUNCTION TESTS:    EKG:

## 2021-06-08 NOTE — PROGRESS NOTE ADULT - ATTENDING COMMENTS
Agree with plan as outlined above. Patient seen and examined at bedside. Patient history, laboratory data, and imaging personally reviewed.    Pt is a 71F with PMHx HFrEF with AICD and COPD c/b chronic hypoxemic and hypercapnic respiratory failure (3L NC O2 ATC and nocturnal NIV) p/w acute worsening of hypercapnic respiratory failure likely 2/2 COPD exacerbation.    Pt clinically improved today, is more alert and at neurologic baseline. Pt's acute hypercapnia improved on AVAPS x36 hours. Trial off NIV this morning, but pt continues to have episodes of prolonged oxygen desaturation for which she refuses NIV for now. Attempted trial of HFNC at low setting with goal O2 saturation 88-90% during the day but will need to c/w NIV qhs and prn. Will trend VBGs. Will c/w IV Solumedrol with DuoNebs ATC and Pulmicort BID. Last PFTs performed 12/2019 showed FEV1/FVC 0.27L (13%)/0.96L (36%) with DLCO 40%, declined from 2018. Last sleep study in 2018 with AHI ~4 and no central apneas. Pt a/w known CHFrEF (EF 20-25%, [employer] CRT-D, last interrogated 8/20) with episode of VF s/p shock on 6/1/21. Restarted home carvedilol. Will hold Imdur+ Hydralazine for now given low BP. Pt clinically euvolemic. Off diuretics. Will c/w home ASA and statin.     Dispo pending medical optimization. Pt full code. Discussion with pt at bedside again today, pt affirms full code status and would like short trial of intubation if clinically worsens. Agree with plan as outlined above. Patient seen and examined at bedside. Patient history, laboratory data, and imaging personally reviewed.    Pt is a 71F with PMHx HFrEF with AICD and COPD c/b chronic hypoxemic and hypercapnic respiratory failure (3L NC O2 ATC and nocturnal NIV) p/w acute worsening of hypercapnic respiratory failure likely 2/2 COPD exacerbation.    Pt clinically improved today, is more alert and at neurologic baseline. Pt's acute hypercapnia improved on AVAPS x36 hours. Trial off NIV this morning, but pt continues to have episodes of prolonged oxygen desaturation for which she refuses NIV for now. Attempted trial of HFNC at low setting with goal O2 saturation 88-90% during the day but will need to c/w NIV qhs and prn. Will trend VBGs. Will c/w IV Solumedrol with DuoNebs ATC and Pulmicort BID. Add azithromycin. Last PFTs performed 12/2019 showed FEV1/FVC 0.27L (13%)/0.96L (36%) with DLCO 40%, declined from 2018. Last sleep study in 2018 with AHI ~4 and no central apneas. Pt a/w known CHFrEF (EF 20-25%, [employer] CRT-D, last interrogated 8/20) with episode of VF s/p shock on 6/1/21. Restarted home carvedilol. Will hold Imdur+ Hydralazine for now given low BP. Pt clinically euvolemic. Off diuretics. Will c/w home ASA and statin.     Dispo pending medical optimization. Pt full code. Discussion with pt at bedside again today, pt affirms full code status and would like short trial of intubation if clinically worsens.

## 2021-06-08 NOTE — PROGRESS NOTE ADULT - ASSESSMENT
70 yo F with PMH of HTN, HFrEF s/p ICD, CAD, COPD on 3L O2 and nocturnal BiPAP, pHTN, p/w SOB x 1 day found to have acute on chronic hypercarbic respiratory failure in setting of advanced COPD.    # Acute hypercapnic respiratory failure   -Acute on chronic hypercapnia, tolerating bipap and mentating well.  -Pulmonary/RCU team at bedside during AM rounds, p transferred to RCU team.  -C/w AVAPS, settings per Pulmonary team, plan for REPEAT ABG.   -Continue to trend pCO2  -Continue nebulizer and steroids as below.   -MICU consulted overnight, not candidate for MICU level care.   -Low threshold for escalation to MICU if decompensates.    # COPD  -On home Oxygen 3L. On BIPAP in ED for hypercapnic resp failure.  -s/p 125mg solumedrol in the ED  -Continue Oxygen supplementation as needed.   -Continue BIPAP FOr now, repeat ABG. Pulmonary/RCU following.   -Currently on chronic prednisone; will c/w 40mg PO   -Continue Spiriva and Pulmicort  -Continue duonebs prn for SOB and/or wheezing    # Chronic systolic heart failure   -Chronic systolic CHF (congestive heart failure) s/p AICD. Last TTE 2019 w/ Right/left systolic dysfunction, EF: 20- 25%. Patient appears euvolemic though mildly elevated pro BNP in ED. s/p Lasix though then became hypotensive and given 500cc LR. No pulm edema on CXR. EKG reviewed no signs of ischemia.   -c/w Coreg 25mg bid when bp tolerates.  -c/w Imdur 30mg qd when bp tolerates.  -c/w Hydralazine 75mg tid when bp tolerated.  -Will consider repeating TTE.    #CAD  -Continue ASA, Coreg    -DVT: Lovenox  -Diet: NPO for now  -Dispo: Pending clinical improvement. PT eval when stable      72 yo F with PMH of HTN, HFrEF s/p ICD, CAD, COPD on 3L O2 and nocturnal BiPAP, pHTN, p/w SOB x 1 day found to have acute on chronic hypercarbic respiratory failure in setting of advanced COPD.    # Acute hypercapnic respiratory failure   -Acute on chronic hypercapnia, tolerating bipap and mentating well.  -Pulmonary/RCU team at bedside during AM rounds, p transferred to RCU team.  -C/w AVAPS, settings per Pulmonary team, plan for REPEAT ABG.   -Continue to trend pCO2  -Continue nebulizer and steroids as below.   -MICU consulted overnight, not candidate for MICU level care.   -Low threshold for escalation to MICU if decompensates.  - SOB/desaturates off bipap will change to HFNC 30/30 and bipap when sleeping /napping     # COPD  -On home Oxygen 3L. On BIPAP in ED for hypercapnic resp failure.  -s/p 125mg solumedrol in the ED  -Continue Oxygen supplementation as needed.   -Continue BIPAP FOr now, repeat ABG. Pulmonary/RCU following.   -Currently on chronic prednisone; will c/w solumedrol bid   -Continue Spiriva and Pulmicort  -Continue duonebs prn for SOB and/or wheezing    # Chronic systolic heart failure   -Chronic systolic CHF (congestive heart failure) s/p AICD. Last TTE 2019 w/ Right/left systolic dysfunction, EF: 20- 25%. Patient appears euvolemic though mildly elevated pro BNP in ED. s/p Lasix though then became hypotensive and given 500cc LR. No pulm edema on CXR. EKG reviewed no signs of ischemia.   -c/w Coreg 25mg bid when bp tolerates.  -c/w Imdur 30mg qd when bp tolerates.  -c/w Hydralazine 75mg tid when bp tolerated.  -Will consider repeating TTE.    #CAD  -Continue ASA, Coreg    -DVT: Lovenox  -Diet: NPO for now  -Dispo: Pending clinical improvement. PT eval when stable

## 2021-06-09 NOTE — PROGRESS NOTE ADULT - ATTENDING COMMENTS
Agree with plan as outlined above. Patient seen and examined at bedside. Patient history, laboratory data, and imaging personally reviewed.    Pt is a 71F with PMHx HFrEF with AICD and COPD c/b chronic hypoxemic and hypercapnic respiratory failure (3L NC O2 ATC and nocturnal NIV) p/w acute worsening of hypercapnic respiratory failure likely 2/2 COPD exacerbation vs progression of disease.    Pt clinically improved today, remains at neurologic baseline. Pt's acute hypercapnia resolved. Now on AVAPS prn and qhs. Pt feels better on nasal pillow NIV interface. Discussed with RT, will trial [id number] as that may benefit pt. Will continue to trend daily VBGs. c/w IV Solumedrol 40mg QD with DuoNebs ATC and Pulmicort BID. Azithromycin x3 day course followed by initiation of ppx for anti-inflammatory properties. Last PFTs performed 12/2019 showed FEV1/FVC 0.27L (13%)/0.96L (36%) with DLCO 40%, declined from 2018. Last sleep study in 2018 with AHI ~4 and no central apneas. Pt a/w known CHFrEF (EF 20-25%, [employer] CRT-D, last interrogated 8/20) with episode of VF s/p shock on 6/1/21. Restarted home carvedilol,  tolerating well. Hydralazine started today, will add isosorbide if hemodynamics tolerate. Pt clinically euvolemic. Off diuretics. Will c/w home ASA and statin.     Dispo pending medical optimization. Pt full code. Discussion with pt at bedside again today, pt affirms full code status and would like a short trial of intubation if clinically worsens.

## 2021-06-09 NOTE — PROGRESS NOTE ADULT - SUBJECTIVE AND OBJECTIVE BOX
CHIEF COMPLAINT: Patient is a 71y old  Female who presents with a chief complaint of Dyspnea (08 Jun 2021 06:33)      Interval Events: Pt seen and evaluated Used bipap overnight with encouragement     REVIEW OF SYSTEMS:  Constitutional:   Eyes:  ENT:  CV:  Resp:  GI:  :  MSK:  Integumentary:  Neurological:  Psychiatric:  Endocrine:  Hematologic/Lymphatic:  Allergic/Immunologic:  [ ] All other systems negative      OBJECTIVE:  ICU Vital Signs Last 24 Hrs  T(C): 36.3 (09 Jun 2021 05:35), Max: 36.4 (08 Jun 2021 21:32)  T(F): 97.3 (09 Jun 2021 05:35), Max: 97.5 (08 Jun 2021 21:32)  HR: 83 (09 Jun 2021 05:35) (79 - 98)  BP: 146/79 (09 Jun 2021 05:35) (102/64 - 146/79)  BP(mean): --  ABP: --  ABP(mean): --  RR: 20 (09 Jun 2021 06:10) (20 - 24)  SpO2: 94% (09 Jun 2021 06:10) (94% - 100%)        CAPILLARY BLOOD GLUCOSE      HOSPITAL MEDICATIONS:  MEDICATIONS  (STANDING):  albuterol/ipratropium for Nebulization 3 milliLiter(s) Nebulizer every 6 hours  aspirin enteric coated 81 milliGRAM(s) Oral daily  budesonide 160 MICROgram(s)/formoterol 4.5 MICROgram(s) Inhaler 2 Puff(s) Inhalation two times a day  carvedilol 25 milliGRAM(s) Oral every 12 hours  enoxaparin Injectable 30 milliGRAM(s) SubCutaneous daily  fluticasone propionate 50 MICROgram(s)/spray Nasal Spray 1 Spray(s) Both Nostrils two times a day  methylPREDNISolone sodium succinate Injectable 40 milliGRAM(s) IV Push two times a day  simvastatin 20 milliGRAM(s) Oral at bedtime  sodium phosphate IVPB 30 milliMole(s) IV Intermittent once    MEDICATIONS  (PRN):  acetaminophen   Tablet .. 650 milliGRAM(s) Oral every 6 hours PRN Mild Pain (1 - 3), Moderate Pain (4 - 6)      LABS:                        9.8    4.86  )-----------( 176      ( 09 Jun 2021 05:54 )             30.9     06-09    144  |  97<L>  |  45<H>  ----------------------------<  266<H>  4.5   |  37<H>  |  0.70    Ca    9.0      09 Jun 2021 05:54  Phos  1.0     06-09  Mg     2.3     06-09          Arterial Blood Gas:  06-08 @ 05:29  7.38/65/138/35/98.7/12.0  ABG lactate: --  Arterial Blood Gas:  06-07 @ 13:52  7.29/84/76/34/95.0/12.1  ABG lactate: --    Venous Blood Gas:  06-07 @ 10:58  7.20/106/29/32/48.2  VBG Lactate: --      MICROBIOLOGY:     RADIOLOGY:  [ ] Reviewed and interpreted by me    PULMONARY FUNCTION TESTS:    EKG: CHIEF COMPLAINT: Patient is a 71y old  Female who presents with a chief complaint of Dyspnea (08 Jun 2021 06:33)      Interval Events: Pt seen and evaluated Used bipap overnight with encouragement     REVIEW OF SYSTEMS:  Constitutional: Denies pain/fevers   CV: Denies   Resp: Breathing somewhat improved , still sob off HFNC  GI: Denies   MSK: Weak   [x ] All other systems negative      OBJECTIVE:  ICU Vital Signs Last 24 Hrs  T(C): 36.3 (09 Jun 2021 05:35), Max: 36.4 (08 Jun 2021 21:32)  T(F): 97.3 (09 Jun 2021 05:35), Max: 97.5 (08 Jun 2021 21:32)  HR: 83 (09 Jun 2021 05:35) (79 - 98)  BP: 146/79 (09 Jun 2021 05:35) (102/64 - 146/79)  BP(mean): --  ABP: --  ABP(mean): --  RR: 20 (09 Jun 2021 06:10) (20 - 24)  SpO2: 94% (09 Jun 2021 06:10) (94% - 100%)        CAPILLARY BLOOD GLUCOSE      HOSPITAL MEDICATIONS:  MEDICATIONS  (STANDING):  albuterol/ipratropium for Nebulization 3 milliLiter(s) Nebulizer every 6 hours  aspirin enteric coated 81 milliGRAM(s) Oral daily  budesonide 160 MICROgram(s)/formoterol 4.5 MICROgram(s) Inhaler 2 Puff(s) Inhalation two times a day  carvedilol 25 milliGRAM(s) Oral every 12 hours  enoxaparin Injectable 30 milliGRAM(s) SubCutaneous daily  fluticasone propionate 50 MICROgram(s)/spray Nasal Spray 1 Spray(s) Both Nostrils two times a day  methylPREDNISolone sodium succinate Injectable 40 milliGRAM(s) IV Push two times a day  simvastatin 20 milliGRAM(s) Oral at bedtime  sodium phosphate IVPB 30 milliMole(s) IV Intermittent once    MEDICATIONS  (PRN):  acetaminophen   Tablet .. 650 milliGRAM(s) Oral every 6 hours PRN Mild Pain (1 - 3), Moderate Pain (4 - 6)      LABS:                        9.8    4.86  )-----------( 176      ( 09 Jun 2021 05:54 )             30.9     06-09    144  |  97<L>  |  45<H>  ----------------------------<  266<H>  4.5   |  37<H>  |  0.70    Ca    9.0      09 Jun 2021 05:54  Phos  1.0     06-09  Mg     2.3     06-09          Arterial Blood Gas:  06-08 @ 05:29  7.38/65/138/35/98.7/12.0  ABG lactate: --  Arterial Blood Gas:  06-07 @ 13:52  7.29/84/76/34/95.0/12.1  ABG lactate: --    Venous Blood Gas:  06-07 @ 10:58  7.20/106/29/32/48.2  VBG Lactate: --      MICROBIOLOGY:     RADIOLOGY:  [ ] Reviewed and interpreted by me    PULMONARY FUNCTION TESTS:    EKG:

## 2021-06-09 NOTE — PROGRESS NOTE ADULT - ASSESSMENT
70 yo F with PMH of HTN, HFrEF s/p ICD, CAD, COPD on 3L O2 and nocturnal BiPAP, pHTN, p/w SOB x 1 day found to have acute on chronic hypercarbic respiratory failure in setting of advanced COPD.    # Acute hypercapnic respiratory failure   -Acute on chronic hypercapnia, tolerating bipap and mentating well.  -C/w AVAPS, settings per Pulmonary team, plan for REPEAT ABG.   -Continue to trend pCO2  -Continue nebulizer and steroids as below.   - SOB/desaturates off bipap will change to HFNC 30/30 and bipap when sleeping /napping     # COPD  -On home Oxygen 3L. On BIPAP in ED for hypercapnic resp failure.  -s/p 125mg solumedrol in the ED  -Continue Oxygen supplementation as needed.   -Continue BIPAP FOr now, repeat ABG. Pulmonary/RCU following.   -Currently on chronic prednisone; will c/w solumedrol bid   -Continue Spiriva and Pulmicort  -Continue duonebs prn for SOB and/or wheezing    # Chronic systolic heart failure   -Chronic systolic CHF (congestive heart failure) s/p AICD. Last TTE 2019 w/ Right/left systolic dysfunction, EF: 20- 25%. Patient appears euvolemic though mildly elevated pro BNP in ED. s/p Lasix though then became hypotensive and given 500cc LR. No pulm edema on CXR. EKG reviewed no signs of ischemia.   -c/w Coreg 25mg bid when bp tolerates.  -c/w Imdur 30mg qd when bp tolerates.  -c/w Hydralazine 75mg tid when bp tolerated.  -Will consider repeating TTE.    #CAD  -Continue ASA, Coreg    -DVT: Lovenox  -Diet: NPO for now  -Dispo: Pending clinical improvement. PT eval when stable      70 yo F with PMH of HTN, HFrEF s/p ICD, CAD, COPD on 3L O2 and nocturnal BiPAP, pHTN, p/w SOB x 1 day found to have acute on chronic hypercarbic respiratory failure in setting of advanced COPD.    # Acute hypercapnic respiratory failure   -Acute on chronic hypercapnia, tolerating bipap and mentating well.  -C/w AVAPS, settings per Pulmonary team, plan for REPEAT ABG.   -Continue to trend pCO2  -Continue nebulizer and steroids as below.   - SOB/desaturates off bipap will change to HFNC 30/30 and bipap when sleeping /napping   - Respiratory called re: trial of Life 2000 for consideration at home   -    # COPD  -On home Oxygen 3L. On BIPAP in ED for hypercapnic resp failure.  -s/p 125mg solumedrol in the ED  -Continue Oxygen supplementation as needed.   -Continue BIPAP FOr now, repeat ABG. Pulmonary/RCU following.   -Currently on chronic prednisone; will c/w solumedrol bid   -Continue Spiriva and Pulmicort  -Continue duonebs prn for SOB and/or wheezing  - Added zithromax x 3 days   -solumedrol decreaed to daily     # Chronic systolic heart failure   -Chronic systolic CHF (congestive heart failure) s/p AICD. Last TTE 2019 w/ Right/left systolic dysfunction, EF: 20- 25%. Patient appears euvolemic though mildly elevated pro BNP in ED. s/p Lasix though then became hypotensive and given 500cc LR. No pulm edema on CXR. EKG reviewed no signs of ischemia.   - COreg restarted on lower dose 25mg bid   - Restarted lower dose hydralizine will add imdur if pt tolerates   -Will consider repeating TTE.    #CAD  -Continue ASA, Coreg    -DVT: Lovenox  -Diet: Resumed   -Dispo: Pending clinical improvement. PT eval when stable

## 2021-06-10 NOTE — PROGRESS NOTE ADULT - ASSESSMENT
70 yo F with PMH of HTN, HFrEF s/p ICD, CAD, COPD on 3L O2 and nocturnal BiPAP, pHTN, p/w SOB x 1 day found to have acute on chronic hypercarbic respiratory failure in setting of advanced COPD.    # Acute hypercapnic respiratory failure   -Acute on chronic hypercapnia, tolerating bipap and mentating well.  -C/w AVAPS, settings per Pulmonary team, plan for REPEAT ABG.   -Continue to trend pCO2  -Continue nebulizer and steroids as below.   - SOB/desaturates off bipap will change to HFNC 30/30 and bipap when sleeping /napping   - Respiratory called re: trial of Life 2000 for consideration at home   -    # COPD  -On home Oxygen 3L. On BIPAP in ED for hypercapnic resp failure.  -s/p 125mg solumedrol in the ED  -Continue Oxygen supplementation as needed.   -Continue BIPAP FOr now, repeat ABG. Pulmonary/RCU following.   -Currently on chronic prednisone; will c/w solumedrol bid   -Continue Spiriva and Pulmicort  -Continue duonebs prn for SOB and/or wheezing  - Added zithromax x 3 days   -solumedrol decreaed to daily     # Chronic systolic heart failure   -Chronic systolic CHF (congestive heart failure) s/p AICD. Last TTE 2019 w/ Right/left systolic dysfunction, EF: 20- 25%. Patient appears euvolemic though mildly elevated pro BNP in ED. s/p Lasix though then became hypotensive and given 500cc LR. No pulm edema on CXR. EKG reviewed no signs of ischemia.   - COreg restarted on lower dose 25mg bid   - Restarted lower dose hydralizine will add imdur if pt tolerates   -Will consider repeating TTE.    #CAD  -Continue ASA, Coreg    -DVT: Lovenox  -Diet: Resumed   -Dispo: Pending clinical improvement. PT eval when stable      72 YO F with PMH of HTN, HFrEF 20-25 (4/2019) s/p ICD, CAD, COPD on 3L O2 and nocturnal BiPAP and pHTN, p/w SOB x 1 day found to have acute on chronic hypercarbic respiratory failure in setting of advanced COPD vs HF? Admitted to RCU for further management.     # AHHRF second to COPD vs HF Exacerbation   - CXR and RVP/ COVID PCR negative on admission   - Continue on Solumedrol 40mg BID (6/7 - 6/9) then QD (6/10 - )   - Continue on Symbicort BID and Duonebs Q6H   - Continue on Zithromax (6/9 - )   - Hold Spiriva while on standing Duonebs  - Continue on NC QD and BIPAP QHS/ PRN   - Wean steroids as tolerated.     # HFrEF 20-25 s/p ACID  - ECHO 4/2019 with EF 20-25% and severe global LVSD. Mild LAD, mildpHTN and moderatre tricuspid regurgitation.   - c/w Coreg 25 BID and Hydral 25 BID. Hold Imdur 30mg QD for now.   - c/w ASA     # DVT PPX with Lovenox   # DISPO - PT evaluation when able.        72 YO F with PMH of HTN, HFrEF 20-25 (4/2019) s/p ICD, CAD, COPD on 3L O2 and nocturnal BiPAP and pHTN, p/w SOB x 1 day found to have acute on chronic hypercarbic respiratory failure in setting of advanced COPD vs HF? Admitted to RCU for further management.     # AHHRF second to COPD vs HF Exacerbation   - CXR and RVP/ COVID PCR negative on admission   - Continue on Solumedrol 40mg BID (6/7 - 6/9) then QD (6/10 - )   - Continue on Symbicort BID and Duonebs Q6H   - Continue on Zithromax (6/9 - )   - Hold Spiriva while on standing Duonebs  - Continue on NC QD and BIPAP QHS/ PRN   - Will trial Life 2000 positive pressure system today  - Wean steroids as tolerated.     # HFrEF 20-25 s/p ACID  - ECHO 4/2019 with EF 20-25% and severe global LVSD. Mild LAD, mildpHTN and moderatre tricuspid regurgitation.   - c/w Coreg 25 BID and Hydral 25 BID.   - Imdur restarted 30mg QD for now.   - c/w ASA     # DVT PPX with Lovenox   # DISPO - PT evaluation when able.        72 YO F with PMH of HTN, HFrEF 20-25 (4/2019) s/p ICD, CAD, COPD on 3L O2 and nocturnal BiPAP and pHTN, p/w SOB x 1 day found to have acute on chronic hypercarbic respiratory failure in setting of advanced COPD vs HF? Admitted to RCU for further management.     # AHHRF second to COPD vs HF Exacerbation   - CXR and RVP/ COVID PCR negative on admission   - Continue on Solumedrol 40mg BID (6/7 - 6/9) then QD (6/10 - )   - Continue on Zithromax (6/9 - )   - Continue on Symbicort BID and Duonebs Q6H   - Hold Spiriva while on standing Duonebs  - Continue on NC QD and BIPAP QHS/ PRN   - Will trial Life 2000 positive pressure system tonight    # HFrEF 20-25 s/p ACID  - ECHO 4/2019 with EF 20-25% and severe global LVSD. Mild LAD, mild pHTN and moderate tricuspid regurgitation.   - c/w ASA 81mg, Coreg 25 BID and Hydral 25 BID.   - Imdur 30mg QD restarted and will monitor BP     # DVT PPX with Lovenox   # DISPO - PT evaluation when able.

## 2021-06-10 NOTE — PROGRESS NOTE ADULT - SUBJECTIVE AND OBJECTIVE BOX
CHIEF COMPLAINT: Patient is a 71y old  Female who presents with a chief complaint of Dyspnea (09 Jun 2021 06:52)    INTERVAL EVENTS:     ROS: Seen by bedside during AM rounds     OBJECTIVE:  ICU Vital Signs Last 24 Hrs  T(C): 36.7 (10 Cecilio 2021 05:56), Max: 36.8 (09 Jun 2021 13:28)  T(F): 98.1 (10 Cecilio 2021 05:56), Max: 98.3 (09 Jun 2021 13:28)  HR: 78 (10 Cecilio 2021 06:42) (76 - 108)  BP: 150/78 (10 Cecilio 2021 05:56) (130/77 - 158/90)  BP(mean): --  ABP: --  ABP(mean): --  RR: 20 (10 Cecilio 2021 05:56) (20 - 24)  SpO2: 98% (10 Cecilio 2021 06:42) (89% - 100%)        06-09 @ 07:01  -  06-10 @ 07:00  --------------------------------------------------------  IN: 200 mL / OUT: 0 mL / NET: 200 mL      CAPILLARY BLOOD GLUCOSE        PHYSICAL EXAM:      HOSPITAL MEDICATIONS:  MEDICATIONS  (STANDING):  albuterol/ipratropium for Nebulization 3 milliLiter(s) Nebulizer every 6 hours  aspirin enteric coated 81 milliGRAM(s) Oral daily  azithromycin   Tablet 500 milliGRAM(s) Oral daily  budesonide 160 MICROgram(s)/formoterol 4.5 MICROgram(s) Inhaler 2 Puff(s) Inhalation two times a day  carvedilol 25 milliGRAM(s) Oral every 12 hours  chlorhexidine 4% Liquid 1 Application(s) Topical daily  enoxaparin Injectable 30 milliGRAM(s) SubCutaneous daily  fluticasone propionate 50 MICROgram(s)/spray Nasal Spray 1 Spray(s) Both Nostrils two times a day  hydrALAZINE 25 milliGRAM(s) Oral every 8 hours  methylPREDNISolone sodium succinate Injectable 40 milliGRAM(s) IV Push daily  simvastatin 20 milliGRAM(s) Oral at bedtime    MEDICATIONS  (PRN):  acetaminophen   Tablet .. 650 milliGRAM(s) Oral every 6 hours PRN Mild Pain (1 - 3), Moderate Pain (4 - 6)      LABS:                        9.9    6.35  )-----------( 168      ( 10 Cecilio 2021 06:23 )             32.1     06-10    148<H>  |  101  |  33<H>  ----------------------------<  98  4.3   |  41<H>  |  0.63    Ca    9.2      10 Cecilio 2021 06:23  Phos  1.4     06-10  Mg     2.2     06-10             CHIEF COMPLAINT: Patient is a 71y old  Female who presents with a chief complaint of Dyspnea (09 Jun 2021 06:52)    INTERVAL EVENTS:     ROS: Seen by bedside during AM rounds     OBJECTIVE:  ICU Vital Signs Last 24 Hrs  T(C): 36.7 (10 Cecilio 2021 05:56), Max: 36.8 (09 Jun 2021 13:28)  T(F): 98.1 (10 Cecilio 2021 05:56), Max: 98.3 (09 Jun 2021 13:28)  HR: 78 (10 Cecilio 2021 06:42) (76 - 108)  BP: 150/78 (10 Cecilio 2021 05:56) (130/77 - 158/90)  BP(mean): --  ABP: --  ABP(mean): --  RR: 20 (10 Cecilio 2021 05:56) (20 - 24)  SpO2: 98% (10 Cecilio 2021 06:42) (89% - 100%)        06-09 @ 07:01  -  06-10 @ 07:00  --------------------------------------------------------  IN: 200 mL / OUT: 0 mL / NET: 200 mL      CAPILLARY BLOOD GLUCOSE        PHYSICAL EXAM:  General: NAD   Cards: S1/S2, no murmurs   Pulm: CTA bilaterally. No wheezes.   Abdomen: Soft, NTND. BS (+)   Extremities: No pedal edema. ELIZABETH of BL upper and lower extremities.  Neurology: AOx3 with no focal neurological deficits     HOSPITAL MEDICATIONS:  MEDICATIONS  (STANDING):  albuterol/ipratropium for Nebulization 3 milliLiter(s) Nebulizer every 6 hours  aspirin enteric coated 81 milliGRAM(s) Oral daily  azithromycin   Tablet 500 milliGRAM(s) Oral daily  budesonide 160 MICROgram(s)/formoterol 4.5 MICROgram(s) Inhaler 2 Puff(s) Inhalation two times a day  carvedilol 25 milliGRAM(s) Oral every 12 hours  chlorhexidine 4% Liquid 1 Application(s) Topical daily  enoxaparin Injectable 30 milliGRAM(s) SubCutaneous daily  fluticasone propionate 50 MICROgram(s)/spray Nasal Spray 1 Spray(s) Both Nostrils two times a day  hydrALAZINE 25 milliGRAM(s) Oral every 8 hours  methylPREDNISolone sodium succinate Injectable 40 milliGRAM(s) IV Push daily  simvastatin 20 milliGRAM(s) Oral at bedtime    MEDICATIONS  (PRN):  acetaminophen   Tablet .. 650 milliGRAM(s) Oral every 6 hours PRN Mild Pain (1 - 3), Moderate Pain (4 - 6)      LABS:                        9.9    6.35  )-----------( 168      ( 10 Cecilio 2021 06:23 )             32.1     06-10    148<H>  |  101  |  33<H>  ----------------------------<  98  4.3   |  41<H>  |  0.63    Ca    9.2      10 Cecilio 2021 06:23  Phos  1.4     06-10  Mg     2.2     06-10             CHIEF COMPLAINT: Patient is a 71y old  Female who presents with a chief complaint of Dyspnea (09 Jun 2021 06:52)    INTERVAL EVENTS: No acute events overnight. This morning on HFNC. WIll trial Life 2000 AVAPs machine.    ROS: Seen by bedside during AM rounds     OBJECTIVE:  ICU Vital Signs Last 24 Hrs  T(C): 36.7 (10 Cecilio 2021 05:56), Max: 36.8 (09 Jun 2021 13:28)  T(F): 98.1 (10 Cecilio 2021 05:56), Max: 98.3 (09 Jun 2021 13:28)  HR: 78 (10 Cecilio 2021 06:42) (76 - 108)  BP: 150/78 (10 Cecilio 2021 05:56) (130/77 - 158/90)  BP(mean): --  ABP: --  ABP(mean): --  RR: 20 (10 Cecilio 2021 05:56) (20 - 24)  SpO2: 98% (10 Cecilio 2021 06:42) (89% - 100%)        06-09 @ 07:01  -  06-10 @ 07:00  --------------------------------------------------------  IN: 200 mL / OUT: 0 mL / NET: 200 mL      CAPILLARY BLOOD GLUCOSE        PHYSICAL EXAM:  General: NAD   Cards: S1/S2, no murmurs   Pulm: Unlabored respiratory effort. No tachypnea. Speaking full sentences. On HFNC, satting well.  Abdomen: Soft, NTND. BS (+)   Extremities: No pedal edema. ELIZABETH of BL upper and lower extremities.  Neurology: AOx3 with no focal neurological deficits     HOSPITAL MEDICATIONS:  MEDICATIONS  (STANDING):  albuterol/ipratropium for Nebulization 3 milliLiter(s) Nebulizer every 6 hours  aspirin enteric coated 81 milliGRAM(s) Oral daily  azithromycin   Tablet 500 milliGRAM(s) Oral daily  budesonide 160 MICROgram(s)/formoterol 4.5 MICROgram(s) Inhaler 2 Puff(s) Inhalation two times a day  carvedilol 25 milliGRAM(s) Oral every 12 hours  chlorhexidine 4% Liquid 1 Application(s) Topical daily  enoxaparin Injectable 30 milliGRAM(s) SubCutaneous daily  fluticasone propionate 50 MICROgram(s)/spray Nasal Spray 1 Spray(s) Both Nostrils two times a day  hydrALAZINE 25 milliGRAM(s) Oral every 8 hours  methylPREDNISolone sodium succinate Injectable 40 milliGRAM(s) IV Push daily  simvastatin 20 milliGRAM(s) Oral at bedtime    MEDICATIONS  (PRN):  acetaminophen   Tablet .. 650 milliGRAM(s) Oral every 6 hours PRN Mild Pain (1 - 3), Moderate Pain (4 - 6)      LABS:                        9.9    6.35  )-----------( 168      ( 10 Cecilio 2021 06:23 )             32.1     06-10    148<H>  |  101  |  33<H>  ----------------------------<  98  4.3   |  41<H>  |  0.63    Ca    9.2      10 Cecilio 2021 06:23  Phos  1.4     06-10  Mg     2.2     06-10             CHIEF COMPLAINT: Patient is a 71y old  Female who presents with a chief complaint of Dyspnea (09 Jun 2021 06:52)    INTERVAL EVENTS: No acute events overnight. This morning on HFNC. WIll trial Life 2000 AVAPs machine.    ROS: Seen by bedside during AM rounds and denied SOB with HFNC.     OBJECTIVE:  ICU Vital Signs Last 24 Hrs  T(C): 36.7 (10 Cecilio 2021 05:56), Max: 36.8 (09 Jun 2021 13:28)  T(F): 98.1 (10 Cecilio 2021 05:56), Max: 98.3 (09 Jun 2021 13:28)  HR: 78 (10 Cecilio 2021 06:42) (76 - 108)  BP: 150/78 (10 Cecilio 2021 05:56) (130/77 - 158/90)  BP(mean): --  ABP: --  ABP(mean): --  RR: 20 (10 Cecilio 2021 05:56) (20 - 24)  SpO2: 98% (10 Cecilio 2021 06:42) (89% - 100%)    06-09 @ 07:01  -  06-10 @ 07:00  --------------------------------------------------------  IN: 200 mL / OUT: 0 mL / NET: 200 mL    CAPILLARY BLOOD GLUCOSE    PHYSICAL EXAM:  General: NAD   Cards: S1/S2, no murmurs   Pulm: Unlabored respiratory effort. No tachypnea. Speaking full sentences. On HFNC, satting well.  Abdomen: Soft, NTND. BS (+)   Extremities: No pedal edema. ELIZABETH of BL upper and lower extremities.  Neurology: AOx3 with no focal neurological deficits     HOSPITAL MEDICATIONS:  MEDICATIONS  (STANDING):  albuterol/ipratropium for Nebulization 3 milliLiter(s) Nebulizer every 6 hours  aspirin enteric coated 81 milliGRAM(s) Oral daily  azithromycin   Tablet 500 milliGRAM(s) Oral daily  budesonide 160 MICROgram(s)/formoterol 4.5 MICROgram(s) Inhaler 2 Puff(s) Inhalation two times a day  carvedilol 25 milliGRAM(s) Oral every 12 hours  chlorhexidine 4% Liquid 1 Application(s) Topical daily  enoxaparin Injectable 30 milliGRAM(s) SubCutaneous daily  fluticasone propionate 50 MICROgram(s)/spray Nasal Spray 1 Spray(s) Both Nostrils two times a day  hydrALAZINE 25 milliGRAM(s) Oral every 8 hours  methylPREDNISolone sodium succinate Injectable 40 milliGRAM(s) IV Push daily  simvastatin 20 milliGRAM(s) Oral at bedtime    MEDICATIONS  (PRN):  acetaminophen   Tablet .. 650 milliGRAM(s) Oral every 6 hours PRN Mild Pain (1 - 3), Moderate Pain (4 - 6)    LABS:                        9.9    6.35  )-----------( 168      ( 10 Cecilio 2021 06:23 )             32.1     06-10    148<H>  |  101  |  33<H>  ----------------------------<  98  4.3   |  41<H>  |  0.63    Ca    9.2      10 Cecilio 2021 06:23  Phos  1.4     06-10  Mg     2.2     06-10

## 2021-06-11 NOTE — PROGRESS NOTE ADULT - SUBJECTIVE AND OBJECTIVE BOX
CHIEF COMPLAINT:    Interval Events:    REVIEW OF SYSTEMS:  Constitutional:   Eyes:  ENT:  CV:  Resp:  GI:  :  MSK:  Integumentary:  Neurological:  Psychiatric:  Endocrine:  Hematologic/Lymphatic:  Allergic/Immunologic:  [ ] All other systems negative  [ ] Unable to assess ROS because ________    OBJECTIVE:  ICU Vital Signs Last 24 Hrs  T(C): 37.1 (11 Jun 2021 06:12), Max: 37.1 (11 Jun 2021 06:12)  T(F): 98.8 (11 Jun 2021 06:12), Max: 98.8 (11 Jun 2021 06:12)  HR: 73 (11 Jun 2021 06:12) (71 - 98)  BP: 148/88 (11 Jun 2021 06:12) (108/67 - 165/96)  BP(mean): --  ABP: --  ABP(mean): --  RR: 16 (11 Jun 2021 06:16) (16 - 26)  SpO2: 99% (11 Jun 2021 06:16) (97% - 100%)        06-09 @ 07:01 - 06-10 @ 07:00  --------------------------------------------------------  IN: 200 mL / OUT: 0 mL / NET: 200 mL    06-10 @ 07:01  - 06-11 @ 06:57  --------------------------------------------------------  IN: 100 mL / OUT: 0 mL / NET: 100 mL      CAPILLARY BLOOD GLUCOSE          PHYSICAL EXAM:  General:   HEENT:   Lymph Nodes:  Neck:   Respiratory:   Cardiovascular:   Abdomen:   Extremities:   Skin:   Neurological:  Psychiatry:    HOSPITAL MEDICATIONS:  MEDICATIONS  (STANDING):  albuterol/ipratropium for Nebulization 3 milliLiter(s) Nebulizer every 6 hours  aspirin enteric coated 81 milliGRAM(s) Oral daily  azithromycin   Tablet 500 milliGRAM(s) Oral daily  budesonide 160 MICROgram(s)/formoterol 4.5 MICROgram(s) Inhaler 2 Puff(s) Inhalation two times a day  carvedilol 25 milliGRAM(s) Oral every 12 hours  chlorhexidine 4% Liquid 1 Application(s) Topical daily  enoxaparin Injectable 30 milliGRAM(s) SubCutaneous daily  fluticasone propionate 50 MICROgram(s)/spray Nasal Spray 1 Spray(s) Both Nostrils two times a day  hydrALAZINE 25 milliGRAM(s) Oral every 8 hours  isosorbide   mononitrate ER Tablet (IMDUR) 30 milliGRAM(s) Oral daily  predniSONE   Tablet 40 milliGRAM(s) Oral daily  simvastatin 20 milliGRAM(s) Oral at bedtime    MEDICATIONS  (PRN):  acetaminophen   Tablet .. 650 milliGRAM(s) Oral every 6 hours PRN Mild Pain (1 - 3), Moderate Pain (4 - 6)      LABS:                        9.9    6.35  )-----------( 168      ( 10 Cecilio 2021 06:23 )             32.1     06-10    148<H>  |  101  |  33<H>  ----------------------------<  98  4.3   |  41<H>  |  0.63    Ca    9.2      10 Cecilio 2021 06:23  Phos  1.4     06-10  Mg     2.2     06-10                MICROBIOLOGY:     RADIOLOGY:  [ ] Reviewed and interpreted by me    PULMONARY FUNCTION TESTS:    EKG: CHIEF COMPLAINT: denies complaints    Interval Events: no events overnight    REVIEW OF SYSTEMS:  Constitutional:   Eyes:  ENT:  CV: denies chest pain  Resp: denies shortness of breath  GI: denies abd pain  :  MSK:  Integumentary:  Neurological:  Psychiatric:  Endocrine:  Hematologic/Lymphatic:  Allergic/Immunologic:  [x ] All other systems negative  [ ] Unable to assess ROS because ________    OBJECTIVE:  ICU Vital Signs Last 24 Hrs  T(C): 37.1 (11 Jun 2021 06:12), Max: 37.1 (11 Jun 2021 06:12)  T(F): 98.8 (11 Jun 2021 06:12), Max: 98.8 (11 Jun 2021 06:12)  HR: 73 (11 Jun 2021 06:12) (71 - 98)  BP: 148/88 (11 Jun 2021 06:12) (108/67 - 165/96)  BP(mean): --  ABP: --  ABP(mean): --  RR: 16 (11 Jun 2021 06:16) (16 - 26)  SpO2: 99% (11 Jun 2021 06:16) (97% - 100%)        06-09 @ 07:01  -  06-10 @ 07:00  --------------------------------------------------------  IN: 200 mL / OUT: 0 mL / NET: 200 mL    06-10 @ 07:01 - 06-11 @ 06:57  --------------------------------------------------------  IN: 100 mL / OUT: 0 mL / NET: 100 mL      HOSPITAL MEDICATIONS:  MEDICATIONS  (STANDING):  albuterol/ipratropium for Nebulization 3 milliLiter(s) Nebulizer every 6 hours  aspirin enteric coated 81 milliGRAM(s) Oral daily  azithromycin   Tablet 500 milliGRAM(s) Oral daily  budesonide 160 MICROgram(s)/formoterol 4.5 MICROgram(s) Inhaler 2 Puff(s) Inhalation two times a day  carvedilol 25 milliGRAM(s) Oral every 12 hours  chlorhexidine 4% Liquid 1 Application(s) Topical daily  enoxaparin Injectable 30 milliGRAM(s) SubCutaneous daily  fluticasone propionate 50 MICROgram(s)/spray Nasal Spray 1 Spray(s) Both Nostrils two times a day  hydrALAZINE 25 milliGRAM(s) Oral every 8 hours  isosorbide   mononitrate ER Tablet (IMDUR) 30 milliGRAM(s) Oral daily  predniSONE   Tablet 40 milliGRAM(s) Oral daily  simvastatin 20 milliGRAM(s) Oral at bedtime    MEDICATIONS  (PRN):  acetaminophen   Tablet .. 650 milliGRAM(s) Oral every 6 hours PRN Mild Pain (1 - 3), Moderate Pain (4 - 6)      LABS:                        9.9    6.35  )-----------( 168      ( 10 Cecilio 2021 06:23 )             32.1     06-10    148<H>  |  101  |  33<H>  ----------------------------<  98  4.3   |  41<H>  |  0.63    Ca    9.2      10 Cecilio 2021 06:23  Phos  1.4     06-10  Mg     2.2     06-10                MICROBIOLOGY:     RADIOLOGY:  [ ] Reviewed and interpreted by me    PULMONARY FUNCTION TESTS:    EKG:

## 2021-06-11 NOTE — PROGRESS NOTE ADULT - ASSESSMENT
70 YO F with PMH of HTN, HFrEF 20-25 (4/2019) s/p ICD, CAD, COPD on 3L O2 and nocturnal BiPAP and pHTN, p/w SOB x 1 day found to have acute on chronic hypercarbic respiratory failure in setting of advanced COPD vs HF? Admitted to RCU for further management.     # AHHRF second to COPD vs HF Exacerbation   - CXR and RVP/ COVID PCR negative on admission   - Continue on Solumedrol 40mg BID (6/7 - 6/9) then QD (6/10 - )   - Continue on Zithromax (6/9 - )   - Continue on Symbicort BID and Duonebs Q6H   - Hold Spiriva while on standing Duonebs  - Continue on NC QD and BIPAP QHS/ PRN   - Will trial Life 2000 positive pressure system tonight    # HFrEF 20-25 s/p ACID  - ECHO 4/2019 with EF 20-25% and severe global LVSD. Mild LAD, mild pHTN and moderate tricuspid regurgitation.   - c/w ASA 81mg, Coreg 25 BID and Hydral 25 BID.   - Imdur 30mg QD restarted and will monitor BP     # DVT PPX with Lovenox   # DISPO - PT evaluation when able.        72 YO F with PMH of HTN, HFrEF 20-25 (4/2019) s/p ICD, CAD, COPD on 3L O2 and nocturnal BiPAP and pHTN, p/w SOB x 1 day found to have acute on chronic hypercarbic respiratory failure in setting of advanced COPD vs HF? Admitted to RCU for further management.     # AHHRF second to COPD vs HF Exacerbation   - CXR and RVP/ COVID PCR negative on admission   - Continue on Solumedrol 40mg BID (6/7 - 6/9) then QD (6/10 - )   - Continue on Zithromax x 5d  - Continue on Symbicort BID and Duonebs Q6H   - Hold Spiriva while on standing Duonebs  - Continue on NC QD and BIPAP QHS/ PRN   - Will trial Life [employer] positive pressure system prior to discharge    # HFrEF 20-25 s/p ACID  - ECHO 4/2019 with EF 20-25% and severe global LVSD. Mild LAD, mild pHTN and moderate tricuspid regurgitation.   - c/w ASA 81mg, Coreg 25 BID and Hydral 25 BID.   - Hydralazine dose increased today for better BP control  - Imdur 30mg QD restarted and will monitor BP     # DVT PPX with Lovenox   # DISPO - PT evaluation when able.

## 2021-06-11 NOTE — PROGRESS NOTE ADULT - ATTENDING COMMENTS
Agree with plan as outlined above. Patient seen and examined at bedside. Patient history, laboratory data, and imaging personally reviewed.    Pt is a 71F with PMHx HFrEF with AICD and COPD c/b chronic hypoxemic and hypercapnic respiratory failure (3L NC O2 ATC and nocturnal NIV) p/w acute worsening of hypercapnic respiratory failure likely 2/2 COPD exacerbation vs progression of disease.    Pt clinically improved, remains at neurologic baseline with resolution of acute hypercapnia. Tolerating AVAPS prn and qhs. Pt feels better on nasal pillow NIV interface. Discussed with RT, trial of [id number] delayed for now as machine required BioMed repair. Will continue to trend daily VBGs. Transitioned to Prednisone 40mg QD with DuoNebs ATC and Pulmicort BID. Azithromycin x3 day course followed by initiation of ppx for anti-inflammatory properties. Pt clinically improved today, tolerating 3L NC at rest.  Last PFTs performed 12/2019 showed FEV1/FVC 0.27L (13%)/0.96L (36%) with DLCO 40%, declined from 2018. Last sleep study in 2018 with AHI ~4 and no central apneas. Pt a/w known CHFrEF (EF 20-25%, [employer] CRT-D, last interrogated 8/20) with episode of VF s/p shock on 6/1/21. Restarted home carvedilol, tolerating well. Restarted hydralazine and Imdur, hydralazine uptitrated today. Pt clinically euvolemic. Off diuretics. Will c/w home ASA and statin.     Dispo pending medical optimization. Pt full code, would like trial of intubation if clinically worsens.

## 2021-06-12 NOTE — PROGRESS NOTE ADULT - ASSESSMENT
70 YO F with PMH of HTN, HFrEF 20-25 (4/2019) s/p ICD, CAD, COPD on 3L O2 and nocturnal BiPAP and pHTN, p/w SOB x 1 day found to have acute on chronic hypercarbic respiratory failure in setting of advanced COPD vs HF? Admitted to [facility] for further management.     # AHHRF second to COPD vs HF Exacerbation   - CXR and RVP/ COVID PCR negative on admission   - Continue on Solumedrol 40mg BID (6/7 - 6/9) then QD (6/10 - )   - Continue on Zithromax x 5d- completed on 6/12- now on zithromax 3x/week ppx  - Continue on Symbicort BID and Duonebs Q6H   - Hold Spiriva while on standing Duonebs  - Continue on NC QD and BIPAP QHS/ PRN   - Will trial [employer] positive pressure system prior to discharge    # HFrEF 20-25 s/p ACID  - ECHO 4/2019 with EF 20-25% and severe global LVSD. Mild LAD, mild pHTN and moderate tricuspid regurgitation.   - c/w ASA 81mg, Coreg 25 BID and Hydral 25 BID.   - Hydralazine dose increased to 75mg TID (home dose) for better BP control  - Imdur 30mg QD restarted and will monitor BP     # DVT PPX with Lovenox   # DISPO - PT evaluation when able.        72 YO F with PMH of HTN, HFrEF 20-25 (4/2019) s/p ICD, CAD, COPD on 3L O2 and nocturnal BiPAP and pHTN, p/w SOB x 1 day found to have acute on chronic hypercarbic respiratory failure in setting of advanced COPD vs HF? Admitted to [facility] for further management.     # AHHRF second to COPD vs HF Exacerbation   - CXR and RVP/ COVID PCR negative on admission   - Continue on Solumedrol 40mg BID (6/7 - 6/9) then QD (6/10 - )   - Continue on Zithromax x 5d- completed on 6/12- now on zithromax 3x/week ppx  - Continue on Symbicort BID and Duonebs Q6H   - Hold Spiriva while on standing Duonebs  - Continue on NC QD and BIPAP QHS/ PRN   - Will trial Life [employer] positive pressure system prior to discharge  -Slow prednsione taper- dec by 10mg q5d    # HFrEF 20-25 s/p ACID  - ECHO 4/2019 with EF 20-25% and severe global LVSD. Mild LAD, mild pHTN and moderate tricuspid regurgitation.   - c/w ASA 81mg, Coreg 25 BID and Hydral 25 BID.   - Hydralazine dose increased to 75mg TID (home dose) for better BP control  - Imdur 30mg QD restarted and will monitor BP     # DVT PPX with Lovenox   # DISPO - PT evaluation when able.

## 2021-06-12 NOTE — PROGRESS NOTE ADULT - ATTENDING COMMENTS
Agree with plan as outlined above. Patient seen and examined at bedside. Patient history, laboratory data, and imaging personally reviewed.    Pt is a 71F with PMHx HFrEF with AICD and COPD c/b chronic hypoxemic and hypercapnic respiratory failure (3L NC O2 ATC and nocturnal NIV) p/w acute worsening of hypercapnic respiratory failure likely 2/2 COPD exacerbation vs progression of disease.    Pt clinically improved, remains at neurologic baseline with resolution of acute hypercapnia. Tolerating AVAPS prn and qhs. Pt feels better on nasal pillow NIV interface. Discussed with RT, trial of [id number] delayed for now as machine required BioMed repair. Will continue to trend daily VBGs. Transitioned to Prednisone 40mg QD with DuoNebs ATC and Symbicort BID. Azithromycin changed to three time weekly for anti-inflammatory properties. Pt clinically improved today, tolerating 3L NC at rest.  Last PFTs performed 12/2019 showed FEV1/FVC 0.27L (13%)/0.96L (36%) with DLCO 40%, declined from 2018. Last sleep study in 2018 with AHI ~4 and no central apneas. Pt a/w known CHFrEF (EF 20-25%, [employer] CRT-D, last interrogated 8/20) with episode of VF s/p shock on 6/1/21. May need to increase Coreg. Patient not on ACE inhibitor but Hydralazine and Imdur instead from home. Continue hydralazine and Imdur, hydralazine uptitrated today with goal of BP improvement. Pt clinically euvolemic. Off diuretics. Will c/w home ASA and statin.     Dispo pending medical optimization. Pt full code, would like trial of intubation if clinically worsens. Agree with plan as outlined above. Patient seen and examined at bedside. Patient history, laboratory data, and imaging personally reviewed.    Pt is a 71F with PMHx HFrEF with AICD and COPD c/b chronic hypoxemic and hypercapnic respiratory failure (3L NC O2 ATC and nocturnal NIV) p/w acute worsening of hypercapnic respiratory failure likely 2/2 COPD exacerbation vs progression of disease.    Pt clinically improved, remains at neurologic baseline with resolution of acute hypercapnia. Tolerating BIPAP prn and qhs. Pt feels better on nasal pillow NIV interface. Discussed with RT, trial of [id number] delayed for now as machine required BioMed repair. Will continue to trend daily VBGs. Transitioned to Prednisone 40mg QD with DuoNebs ATC and Symbicort BID. Azithromycin changed to three time weekly for anti-inflammatory properties. Pt clinically improved today, tolerating 3L NC at rest.  Last PFTs performed 12/2019 showed FEV1/FVC 0.27L (13%)/0.96L (36%) with DLCO 40%, declined from 2018. Last sleep study in 2018 with AHI ~4 and no central apneas. Pt a/w known CHFrEF (EF 20-25%, [employer] CRT-D, last interrogated 8/20) with episode of VF s/p shock on 6/1/21. May need to increase Coreg. Patient not on ACE inhibitor but Hydralazine and Imdur instead from home. Continue hydralazine and Imdur, hydralazine uptitrated today with goal of BP improvement. Pt clinically euvolemic. Off diuretics. Will c/w home ASA and statin.     Dispo pending medical optimization. Pt full code, would like trial of intubation if clinically worsens.

## 2021-06-12 NOTE — PROGRESS NOTE ADULT - SUBJECTIVE AND OBJECTIVE BOX
CHIEF COMPLAINT: denies complaints    Interval Events: no events overnight    REVIEW OF SYSTEMS:  Constitutional:   Eyes:  ENT:  CV: denies chest pain  Resp: reports breathing feels a little better today  GI: denies abd pain  :  MSK:  Integumentary:  Neurological:  Psychiatric:  Endocrine:  Hematologic/Lymphatic:  Allergic/Immunologic:  [x ] All other systems negative  [ ] Unable to assess ROS because ________    OBJECTIVE:  ICU Vital Signs Last 24 Hrs  T(C): 36.6 (12 Jun 2021 05:33), Max: 37.1 (11 Jun 2021 14:40)  T(F): 97.9 (12 Jun 2021 05:33), Max: 98.7 (11 Jun 2021 14:40)  HR: 80 (12 Jun 2021 10:01) (70 - 92)  BP: 160/76 (12 Jun 2021 05:33) (136/79 - 163/91)  BP(mean): --  ABP: --  ABP(mean): --  RR: 19 (12 Jun 2021 05:33) (18 - 26)  SpO2: 100% (12 Jun 2021 10:01) (96% - 100%)    HOSPITAL MEDICATIONS:  MEDICATIONS  (STANDING):  albuterol/ipratropium for Nebulization 3 milliLiter(s) Nebulizer every 6 hours  aspirin enteric coated 81 milliGRAM(s) Oral daily  azithromycin   Tablet 500 milliGRAM(s) Oral <User Schedule>  budesonide 160 MICROgram(s)/formoterol 4.5 MICROgram(s) Inhaler 2 Puff(s) Inhalation two times a day  carvedilol 25 milliGRAM(s) Oral every 12 hours  chlorhexidine 4% Liquid 1 Application(s) Topical daily  enoxaparin Injectable 30 milliGRAM(s) SubCutaneous daily  fluticasone propionate 50 MICROgram(s)/spray Nasal Spray 1 Spray(s) Both Nostrils two times a day  hydrALAZINE 75 milliGRAM(s) Oral every 8 hours  isosorbide   mononitrate ER Tablet (IMDUR) 30 milliGRAM(s) Oral daily  potassium phosphate / sodium phosphate Tablet (K-PHOS No. 2) 1 Tablet(s) Oral four times a day with meals  predniSONE   Tablet 40 milliGRAM(s) Oral daily  simvastatin 20 milliGRAM(s) Oral at bedtime    MEDICATIONS  (PRN):  acetaminophen   Tablet .. 650 milliGRAM(s) Oral every 6 hours PRN Mild Pain (1 - 3), Moderate Pain (4 - 6)      LABS:                        10.1   8.70  )-----------( 194      ( 12 Jun 2021 08:44 )             32.7     06-12    143  |  97<L>  |  19  ----------------------------<  111<H>  4.1   |  37<H>  |  0.54    Ca    9.5      12 Jun 2021 08:44  Phos  4.1     06-12  Mg     1.9     06-12    TPro  5.2<L>  /  Alb  3.3  /  TBili  0.3  /  DBili  x   /  AST  13  /  ALT  13  /  AlkPhos  54  06-11          Venous Blood Gas:  06-11 @ 08:14  7.42/69/59/41/93.3  VBG Lactate: 1.9      MICROBIOLOGY:     RADIOLOGY:  [ ] Reviewed and interpreted by me    PULMONARY FUNCTION TESTS:    EKG:

## 2021-06-13 NOTE — PROGRESS NOTE ADULT - ATTENDING COMMENTS
Agree with plan as outlined above. Patient seen and examined at bedside. Patient history, laboratory data, and imaging personally reviewed.    Pt is a 71F with PMHx HFrEF with AICD and COPD c/b chronic hypoxemic and hypercapnic respiratory failure (3L NC O2 ATC and nocturnal NIV) p/w acute worsening of hypercapnic respiratory failure likely 2/2 COPD exacerbation vs progression of disease.    Pt clinically improved, remains at neurologic baseline with resolution of acute hypercapnia. Tolerating BIPAP prn and qhs. Pt feels better on nasal pillow NIV interface. Will continue to trend daily VBGs. Transitioned to Prednisone 40mg QD with DuoNebs ATC and Symbicort BID. Azithromycin changed to three time weekly for anti-inflammatory properties. Pt clinically improved today, tolerating 3L NC at rest.  Last PFTs performed 12/2019 showed FEV1/FVC 0.27L (13%)/0.96L (36%) with DLCO 40%, declined from 2018. Last sleep study in 2018 with AHI ~4 and no central apneas. Pt a/w known CHFrEF (EF 20-25%, Medtronic CRT-D, last interrogated 8/20) with episode of VF s/p shock on 6/1/21. May need to increase Coreg. Patient not on ACE inhibitor but Hydralazine and Imdur instead from home. Pt clinically euvolemic. Off diuretics. Will c/w home ASA and statin.     Patient has been planned for trial of [id number] but the machine required BioMed repair. Will continue to monitor O2 saturations on nasal canula and on exertion. If continued improvement patient may not require [id number]. Patient needs outpatient followup with her pulmonologist Dr. Marisela Teran.    Dispo pending medical optimization. Pt full code, would like trial of intubation if clinically worsens.

## 2021-06-13 NOTE — PROGRESS NOTE ADULT - SUBJECTIVE AND OBJECTIVE BOX
CHIEF COMPLAINT: Patient is a 71y old Female who presents with a chief complaint of Dyspnea.    Interval Events:    REVIEW OF SYSTEMS:  [ ] All other systems negative  [ ] Unable to assess ROS because ________    OBJECTIVE:  ICU Vital Signs Last 24 Hrs  T(C): 36.3 (13 Jun 2021 06:07), Max: 36.7 (12 Jun 2021 14:10)  T(F): 97.3 (13 Jun 2021 06:07), Max: 98.1 (12 Jun 2021 21:44)  HR: 78 (13 Jun 2021 06:07) (77 - 107)  BP: 122/62 (13 Jun 2021 06:07) (122/62 - 153/74)  RR: 19 (13 Jun 2021 06:07) (19 - 20)  SpO2: 100% (13 Jun 2021 06:07) (98% - 100%)        CAPILLARY BLOOD GLUCOSE        HOSPITAL MEDICATIONS:  MEDICATIONS  (STANDING):  albuterol/ipratropium for Nebulization 3 milliLiter(s) Nebulizer every 6 hours  aspirin enteric coated 81 milliGRAM(s) Oral daily  azithromycin   Tablet 500 milliGRAM(s) Oral <User Schedule>  budesonide 160 MICROgram(s)/formoterol 4.5 MICROgram(s) Inhaler 2 Puff(s) Inhalation two times a day  carvedilol 25 milliGRAM(s) Oral every 12 hours  chlorhexidine 4% Liquid 1 Application(s) Topical daily  enoxaparin Injectable 30 milliGRAM(s) SubCutaneous daily  fluticasone propionate 50 MICROgram(s)/spray Nasal Spray 1 Spray(s) Both Nostrils two times a day  hydrALAZINE 75 milliGRAM(s) Oral every 8 hours  isosorbide   mononitrate ER Tablet (IMDUR) 30 milliGRAM(s) Oral daily  potassium phosphate / sodium phosphate Tablet (K-PHOS No. 2) 1 Tablet(s) Oral four times a day with meals  predniSONE   Tablet 40 milliGRAM(s) Oral daily  simvastatin 20 milliGRAM(s) Oral at bedtime    MEDICATIONS  (PRN):  acetaminophen   Tablet .. 650 milliGRAM(s) Oral every 6 hours PRN Mild Pain (1 - 3), Moderate Pain (4 - 6)      LABS:                        MICROBIOLOGY:     RADIOLOGY:  [ ] Reviewed and interpreted by me    PULMONARY FUNCTION TESTS:    EKG: CHIEF COMPLAINT: Patient is a 71y old Female who presents with a chief complaint of Dyspnea.    Interval Events: No interval events noted overnight.    REVIEW OF SYSTEMS:  Denies SOB, CP, abd pain, N/V  [x] All other systems negative      OBJECTIVE:  ICU Vital Signs Last 24 Hrs  T(C): 36.3 (13 Jun 2021 06:07), Max: 36.7 (12 Jun 2021 14:10)  T(F): 97.3 (13 Jun 2021 06:07), Max: 98.1 (12 Jun 2021 21:44)  HR: 78 (13 Jun 2021 06:07) (77 - 107)  BP: 122/62 (13 Jun 2021 06:07) (122/62 - 153/74)  RR: 19 (13 Jun 2021 06:07) (19 - 20)  SpO2: 100% (13 Jun 2021 06:07) (98% - 100%)      CAPILLARY BLOOD GLUCOSE      HOSPITAL MEDICATIONS:  MEDICATIONS  (STANDING):  albuterol/ipratropium for Nebulization 3 milliLiter(s) Nebulizer every 6 hours  aspirin enteric coated 81 milliGRAM(s) Oral daily  azithromycin   Tablet 500 milliGRAM(s) Oral <User Schedule>  budesonide 160 MICROgram(s)/formoterol 4.5 MICROgram(s) Inhaler 2 Puff(s) Inhalation two times a day  carvedilol 25 milliGRAM(s) Oral every 12 hours  chlorhexidine 4% Liquid 1 Application(s) Topical daily  enoxaparin Injectable 30 milliGRAM(s) SubCutaneous daily  fluticasone propionate 50 MICROgram(s)/spray Nasal Spray 1 Spray(s) Both Nostrils two times a day  hydrALAZINE 75 milliGRAM(s) Oral every 8 hours  isosorbide   mononitrate ER Tablet (IMDUR) 30 milliGRAM(s) Oral daily  potassium phosphate / sodium phosphate Tablet (K-PHOS No. 2) 1 Tablet(s) Oral four times a day with meals  predniSONE   Tablet 40 milliGRAM(s) Oral daily  simvastatin 20 milliGRAM(s) Oral at bedtime    MEDICATIONS  (PRN):  acetaminophen   Tablet .. 650 milliGRAM(s) Oral every 6 hours PRN Mild Pain (1 - 3), Moderate Pain (4 - 6)      LABS:                        MICROBIOLOGY:     RADIOLOGY:  [ ] Reviewed and interpreted by me    PULMONARY FUNCTION TESTS:    EKG:

## 2021-06-13 NOTE — PROGRESS NOTE ADULT - ASSESSMENT
70 YO F with PMH of HTN, HFrEF 20-25 (4/2019) s/p ICD, CAD, COPD on 3L O2 and nocturnal BiPAP and pHTN, p/w SOB x 1 day found to have acute on chronic hypercarbic respiratory failure in setting of advanced COPD vs HF? Admitted to [facility] for further management.     # AHHRF second to COPD vs HF Exacerbation   - CXR and RVP/ COVID PCR negative on admission   - Continue on Solumedrol 40mg BID (6/7 - 6/9) then QD (6/10 - )   - Continue on Zithromax x 5d- completed on 6/12- now on zithromax 3x/week ppx  - Continue on Symbicort BID and Duonebs Q6H   - Hold Spiriva while on standing Duonebs  - Continue on NC QD and BIPAP QHS/ PRN   - Will trial [employer] positive pressure system prior to discharge  -Slow prednsione taper- dec by 10mg q5d    # HFrEF 20-25 s/p ACID  - ECHO 4/2019 with EF 20-25% and severe global LVSD. Mild LAD, mild pHTN and moderate tricuspid regurgitation.   - c/w ASA 81mg, Coreg 25 BID and Hydral 25 BID.   - Hydralazine dose increased to 75mg TID (home dose) for better BP control  - Imdur 30mg QD restarted and will monitor BP     # DVT PPX with Lovenox   # DISPO - PT evaluation when able.        70 YO F with PMH of HTN, HFrEF 20-25 (4/2019) s/p ICD, CAD, COPD on 3L O2 and nocturnal BiPAP and pHTN, p/w SOB x 1 day found to have acute on chronic hypercarbic respiratory failure in setting of advanced COPD vs HF? Admitted to [facility] for further management.     # AHHRF second to COPD vs HF Exacerbation   - CXR and RVP/ COVID PCR negative on admission   - Continue on Solumedrol 40mg BID (6/7 - 6/9) then QD (6/10 - )   - Continue on Zithromax x 5d- completed on 6/12- now on zithromax 3x/week ppx  - Continue on Symbicort BID and Duonebs Q6H   - Hold Spiriva while on standing Duonebs  - Continue on NC QD and BIPAP QHS/ PRN   - Will trial [employer] positive pressure system prior to discharge (if needed)  - Slow prednisone taper- dec by 10mg q5d    # HFrEF 20-25 s/p ACID  - ECHO 4/2019 with EF 20-25% and severe global LVSD. Mild LAD, mild pHTN and moderate tricuspid regurgitation.   - c/w ASA 81mg, Coreg 25 BID and Hydral 25 BID.   - Hydralazine dose increased to 75mg TID (home dose) for better BP control.  - Imdur 30mg QD restarted and will monitor BP  - If BP still elevated coreg can go up to 37.5mg BID    # DVT PPX with Lovenox   # DISPO - PT evaluation when able.

## 2021-06-14 NOTE — PROGRESS NOTE ADULT - SUBJECTIVE AND OBJECTIVE BOX
CHIEF COMPLAINT: Patient is a 71y old  Female who presents with a chief complaint of Dyspnea (13 Jun 2021 07:02)    Interval Events:    REVIEW OF SYSTEMS:  Constitutional:   Eyes:  ENT:  CV:  Resp:  GI:  :  MSK:  Integumentary:  Neurological:  Psychiatric:  Endocrine:  Hematologic/Lymphatic:  Allergic/Immunologic:  [ ] All other systems negative  [ ] Unable to assess ROS because ________    OBJECTIVE:  ICU Vital Signs Last 24 Hrs  T(C): 36.4 (14 Jun 2021 06:14), Max: 36.6 (13 Jun 2021 17:54)  T(F): 97.6 (14 Jun 2021 06:14), Max: 97.9 (13 Jun 2021 21:34)  HR: 80 (14 Jun 2021 07:26) (80 - 109)  BP: 137/72 (14 Jun 2021 06:14) (104/64 - 137/72)  BP(mean): --  ABP: --  ABP(mean): --  RR: 24 (14 Jun 2021 06:14) (20 - 24)  SpO2: 100% (14 Jun 2021 07:26) (99% - 100%)        CAPILLARY BLOOD GLUCOSE          PHYSICAL EXAM:  General:   HEENT:   Lymph Nodes:  Neck:   Respiratory:   Cardiovascular:   Abdomen:   Extremities:   Skin:   Neurological:  Psychiatry:    HOSPITAL MEDICATIONS:  MEDICATIONS  (STANDING):  albuterol/ipratropium for Nebulization 3 milliLiter(s) Nebulizer every 6 hours  aspirin enteric coated 81 milliGRAM(s) Oral daily  azithromycin   Tablet 500 milliGRAM(s) Oral <User Schedule>  budesonide 160 MICROgram(s)/formoterol 4.5 MICROgram(s) Inhaler 2 Puff(s) Inhalation two times a day  carvedilol 25 milliGRAM(s) Oral every 12 hours  chlorhexidine 4% Liquid 1 Application(s) Topical daily  enoxaparin Injectable 30 milliGRAM(s) SubCutaneous daily  fluticasone propionate 50 MICROgram(s)/spray Nasal Spray 1 Spray(s) Both Nostrils two times a day  hydrALAZINE 75 milliGRAM(s) Oral every 8 hours  isosorbide   mononitrate ER Tablet (IMDUR) 30 milliGRAM(s) Oral daily  predniSONE   Tablet 40 milliGRAM(s) Oral daily  simvastatin 20 milliGRAM(s) Oral at bedtime    MEDICATIONS  (PRN):  acetaminophen   Tablet .. 650 milliGRAM(s) Oral every 6 hours PRN Mild Pain (1 - 3), Moderate Pain (4 - 6)      LABS:                        10.6   9.09  )-----------( 227      ( 14 Jun 2021 07:01 )             34.1     06-14    146<H>  |  102  |  28<H>  ----------------------------<  100<H>  4.4   |  36<H>  |  0.66    Ca    9.0      14 Jun 2021 07:01  Phos  3.4     06-14  Mg     1.9     06-14                MICROBIOLOGY:     RADIOLOGY:  [ ] Reviewed and interpreted by me    PULMONARY FUNCTION TESTS:    EKG: CHIEF COMPLAINT: Patient is a 71y old  Female who presents with a chief complaint of Dyspnea (13 Jun 2021 07:02)    Interval Events: None reported overnight. Clinically stable. She reports mild improvement, but still feels somewhat short of breath. Will continue with current management. Plan to taper down Prednisone to 30mg qd starting on 6/15. VSS, and medications reviewed.     REVIEW OF SYSTEMS:  see above  [x] All other systems negative    OBJECTIVE:  ICU Vital Signs Last 24 Hrs  T(C): 36.4 (14 Jun 2021 06:14), Max: 36.6 (13 Jun 2021 17:54)  T(F): 97.6 (14 Jun 2021 06:14), Max: 97.9 (13 Jun 2021 21:34)  HR: 80 (14 Jun 2021 07:26) (80 - 109)  BP: 137/72 (14 Jun 2021 06:14) (104/64 - 137/72)  BP(mean): --  ABP: --  ABP(mean): --  RR: 24 (14 Jun 2021 06:14) (20 - 24)  SpO2: 100% (14 Jun 2021 07:26) (99% - 100%)    CAPILLARY BLOOD GLUCOSE    PHYSICAL EXAM  General: nad  Neck: supple, no JVD  Respiratory: cta b/l, no accessory muscle use  Cardiovascular: +s1, s2  Abdomen: No peritoneal sign noted,   Extremities: grossly normal   Skin: as per RN assessment sheet   Neurological: non focal     HOSPITAL MEDICATIONS:  MEDICATIONS  (STANDING):  albuterol/ipratropium for Nebulization 3 milliLiter(s) Nebulizer every 6 hours  aspirin enteric coated 81 milliGRAM(s) Oral daily  azithromycin   Tablet 500 milliGRAM(s) Oral <User Schedule>  budesonide 160 MICROgram(s)/formoterol 4.5 MICROgram(s) Inhaler 2 Puff(s) Inhalation two times a day  carvedilol 25 milliGRAM(s) Oral every 12 hours  chlorhexidine 4% Liquid 1 Application(s) Topical daily  enoxaparin Injectable 30 milliGRAM(s) SubCutaneous daily  fluticasone propionate 50 MICROgram(s)/spray Nasal Spray 1 Spray(s) Both Nostrils two times a day  hydrALAZINE 75 milliGRAM(s) Oral every 8 hours  isosorbide   mononitrate ER Tablet (IMDUR) 30 milliGRAM(s) Oral daily  predniSONE   Tablet 40 milliGRAM(s) Oral daily  simvastatin 20 milliGRAM(s) Oral at bedtime    MEDICATIONS  (PRN):  acetaminophen   Tablet .. 650 milliGRAM(s) Oral every 6 hours PRN Mild Pain (1 - 3), Moderate Pain (4 - 6)      LABS:                        10.6   9.09  )-----------( 227      ( 14 Jun 2021 07:01 )             34.1     06-14    146<H>  |  102  |  28<H>  ----------------------------<  100<H>  4.4   |  36<H>  |  0.66    Ca    9.0      14 Jun 2021 07:01  Phos  3.4     06-14  Mg     1.9     06-14                MICROBIOLOGY:     RADIOLOGY:  [ ] Reviewed and interpreted by me    PULMONARY FUNCTION TESTS:    EKG:

## 2021-06-14 NOTE — PROGRESS NOTE ADULT - ASSESSMENT
70 YO F with PMH of HTN, HFrEF 20-25 (4/2019) s/p ICD, CAD, COPD on 3L O2 and nocturnal BiPAP and pHTN, p/w SOB x 1 day found to have acute on chronic hypercarbic respiratory failure in setting of advanced COPD vs HF? Admitted to [facility] for further management.     # AHHRF second to COPD vs HF Exacerbation   - CXR and RVP/ COVID PCR negative on admission   - Continue on Solumedrol 40mg BID (6/7 - 6/9) then QD (6/10 - )   - Continue on Zithromax x 5d- completed on 6/12- now on zithromax 3x/week ppx  - Continue on Symbicort BID and Duonebs Q6H   - Hold Spiriva while on standing Duonebs  - Continue on NC QD and BIPAP QHS/ PRN   - Will trial Life 2000 positive pressure system prior to discharge (if needed)  - Slow prednisone taper- dec by 10mg q5d    # HFrEF 20-25 s/p ACID  - ECHO 4/2019 with EF 20-25% and severe global LVSD. Mild LAD, mild pHTN and moderate tricuspid regurgitation.   - c/w ASA 81mg, Coreg 25 BID and Hydral 25 BID.   - Hydralazine dose increased to 75mg TID (home dose) for better BP control.  - Imdur 30mg QD restarted and will monitor BP  - If BP still elevated coreg can go up to 37.5mg BID    # DVT PPX with Lovenox   # DISPO - PT evaluation when able.  72 YO F with PMH of HTN, HFrEF 20-25 (4/2019) s/p ICD, CAD, COPD on 3L O2 and nocturnal BiPAP and pHTN, p/w SOB x 1 day found to have acute on chronic hypercarbic respiratory failure in setting of advanced COPD vs HF? Admitted to [facility] for further management.     # AHHRF second to COPD vs HF Exacerbation   - CXR and RVP/ COVID PCR negative on admission   - Continue on Solumedrol 40mg BID (6/7 - 6/9) then QD (6/10 - )   - Continue on Zithromax x 5d- completed on 6/12- now on zithromax 3x/week ppx  - Continue on Symbicort BID and Spriiva  - Duonebs PRN  - Continue on NC QD and BIPAP QHS/ PRN   - Will trial Life [employer] positive pressure system prior to discharge (if needed)  - Slow prednisone taper- dec by 10mg q5d    # HFrEF 20-25 s/p ACID  - ECHO 4/2019 with EF 20-25% and severe global LVSD. Mild LAD, mild pHTN and moderate tricuspid regurgitation.   - c/w ASA 81mg, Coreg 25 BID and Hydral 25 BID.   - Hydralazine dose increased to 75mg TID (home dose) for better BP control.  - Imdur 30mg QD restarted and will monitor BP  - If BP still elevated coreg can go up to 37.5mg BID    # DVT PPX with Lovenox   # DISPO - PT evaluation when able.  72 YO F with PMH of HTN, HFrEF 20-25 (4/2019) s/p ICD, CAD, COPD on 3L O2 and nocturnal BiPAP and pHTN, p/w SOB x 1 day found to have acute on chronic hypercarbic respiratory failure in setting of advanced COPD vs HF? Admitted to [facility] for further management.     # AHHRF second to COPD vs HF Exacerbation   - CXR and RVP/ COVID PCR negative on admission   - s/p Solumedrol 40mg BID (6/7 - 6/9) then QD (6/10 - 6/14)   - Plan to taper Prednisone to 30mg qd starting on 6/15 (Plan to taper by 10mg x 5 days)  - s/p Zithromax x 5d- completed on 6/12- now on Zithromax 3x/week ppx  - c/w Spriva BID and Duonebs PRN  - Continue on NC QD and BIPAP QHS/ PRN   - Will trial Life 2000 positive pressure system prior to discharge (if needed)    # HFrEF 20-25 s/p ACID  - ECHO 4/2019 with EF 20-25% and severe global LVSD. Mild LAD, mild pHTN and moderate tricuspid regurgitation.   - c/w ASA 81mg, Coreg 25 BID and Hydralazine 25 BID.   - Hydralazine dose increased to 75mg TID (home dose) for better BP control.  - Imdur 30mg QD restarted and will monitor BP  - If BP still elevated coreg can go up to 37.5mg BID    # DVT PPX with Lovenox   # DISPO - PT evaluation when able.

## 2021-06-14 NOTE — PROGRESS NOTE ADULT - ATTENDING COMMENTS
71 F with history of HFrEF and VF s/p ICD and chronic hypoxemic / hypercapnic respiratory failure secondary to COPD on 3L NC ATC and nocturnal BiPAP presents with acute on chronic hypoxemic and hypercapnic respiratory failure in setting of COPD exacerbation. She says she is feeling slightly better although remains markedly dyspneic. Will continue prednisone taper and azithromycin TIW. Continue Symbicort and Spiriva, Duonebs PRN. Using BiPAP qHS and PRN during daytime. Awaiting trial with [id number] if possible (awaiting repair). Remainder of plan as above. 71 F with history of chronic HFrEF and VF s/p ICD and chronic hypoxemic / hypercapnic respiratory failure secondary to COPD on 3L NC ATC and nocturnal BiPAP presents with acute on chronic hypoxemic and hypercapnic respiratory failure in setting of COPD exacerbation. She says she is feeling slightly better although remains markedly dyspneic. Will continue prednisone taper and azithromycin TIW. Continue Symbicort and Spiriva, Duonebs PRN. Using BiPAP qHS and PRN during daytime. Awaiting trial with [id number] if possible (awaiting repair). Remainder of plan as above. never used

## 2021-06-15 NOTE — DIETITIAN INITIAL EVALUATION ADULT. - OTHER INFO
Pt. w PMH COPD, CHF (s/p ICD), pHTN, HTN, CAD.  Found to have acute on chronic hypercarbic respiratory failure.    Pt. somnolent and lethargic @ time of RDN encounter.  Observed 0% consumption of breakfast or Ensure Enlive supplement. Offered to obtain/provide food preferences.  Encouraged PO intake.  Noted with 50-95% consumption of meals.     No noted/reported food allergies, nausea/vomiting/diarrhea/constipation, or issues with chewing/swallowing.      Pt. w persistent & significant weight decrease.  Usual body weight ~87lbs (=39.5kg)--> 61.2lbs current admission.  Weight Hx (2021).  39.9kg (Mar 12)  39.5kg (Apr 20)  34.8kg (May 4)  27.8kg (June 9 on current admission)

## 2021-06-15 NOTE — PROGRESS NOTE ADULT - ASSESSMENT
70 YO F with PMH of HTN, HFrEF 20-25 (4/2019) s/p ICD, CAD, COPD on 3L O2 and nocturnal BiPAP and pHTN, p/w SOB x 1 day found to have acute on chronic hypercarbic respiratory failure in setting of advanced COPD vs HF? Admitted to [facility] for further management.     # AHHRF second to COPD vs HF Exacerbation   - CXR and RVP/ COVID PCR negative on admission   - s/p Solumedrol 40mg BID (6/7 - 6/9) and 40mg QD (6/10 - 6/14)   - s/p Zithromax x 5d- completed on 6/12- now on Zithromax 3x/week ppx  - Start with Prednisone 30mg QD (6/15----) (Plan to taper by 10mg x 5 days)  - c/w Spriva BID and Duonebs PRN  - Continue on NC QD and BIPAP QHS/ PRN   - Will trial Life 2000 positive pressure system prior to discharge (if needed)    # HFrEF 20-25 s/p ACID  - ECHO 4/2019 with EF 20-25% and severe global LVSD. Mild LAD, mild pHTN and moderate tricuspid regurgitation.   - c/w Hydralazine 75mg TID (home dose) for better BP control. - c/w ASA 81mg  - c/w Imdur 30mg QD   - BP well controlled - c/w Coreg 25mg q 12hr    # DVT PPX with Lovenox   # DISPO - PT evaluation when able.  70 YO F with PMH of HTN, HFrEF 20-25 (4/2019) s/p ICD, CAD, COPD on 3L O2 and nocturnal BiPAP and pHTN, p/w SOB x 1 day found to have acute on chronic hypercarbic respiratory failure in setting of advanced COPD vs HF? Admitted to [facility] for further management.     # AHHRF second to COPD vs HF Exacerbation   - CXR and RVP/ COVID PCR negative on admission   - s/p Solumedrol 40mg BID (6/7 - 6/9) and 40mg QD (6/10 - 6/14)   - s/p Zithromax x 5d- completed on 6/12- now on Zithromax 3x/week ppx  - Start with Prednisone 30mg QD (6/15----) (Plan to taper by 10mg x 5 days)  - c/w Spriva BID and Duonebs PRN  - Continue on NC QD and BIPAP QHS/ PRN   - Will trial Life 2000 positive pressure system prior to discharge (if needed)    # HFrEF 20-25 s/p ACID  - ECHO 4/2019 with EF 20-25% and severe global LVSD. Mild LAD, mild pHTN and moderate tricuspid regurgitation.   - c/w Hydralazine 75mg TID (home dose) for better BP control. - c/w ASA 81mg  - c/w Imdur 30mg QD   - BP well controlled - c/w Coreg 25mg q 12hr  - No Diuretics - 2D- Echo pending     # DVT PPX with Lovenox   # DISPO - PT evaluation when able.

## 2021-06-15 NOTE — DIETITIAN INITIAL EVALUATION ADULT. - ETIOLOGY
In the context of chronic illness 2/2 to pulmonary (COPD) & cardiovascular (CHF) dysfunction with significant increased energy requirements.

## 2021-06-15 NOTE — PROGRESS NOTE ADULT - SUBJECTIVE AND OBJECTIVE BOX
CHIEF COMPLAINT: Patient is a 71y old  Female who presents with a chief complaint of Dyspnea (14 Jun 2021 08:40)    Interval Events: None reported overnight. Clinically unchanged.     REVIEW OF SYSTEMS:  see above  [x] All other systems negative    OBJECTIVE:  ICU Vital Signs Last 24 Hrs  T(C): 36.9 (15 Cecilio 2021 05:32), Max: 36.9 (15 Cecilio 2021 05:32)  T(F): 98.5 (15 Cecilio 2021 05:32), Max: 98.5 (15 Cecilio 2021 05:32)  HR: 93 (15 Cecilio 2021 05:32) (80 - 103)  BP: 130/70 (15 Cecilio 2021 05:32) (107/58 - 130/70)  BP(mean): --  ABP: --  ABP(mean): --  RR: 24 (15 Cecilio 2021 06:09) (18 - 25)  SpO2: 100% (15 Cecilio 2021 06:09) (100% - 100%)        CAPILLARY BLOOD GLUCOSE    PHYSICAL EXAM  General: nad  Neck: supple, no JVD  Respiratory: cta b/l, no accessory muscle use  Cardiovascular: +s1, s2  Abdomen: No peritoneal sign noted,   Extremities: grossly normal   Skin: as per RN assessment sheet   Neurological: non focal       HOSPITAL MEDICATIONS:  MEDICATIONS  (STANDING):  aspirin enteric coated 81 milliGRAM(s) Oral daily  azithromycin   Tablet 500 milliGRAM(s) Oral <User Schedule>  budesonide 160 MICROgram(s)/formoterol 4.5 MICROgram(s) Inhaler 2 Puff(s) Inhalation two times a day  carvedilol 25 milliGRAM(s) Oral every 12 hours  chlorhexidine 4% Liquid 1 Application(s) Topical daily  enoxaparin Injectable 30 milliGRAM(s) SubCutaneous daily  fluticasone propionate 50 MICROgram(s)/spray Nasal Spray 1 Spray(s) Both Nostrils two times a day  hydrALAZINE 75 milliGRAM(s) Oral every 8 hours  isosorbide   mononitrate ER Tablet (IMDUR) 30 milliGRAM(s) Oral daily  predniSONE   Tablet 30 milliGRAM(s) Oral daily  simvastatin 20 milliGRAM(s) Oral at bedtime  tiotropium 18 MICROgram(s) Capsule 1 Capsule(s) Inhalation daily    MEDICATIONS  (PRN):  acetaminophen   Tablet .. 650 milliGRAM(s) Oral every 6 hours PRN Mild Pain (1 - 3), Moderate Pain (4 - 6)  albuterol/ipratropium for Nebulization 3 milliLiter(s) Nebulizer every 6 hours PRN Shortness of Breath and/or Wheezing      LABS:                        10.6   9.09  )-----------( 227      ( 14 Jun 2021 07:01 )             34.1     06-14    146<H>  |  102  |  28<H>  ----------------------------<  100<H>  4.4   |  36<H>  |  0.66    Ca    9.0      14 Jun 2021 07:01  Phos  3.4     06-14  Mg     1.9     06-14                MICROBIOLOGY:     RADIOLOGY:  [ ] Reviewed and interpreted by me    PULMONARY FUNCTION TESTS:    EKG: CHIEF COMPLAINT: Patient is a 71y old  Female who presents with a chief complaint of Dyspnea (14 Jun 2021 08:40)    Interval Events: None reported overnight. Clinically unchanged. Will continue with prednisone taper. Will check Echo. VSS, and medications reviewed.     REVIEW OF SYSTEMS:  see above  [x] All other systems negative    OBJECTIVE:  ICU Vital Signs Last 24 Hrs  T(C): 36.9 (15 Cecilio 2021 05:32), Max: 36.9 (15 Cecilio 2021 05:32)  T(F): 98.5 (15 Cecilio 2021 05:32), Max: 98.5 (15 Cecilio 2021 05:32)  HR: 93 (15 Cecilio 2021 05:32) (80 - 103)  BP: 130/70 (15 Cecilio 2021 05:32) (107/58 - 130/70)  BP(mean): --  ABP: --  ABP(mean): --  RR: 24 (15 Cecilio 2021 06:09) (18 - 25)  SpO2: 100% (15 Cecilio 2021 06:09) (100% - 100%)        CAPILLARY BLOOD GLUCOSE    PHYSICAL EXAM  General: NAD   Neck: supple, no JVD  Respiratory: significant decreased air entry b/l, no wheezing, +/- tripad  Cardiovascular: +s1, s2  Abdomen: No peritoneal sign noted,   Extremities: grossly normal   Skin: as per RN assessment sheet   Neurological: non focal       HOSPITAL MEDICATIONS:  MEDICATIONS  (STANDING):  aspirin enteric coated 81 milliGRAM(s) Oral daily  azithromycin   Tablet 500 milliGRAM(s) Oral <User Schedule>  budesonide 160 MICROgram(s)/formoterol 4.5 MICROgram(s) Inhaler 2 Puff(s) Inhalation two times a day  carvedilol 25 milliGRAM(s) Oral every 12 hours  chlorhexidine 4% Liquid 1 Application(s) Topical daily  enoxaparin Injectable 30 milliGRAM(s) SubCutaneous daily  fluticasone propionate 50 MICROgram(s)/spray Nasal Spray 1 Spray(s) Both Nostrils two times a day  hydrALAZINE 75 milliGRAM(s) Oral every 8 hours  isosorbide   mononitrate ER Tablet (IMDUR) 30 milliGRAM(s) Oral daily  predniSONE   Tablet 30 milliGRAM(s) Oral daily  simvastatin 20 milliGRAM(s) Oral at bedtime  tiotropium 18 MICROgram(s) Capsule 1 Capsule(s) Inhalation daily    MEDICATIONS  (PRN):  acetaminophen   Tablet .. 650 milliGRAM(s) Oral every 6 hours PRN Mild Pain (1 - 3), Moderate Pain (4 - 6)  albuterol/ipratropium for Nebulization 3 milliLiter(s) Nebulizer every 6 hours PRN Shortness of Breath and/or Wheezing      LABS:                        10.6   9.09  )-----------( 227      ( 14 Jun 2021 07:01 )             34.1     06-14    146<H>  |  102  |  28<H>  ----------------------------<  100<H>  4.4   |  36<H>  |  0.66    Ca    9.0      14 Jun 2021 07:01  Phos  3.4     06-14  Mg     1.9     06-14                MICROBIOLOGY:     RADIOLOGY:  [ ] Reviewed and interpreted by me    PULMONARY FUNCTION TESTS:    EKG: CHIEF COMPLAINT: Patient is a 71y old  Female who presents with a chief complaint of Dyspnea (14 Jun 2021 08:40)    Interval Events: None reported overnight. Clinically unchanged. Will continue with prednisone taper. Will check Echo. VSS, and medications reviewed.     REVIEW OF SYSTEMS:  see above  [x] All other systems negative    OBJECTIVE:  ICU Vital Signs Last 24 Hrs  T(C): 36.9 (15 Cecilio 2021 05:32), Max: 36.9 (15 Cecilio 2021 05:32)  T(F): 98.5 (15 Cecilio 2021 05:32), Max: 98.5 (15 Cecilio 2021 05:32)  HR: 93 (15 Cecilio 2021 05:32) (80 - 103)  BP: 130/70 (15 Cecilio 2021 05:32) (107/58 - 130/70)  BP(mean): --  ABP: --  ABP(mean): --  RR: 24 (15 Cecilio 2021 06:09) (18 - 25)  SpO2: 100% (15 Cecilio 2021 06:09) (100% - 100%)    CAPILLARY BLOOD GLUCOSE    PHYSICAL EXAM  General: NAD   Neck: supple, no JVD  Respiratory: Significant decreased air entry b/l, No wheezing  Cardiovascular: +s1, s2  Abdomen: No peritoneal sign noted,   Extremities: grossly normal   Skin: as per RN assessment sheet   Neurological: non focal       HOSPITAL MEDICATIONS:  MEDICATIONS  (STANDING):  aspirin enteric coated 81 milliGRAM(s) Oral daily  azithromycin   Tablet 500 milliGRAM(s) Oral <User Schedule>  budesonide 160 MICROgram(s)/formoterol 4.5 MICROgram(s) Inhaler 2 Puff(s) Inhalation two times a day  carvedilol 25 milliGRAM(s) Oral every 12 hours  chlorhexidine 4% Liquid 1 Application(s) Topical daily  enoxaparin Injectable 30 milliGRAM(s) SubCutaneous daily  fluticasone propionate 50 MICROgram(s)/spray Nasal Spray 1 Spray(s) Both Nostrils two times a day  hydrALAZINE 75 milliGRAM(s) Oral every 8 hours  isosorbide   mononitrate ER Tablet (IMDUR) 30 milliGRAM(s) Oral daily  predniSONE   Tablet 30 milliGRAM(s) Oral daily  simvastatin 20 milliGRAM(s) Oral at bedtime  tiotropium 18 MICROgram(s) Capsule 1 Capsule(s) Inhalation daily    MEDICATIONS  (PRN):  acetaminophen   Tablet .. 650 milliGRAM(s) Oral every 6 hours PRN Mild Pain (1 - 3), Moderate Pain (4 - 6)  albuterol/ipratropium for Nebulization 3 milliLiter(s) Nebulizer every 6 hours PRN Shortness of Breath and/or Wheezing      LABS:                        10.6   9.09  )-----------( 227      ( 14 Jun 2021 07:01 )             34.1     06-14    146<H>  |  102  |  28<H>  ----------------------------<  100<H>  4.4   |  36<H>  |  0.66    Ca    9.0      14 Jun 2021 07:01  Phos  3.4     06-14  Mg     1.9     06-14      MICROBIOLOGY:     RADIOLOGY:  [ ] Reviewed and interpreted by me    PULMONARY FUNCTION TESTS:    EKG:

## 2021-06-15 NOTE — DIETITIAN INITIAL EVALUATION ADULT. - PERTINENT MEDS FT
MEDICATIONS  (STANDING):  aspirin enteric coated 81 milliGRAM(s) Oral daily  azithromycin   Tablet 500 milliGRAM(s) Oral <User Schedule>  budesonide 160 MICROgram(s)/formoterol 4.5 MICROgram(s) Inhaler 2 Puff(s) Inhalation two times a day  carvedilol 25 milliGRAM(s) Oral every 12 hours  chlorhexidine 4% Liquid 1 Application(s) Topical daily  enoxaparin Injectable 30 milliGRAM(s) SubCutaneous daily  fluticasone propionate 50 MICROgram(s)/spray Nasal Spray 1 Spray(s) Both Nostrils two times a day  hydrALAZINE 75 milliGRAM(s) Oral every 8 hours  isosorbide   mononitrate ER Tablet (IMDUR) 30 milliGRAM(s) Oral daily  predniSONE   Tablet 30 milliGRAM(s) Oral daily  simvastatin 20 milliGRAM(s) Oral at bedtime  tiotropium 18 MICROgram(s) Capsule 1 Capsule(s) Inhalation daily

## 2021-06-15 NOTE — DIETITIAN NUTRITION RISK NOTIFICATION - TREATMENT: THE FOLLOWING DIET HAS BEEN RECOMMENDED
Diet, Regular:   DASH/TLC {Sodium & Cholesterol Restricted} (DASH)  Supplement Feeding Modality:  Oral  Ensure Enlive Cans or Servings Per Day:  1       Frequency:  Three Times a day (06-08-21 @ 10:36) [Active]

## 2021-06-15 NOTE — PROGRESS NOTE ADULT - ATTENDING COMMENTS
71 F with history of chronic severe systolic HF and VF s/p ICD and chronic hypoxemic / hypercapnic respiratory failure secondary to COPD on 3L NC ATC and nocturnal BiPAP presents with acute on chronic hypoxemic and hypercapnic respiratory failure in setting of COPD exacerbation. She says she is feeling slightly better although remains markedly dyspneic. Will continue prednisone and azithromycin TIW. Continue Symbicort and Spiriva, Duonebs PRN. Using BiPAP qHS and PRN during daytime. Awaiting trial with [id number] if possible (awaiting repair). Her baseline functional status is poor and her current clinical state may represent a general worsening of her severe cardiopulmonary disease. Will speak with patient and family to establish goals and expectations of care. Remainder of plan as above.

## 2021-06-15 NOTE — DIETITIAN INITIAL EVALUATION ADULT. - PERTINENT LABORATORY DATA
06-15    144  |  101  |  26<H>  ----------------------------<  105<H>  4.0   |  39<H>  |  0.59    Ca    9.7      15 Cecilio 2021 07:35  Phos  2.8     06-15  Mg     1.9     06-15

## 2021-06-16 NOTE — PROGRESS NOTE ADULT - ATTENDING COMMENTS
71 F with history of chronic severe systolic HF and VF s/p ICD and chronic hypoxemic / hypercapnic respiratory failure secondary to COPD on 3L NC ATC and nocturnal BiPAP presents with acute on chronic hypoxemic and hypercapnic respiratory failure in setting of COPD exacerbation. She remains markedly dyspneic. Will continue prednisone and azithromycin TIW. Continue Symbicort and Spiriva, Duonebs PRN. Using BiPAP qHS and PRN during daytime. Awaiting trial with [id number] if possible (awaiting repair). Her baseline functional status is poor and her current clinical state may represent a general worsening of her severe cardiopulmonary disease. She does not want to use her BiPAP despite her dyspnea. She does agreed to low dose morphine for symptomatic relief. Raised the possibility of involving palliative services to help in establishing goals of care and the possibility of pursuing hospice. Patient amenable to discussing further with palliative but ultimately defers decision making to her son Genaro Jalloh. Will attempt to reach out to him today. Remainder of plan as above.

## 2021-06-16 NOTE — PHYSICAL THERAPY INITIAL EVALUATION ADULT - PERTINENT HX OF CURRENT PROBLEM, REHAB EVAL
patient is a 71 year old female admitted for acute on chronic hypercarbic respiratory failure in the setting of advanced COPD and heart failure

## 2021-06-16 NOTE — CONSULT NOTE ADULT - PROBLEM SELECTOR RECOMMENDATION 3
- dyspnea management as above  - on prednisone and Spiriva  - on supplemental oxygen and nocturnal bipap

## 2021-06-16 NOTE — CONSULT NOTE ADULT - PROBLEM SELECTOR RECOMMENDATION 9
- due to respiratory failure due to COPD vs HF exacerbation  - Start IV Morphine 1mg q6 PRN for dyspnea  - Bowel regimen while on opiates: Miralax QD, Senna QHS

## 2021-06-16 NOTE — CONSULT NOTE ADULT - ASSESSMENT
70 yo F with PMH of HTN, HFrEF s/p ICD, CAD, COPD on 3L O2 and nocturnal BiPAP, pHTN, p/w SOB x 1 day found to have acute on chronic hypercarbic respiratory failure in setting of advanced COPD and questionable adherence to BIPAP.     Acute on chronic hypercarbic respiratory failure   -on BIPAP, appears comfortable and mentating well despite pCO2 >110. Baseline CO2 in in 60-70s.   -can trial AVAPS and repeat blood gas afterwards.    Not a MICU candidate at this time however if persistently hypercarbic while on AVAPS, MICU to reassess.   Discussed with Dr. Cavazos. 
72 YO F with PMH of HTN, HFrEF 20-25 (4/2019) s/p ICD, CAD, COPD on 3L O2 and nocturnal BiPAP and pHTN, p/w SOB x 1 day found to have acute on chronic hypercarbic respiratory failure in setting of advanced COPD vs HF? Admitted to [facility] for further management.   Palliative Care consulted for complex decision making  related to goals of care discussions in the setting of advanced illness/ evaluation and management of symptoms

## 2021-06-16 NOTE — CONSULT NOTE ADULT - SUBJECTIVE AND OBJECTIVE BOX
CHIEF COMPLAINT:    HPI: 72 yo F with PMH of HTN, HFrEF s/p ICD, CAD, COPD on 3L O2 and nocturnal BiPAP, pHTN, p/w SOB x 1 day. Patient was recently admitted 5/4-5/11 for SOB 2/2 COPD exacerbation, started on BIPAP and weaned to home O2, was on prednisone. Patient currently endorses SOB that started today, difficulty with ambulation due to SOB.   MICU consulted for hypercarbia, requiring BIPAP. On questioning pt is AAOx4, able to provide history although limited due to BIPAP. Of note she states she only wears BIPAP a few nights per week. Denies any sick contacts, f/c/n/v/cough.       PAST MEDICAL & SURGICAL HISTORY:  CAD (coronary artery disease)    COPD (chronic obstructive pulmonary disease)  on 3L NC    HTN (hypertension)    Chronic systolic CHF (congestive heart failure)  EF 20-25% s/p Med[employer]onic ICD (9/2018)    Sleep apnea  cannot tolerate CPAP    Pulmonary hypertension  moderate    Right-sided heart failure    Presence of cardioverter defibrillator  Medtronic PPM/ICD placed 9/2018        FAMILY HISTORY:  Family history of colon cancer (Father)        SOCIAL HISTORY:  Smoking: __ packs x ___ years  EtOH Use:  Marital Status:  Occupation:  Recent Travel:  Country of Birth:  Advance Directives:    Allergies    No Known Allergies    Intolerances        HOME MEDICATIONS:    REVIEW OF SYSTEMS:  CONSTITUTIONAL: No weakness, fevers or chills  EYES/ENT: No visual changes;  No vertigo or throat pain   NECK: No pain or stiffness  RESPIRATORY: No cough, wheezing, hemoptysis; No shortness of breath  CARDIOVASCULAR: No chest pain or palpitations  GASTROINTESTINAL: No abdominal or epigastric pain. No nausea, vomiting, or hematemesis; No diarrhea or constipation. No melena or hematochezia.  GENITOURINARY: No dysuria, frequency or hematuria  NEUROLOGICAL: No flaccid paralysis  SKIN: No itching, rashes  MSK: no joint deformities  PSYCH: no SI/HI   [ ] Unable to assess ROS because ________    OBJECTIVE:  ICU Vital Signs Last 24 Hrs  T(C): 36.8 (06 Jun 2021 16:04), Max: 36.8 (06 Jun 2021 16:04)  T(F): 98.3 (06 Jun 2021 16:04), Max: 98.3 (06 Jun 2021 16:04)  HR: 83 (06 Jun 2021 19:35) (50 - 108)  BP: 128/70 (06 Jun 2021 19:35) (115/69 - 167/55)  BP(mean): --  ABP: --  ABP(mean): --  RR: 22 (06 Jun 2021 19:35) (18 - 24)  SpO2: 100% (06 Jun 2021 19:35) (92% - 100%)        CAPILLARY BLOOD GLUCOSE          PHYSICAL EXAM:  GENERAL: thin female lying in bed on BIPAP, able to speak in full sentences.   HEAD:  Atraumatic, Normocephalic  EYES: EOMI, PERRLA, conjunctiva and sclera clear  ENT: Moist mucous membranes  NECK: Supple, No JVD  CHEST/LUNG: decreased breath sounds bilaterally. No wheezing. Unlabored respirations on BIPAP.   HEART: Regular rate and rhythm; No murmurs, rubs, or gallops  ABDOMEN: Bowel sounds present; Soft, Nontender, Nondistended. No hepatomegaly  EXTREMITIES:  2+ Peripheral Pulses, brisk capillary refill. No clubbing, cyanosis, or edema  NERVOUS SYSTEM:  Alert & Oriented X3, speech clear. No deficits   MSK: FROM all 4 extremities, full and equal strength  SKIN: No rashes or lesions    HOSPITAL MEDICATIONS:  MEDICATIONS  (STANDING):    MEDICATIONS  (PRN):      LABS:                        11.9   4.64  )-----------( 216      ( 06 Jun 2021 17:34 )             39.7     06-06    147<H>  |  94<L>  |  25<H>  ----------------------------<  90  4.5   |  37<H>  |  0.65    Ca    9.4      06 Jun 2021 17:34  Mg     1.9     06-06    TPro  6.9  /  Alb  4.3  /  TBili  0.7  /  DBili  x   /  AST  21  /  ALT  17  /  AlkPhos  57  06-06          Venous Blood Gas:  06-06 @ 19:27  7.18/>110/37/34/57.6  VBG Lactate: 1.1  Venous Blood Gas:  06-06 @ 17:34  7.19/>110/28/33/40.3  VBG Lactate: 1.5      MICROBIOLOGY:     RADIOLOGY:  [ ] Reviewed and interpreted by me    EKG:
HPI:  70 yo F with PMH of HTN, HFrEF s/p ICD, CAD, COPD on 3L O2 and nocturnal BiPAP, pHTN, p/w SOB x 1 day. Patient was recently admitted 5/4-5/11 for SOB 2/2 COPD exacerbation, started on BIPAP and weaned to home O2, was on prednisone. Patient came back to the Ed with worsening SOB for oned ay. States this is similar to her copd exacerbations. No chest pain. Does have cough. NO feers chills chest pain abdominal pain or urinarysymptoms. Patient is alert and oriented though difficult to fullyunderstand because she is on bipap.She is asking for water throughout interview process and states she feels uncomfortable.     MICU consulted for hypercarbia, requiring BIPAP.  Deemed not MICU candidate.  (07 Jun 2021 03:20)    PERTINENT PM/SXH:   CAD (coronary artery disease)  COPD (chronic obstructive pulmonary disease)  HTN (hypertension)  Heart failure, systolic  CARLOTA (obstructive sleep apnea)  Chronic systolic CHF (congestive heart failure)  LBBB (left bundle branch block)  Sleep apnea  Pulmonary hypertension  Right-sided heart failure  S/P primary angioplasty with coronary stent  No significant past surgical history  Presence of cardioverter defibrillator    FAMILY HISTORY:  Family history of colon cancer (Father)    ITEMS NOT CHECKED ARE NOT PRESENT    SOCIAL HISTORY:   Significant other/partner[ ]  Children[x ]  Scientologist/Spirituality: Pentecostal  Substance hx:  [ ]   Tobacco hx:  [x ]   Former Smoker Alcohol hx: [ ]   Home Opioid hx:  [ ] I-Stop Reference No: [id number]. No opioids found on ISTOP  Living Situation: [x ]Home  [ ]Long term care  [ ]Rehab [ ]Other        ADVANCE DIRECTIVES:    DNR  MOLST  [ ]  Living Will  [ ]   DECISION MAKER(s):  [x ] Health Care Proxy(s)  [ ] Surrogate(s)  [ ] Guardian           Name(s): Phone Number(s):  Primary HCP: Daughter-in-Law Hannah Jalloh : [phone]  Alternate HCP: Maribel Jalloh: [phone]    BASELINE (I)ADL(s) (prior to admission):  [place]: [ ]Total  [ x] Moderate [ ]Dependent    Allergies    No Known Allergies    Intolerances    MEDICATIONS  (STANDING):  aspirin enteric coated 81 milliGRAM(s) Oral daily  azithromycin   Tablet 500 milliGRAM(s) Oral <User Schedule>  budesonide 160 MICROgram(s)/formoterol 4.5 MICROgram(s) Inhaler 2 Puff(s) Inhalation two times a day  carvedilol 25 milliGRAM(s) Oral every 12 hours  chlorhexidine 4% Liquid 1 Application(s) Topical daily  enoxaparin Injectable 30 milliGRAM(s) SubCutaneous daily  fluticasone propionate 50 MICROgram(s)/spray Nasal Spray 1 Spray(s) Both Nostrils two times a day  hydrALAZINE 75 milliGRAM(s) Oral every 8 hours  isosorbide   mononitrate ER Tablet (IMDUR) 30 milliGRAM(s) Oral daily  predniSONE   Tablet 30 milliGRAM(s) Oral daily  simvastatin 20 milliGRAM(s) Oral at bedtime  tiotropium 18 MICROgram(s) Capsule 1 Capsule(s) Inhalation daily    MEDICATIONS  (PRN):  acetaminophen   Tablet .. 650 milliGRAM(s) Oral every 6 hours PRN Mild Pain (1 - 3), Moderate Pain (4 - 6)  albuterol/ipratropium for Nebulization 3 milliLiter(s) Nebulizer every 6 hours PRN Shortness of Breath and/or Wheezing  morphine  - Injectable 2 milliGRAM(s) IV Push every 6 hours PRN SOB      PRESENT SYMPTOMS: [ ]Unable to obtain due to poor mentation   Source if other than patient:  [ ]Family   [ ]Team     Pain: [ ]yes [x ]no  QOL impact -   Location -                    Aggravating factors -  Quality -  Radiation -  Timing-  Severity (0-10 scale):  Minimal acceptable level (0-10 scale):     CPOT:    https://www.[facility].org/getattachment/bpa23k91-5n5t-6i6w-4m4j-4072g5259y6h/Critical-Care-Pain-Observation-Tool-(CPOT)      PAIN AD Score: 3    http://geriatrictoolkit.[employer].edu/cog/painad.pdf (press ctrl +  left click to view)    Dyspnea:                           [ ]Mild [ ]Moderate [x ]Severe  Anxiety:                             [ ]Mild [ ]Moderate [ ]Severe  Agitation:                          [ ]Mild [ ]Moderate [ ]Severe  Fatigue:                             [ ]Mild [ ]Moderate [ ]Severe  Nausea:                             [ ]Mild [ ]Moderate [ ]Severe  Loss of appetite:              [ ]Mild [ ]Moderate [ ]Severe  Constipation:                   [ ]Mild [ ]Moderate [ ]Severe  Diarrhea:                          [ ]Mild [ ]Moderate [ ]Severe  Pruritus:                            [ ]Mild [ ]Moderate [ ]Severe  Depression:                      [ ]Mild [ ]Moderate [ ]Severe    Other Symptoms:  [ ]All other review of systems negative - limited due to patient's dyspnea    Palliative Performance Status Version 2:  30-40      %    http://[facility].org/files/news/palliative_performance_scale_ppsv2.pdf    PHYSICAL EXAM:  Vital Signs Last 24 Hrs  T(C): 36.2 (16 Jun 2021 11:11), Max: 36.8 (15 Cecilio 2021 22:23)  T(F): 97.2 (16 Jun 2021 11:11), Max: 98.2 (15 Cecilio 2021 22:23)  HR: 85 (16 Jun 2021 11:11) (82 - 100)  BP: 106/59 (16 Jun 2021 11:11) (104/55 - 148/79)  BP(mean): --  RR: 18 (16 Jun 2021 11:11) (17 - 24)  SpO2: 98% (16 Jun 2021 11:11) (94% - 100%)     I&O's Summary      GENERAL:  [ x]Alert  [x ]Oriented x 2-3  [ ]Lethargic  [ ]Cachexia  [ ]Unarousable  [ x]Verbal  [ ]Non-Verbal  [x ] No Distress  Behavioral:   [ ] Anxiety  [ ] Delirium [ ] Agitation [ x] Calm  [ ] Other  HEENT:  [ x]Normal  [ ] Temporal Wasting  [ ]Dry mouth   [ ]ET Tube/Trach  [ ]Oral lesions  [ ] Mucositis  PULMONARY:   [ ]Clear [ x]Tachypnea  [ ]Audible excessive secretions   [ ]Rhonchi        [ ]Right [ ]Left [ ]Bilateral  [ ]Crackles        [ ]Right [ ]Left [ ]Bilateral  [ ]Wheezing     [ ]Right [ ]Left [ ]Bilateral  [x ]Diminished breath sounds [ ]right [ ]left [x ]bilateral  CARDIOVASCULAR:    [x ]Regular [ ]Irregular [ ]Tachy  [ ]Dg [ ]Murmur [ ]Other  GASTROINTESTINAL:  [x ]Soft  [ ]Distended   [x ]+BS  [x ]Non tender [ ]Tender  [ ]PEG [ ]OGT/ NGT  Last BM: 6/13  GENITOURINARY:  [x ]Normal [ ] Incontinent   [ ]Oliguria/Anuria   [ ]Meredith  MUSCULOSKELETAL:   [ ]Normal   [ x]Weakness  [ x]Bed/Wheelchair bound [ ]Edema  [  ] amputation  [  ] contraction  NEUROLOGIC:   [x ]No focal deficits  [ ]Cognitive impairment  [ ]Dysphagia [ ]Dysarthria [ ]Paresis [ ]Other   SKIN: Incontinence associated dermatitis.  See Nursing Skin Assessment for further details  [ ]Normal    [ ]Rash  [ ]Pressure ulcer(s)       Present on admission [ ]y [ ]n   [  ]  Wound    [  ] hyperpigmentation    CRITICAL CARE:  [ ] Shock Present  [ ]Septic [ ]Cardiogenic [ ]Neurologic [ ]Hypovolemic  [ ]  Vasopressors [ ]  Inotropes   [ ]Respiratory failure present [ ]Mechanical ventilation [ ]Non-invasive ventilatory support [ ]High flow    [ ]Acute  [ ]Chronic [ ]Hypoxic  [ ]Hypercarbic [ ]Other  [ ]Other organ failure     LABS:                        10.0   7.33  )-----------( 216      ( 16 Jun 2021 07:57 )             33.9   06-16    145  |  100  |  34<H>  ----------------------------<  94  4.6   |  38<H>  |  0.56    Ca    9.2      16 Jun 2021 07:57  Phos  3.2     06-16  Mg     1.9     06-16      RADIOLOGY & ADDITIONAL STUDIES:  CXRAY 6/6/2021  FINDINGS:    Markedly hyperinflated lungs but similar to past exams consistent with emphysema.  Trace bilateral pleural effusions. The lungs are clear.    Heart size cannot be accurately assessed in this projection. Left chest wall biventricular AICD.    No acute osseous findings.      IMPRESSION:    Clear lungs.      PROTEIN CALORIE MALNUTRITION PRESENT: [ ]mild [ ]moderate [ ]severe [ ]underweight [ ]morbid obesity  https://www.andeal.org/vault/2440/web/files/ONC/Table_Clinical%20Characteristics%20to%20Document%20Malnutrition-White%20JV%20et%20al%202012.pdf    Height (cm): 167.6 (06-06-21 @ 16:04), 167.6 (05-04-21 @ 20:23), 160 (03-29-21 @ 09:15)  Weight (kg): 27.8 (06-09-21 @ 12:02), 34.8 (05-04-21 @ 20:23), 40.1 (03-29-21 @ 09:15)  BMI (kg/m2): 9.9 (06-09-21 @ 12:02), 12.4 (06-06-21 @ 16:04), 12.4 (05-04-21 @ 20:23)    [x ]PPSV2 < or = to 30% [ ]significant weight loss  [ ]poor nutritional intake  [ ]anasarca     Albumin, Serum: 3.3 g/dL (06-11-21 @ 07:47)   [ ]Artificial Nutrition      REFERRALS:   [ ]Chaplaincy  [ ]Hospice  [ ]Child Life  [ ]Social Work  [x ]Case management [ ]Holistic Therapy     Goals of Care Document: FANI Britt (06-16-21 @ 14:14)  Goals of Care Conversation:   Participants:  · Participants  Family  · Relative  Hannah Jalloh    Advance Directives:  · Does patient have Advance Directive  Yes  · Indicate Type  Health Care Proxy (HCP)  · Agent's Name  Hannah Jalloh  · Phone #  [phone]  · If HCP is not on chart, identify the persons name and phone number contacted to bring in document  No paperwork to confirm. Hannah states that patient has paperwork. Will inquire.  · Are any of the items on the chart  No  · Does Patient Have a Surrogate  No  · Does the Patient have a Court Appointed Guardian ([address])  No  · Caregiver:  no    Conversation Discussion:  · Conversation  Diagnosis; Prognosis; Treatment Options  · Conversation Details  Updated Hannah (daughter in law) on plan of care. Discussed overall prognosis and treatment options. Conveyed that Mr. Jalloh has severe pulmonary disease and given her concomitant severe chronic heart failure her overall prognosis is guarded. We discussed code status and the option to look into hospice. For now Hannah would like Ms. Jalloh to remain full code and to continue current management. She will discuss the case further with her  (patient's son Genaro Jalloh) and be in touch with us should they make any further decisions.    Personal Advance Directives Treatment Guidelines:   Treatment Guidelines:  · Decision Maker  Patient  · Treatment Guideline Comments  Patient is alert and oriented. She states she defers medical decision making to her daughter in law (Hannah Jalloh) and her son (Genaro Jalloh).    MOLST:  · Completed  11-Nov-2020  · Updated  16-Feb-2021    Location of Discussion:   Duration of Advanced Care Planning Meeting:  · Time spent (in minutes)  30    Location of Discussion:  · Location of discussion  Telephone      Electronic Signatures:  Rigo Britt)  (Signed 16-Jun-2021 14:19)  	Authored: Goals of Care Conversation, Personal Advance Directives Treatment Guidelines, Location of Discussion      Last Updated: 16-Jun-2021 14:19 by Rigo Britt)

## 2021-06-16 NOTE — GOALS OF CARE CONVERSATION - ADVANCED CARE PLANNING - CONVERSATION DETAILS
Updated Hannah (daughter in law) on plan of care. Discussed overall prognosis and treatment options. Conveyed that Mr. Jalloh has severe pulmonary disease and given her concomitant severe chronic heart failure her overall prognosis is guarded. We discussed code status and the option to look into hospice. For now Hannah would like Ms. Jalloh to remain full code and to continue current management. She will discuss the case further with her  (patient's son Genaro Jalloh) and be in touch with us should they make any further decisions.

## 2021-06-16 NOTE — CONSULT NOTE ADULT - PROBLEM SELECTOR RECOMMENDATION 6
- Patient seen at bedside appearing very dyspneic and unable to speak in full sentences. Able to state she lives alone and has 1 son.   - Spoke with daughter-in-law Hannah. Per Hannah, patient lives in 2 family house, patient's mother lives upstairs who she had been helping to care for until recently when Hannah and son Maribel had advised her not to as patient's mother is an active smoker and patient is on home oxygen and would remove oxygen while caring for her mother. Patient's son lives in [place] and they have been in the process of trying to have patient move to [place] to live with them. Hannah was unclear if current hospitalization is new from last week or if patient was never discharged last week. Discussed patient has been hospitalized since 6/6/2021 this admission for respiratory failure due to CHF vs COPD. She is aware of patient's COPD condition but was surprised to learn that patient also had CHF.  Discussed with Hannah that upon review of EMR, patient had completed MOLST in 11/2020 for DNR/DNI but also noted in EMR to have revoked DNR status in 5/2021 admission. Hannah states that per her understanding, her mother-in-law has always stated she wanted to be "fully resuscitated" and she states patient's son Maribel also feels strongly about this. Discussed that the significance of continuing to discuss advanced directives given patient's underlying conditions. Also discussed that patient is a hospice candidate.   For now, She would like further updates from primary team and to further discuss with her  (patient's son). She agrees that patient should remain FULL CODE for now.   - will continue to follow with primary team    [facility] Palliative Medicine Pager: [phone]

## 2021-06-16 NOTE — PROGRESS NOTE ADULT - SUBJECTIVE AND OBJECTIVE BOX
CHIEF COMPLAINT:Patient is a 71y old  Female who presents with a chief complaint of Dyspnea (15 Cecilio 2021 09:32)      INTERVAL EVENTS:     ROS: Seen by bedside during AM rounds     OBJECTIVE:  ICU Vital Signs Last 24 Hrs  T(C): 36.8 (16 Jun 2021 06:16), Max: 36.8 (15 Cecilio 2021 22:23)  T(F): 98.2 (16 Jun 2021 06:16), Max: 98.2 (15 Cecilio 2021 22:23)  HR: 88 (16 Jun 2021 06:23) (82 - 100)  BP: 148/79 (16 Jun 2021 06:16) (104/55 - 148/79)  BP(mean): --  ABP: --  ABP(mean): --  RR: 24 (16 Jun 2021 06:16) (17 - 24)  SpO2: 95% (16 Jun 2021 06:23) (94% - 100%)        CAPILLARY BLOOD GLUCOSE          PHYSICAL EXAM:  General:   HEENT:   Lymph Nodes:  Neck:   Respiratory:   Cardiovascular:   Abdomen:   Extremities:   Skin:   Neurological:  Psychiatry:        HOSPITAL MEDICATIONS:  MEDICATIONS  (STANDING):  aspirin enteric coated 81 milliGRAM(s) Oral daily  azithromycin   Tablet 500 milliGRAM(s) Oral <User Schedule>  budesonide 160 MICROgram(s)/formoterol 4.5 MICROgram(s) Inhaler 2 Puff(s) Inhalation two times a day  carvedilol 25 milliGRAM(s) Oral every 12 hours  chlorhexidine 4% Liquid 1 Application(s) Topical daily  enoxaparin Injectable 30 milliGRAM(s) SubCutaneous daily  fluticasone propionate 50 MICROgram(s)/spray Nasal Spray 1 Spray(s) Both Nostrils two times a day  hydrALAZINE 75 milliGRAM(s) Oral every 8 hours  isosorbide   mononitrate ER Tablet (IMDUR) 30 milliGRAM(s) Oral daily  predniSONE   Tablet 30 milliGRAM(s) Oral daily  simvastatin 20 milliGRAM(s) Oral at bedtime  tiotropium 18 MICROgram(s) Capsule 1 Capsule(s) Inhalation daily    MEDICATIONS  (PRN):  acetaminophen   Tablet .. 650 milliGRAM(s) Oral every 6 hours PRN Mild Pain (1 - 3), Moderate Pain (4 - 6)  albuterol/ipratropium for Nebulization 3 milliLiter(s) Nebulizer every 6 hours PRN Shortness of Breath and/or Wheezing      LABS:                        10.1   8.97  )-----------( 225      ( 15 Cecilio 2021 07:35 )             35.1     06-15    144  |  101  |  26<H>  ----------------------------<  105<H>  4.0   |  39<H>  |  0.59    Ca    9.7      15 Cecilio 2021 07:35  Phos  2.8     06-15  Mg     1.9     06-15             CHIEF COMPLAINT:Patient is a 71y old  Female who presents with a chief complaint of Dyspnea (15 Cecilio 2021 09:32)      INTERVAL EVENTS: Patient refusing to wear BIPAP overnight and has been refusing intermittently during stay. This morning on nasal cannula oxygen maintaining sats high 90s.    ROS: See above. Remaining ROS negative.    OBJECTIVE:  ICU Vital Signs Last 24 Hrs  T(C): 36.8 (16 Jun 2021 06:16), Max: 36.8 (15 Cecilio 2021 22:23)  T(F): 98.2 (16 Jun 2021 06:16), Max: 98.2 (15 Cecilio 2021 22:23)  HR: 88 (16 Jun 2021 06:23) (82 - 100)  BP: 148/79 (16 Jun 2021 06:16) (104/55 - 148/79)  BP(mean): --  ABP: --  ABP(mean): --  RR: 24 (16 Jun 2021 06:16) (17 - 24)  SpO2: 95% (16 Jun 2021 06:23) (94% - 100%)        CAPILLARY BLOOD GLUCOSE          PHYSICAL EXAM:  General: NAD   Cards: S1/S2, no murmurs   Pulm: Diminished breath sounds bilaterally. Normal respiratory rate.  Abdomen: Soft, NTND  Extremities: No pedal edema.   Neurology: AOx3 with no focal neurological deficits         HOSPITAL MEDICATIONS:  MEDICATIONS  (STANDING):  aspirin enteric coated 81 milliGRAM(s) Oral daily  azithromycin   Tablet 500 milliGRAM(s) Oral <User Schedule>  budesonide 160 MICROgram(s)/formoterol 4.5 MICROgram(s) Inhaler 2 Puff(s) Inhalation two times a day  carvedilol 25 milliGRAM(s) Oral every 12 hours  chlorhexidine 4% Liquid 1 Application(s) Topical daily  enoxaparin Injectable 30 milliGRAM(s) SubCutaneous daily  fluticasone propionate 50 MICROgram(s)/spray Nasal Spray 1 Spray(s) Both Nostrils two times a day  hydrALAZINE 75 milliGRAM(s) Oral every 8 hours  isosorbide   mononitrate ER Tablet (IMDUR) 30 milliGRAM(s) Oral daily  predniSONE   Tablet 30 milliGRAM(s) Oral daily  simvastatin 20 milliGRAM(s) Oral at bedtime  tiotropium 18 MICROgram(s) Capsule 1 Capsule(s) Inhalation daily    MEDICATIONS  (PRN):  acetaminophen   Tablet .. 650 milliGRAM(s) Oral every 6 hours PRN Mild Pain (1 - 3), Moderate Pain (4 - 6)  albuterol/ipratropium for Nebulization 3 milliLiter(s) Nebulizer every 6 hours PRN Shortness of Breath and/or Wheezing      LABS:                        10.1   8.97  )-----------( 225      ( 15 Cecilio 2021 07:35 )             35.1     06-15    144  |  101  |  26<H>  ----------------------------<  105<H>  4.0   |  39<H>  |  0.59    Ca    9.7      15 Cecilio 2021 07:35  Phos  2.8     06-15  Mg     1.9     06-15

## 2021-06-16 NOTE — CONSULT NOTE ADULT - PROBLEM SELECTOR RECOMMENDATION 5
- HCP paperwork completed during [facility] admission for 11/2020. Located on Alpha tab for [facility] admission 11/2020. Page2  Patient had appointed Primary HCP: Hannah Jalloh : [phone] and Alternate HCP:  Maribel Jalloh: [phone]  - Per EMR, patient had completed MOLST on 11/2020 for DNR/DNI however had revoked DNR status during 5/2021 admission.  - Per discussion with HCP Hannah, patient to remain FULL Code for now.

## 2021-06-16 NOTE — CONSULT NOTE ADULT - PROBLEM SELECTOR RECOMMENDATION 4
- ECHO 4/2019 with EF 20-25% and severe global LVSD. Mild LAD, mild pHTN and moderate tricuspid regurgitation.   - s/p AICD placement  - management as per primary team

## 2021-06-16 NOTE — CONSULT NOTE ADULT - ATTENDING COMMENTS
70 yo F with PMH of HTN, HFrEF s/p ICD, CAD, COPD on 3L O2 and nocturnal BiPAP, pHTN, p/w SOB x 1 day found to have acute on chronic hypercarbic respiratory failure in setting of advanced COPD.  acute on chronic hypercapnia, tolerating bipap and mentating well   would switch to AVAPS, repeat VBG  steroids, nebs  reconsult as needed
ES COPD, CHF- now with dyspnea.  Asked to see for symptom management and goc.  morphine iv prn sob.  extensive discussion with hcp- as above.  pt remains full code despite poor prognosis.  will remain available for ongoing discussions and symptom management

## 2021-06-16 NOTE — PHYSICAL THERAPY INITIAL EVALUATION ADULT - ADDITIONAL COMMENTS
patient reports she ambulates independently without device. patient lethargic and dyspneic throughout conversation. patient at first asked to walk to the bathroom, then refused later in the encounter.

## 2021-06-16 NOTE — GOALS OF CARE CONVERSATION - ADVANCED CARE PLANNING - TREATMENT GUIDELINE COMMENT
Patient is alert and oriented. She states she defers medical decision making to her daughter in law (Hannah Jalloh) and her son (Genaro Jalloh).

## 2021-06-16 NOTE — PROGRESS NOTE ADULT - ASSESSMENT
72 YO F with PMH of HTN, HFrEF 20-25 (4/2019) s/p ICD, CAD, COPD on 3L O2 and nocturnal BiPAP and pHTN, p/w SOB x 1 day found to have acute on chronic hypercarbic respiratory failure in setting of advanced COPD vs HF? Admitted to [facility] for further management.     # AHHRF second to COPD vs HF Exacerbation   - CXR and RVP/ COVID PCR negative on admission   - s/p Solumedrol 40mg BID (6/7 - 6/9) and 40mg QD (6/10 - 6/14)   - s/p Zithromax x 5d- completed on 6/12- now on Zithromax 3x/week ppx  - Start with Prednisone 30mg QD (6/15----) (Plan to taper by 10mg x 5 days)  - c/w Spriva BID and Duonebs PRN  - Continue on NC QD and BIPAP QHS/ PRN   - Will trial Life 2000 positive pressure system prior to discharge (if needed)    # HFrEF 20-25 s/p ACID  - ECHO 4/2019 with EF 20-25% and severe global LVSD. Mild LAD, mild pHTN and moderate tricuspid regurgitation.   - c/w Hydralazine 75mg TID (home dose) for better BP control. - c/w ASA 81mg  - c/w Imdur 30mg QD   - BP well controlled - c/w Coreg 25mg q 12hr  - No Diuretics - 2D- Echo pending     # DVT PPX with Lovenox   # DISPO - PT evaluation when able.  72 YO F with PMH of HTN, HFrEF 20-25 (4/2019) s/p ICD, CAD, COPD on 3L O2 and nocturnal BiPAP and pHTN, p/w SOB x 1 day found to have acute on chronic hypercarbic respiratory failure in setting of advanced COPD vs HF? Admitted to [facility] for further management.     # AHHRF second to COPD vs HF Exacerbation   - CXR and RVP/ COVID PCR negative on admission   - s/p Solumedrol 40mg BID (6/7 - 6/9) and 40mg QD (6/10 - 6/14)   - s/p Zithromax x 5d- completed on 6/12- now on Zithromax 3x/week ppx  - Started Prednisone 30mg QD (6/15----) to continue daily  - c/w Spriva BID and Duonebs PRN  - Continue on NC QD and BIPAP QHS/ PRN   - Will trial Life 2000 positive pressure system prior to discharge (if needed)    # HFrEF 20-25 s/p ACID  - ECHO 4/2019 with EF 20-25% and severe global LVSD. Mild LAD, mild pHTN and moderate tricuspid regurgitation.   - c/w Hydralazine 75mg TID (home dose) for better BP control. - c/w ASA 81mg  - c/w Imdur 30mg QD   - BP well controlled - c/w Coreg 25mg q 12hr  - No Diuretics - 2D- Echo pending     # DVT PPX with Lovenox   # DISPO - PT rec home no needs. To discuss GOC with patient's son. Ultimate dispo to be determined. 72 YO F with PMH of HTN, HFrEF 20-25 (4/2019) s/p ICD, CAD, COPD on 3L O2 and nocturnal BiPAP and pHTN, p/w SOB x 1 day found to have acute on chronic hypercarbic respiratory failure in setting of advanced COPD vs HF? Admitted to [facility] for further management.     # AHHRF second to COPD vs HF Exacerbation   - CXR and RVP/ COVID PCR negative on admission   - s/p Solumedrol 40mg BID (6/7 - 6/9) and 40mg QD (6/10 - 6/14)   - s/p Zithromax x 5d- completed on 6/12- now on Zithromax 3x/week ppx  - Started Prednisone 30mg QD (6/15----) to continue daily  - c/w Spriva BID and Duonebs PRN  - Continue on NC QD and BIPAP QHS/ PRN   - Will trial Life 2000 positive pressure system prior to discharge (if needed)  - Palliative Care evaluation pending    # HFrEF 20-25 s/p ACID  - ECHO 4/2019 with EF 20-25% and severe global LVSD. Mild LAD, mild pHTN and moderate tricuspid regurgitation.   - c/w Hydralazine 75mg TID (home dose) for better BP control. - c/w ASA 81mg  - c/w Imdur 30mg QD   - BP well controlled - c/w Coreg 25mg q 12hr  - No Diuretics - 2D- Echo pending     # DVT PPX with Lovenox   # DISPO - PT rec home no needs. Team to discuss GOC with patient's son. Palliative Care evaluation pending. Final dispo TBD.

## 2021-06-17 NOTE — PROGRESS NOTE ADULT - SUBJECTIVE AND OBJECTIVE BOX
CHIEF COMPLAINT: Patient is a 71y old  Female who presents with a chief complaint of Dyspnea (16 Jun 2021 16:59)    INTERVAL EVENTS:     ROS: See above. Remaining ROS Negative    OBJECTIVE:  ICU Vital Signs Last 24 Hrs  T(C): 36.1 (17 Jun 2021 05:00), Max: 36.2 (16 Jun 2021 11:11)  T(F): 97 (17 Jun 2021 05:00), Max: 97.2 (16 Jun 2021 11:11)  HR: 81 (17 Jun 2021 05:00) (76 - 87)  BP: 128/70 (17 Jun 2021 05:00) (93/44 - 128/70)  BP(mean): --  ABP: --  ABP(mean): --  RR: 18 (17 Jun 2021 05:00) (18 - 20)  SpO2: 100% (17 Jun 2021 05:00) (95% - 100%)        06-16 @ 07:01  -  06-17 @ 06:56  --------------------------------------------------------  IN: 450 mL / OUT: 400 mL / NET: 50 mL      CAPILLARY BLOOD GLUCOSE          PHYSICAL EXAM:  General: NAD   Cards: S1/S2, no murmurs   Pulm: CTA bilaterally. No wheezes.   Abdomen: Soft, NTND. BS (+)   Extremities: No pedal edema. ELIZABETH of BL upper and lower extremities.  Neurology: AOx3 with no focal neurological deficits       HOSPITAL MEDICATIONS:  MEDICATIONS  (STANDING):  aspirin enteric coated 81 milliGRAM(s) Oral daily  azithromycin   Tablet 500 milliGRAM(s) Oral <User Schedule>  budesonide 160 MICROgram(s)/formoterol 4.5 MICROgram(s) Inhaler 2 Puff(s) Inhalation two times a day  carvedilol 25 milliGRAM(s) Oral every 12 hours  chlorhexidine 4% Liquid 1 Application(s) Topical daily  enoxaparin Injectable 30 milliGRAM(s) SubCutaneous daily  fluticasone propionate 50 MICROgram(s)/spray Nasal Spray 1 Spray(s) Both Nostrils two times a day  hydrALAZINE 75 milliGRAM(s) Oral every 8 hours  isosorbide   mononitrate ER Tablet (IMDUR) 30 milliGRAM(s) Oral daily  predniSONE   Tablet 30 milliGRAM(s) Oral daily  simvastatin 20 milliGRAM(s) Oral at bedtime  tiotropium 18 MICROgram(s) Capsule 1 Capsule(s) Inhalation daily    MEDICATIONS  (PRN):  acetaminophen   Tablet .. 650 milliGRAM(s) Oral every 6 hours PRN Mild Pain (1 - 3), Moderate Pain (4 - 6)  albuterol/ipratropium for Nebulization 3 milliLiter(s) Nebulizer every 6 hours PRN Shortness of Breath and/or Wheezing  morphine  - Injectable 2 milliGRAM(s) IV Push every 6 hours PRN SOB      LABS:                        10.0   7.33  )-----------( 216      ( 16 Jun 2021 07:57 )             33.9     06-16    145  |  100  |  34<H>  ----------------------------<  94  4.6   |  38<H>  |  0.56    Ca    9.2      16 Jun 2021 07:57  Phos  3.2     06-16  Mg     1.9     06-16             CHIEF COMPLAINT: Patient is a 71y old  Female who presents with a chief complaint of Dyspnea (16 Jun 2021 16:59)    INTERVAL EVENTS: No acute events overnight. Wore BIPAP device overnight. Back on nasal cannula this morning. Offers no new complaints.    ROS: See above. Remaining ROS Negative    OBJECTIVE:  ICU Vital Signs Last 24 Hrs  T(C): 36.1 (17 Jun 2021 05:00), Max: 36.2 (16 Jun 2021 11:11)  T(F): 97 (17 Jun 2021 05:00), Max: 97.2 (16 Jun 2021 11:11)  HR: 81 (17 Jun 2021 05:00) (76 - 87)  BP: 128/70 (17 Jun 2021 05:00) (93/44 - 128/70)  BP(mean): --  ABP: --  ABP(mean): --  RR: 18 (17 Jun 2021 05:00) (18 - 20)  SpO2: 100% (17 Jun 2021 05:00) (95% - 100%)        06-16 @ 07:01  -  06-17 @ 06:56  --------------------------------------------------------  IN: 450 mL / OUT: 400 mL / NET: 50 mL      CAPILLARY BLOOD GLUCOSE          PHYSICAL EXAM:  General: Drowsy appearing. Responds to verbal stimuli. In NAD.   Cards: S1/S2, no murmurs   Pulm: CTA bilaterally. No wheezes.   Abdomen: Soft, NTND. BS (+)   Extremities: No pedal edema. ELIZABETH of BL upper and lower extremities.  Neurology: AOx3 with no focal neurological deficits       HOSPITAL MEDICATIONS:  MEDICATIONS  (STANDING):  aspirin enteric coated 81 milliGRAM(s) Oral daily  azithromycin   Tablet 500 milliGRAM(s) Oral <User Schedule>  budesonide 160 MICROgram(s)/formoterol 4.5 MICROgram(s) Inhaler 2 Puff(s) Inhalation two times a day  carvedilol 25 milliGRAM(s) Oral every 12 hours  chlorhexidine 4% Liquid 1 Application(s) Topical daily  enoxaparin Injectable 30 milliGRAM(s) SubCutaneous daily  fluticasone propionate 50 MICROgram(s)/spray Nasal Spray 1 Spray(s) Both Nostrils two times a day  hydrALAZINE 75 milliGRAM(s) Oral every 8 hours  isosorbide   mononitrate ER Tablet (IMDUR) 30 milliGRAM(s) Oral daily  predniSONE   Tablet 30 milliGRAM(s) Oral daily  simvastatin 20 milliGRAM(s) Oral at bedtime  tiotropium 18 MICROgram(s) Capsule 1 Capsule(s) Inhalation daily    MEDICATIONS  (PRN):  acetaminophen   Tablet .. 650 milliGRAM(s) Oral every 6 hours PRN Mild Pain (1 - 3), Moderate Pain (4 - 6)  albuterol/ipratropium for Nebulization 3 milliLiter(s) Nebulizer every 6 hours PRN Shortness of Breath and/or Wheezing  morphine  - Injectable 2 milliGRAM(s) IV Push every 6 hours PRN SOB      LABS:                        10.0   7.33  )-----------( 216      ( 16 Jun 2021 07:57 )             33.9     06-16    145  |  100  |  34<H>  ----------------------------<  94  4.6   |  38<H>  |  0.56    Ca    9.2      16 Jun 2021 07:57  Phos  3.2     06-16  Mg     1.9     06-16             CHIEF COMPLAINT: Patient is a 71y old  Female who presents with a chief complaint of Dyspnea (16 Jun 2021 16:59)    INTERVAL EVENTS: No acute events overnight. Wore BIPAP device overnight. Back on nasal cannula this morning. Offers no new complaints.    ROS: See above. Remaining ROS Negative    OBJECTIVE:  ICU Vital Signs Last 24 Hrs  T(C): 36.1 (17 Jun 2021 05:00), Max: 36.2 (16 Jun 2021 11:11)  T(F): 97 (17 Jun 2021 05:00), Max: 97.2 (16 Jun 2021 11:11)  HR: 81 (17 Jun 2021 05:00) (76 - 87)  BP: 128/70 (17 Jun 2021 05:00) (93/44 - 128/70)  BP(mean): --  ABP: --  ABP(mean): --  RR: 18 (17 Jun 2021 05:00) (18 - 20)  SpO2: 100% (17 Jun 2021 05:00) (95% - 100%)        06-16 @ 07:01  -  06-17 @ 06:56  --------------------------------------------------------  IN: 450 mL / OUT: 400 mL / NET: 50 mL      CAPILLARY BLOOD GLUCOSE          PHYSICAL EXAM:  General: Drowsy appearing but responds to verbal stimuli. In NAD.   Cards: S1/S2, no murmurs   Pulm: Diminished bilaterally.   Abdomen: Soft, NTND.  Extremities: No pedal edema. ELIZABETH of BL upper and lower extremities.  Neurology: Follows commands, moves all extremities      HOSPITAL MEDICATIONS:  MEDICATIONS  (STANDING):  aspirin enteric coated 81 milliGRAM(s) Oral daily  azithromycin   Tablet 500 milliGRAM(s) Oral <User Schedule>  budesonide 160 MICROgram(s)/formoterol 4.5 MICROgram(s) Inhaler 2 Puff(s) Inhalation two times a day  carvedilol 25 milliGRAM(s) Oral every 12 hours  chlorhexidine 4% Liquid 1 Application(s) Topical daily  enoxaparin Injectable 30 milliGRAM(s) SubCutaneous daily  fluticasone propionate 50 MICROgram(s)/spray Nasal Spray 1 Spray(s) Both Nostrils two times a day  hydrALAZINE 75 milliGRAM(s) Oral every 8 hours  isosorbide   mononitrate ER Tablet (IMDUR) 30 milliGRAM(s) Oral daily  predniSONE   Tablet 30 milliGRAM(s) Oral daily  simvastatin 20 milliGRAM(s) Oral at bedtime  tiotropium 18 MICROgram(s) Capsule 1 Capsule(s) Inhalation daily    MEDICATIONS  (PRN):  acetaminophen   Tablet .. 650 milliGRAM(s) Oral every 6 hours PRN Mild Pain (1 - 3), Moderate Pain (4 - 6)  albuterol/ipratropium for Nebulization 3 milliLiter(s) Nebulizer every 6 hours PRN Shortness of Breath and/or Wheezing  morphine  - Injectable 2 milliGRAM(s) IV Push every 6 hours PRN SOB      LABS:                        10.0   7.33  )-----------( 216      ( 16 Jun 2021 07:57 )             33.9     06-16    145  |  100  |  34<H>  ----------------------------<  94  4.6   |  38<H>  |  0.56    Ca    9.2      16 Jun 2021 07:57  Phos  3.2     06-16  Mg     1.9     06-16             CHIEF COMPLAINT: Patient is a 71y old  Female who presents with a chief complaint of Dyspnea (16 Jun 2021 16:59)    INTERVAL EVENTS: No acute events overnight. Wore BIPAP device overnight. Back on nasal cannula this morning. Offers no new complaints.    ROS: See above. Remaining ROS Negative    OBJECTIVE:  ICU Vital Signs Last 24 Hrs  T(C): 36.1 (17 Jun 2021 05:00), Max: 36.2 (16 Jun 2021 11:11)  T(F): 97 (17 Jun 2021 05:00), Max: 97.2 (16 Jun 2021 11:11)  HR: 81 (17 Jun 2021 05:00) (76 - 87)  BP: 128/70 (17 Jun 2021 05:00) (93/44 - 128/70)  BP(mean): --  ABP: --  ABP(mean): --  RR: 18 (17 Jun 2021 05:00) (18 - 20)  SpO2: 100% (17 Jun 2021 05:00) (95% - 100%)        06-16 @ 07:01  -  06-17 @ 06:56  --------------------------------------------------------  IN: 450 mL / OUT: 400 mL / NET: 50 mL      CAPILLARY BLOOD GLUCOSE          PHYSICAL EXAM:  General: Lethargic but responds to verbal stimuli.  Cards: S1/S2, no murmurs   Pulm: Diminished bilaterally.   Abdomen: Soft, NTND.  Extremities: No pedal edema. ELIZABTEH of BL upper and lower extremities.  Neurology: Follows commands, moves all extremities      HOSPITAL MEDICATIONS:  MEDICATIONS  (STANDING):  aspirin enteric coated 81 milliGRAM(s) Oral daily  azithromycin   Tablet 500 milliGRAM(s) Oral <User Schedule>  budesonide 160 MICROgram(s)/formoterol 4.5 MICROgram(s) Inhaler 2 Puff(s) Inhalation two times a day  carvedilol 25 milliGRAM(s) Oral every 12 hours  chlorhexidine 4% Liquid 1 Application(s) Topical daily  enoxaparin Injectable 30 milliGRAM(s) SubCutaneous daily  fluticasone propionate 50 MICROgram(s)/spray Nasal Spray 1 Spray(s) Both Nostrils two times a day  hydrALAZINE 75 milliGRAM(s) Oral every 8 hours  isosorbide   mononitrate ER Tablet (IMDUR) 30 milliGRAM(s) Oral daily  predniSONE   Tablet 30 milliGRAM(s) Oral daily  simvastatin 20 milliGRAM(s) Oral at bedtime  tiotropium 18 MICROgram(s) Capsule 1 Capsule(s) Inhalation daily    MEDICATIONS  (PRN):  acetaminophen   Tablet .. 650 milliGRAM(s) Oral every 6 hours PRN Mild Pain (1 - 3), Moderate Pain (4 - 6)  albuterol/ipratropium for Nebulization 3 milliLiter(s) Nebulizer every 6 hours PRN Shortness of Breath and/or Wheezing  morphine  - Injectable 2 milliGRAM(s) IV Push every 6 hours PRN SOB      LABS:                        10.0   7.33  )-----------( 216      ( 16 Jun 2021 07:57 )             33.9     06-16    145  |  100  |  34<H>  ----------------------------<  94  4.6   |  38<H>  |  0.56    Ca    9.2      16 Jun 2021 07:57  Phos  3.2     06-16  Mg     1.9     06-16             CHIEF COMPLAINT: Patient is a 71y old  Female who presents with a chief complaint of Dyspnea (16 Jun 2021 16:59)    INTERVAL EVENTS: No acute events overnight. Wore BIPAP device overnight per report. Back on nasal cannula this morning. Offers no new complaints.    ROS: See above. Remaining ROS Negative    OBJECTIVE:  ICU Vital Signs Last 24 Hrs  T(C): 36.1 (17 Jun 2021 05:00), Max: 36.2 (16 Jun 2021 11:11)  T(F): 97 (17 Jun 2021 05:00), Max: 97.2 (16 Jun 2021 11:11)  HR: 81 (17 Jun 2021 05:00) (76 - 87)  BP: 128/70 (17 Jun 2021 05:00) (93/44 - 128/70)  BP(mean): --  ABP: --  ABP(mean): --  RR: 18 (17 Jun 2021 05:00) (18 - 20)  SpO2: 100% (17 Jun 2021 05:00) (95% - 100%)        06-16 @ 07:01  -  06-17 @ 06:56  --------------------------------------------------------  IN: 450 mL / OUT: 400 mL / NET: 50 mL      CAPILLARY BLOOD GLUCOSE          PHYSICAL EXAM:  General: Lethargic but responds to verbal stimuli.  Cards: S1/S2, no murmurs   Pulm: Diminished bilaterally.   Abdomen: Soft, NTND.  Extremities: No pedal edema. ELIZABETH of BL upper and lower extremities.  Neurology: Follows commands, moves all extremities      HOSPITAL MEDICATIONS:  MEDICATIONS  (STANDING):  aspirin enteric coated 81 milliGRAM(s) Oral daily  azithromycin   Tablet 500 milliGRAM(s) Oral <User Schedule>  budesonide 160 MICROgram(s)/formoterol 4.5 MICROgram(s) Inhaler 2 Puff(s) Inhalation two times a day  carvedilol 25 milliGRAM(s) Oral every 12 hours  chlorhexidine 4% Liquid 1 Application(s) Topical daily  enoxaparin Injectable 30 milliGRAM(s) SubCutaneous daily  fluticasone propionate 50 MICROgram(s)/spray Nasal Spray 1 Spray(s) Both Nostrils two times a day  hydrALAZINE 75 milliGRAM(s) Oral every 8 hours  isosorbide   mononitrate ER Tablet (IMDUR) 30 milliGRAM(s) Oral daily  predniSONE   Tablet 30 milliGRAM(s) Oral daily  simvastatin 20 milliGRAM(s) Oral at bedtime  tiotropium 18 MICROgram(s) Capsule 1 Capsule(s) Inhalation daily    MEDICATIONS  (PRN):  acetaminophen   Tablet .. 650 milliGRAM(s) Oral every 6 hours PRN Mild Pain (1 - 3), Moderate Pain (4 - 6)  albuterol/ipratropium for Nebulization 3 milliLiter(s) Nebulizer every 6 hours PRN Shortness of Breath and/or Wheezing  morphine  - Injectable 2 milliGRAM(s) IV Push every 6 hours PRN SOB      LABS:                        10.0   7.33  )-----------( 216      ( 16 Jun 2021 07:57 )             33.9     06-16    145  |  100  |  34<H>  ----------------------------<  94  4.6   |  38<H>  |  0.56    Ca    9.2      16 Jun 2021 07:57  Phos  3.2     06-16  Mg     1.9     06-16

## 2021-06-17 NOTE — PROCEDURE NOTE - INTERROGATION NOTE: VOLTAGE
Form from [employer] requesting PA for this medication.  Need to call [phone], ID # [id number].  Drug not covered.  Routed to PA dept  
It says no benefits for prescription are current  In meds and orders there is no \"request PA\" button for this medication.  Routed back to previous writer  
Medication Name: Onglyza    Medication is older than 60 days from the date written.  Please Verify RX Benefits, then reorder medication and hit the “Request PA” button located in Meds & Orders.      
Onglyza Denied     Per insurance, patient must try and fail Tradjenta before a prior auth for Onglyza can be attempted.  Patient doesn't show to have tried this preferred medication.    Formulary reason  Prescription Drug Insurance: [employer]  Denial reason:       Appeal information:     Please reply to telephone encounter on how to proceed with medication authorization.    
Onglyza Pending    Insurance response  Prescription Drug Insurance: [employer]  Notes: Prior authorization submitted - will update provider when decision has been made by insurance.           
Per my message below, there is no PA button because the order is greater then 60 days old. Please reorder the medication, then go back to Meds and Orders and hit the Request PA button.   
Please advise on below message. Thank you.  
Received PA fax for ONGLYZA. Put in MD box  
Refill per PA department request/instructions.    
Sent addition information to insurance company. Per insurance there will be an answer within 24 to 72 hours.  
Will try Tradjenta instead.  Rx sent.  
2.97

## 2021-06-17 NOTE — PROGRESS NOTE ADULT - ASSESSMENT
72 YO F with PMH of HTN, HFrEF 20-25 (4/2019) s/p ICD, CAD, COPD on 3L O2 and nocturnal BiPAP and pHTN, p/w SOB x 1 day found to have acute on chronic hypercarbic respiratory failure in setting of advanced COPD vs HF? Admitted to [facility] for further management.     # AHHRF second to COPD vs HF Exacerbation   - CXR and RVP/ COVID PCR negative on admission   - s/p Solumedrol 40mg BID (6/7 - 6/9) and 40mg QD (6/10 - 6/14)   - s/p Zithromax x 5d- completed on 6/12- now on Zithromax 3x/week ppx  - Started Prednisone 30mg QD (6/15----) to continue daily  - c/w Spriva BID and Duonebs PRN  - Continue on NC QD and BIPAP QHS/ PRN   - Will trial Life 2000 positive pressure system prior to discharge (if needed)  - Palliative Care evaluation pending    # HFrEF 20-25 s/p ACID  - ECHO 4/2019 with EF 20-25% and severe global LVSD. Mild LAD, mild pHTN and moderate tricuspid regurgitation.   - c/w Hydralazine 75mg TID (home dose) for better BP control. - c/w ASA 81mg  - c/w Imdur 30mg QD   - BP well controlled - c/w Coreg 25mg q 12hr  - No Diuretics - 2D- Echo pending     # DVT PPX with Lovenox   # DISPO - PT rec home no needs. Team to discuss GOC with patient's son. Palliative Care evaluation pending. Final dispo TBD. 72 YO F with PMH of HTN, HFrEF 20-25 (4/2019) s/p ICD, CAD, COPD on 3L O2 and nocturnal BiPAP and pHTN, p/w SOB x 1 day found to have acute on chronic hypercarbic respiratory failure in setting of advanced COPD vs HF? Admitted to [facility] for further management.     # AHHRF second to COPD vs HF Exacerbation   - CXR and RVP/ COVID PCR negative on admission   - s/p Solumedrol 40mg BID (6/7 - 6/9) and 40mg QD (6/10 - 6/14)   - s/p Zithromax x 5d- completed on 6/12- now on Zithromax 3x/week ppx  - C/w Prednisone 30mg QD (6/15----) to continue daily  - c/w Spriva BID and Duonebs PRN  - Continue on NC QD and BIPAP QHS/ PRN   - Will trial Life 2000 positive pressure system prior to discharge (if needed)  - Palliative Care evaluation pending    # HFrEF 20-25 s/p ACID  - ECHO 4/2019 with EF 20-25% and severe global LVSD. Mild LAD, mild pHTN and moderate tricuspid regurgitation.   - c/w Hydralazine 75mg TID (home dose) for better BP control. - c/w ASA 81mg  - c/w Imdur 30mg QD   - BP well controlled - c/w Coreg 25mg q 12hr  - No Diuretics - 2D- Echo pending     # DVT PPX with Lovenox   # DISPO - PT rec home no needs. Ongoing GOC discussion with patient's son. Palliative Care recommendations appreciated. Final dispo TBD. 70 YO F with PMH of HTN, HFrEF 20-25 (4/2019) s/p ICD, CAD, COPD on 3L O2 and nocturnal BiPAP and pHTN, p/w SOB x 1 day found to have acute on chronic hypercarbic respiratory failure in setting of advanced COPD vs HF? Admitted to [facility] for further management.     # AHHRF second to COPD vs HF Exacerbation   - CXR and RVP/ COVID PCR negative on admission   - s/p Solumedrol 40mg BID (6/7 - 6/9) and 40mg QD (6/10 - 6/14)   - s/p Zithromax x 5d- completed on 6/12- now on Zithromax 3x/week ppx  - C/w Prednisone 30mg QD (6/15----) to continue daily  - c/w Spriva BID and Duonebs PRN  - Continue on NC QD and BIPAP QHS/ PRN   - Will trial Life 2000 positive pressure system prior to discharge (if needed)  - Palliative Care following  - More lethargic today- ABG with PaCO2 above baseline, will place on BiPAP and recheck ABG    # HFrEF 20-25 s/p ACID  - ECHO 4/2019 with EF 20-25% and severe global LVSD. Mild LAD, mild pHTN and moderate tricuspid regurgitation.   - c/w Hydralazine 75mg TID (home dose) for better BP control. - c/w ASA 81mg  - c/w Imdur 30mg QD   - BP well controlled - c/w Coreg 25mg q 12hr  - No Diuretics - 2D- Echo pending     # DVT PPX with Lovenox   # DISPO - PT rec home no needs. Ongoing GOC discussion with patient's son. Palliative Care following. Final dispo TBD.

## 2021-06-17 NOTE — PROGRESS NOTE ADULT - ATTENDING COMMENTS
71 F with history of chronic severe systolic HF and VF s/p ICD and chronic hypoxemic / hypercapnic respiratory failure secondary to COPD on 3L NC ATC and nocturnal BiPAP presents with acute on chronic hypoxemic and hypercapnic respiratory failure in setting of COPD exacerbation. She remains markedly dyspneic. Will continue prednisone and azithromycin TIW. Continue Symbicort and Spiriva, Duonebs PRN. Using BiPAP qHS and PRN during daytime. Awaiting trial with [id number] if possible (awaiting repair). Her baseline functional status is poor and her current clinical state may represent a general worsening of her severe cardiopulmonary disease. This morning noted to be increasingly lethargic with pCO2 >100. Placed back on BiPAP with improvement in mental status. Awaiting repeat ABG. Updated patient's daughter in law today. She plans on traveling to [place] with her  (patient's son) tonight. Raised the possibility in pursuing hospice given her prognosis and severity of her symptoms, which patient and family will consider. Code status presently Full. Will continue current management and supportive care. Patient agrees to the use of morphine PRN for dyspnea. Remainder of plan as above.

## 2021-06-18 NOTE — PROGRESS NOTE ADULT - ATTENDING COMMENTS
71 F with history of chronic severe systolic HF and VF s/p ICD and chronic hypoxemic / hypercapnic respiratory failure secondary to COPD on 3L NC ATC and nocturnal BiPAP presents with acute on chronic hypoxemic and hypercapnic respiratory failure in setting of COPD exacerbation. She remains markedly dyspneic. Will continue prednisone and azithromycin TIW. Continue Symbicort and Spiriva, Duonebs PRN. Using BiPAP qHS and PRN during daytime. Awaiting trial with [id number] if possible (awaiting repair). Her baseline functional status is poor and her current clinical state may represent a general worsening of her severe cardiopulmonary disease. Prognosis is guarded. Awaiting family to come up from [place] to further discuss goals of care. Presently she is full code. Will continue current management and supportive care. Patient agrees to the use of morphine PRN for dyspnea. Remainder of plan as above. 71 F with history of chronic severe systolic HF and VF s/p ICD and chronic hypoxemic / hypercapnic respiratory failure secondary to COPD on 3L NC ATC and nocturnal BiPAP presents with acute on chronic hypoxemic and hypercapnic respiratory failure in setting of COPD exacerbation. She remains markedly dyspneic. Will change PO prednisone to IV Solumedrol. Continue azithromycin TIW. Continue Symbicort and Spiriva, Duonebs PRN. Using BiPAP qHS and PRN during daytime. Awaiting trial with [id number] if possible (awaiting repair). Her baseline functional status is poor and her current clinical state may represent a general worsening of her severe cardiopulmonary disease. Prognosis is guarded. Awaiting family to come up from [place] to further discuss goals of care. Presently she is full code. Will continue current management and supportive care. Patient agrees to the use of morphine PRN for dyspnea. Remainder of plan as above.

## 2021-06-18 NOTE — PROGRESS NOTE ADULT - ASSESSMENT
70 YO F with PMH of HTN, HFrEF 20-25 (4/2019) s/p ICD, CAD, COPD on 3L O2 and nocturnal BiPAP and pHTN, p/w SOB x 1 day found to have acute on chronic hypercarbic respiratory failure in setting of advanced COPD vs HF? Admitted to [facility] for further management.   Palliative Care consulted for complex decision making  related to goals of care discussions in the setting of advanced illness/ evaluation and management of symptoms

## 2021-06-18 NOTE — PROGRESS NOTE ADULT - SUBJECTIVE AND OBJECTIVE BOX
CHIEF COMPLAINT: Patient is a 71y old  Female who presents with a chief complaint of Dyspnea (17 Jun 2021 06:55)    INTERVAL EVENTS: No acute events overnight.    ROS: Seen by bedside during AM rounds     OBJECTIVE:  ICU Vital Signs Last 24 Hrs  T(C): 36.1 (18 Jun 2021 05:29), Max: 36.1 (17 Jun 2021 13:07)  T(F): 97 (18 Jun 2021 05:29), Max: 97 (17 Jun 2021 13:07)  HR: 98 (18 Jun 2021 05:29) (74 - 98)  BP: 170/99 (18 Jun 2021 05:29) (113/58 - 170/99)  BP(mean): --  ABP: --  ABP(mean): --  RR: 22 (18 Jun 2021 05:29) (18 - 22)  SpO2: 100% (18 Jun 2021 05:29) (93% - 100%)        06-17 @ 07:01  -  06-18 @ 07:00  --------------------------------------------------------  IN: 930 mL / OUT: 850 mL / NET: 80 mL      CAPILLARY BLOOD GLUCOSE          PHYSICAL EXAM:  General:   HEENT:   Lymph Nodes:  Neck:   Respiratory:   Cardiovascular:   Abdomen:   Extremities:   Skin:   Neurological:  Psychiatry:        HOSPITAL MEDICATIONS:  MEDICATIONS  (STANDING):  aspirin enteric coated 81 milliGRAM(s) Oral daily  azithromycin   Tablet 500 milliGRAM(s) Oral <User Schedule>  budesonide 160 MICROgram(s)/formoterol 4.5 MICROgram(s) Inhaler 2 Puff(s) Inhalation two times a day  carvedilol 25 milliGRAM(s) Oral every 12 hours  chlorhexidine 4% Liquid 1 Application(s) Topical daily  enoxaparin Injectable 30 milliGRAM(s) SubCutaneous daily  fluticasone propionate 50 MICROgram(s)/spray Nasal Spray 1 Spray(s) Both Nostrils two times a day  hydrALAZINE 75 milliGRAM(s) Oral every 8 hours  isosorbide   mononitrate ER Tablet (IMDUR) 30 milliGRAM(s) Oral daily  polyethylene glycol 3350 17 Gram(s) Oral daily  predniSONE   Tablet 30 milliGRAM(s) Oral daily  senna 2 Tablet(s) Oral at bedtime  simvastatin 20 milliGRAM(s) Oral at bedtime  tiotropium 18 MICROgram(s) Capsule 1 Capsule(s) Inhalation daily    MEDICATIONS  (PRN):  acetaminophen   Tablet .. 650 milliGRAM(s) Oral every 6 hours PRN Mild Pain (1 - 3), Moderate Pain (4 - 6)  albuterol/ipratropium for Nebulization 3 milliLiter(s) Nebulizer every 6 hours PRN Shortness of Breath and/or Wheezing  morphine  - Injectable 1 milliGRAM(s) IV Push every 6 hours PRN SOB      LABS:                        10.0   7.33  )-----------( 216      ( 16 Jun 2021 07:57 )             33.9     06-16    145  |  100  |  34<H>  ----------------------------<  94  4.6   |  38<H>  |  0.56    Ca    9.2      16 Jun 2021 07:57  Phos  3.2     06-16  Mg     1.9     06-16          Arterial Blood Gas:  06-17 @ 13:40  7.36/77/179/39/99.3/16.2  ABG lactate: --  Arterial Blood Gas:  06-17 @ 10:20  7.25/107/158/39/98.9/17.8  ABG lactate: --     CHIEF COMPLAINT: Patient is a 71y old  Female who presents with a chief complaint of Dyspnea (17 Jun 2021 06:55)    INTERVAL EVENTS: No acute events overnight. Wore BiPAP overnight.    ROS: See above. Remaining ROS negative.    OBJECTIVE:  ICU Vital Signs Last 24 Hrs  T(C): 36.1 (18 Jun 2021 05:29), Max: 36.1 (17 Jun 2021 13:07)  T(F): 97 (18 Jun 2021 05:29), Max: 97 (17 Jun 2021 13:07)  HR: 98 (18 Jun 2021 05:29) (74 - 98)  BP: 170/99 (18 Jun 2021 05:29) (113/58 - 170/99)  BP(mean): --  ABP: --  ABP(mean): --  RR: 22 (18 Jun 2021 05:29) (18 - 22)  SpO2: 100% (18 Jun 2021 05:29) (93% - 100%)        06-17 @ 07:01  -  06-18 @ 07:00  --------------------------------------------------------  IN: 930 mL / OUT: 850 mL / NET: 80 mL      CAPILLARY BLOOD GLUCOSE          PHYSICAL EXAM:  General: Thin elderly female laying in stretcher. Lethargic but arousable  Cards: S1/S2, no murmurs   Pulm: Diminished BS bilaterally. On NC saturating well.   Abdomen: Soft, NTND  Extremities: No pedal edema.   Neurology: Moves all extremities.        HOSPITAL MEDICATIONS:  MEDICATIONS  (STANDING):  aspirin enteric coated 81 milliGRAM(s) Oral daily  azithromycin   Tablet 500 milliGRAM(s) Oral <User Schedule>  budesonide 160 MICROgram(s)/formoterol 4.5 MICROgram(s) Inhaler 2 Puff(s) Inhalation two times a day  carvedilol 25 milliGRAM(s) Oral every 12 hours  chlorhexidine 4% Liquid 1 Application(s) Topical daily  enoxaparin Injectable 30 milliGRAM(s) SubCutaneous daily  fluticasone propionate 50 MICROgram(s)/spray Nasal Spray 1 Spray(s) Both Nostrils two times a day  hydrALAZINE 75 milliGRAM(s) Oral every 8 hours  isosorbide   mononitrate ER Tablet (IMDUR) 30 milliGRAM(s) Oral daily  polyethylene glycol 3350 17 Gram(s) Oral daily  predniSONE   Tablet 30 milliGRAM(s) Oral daily  senna 2 Tablet(s) Oral at bedtime  simvastatin 20 milliGRAM(s) Oral at bedtime  tiotropium 18 MICROgram(s) Capsule 1 Capsule(s) Inhalation daily    MEDICATIONS  (PRN):  acetaminophen   Tablet .. 650 milliGRAM(s) Oral every 6 hours PRN Mild Pain (1 - 3), Moderate Pain (4 - 6)  albuterol/ipratropium for Nebulization 3 milliLiter(s) Nebulizer every 6 hours PRN Shortness of Breath and/or Wheezing  morphine  - Injectable 1 milliGRAM(s) IV Push every 6 hours PRN SOB      LABS:                        10.0   7.33  )-----------( 216      ( 16 Jun 2021 07:57 )             33.9     06-16    145  |  100  |  34<H>  ----------------------------<  94  4.6   |  38<H>  |  0.56    Ca    9.2      16 Jun 2021 07:57  Phos  3.2     06-16  Mg     1.9     06-16          Arterial Blood Gas:  06-17 @ 13:40  7.36/77/179/39/99.3/16.2  ABG lactate: --  Arterial Blood Gas:  06-17 @ 10:20  7.25/107/158/39/98.9/17.8  ABG lactate: --

## 2021-06-18 NOTE — PROGRESS NOTE ADULT - ASSESSMENT
72 YO F with PMH of HTN, HFrEF 20-25 (4/2019) s/p ICD, CAD, COPD on 3L O2 and nocturnal BiPAP and pHTN, p/w SOB x 1 day found to have acute on chronic hypercarbic respiratory failure in setting of advanced COPD vs HF? Admitted to [facility] for further management.     # AHHRF second to COPD vs HF Exacerbation   - CXR and RVP/ COVID PCR negative on admission   - s/p Solumedrol 40mg BID (6/7 - 6/9) and 40mg QD (6/10 - 6/14)   - s/p Zithromax x 5d- completed on 6/12- now on Zithromax 3x/week ppx  - C/w Prednisone 30mg QD (6/15----) to continue daily  - c/w Spriva BID and Duonebs PRN  - Continue on NC QD and BIPAP QHS/ PRN   - Will trial Life 2000 positive pressure system prior to discharge (if needed)  - Palliative Care following  - More lethargic today- ABG with PaCO2 above baseline, will place on BiPAP and recheck ABG    # HFrEF 20-25 s/p ACID  - ECHO 4/2019 with EF 20-25% and severe global LVSD. Mild LAD, mild pHTN and moderate tricuspid regurgitation.   - c/w Hydralazine 75mg TID (home dose) for better BP control. - c/w ASA 81mg  - c/w Imdur 30mg QD   - BP well controlled - c/w Coreg 25mg q 12hr  - No Diuretics - 2D- Echo pending     # DVT PPX with Lovenox   # DISPO - PT rec home no needs. Ongoing GOC discussion with patient's son. Palliative Care following. Final dispo TBD. 70 YO F with PMH of HTN, HFrEF 20-25 (4/2019) s/p ICD, CAD, COPD on 3L O2 and nocturnal BiPAP and pHTN, p/w SOB x 1 day found to have acute on chronic hypercarbic respiratory failure in setting of advanced COPD vs HF? Admitted to [facility]CU for further management.     # AHHRF second to COPD vs HF Exacerbation   - CXR and RVP/ COVID PCR negative on admission   - s/p Solumedrol 40mg BID (6/7 - 6/9) and 40mg QD (6/10 - 6/14)   - s/p Zithromax x 5d- completed on 6/12- now on Zithromax 3x/week ppx  - S/P Prednisone 30mg QD (6/15- 6/18)- switch to IV solumedrol 40mg QD today  - c/w Spriva BID and Duonebs PRN  - Continue on NC QD and BIPAP QHS/ PRN   - Will trial Life 2000 positive pressure system prior to discharge (once repaired)  - Palliative Care following  - Lethargic today- ABG with PaCO2 93, placed back on BiPAP    # HFrEF 20-25 s/p ACID  - ECHO 4/2019 with EF 20-25% and severe global LVSD. Mild LAD, mild pHTN and moderate tricuspid regurgitation.   - c/w Hydralazine 75mg TID (home dose) for better BP control. - c/w ASA 81mg  - c/w Imdur 30mg QD   - BP well controlled - c/w Coreg 25mg q 12hr  - No Diuretics - 2D- Echo pending     # DVT PPX with Lovenox   # DISPO - PT rec home no needs. Patient's son and daughter-in-law traveling to [place] to see patient. Ongoing C discussion. Palliative Care following. Final dispo TBD.

## 2021-06-18 NOTE — PROGRESS NOTE ADULT - TIME BILLING
chart review, clinical assessment, discussing plan with team, updating patient/family on plan of care
Review of results/records, medical management as above, discussion with team, and discharge planning.
chart review, clinical assessment, discussing plan with [facility]CU team, updating patient on plan of care
chart review, clinical assessment, discussing plan with team, updating patient/family on plan of care
Review of results/records, medical management as above, discussion with team, and discharge planning.

## 2021-06-18 NOTE — PROGRESS NOTE ADULT - SUBJECTIVE AND OBJECTIVE BOX
SUBJECTIVE AND OBJECTIVE:  pt increased lethargy.  +work of breathing.    INTERVAL HPI/OVERNIGHT EVENTS:    DNR on chart:   Allergies    No Known Allergies    Intolerances    MEDICATIONS  (STANDING):  aspirin enteric coated 81 milliGRAM(s) Oral daily  azithromycin   Tablet 500 milliGRAM(s) Oral <User Schedule>  budesonide 160 MICROgram(s)/formoterol 4.5 MICROgram(s) Inhaler 2 Puff(s) Inhalation two times a day  carvedilol 25 milliGRAM(s) Oral every 12 hours  chlorhexidine 4% Liquid 1 Application(s) Topical daily  enoxaparin Injectable 30 milliGRAM(s) SubCutaneous daily  fluticasone propionate 50 MICROgram(s)/spray Nasal Spray 1 Spray(s) Both Nostrils two times a day  hydrALAZINE 75 milliGRAM(s) Oral every 8 hours  isosorbide   mononitrate ER Tablet (IMDUR) 30 milliGRAM(s) Oral daily  polyethylene glycol 3350 17 Gram(s) Oral daily  predniSONE   Tablet 30 milliGRAM(s) Oral daily  senna 2 Tablet(s) Oral at bedtime  simvastatin 20 milliGRAM(s) Oral at bedtime  tiotropium 18 MICROgram(s) Capsule 1 Capsule(s) Inhalation daily    MEDICATIONS  (PRN):  acetaminophen   Tablet .. 650 milliGRAM(s) Oral every 6 hours PRN Mild Pain (1 - 3), Moderate Pain (4 - 6)  albuterol/ipratropium for Nebulization 3 milliLiter(s) Nebulizer every 6 hours PRN Shortness of Breath and/or Wheezing  morphine  - Injectable 1 milliGRAM(s) IV Push every 6 hours PRN SOB      ITEMS UNCHECKED ARE NOT PRESENT    PRESENT SYMPTOMS: [ ]Unable to obtain due to poor mentation   Source if other than patient:  [ ]Family   [ ]Team     Pain (Impact on QOL):  denies  Location:  Minimal acceptable level (0-10 scale):            Aggravating factors:  Quality:  Radiation:  Severity (0-10 scale):    Timing:    Dyspnea:                           [ ]Mild [ ]Moderate [x ]Severe  Anxiety:                             [ ]Mild [ ]Moderate [ ]Severe  Fatigue:                             [ ]Mild [ ]Moderate [x ]Severe  Nausea:                             [ ]Mild [ ]Moderate [ ]Severe  Loss of appetite:              [ ]Mild [ ]Moderate [ x]Severe  Constipation:                    [ ]Mild [ ]Moderate [ ]Severe    PAIN AD Score:	  [email or web address] (Ctrl + left click to view)    Other Symptoms:  [x ]All other review of systems negative     Karnofsky Performance Score/Palliative Performance Status Version 2:  40       %    http://palliative.info/resource_material/[email or web address].pdf  PHYSICAL EXAM:  Vital Signs Last 24 Hrs  T(C): 36.1 (18 Jun 2021 05:29), Max: 36.1 (17 Jun 2021 13:07)  T(F): 97 (18 Jun 2021 05:29), Max: 97 (17 Jun 2021 13:07)  HR: 74 (18 Jun 2021 11:38) (74 - 98)  BP: 109/70 (18 Jun 2021 11:15) (109/70 - 170/99)  BP(mean): --  RR: 20 (18 Jun 2021 11:15) (18 - 22)  SpO2: 100% (18 Jun 2021 11:38) (93% - 100%) I&O's Summary    17 Jun 2021 07:01  -  18 Jun 2021 07:00  --------------------------------------------------------  IN: 930 mL / OUT: 850 mL / NET: 80 mL     GENERAL:  [x ]Alert  [ ]Oriented x   [ x]Lethargic  [ ]Cachexia  [ ]Unarousable  [ ]Verbal  [ ]Non-Verbal    Behavioral:   [ ] Anxiety  [ ] Delirium [ ] Agitation [ ] Other    HEENT:  [ ]Normal   [ x]Dry mouth   [ ]ET Tube/Trach  [ ]Oral lesions    PULMONARY:   [ ]Clear [x ]Tachypnea  [ ]Audible excessive secretions   [ ]Rhonchi        [ ]Right [ ]Left [ ]Bilateral  [ ]Crackles        [ ]Right [ ]Left [ ]Bilateral  [ x]Wheezing     [ ]Right [ ]Left [x ]Bilateral    CARDIOVASCULAR:    [ x]Regular [ ]Irregular [ ]Tachy  [ ]Dg [ ]Murmur [ ]Other    GASTROINTESTINAL:  [ x]Soft  [ ]Distended   [x ]+BS  [x ]Non tender [ ]Tender  [ ]PEG [ ]OGT/ NGT   Last BM:    GENITOURINARY:  [ ]Normal [x ] Incontinent   [ ]Oliguria/Anuria   [ ]Meredith    MUSCULOSKELETAL:   [ ]Normal   [ x]Weakness  [ ]Bed/Wheelchair bound [ ]Edema    NEUROLOGIC:   [ ]No focal deficits  [x ] Cognitive impairment  [ ] Dysphagia [ ]Dysarthria [ ] Paresis [ ]Other     SKIN:   [x ]Normal   [ ]Pressure ulcer(s)  [ ]Rash    CRITICAL CARE:  [ ] Shock Present  [ ]Septic [ ]Cardiogenic [ ]Neurologic [ ]Hypovolemic  [ ]  Vasopressors [ ]  Inotropes   [ ] Respiratory failure present  [ ] Acute  [ ] Chronic [ ] Hypoxic  [ ] Hypercarbic [ ] Other  [ ] Other organ failure     LABS:            RADIOLOGY & ADDITIONAL STUDIES:    Protein Calorie Malnutrition Present: [ ] yes [ ] no  [ ] PPSV2 < or = 30%  [ ] significant weight loss [ ] poor nutritional intake [ ] anasarca [ ] catabolic state Albumin, Serum: 3.3 g/dL (06-11-21 @ 07:47)  Artificial Nutrition [ ]     REFERRALS:   [ ]Chaplaincy  [ ] Hospice  [ ]Child Life  [ ]Social Work  [ ]Case management [ ]Holistic Therapy   Goals of Care Document: FANI Britt (06-16-21 @ 14:14)  Goals of Care Conversation:   Participants:  · Participants  Family  · Relative  Hannah Jalloh    Advance Directives:  · Does patient have Advance Directive  Yes  · Indicate Type  Health Care Proxy (HCP)  · Agent's Name  Hannah Jalloh  · Phone #  [phone]  · If HCP is not on chart, identify the persons name and phone number contacted to bring in document  No paperwork to confirm. Hannah states that patient has paperwork. Will inquire.  · Are any of the items on the chart  No  · Does Patient Have a Surrogate  No  · Does the Patient have a Court Appointed Guardian ([id number])  No  · Caregiver:  no    Conversation Discussion:  · Conversation  Diagnosis; Prognosis; Treatment Options  · Conversation Details  Updated Hannah (daughter in law) on plan of care. Discussed overall prognosis and treatment options. Conveyed that Mr. Jalloh has severe pulmonary disease and given her concomitant severe chronic heart failure her overall prognosis is guarded. We discussed code status and the option to look into hospice. For now Hannah would like Ms. Jalloh to remain full code and to continue current management. She will discuss the case further with her  (patient's son Genaro Jalloh) and be in touch with us should they make any further decisions.    Personal Advance Directives Treatment Guidelines:   Treatment Guidelines:  · Decision Maker  Patient  · Treatment Guideline Comments  Patient is alert and oriented. She states she defers medical decision making to her daughter in law (Hannah Jalloh) and her son (Genaro Jalloh).    MOLST:  · Completed  11-Nov-2020  · Updated  16-Feb-2021    Location of Discussion:   Duration of Advanced Care Planning Meeting:  · Time spent (in minutes)  30    Location of Discussion:  · Location of discussion  Telephone      Electronic Signatures:  Rigo Britt)  (Signed 16-Jun-2021 14:19)  	Authored: Goals of Care Conversation, Personal Advance Directives Treatment Guidelines, Location of Discussion      Last Updated: 16-Jun-2021 14:19 by Rigo Britt)

## 2021-06-19 NOTE — PROVIDER CONTACT NOTE (CRITICAL VALUE NOTIFICATION) - ASSESSMENT
Pt alert and oriented x4. Pt VSS
Pt pH is 7.3 from ABG; pt A&Ox4 and lethargic; pt arouses to gentle shaking
pt. is alert. not in respiratory distress. no change in LOC.
Pt alert and oriented x4. Pt VSS
Pt pH is 7.25 from ABG; pt A&Ox4 and lethargic

## 2021-06-19 NOTE — PROGRESS NOTE ADULT - ASSESSMENT
70 YO F with PMH of HTN, HFrEF 20-25 (4/2019) s/p ICD, CAD, COPD on 3L O2 and nocturnal BiPAP and pHTN, p/w SOB x 1 day found to have acute on chronic hypercarbic respiratory failure in setting of advanced COPD vs HF? Admitted to RCU for further management.     # AHHRF second to COPD vs HF Exacerbation   - CXR and RVP/ COVID PCR negative on admission   - s/p Solumedrol 40mg BID (6/7 - 6/9) and 40mg QD (6/10 - 6/14)   - s/p Zithromax x 5d- completed on 6/12- now on Zithromax 3x/week ppx  - S/P Prednisone 30mg QD (6/15- 6/18)- switch to IV solumedrol 40mg QD today  - c/w Spriva BID and Duonebs PRN  - Continue on NC QD and BIPAP QHS/ PRN   - Will trial Life 2000 positive pressure system prior to discharge (once repaired)  - Palliative Care following  - Lethargic today- ABG with PaCO2 93, placed back on BiPAP    # HFrEF 20-25 s/p ACID  - ECHO 4/2019 with EF 20-25% and severe global LVSD. Mild LAD, mild pHTN and moderate tricuspid regurgitation.   - c/w Hydralazine 75mg TID (home dose) for better BP control. - c/w ASA 81mg  - c/w Imdur 30mg QD   - BP well controlled - c/w Coreg 25mg q 12hr  - No Diuretics - 2D- Echo pending     # DVT PPX with Lovenox   # DISPO - PT rec home no needs. Patient's son and daughter-in-law traveling to [place] to see patient. Ongoing C discussion. Palliative Care following. Final dispo TBD. 72 YO F with PMH of HTN, HFrEF 20-25 (4/2019) s/p ICD, CAD, COPD on 3L O2 and nocturnal BiPAP and pHTN, p/w SOB x 1 day found to have acute on chronic hypercarbic respiratory failure in setting of advanced COPD vs HF? Admitted to RCU for further management.     # AHHRF second to COPD vs HF Exacerbation   - CXR and RVP/ COVID PCR negative on admission   - s/p Solumedrol 40mg BID (6/7 - 6/9) and 40mg QD (6/10 - 6/14)   - s/p Zithromax x 5d- completed on 6/12- now on Zithromax 3x/week ppx  - S/P Prednisone 30mg QD (6/15- 6/18)- switch to IV solumedrol 40mg QD today  - c/w Spriva BID and Duonebs PRN  - Continue on NC QD and BIPAP QHS/ PRN   - Will trial Life 2000 positive pressure system prior to discharge (once repaired)  - Palliative Care following  - Lethargic today- ABG with PaCO2 93, placed back on BiPAP - pt using intermittently  - Son HCP visited pt and made pt DNR /DNI and is aware of her grave condition     # HFrEF 20-25 s/p ACID  - ECHO 4/2019 with EF 20-25% and severe global LVSD. Mild LAD, mild pHTN and moderate tricuspid regurgitation.   - c/w Hydralazine 75mg TID (home dose) for better BP control. - c/w ASA 81mg  - c/w Imdur 30mg QD   - BP well controlled - c/w Coreg 25mg q 12hr  - No Diuretics - 2D- Echo pending     # DVT PPX with Lovenox   # DISPO - PT rec home no needs. however pt condition has declined and is full carePatient's son and daughter-in-law traveled to [place] to see patient. And DNR/DNI  paperwork  completed  Son would like pt to go to [facility] for hospice care.  His grandmother is already in the facility  Palliative Care following. Final dispo TBD.

## 2021-06-19 NOTE — PROVIDER CONTACT NOTE (CRITICAL VALUE NOTIFICATION) - ACTION/TREATMENT ORDERED:
Place patient back on BIPAP; cont to monitor patient
Place patient back on BIPAP; cont to monitor patient
Leave pt on 30% BIPAP. Pt able to take short breaks for oral care and meds; maintain NPO status
repeat labs will be ordered
no new recommendation.

## 2021-06-19 NOTE — PROGRESS NOTE ADULT - SUBJECTIVE AND OBJECTIVE BOX
CHIEF COMPLAINT:    Interval Events:    REVIEW OF SYSTEMS:  Constitutional:   Eyes:  ENT:  CV:  Resp:  GI:  :  MSK:  Integumentary:  Neurological:  Psychiatric:  Endocrine:  Hematologic/Lymphatic:  Allergic/Immunologic:  [ ] All other systems negative  [ ] Unable to assess ROS because ________    OBJECTIVE:  ICU Vital Signs Last 24 Hrs  T(C): 36.7 (19 Jun 2021 03:53), Max: 36.8 (18 Jun 2021 22:11)  T(F): 98.1 (19 Jun 2021 03:53), Max: 98.2 (18 Jun 2021 22:11)  HR: 93 (19 Jun 2021 04:02) (74 - 100)  BP: 175/95 (19 Jun 2021 05:40) (100/87 - 177/99)  BP(mean): --  ABP: --  ABP(mean): --  RR: 22 (19 Jun 2021 03:53) (20 - 22)  SpO2: 97% (19 Jun 2021 04:02) (93% - 100%)        06-17 @ 07:01  -  06-18 @ 07:00  --------------------------------------------------------  IN: 930 mL / OUT: 850 mL / NET: 80 mL    06-18 @ 07:01  -  06-19 @ 06:50  --------------------------------------------------------  IN: 240 mL / OUT: 300 mL / NET: -60 mL      CAPILLARY BLOOD GLUCOSE          PHYSICAL EXAM:  General:   HEENT:   Lymph Nodes:  Neck:   Respiratory:   Cardiovascular:   Abdomen:   Extremities:   Skin:   Neurological:  Psychiatry:    HOSPITAL MEDICATIONS:  MEDICATIONS  (STANDING):  aspirin enteric coated 81 milliGRAM(s) Oral daily  azithromycin   Tablet 500 milliGRAM(s) Oral <User Schedule>  budesonide 160 MICROgram(s)/formoterol 4.5 MICROgram(s) Inhaler 2 Puff(s) Inhalation two times a day  carvedilol 25 milliGRAM(s) Oral every 12 hours  chlorhexidine 4% Liquid 1 Application(s) Topical daily  enoxaparin Injectable 30 milliGRAM(s) SubCutaneous daily  fluticasone propionate 50 MICROgram(s)/spray Nasal Spray 1 Spray(s) Both Nostrils two times a day  hydrALAZINE 75 milliGRAM(s) Oral every 8 hours  isosorbide   mononitrate ER Tablet (IMDUR) 30 milliGRAM(s) Oral daily  LORazepam   Injectable 0.25 milliGRAM(s) IV Push once  methylPREDNISolone sodium succinate Injectable 40 milliGRAM(s) IV Push daily  polyethylene glycol 3350 17 Gram(s) Oral daily  senna 2 Tablet(s) Oral at bedtime  simvastatin 20 milliGRAM(s) Oral at bedtime  tiotropium 18 MICROgram(s) Capsule 1 Capsule(s) Inhalation daily    MEDICATIONS  (PRN):  acetaminophen   Tablet .. 650 milliGRAM(s) Oral every 6 hours PRN Mild Pain (1 - 3), Moderate Pain (4 - 6)  albuterol/ipratropium for Nebulization 3 milliLiter(s) Nebulizer every 6 hours PRN Shortness of Breath and/or Wheezing  morphine  - Injectable 1 milliGRAM(s) IV Push every 6 hours PRN SOB      LABS:                        10.0   6.45  )-----------( 198      ( 18 Jun 2021 13:35 )             33.2     06-18    144  |  98  |  32<H>  ----------------------------<  123<H>  4.4   |  39<H>  |  0.47<L>    Ca    9.3      18 Jun 2021 13:35    TPro  5.7<L>  /  Alb  3.7  /  TBili  0.6  /  DBili  x   /  AST  13  /  ALT  22  /  AlkPhos  45  06-18        Arterial Blood Gas:  06-19 @ 04:32  7.38/77/176/40/99.1/17.8  ABG lactate: --  Arterial Blood Gas:  06-18 @ 13:35  7.30/93/76/39/95.8/17.6  ABG lactate: --  Arterial Blood Gas:  06-17 @ 13:40  7.36/77/179/39/99.3/16.2  ABG lactate: --  Arterial Blood Gas:  06-17 @ 10:20  7.25/107/158/39/98.9/17.8  ABG lactate: --        MICROBIOLOGY:     RADIOLOGY:  [ ] Reviewed and interpreted by me    PULMONARY FUNCTION TESTS:    EKG: CHIEF COMPLAINT: Patient is a 71y old  Female who presents with a chief complaint of Dyspnea (19 Jun 2021 06:50)      Interval Events: pt seen at bedside .Son in to visit from [facility] signed DNR/DNI    REVIEW OF SYSTEMS:  [x ] Unable to assess ROS because AMS    OBJECTIVE:  ICU Vital Signs Last 24 Hrs  T(C): 36.7 (19 Jun 2021 03:53), Max: 36.8 (18 Jun 2021 22:11)  T(F): 98.1 (19 Jun 2021 03:53), Max: 98.2 (18 Jun 2021 22:11)  HR: 93 (19 Jun 2021 04:02) (74 - 100)  BP: 175/95 (19 Jun 2021 05:40) (100/87 - 177/99)  BP(mean): --  ABP: --  ABP(mean): --  RR: 22 (19 Jun 2021 03:53) (20 - 22)  SpO2: 97% (19 Jun 2021 04:02) (93% - 100%)        06-17 @ 07:01  -  06-18 @ 07:00  --------------------------------------------------------  IN: 930 mL / OUT: 850 mL / NET: 80 mL    06-18 @ 07:01  -  06-19 @ 06:50  --------------------------------------------------------  IN: 240 mL / OUT: 300 mL / NET: -60 mL      CAPILLARY BLOOD GLUCOSE    HOSPITAL MEDICATIONS:  MEDICATIONS  (STANDING):  aspirin enteric coated 81 milliGRAM(s) Oral daily  azithromycin   Tablet 500 milliGRAM(s) Oral <User Schedule>  budesonide 160 MICROgram(s)/formoterol 4.5 MICROgram(s) Inhaler 2 Puff(s) Inhalation two times a day  carvedilol 25 milliGRAM(s) Oral every 12 hours  chlorhexidine 4% Liquid 1 Application(s) Topical daily  enoxaparin Injectable 30 milliGRAM(s) SubCutaneous daily  fluticasone propionate 50 MICROgram(s)/spray Nasal Spray 1 Spray(s) Both Nostrils two times a day  hydrALAZINE 75 milliGRAM(s) Oral every 8 hours  isosorbide   mononitrate ER Tablet (IMDUR) 30 milliGRAM(s) Oral daily  LORazepam   Injectable 0.25 milliGRAM(s) IV Push once  methylPREDNISolone sodium succinate Injectable 40 milliGRAM(s) IV Push daily  polyethylene glycol 3350 17 Gram(s) Oral daily  senna 2 Tablet(s) Oral at bedtime  simvastatin 20 milliGRAM(s) Oral at bedtime  tiotropium 18 MICROgram(s) Capsule 1 Capsule(s) Inhalation daily    MEDICATIONS  (PRN):  acetaminophen   Tablet .. 650 milliGRAM(s) Oral every 6 hours PRN Mild Pain (1 - 3), Moderate Pain (4 - 6)  albuterol/ipratropium for Nebulization 3 milliLiter(s) Nebulizer every 6 hours PRN Shortness of Breath and/or Wheezing  morphine  - Injectable 1 milliGRAM(s) IV Push every 6 hours PRN SOB      LABS:                        10.0   6.45  )-----------( 198      ( 18 Jun 2021 13:35 )             33.2     06-18    144  |  98  |  32<H>  ----------------------------<  123<H>  4.4   |  39<H>  |  0.47<L>    Ca    9.3      18 Jun 2021 13:35    TPro  5.7<L>  /  Alb  3.7  /  TBili  0.6  /  DBili  x   /  AST  13  /  ALT  22  /  AlkPhos  45  06-18        Arterial Blood Gas:  06-19 @ 04:32  7.38/77/176/40/99.1/17.8  ABG lactate: --  Arterial Blood Gas:  06-18 @ 13:35  7.30/93/76/39/95.8/17.6  ABG lactate: --  Arterial Blood Gas:  06-17 @ 13:40  7.36/77/179/39/99.3/16.2  ABG lactate: --  Arterial Blood Gas:  06-17 @ 10:20  7.25/107/158/39/98.9/17.8  ABG lactate: --        MICROBIOLOGY:     RADIOLOGY:  [ ] Reviewed and interpreted by me    PULMONARY FUNCTION TESTS:    EKG:

## 2021-06-19 NOTE — PROGRESS NOTE ADULT - ATTENDING COMMENTS
71 F with a h/o chronic severe HFrEF, VF s/p ICD, chronic hypoxemic and hypercapnic respiratory failure secondary to COPD on 3L NC ATC and nocturnal BiPAP who presented with acute on chronic hypoxemic and hypercapnic respiratory failure in setting of COPD exacerbation.   She remains markedly dyspneic- used morphine this morning with relief  Receiving IV Solumedrol.   Continue azithromycin TIW, Symbicort and Spiriva, Duonebs PRN.   Using BiPAP qHS and intermittently during daytime. Awaiting trial with [id number] if possible (awaiting repair).   Her baseline functional status is poor and her current clinical state may represent a general worsening of her severe cardiopulmonary disease. Prognosis is guarded. Awaiting family to come up from [place] to further discuss goals of care.   Appreciate Palliative care input.   Remains full code. Will continue current management and supportive care. Patient agrees to the . Remainder of plan as above.     Time-based billing (NON-critical care).     35 minutes spent on total encounter; more than 50% of the visit was spent counseling and / or coordinating care by the attending physician.  The necessity of the time spent during the encounter on this date of service was due to:     Medical record review, clinical assessment, discussing plan with team

## 2021-06-20 NOTE — PROGRESS NOTE ADULT - MENTAL STATUS
Lethargic but arousable to verbal stimuli opens eyes does not follow commands
Awake, follows simple commands
Lethargic opens eyes to tactile stimuli

## 2021-06-20 NOTE — PROVIDER CONTACT NOTE (OTHER) - NAME OF MD/NP/PA/DO NOTIFIED:
Anabella Worthington. PA
DARYL Estes
Celi Bhatia NP
DARYL Castro
Celi Bhatia NP
Celi Bhatia NP
DARYL Estes and Dr. Rigo Britt

## 2021-06-20 NOTE — PROVIDER CONTACT NOTE (OTHER) - DATE AND TIME:
18-Jun-2021 17:39
20-Jun-2021 07:33
08-Jun-2021 11:50
19-Jun-2021 04:00
18-Jun-2021 11:30
20-Jun-2021 15:10
10-Cecilio-2021 22:30

## 2021-06-20 NOTE — PROVIDER CONTACT NOTE (OTHER) - BACKGROUND
Admitted for Respiratory failure with hypercapnia.
Admitted for respiratory failure with hypercapnia.
Pt lethargic and between 30% BIPAP and  3L NC. Pt is in end stage COPD. Pt given morphine 1mg IVP x1 at 730 for SOB
Pt lethargic during the day and had high CO2 levels and on 30% BIPAP. Pt was too lethargic earlier to tolerate PO intake; Imdur and Hydralizine held earlier today
Pt admitted for Respiratory failure with hypercapnia
Pt admitted due to respiratory failure with hypercapnia.
Pt admitted for respiratory failure with hypercapnia

## 2021-06-20 NOTE — PROVIDER CONTACT NOTE (OTHER) - SITUATION
Pt restless and removing pulse oximetry probe.
Pt has bp of 177/99
Pt had a BP of 170/90. Pt complaining of difficulty breathing.
pt on nasal canula & desaturating to 78%, pt refsuing to wear Bipap.
/96  HR 94
Pt restless pulling on BIPAP and lines, self removed IV twice overnight.
Pt unable to tolerate po intake at this time

## 2021-06-20 NOTE — PROVIDER CONTACT NOTE (OTHER) - ACTION/TREATMENT ORDERED:
Hold PO medications and food when patient is lethargic and unable to stay awake to reduce risk of aspiration
provider notified, pt switched to high flow nasal canula
Administer scheduled hydralazine  Continue to monitor patient BP
Provider aware. Morphine order to be changed to sublingual.
Administer medications with small amount of water on 3L NC and place pt back on bipap within 15-30 mins
Provider aware. Ok to discontinue pulse oximetry monitoring.
Provider came to bedside. Fio2 levels increased on BIPAP. Morning meds given early. Pt given morphine.  ABG's drawn by provider. Continue to monitor pt.

## 2021-06-20 NOTE — PROGRESS NOTE ADULT - SUBJECTIVE AND OBJECTIVE BOX
CHIEF COMPLAINT: Patient is a 71y old  Female who presents with a chief complaint of Dyspnea (19 Jun 2021 06:50)      Interval Events: Pt seen at bedside Pt son in to visit and condition discussed -pt is now DNR/DNI     REVIEW OF SYSTEMS:  Constitutional:   Eyes:  ENT:  CV:  Resp:  GI:  :  MSK:  Integumentary:  Neurological:  Psychiatric:  Endocrine:  Hematologic/Lymphatic:  Allergic/Immunologic:  [x ] All other systems negative      OBJECTIVE:  ICU Vital Signs Last 24 Hrs  T(C): 36.4 (20 Jun 2021 05:48), Max: 36.6 (19 Jun 2021 16:33)  T(F): 97.5 (20 Jun 2021 05:48), Max: 97.9 (19 Jun 2021 16:33)  HR: 82 (20 Jun 2021 05:48) (76 - 99)  BP: 123/64 (20 Jun 2021 05:48) (104/54 - 157/97)  BP(mean): --  ABP: --  ABP(mean): --  RR: 20 (20 Jun 2021 05:48) (20 - 24)  SpO2: 100% (20 Jun 2021 05:48) (98% - 100%)        06-18 @ 07:01  -  06-19 @ 07:00  --------------------------------------------------------  IN: 240 mL / OUT: 300 mL / NET: -60 mL      CAPILLARY BLOOD GLUCOSE          PHYSICAL EXAM:  General:   HEENT:   Lymph Nodes:  Neck:   Respiratory:   Cardiovascular:   Abdomen:   Extremities:   Skin:   Neurological:  Psychiatry:    HOSPITAL MEDICATIONS:  MEDICATIONS  (STANDING):  aspirin enteric coated 81 milliGRAM(s) Oral daily  azithromycin   Tablet 500 milliGRAM(s) Oral <User Schedule>  budesonide 160 MICROgram(s)/formoterol 4.5 MICROgram(s) Inhaler 2 Puff(s) Inhalation two times a day  carvedilol 25 milliGRAM(s) Oral every 12 hours  chlorhexidine 4% Liquid 1 Application(s) Topical daily  enoxaparin Injectable 30 milliGRAM(s) SubCutaneous daily  fluticasone propionate 50 MICROgram(s)/spray Nasal Spray 1 Spray(s) Both Nostrils two times a day  hydrALAZINE 75 milliGRAM(s) Oral every 8 hours  isosorbide   mononitrate ER Tablet (IMDUR) 30 milliGRAM(s) Oral daily  methylPREDNISolone sodium succinate Injectable 40 milliGRAM(s) IV Push daily  polyethylene glycol 3350 17 Gram(s) Oral daily  senna 2 Tablet(s) Oral at bedtime  simvastatin 20 milliGRAM(s) Oral at bedtime  tiotropium 18 MICROgram(s) Capsule 1 Capsule(s) Inhalation daily    MEDICATIONS  (PRN):  acetaminophen   Tablet .. 650 milliGRAM(s) Oral every 6 hours PRN Mild Pain (1 - 3), Moderate Pain (4 - 6)  albuterol/ipratropium for Nebulization 3 milliLiter(s) Nebulizer every 6 hours PRN Shortness of Breath and/or Wheezing  morphine  - Injectable 1 milliGRAM(s) IV Push every 6 hours PRN SOB      LABS:                        10.0   6.45  )-----------( 198      ( 18 Jun 2021 13:35 )             33.2     06-18    144  |  98  |  32<H>  ----------------------------<  123<H>  4.4   |  39<H>  |  0.47<L>    Ca    9.3      18 Jun 2021 13:35    TPro  5.7<L>  /  Alb  3.7  /  TBili  0.6  /  DBili  x   /  AST  13  /  ALT  22  /  AlkPhos  45  06-18        Arterial Blood Gas:  06-19 @ 04:32  7.38/77/176/40/99.1/17.8  ABG lactate: --  Arterial Blood Gas:  06-18 @ 13:35  7.30/93/76/39/95.8/17.6  ABG lactate: --        MICROBIOLOGY:     RADIOLOGY:  [ ] Reviewed and interpreted by me    PULMONARY FUNCTION TESTS:    EKG: CHIEF COMPLAINT: Patient is a 71y old  Female who presents with a chief complaint of Dyspnea (19 Jun 2021 06:50)      Interval Events: Pt seen at bedside Pt son in to visit and condition discussed -pt is now DNR/DNI     REVIEW OF SYSTEMS:  Unable secondary to AMS       OBJECTIVE:  ICU Vital Signs Last 24 Hrs  T(C): 36.4 (20 Jun 2021 05:48), Max: 36.6 (19 Jun 2021 16:33)  T(F): 97.5 (20 Jun 2021 05:48), Max: 97.9 (19 Jun 2021 16:33)  HR: 82 (20 Jun 2021 05:48) (76 - 99)  BP: 123/64 (20 Jun 2021 05:48) (104/54 - 157/97)  BP(mean): --  ABP: --  ABP(mean): --  RR: 20 (20 Jun 2021 05:48) (20 - 24)  SpO2: 100% (20 Jun 2021 05:48) (98% - 100%)        06-18 @ 07:01  -  06-19 @ 07:00  --------------------------------------------------------  IN: 240 mL / OUT: 300 mL / NET: -60 mL      CAPILLARY BLOOD GLUCOSE          HOSPITAL MEDICATIONS:  MEDICATIONS  (STANDING):  aspirin enteric coated 81 milliGRAM(s) Oral daily  azithromycin   Tablet 500 milliGRAM(s) Oral <User Schedule>  budesonide 160 MICROgram(s)/formoterol 4.5 MICROgram(s) Inhaler 2 Puff(s) Inhalation two times a day  carvedilol 25 milliGRAM(s) Oral every 12 hours  chlorhexidine 4% Liquid 1 Application(s) Topical daily  enoxaparin Injectable 30 milliGRAM(s) SubCutaneous daily  fluticasone propionate 50 MICROgram(s)/spray Nasal Spray 1 Spray(s) Both Nostrils two times a day  hydrALAZINE 75 milliGRAM(s) Oral every 8 hours  isosorbide   mononitrate ER Tablet (IMDUR) 30 milliGRAM(s) Oral daily  methylPREDNISolone sodium succinate Injectable 40 milliGRAM(s) IV Push daily  polyethylene glycol 3350 17 Gram(s) Oral daily  senna 2 Tablet(s) Oral at bedtime  simvastatin 20 milliGRAM(s) Oral at bedtime  tiotropium 18 MICROgram(s) Capsule 1 Capsule(s) Inhalation daily    MEDICATIONS  (PRN):  acetaminophen   Tablet .. 650 milliGRAM(s) Oral every 6 hours PRN Mild Pain (1 - 3), Moderate Pain (4 - 6)  albuterol/ipratropium for Nebulization 3 milliLiter(s) Nebulizer every 6 hours PRN Shortness of Breath and/or Wheezing  morphine  - Injectable 1 milliGRAM(s) IV Push every 6 hours PRN SOB      LABS:                        10.0   6.45  )-----------( 198      ( 18 Jun 2021 13:35 )             33.2     06-18    144  |  98  |  32<H>  ----------------------------<  123<H>  4.4   |  39<H>  |  0.47<L>    Ca    9.3      18 Jun 2021 13:35    TPro  5.7<L>  /  Alb  3.7  /  TBili  0.6  /  DBili  x   /  AST  13  /  ALT  22  /  AlkPhos  45  06-18        Arterial Blood Gas:  06-19 @ 04:32  7.38/77/176/40/99.1/17.8  ABG lactate: --  Arterial Blood Gas:  06-18 @ 13:35  7.30/93/76/39/95.8/17.6  ABG lactate: --        MICROBIOLOGY:     RADIOLOGY:  [ ] Reviewed and interpreted by me    PULMONARY FUNCTION TESTS:    EKG:

## 2021-06-20 NOTE — PROVIDER CONTACT NOTE (OTHER) - RECOMMENDATIONS
Notify provider.
Scheduled hydralazine will be given
Hold PO medications and food for now
Recommend notify provider.
notify provider
Administer medications will small amount of water
Notify provider.

## 2021-06-20 NOTE — PROVIDER CONTACT NOTE (OTHER) - ASSESSMENT
Pt denies any acute discomfort
Pt had a BP of 170/90. Pt O2 sat 96%; pt using accessory muscles to breath.
Pt now more alert; all other VSS
Pt restless pulling on BIPAP and lines, self removed IV twice overnight. Unable to receive morphine due to no access.
pt on nasal canula & desaturating to 78%, pt refsuing to wear Bipap.
Pt VSS. Pt accusable to gentle shaking and able to answer questions appropriately but very lethargic.
Pt restless and removing pulse oximetry probe.

## 2021-06-20 NOTE — PROGRESS NOTE ADULT - ASSESSMENT
72 YO F with PMH of HTN, HFrEF 20-25 (4/2019) s/p ICD, CAD, COPD on 3L O2 and nocturnal BiPAP and pHTN, p/w SOB x 1 day found to have acute on chronic hypercarbic respiratory failure in setting of advanced COPD vs HF? Admitted to [facility] for further management.     # AHHRF second to COPD vs HF Exacerbation   - CXR and RVP/ COVID PCR negative on admission   - s/p Solumedrol 40mg BID (6/7 - 6/9) and 40mg QD (6/10 - 6/14)   - s/p Zithromax x 5d- completed on 6/12- now on Zithromax 3x/week ppx  - S/P Prednisone 30mg QD (6/15- 6/18)- switch to IV solumedrol 40mg QD today  - c/w Spriva BID and Duonebs PRN  - Continue on NC QD and BIPAP QHS/ PRN   - Will trial Life 2000 positive pressure system prior to discharge (once repaired)  - Palliative Care following  - Lethargic today- ABG with PaCO2 93, placed back on BiPAP - pt using intermittently  - Son HCP visited pt and made pt DNR /DNI and is aware of her grave condition     # HFrEF 20-25 s/p ACID  - ECHO 4/2019 with EF 20-25% and severe global LVSD. Mild LAD, mild pHTN and moderate tricuspid regurgitation.   - c/w Hydralazine 75mg TID (home dose) for better BP control. - c/w ASA 81mg  - c/w Imdur 30mg QD   - BP well controlled - c/w Coreg 25mg q 12hr  - No Diuretics - 2D- Echo pending     # DVT PPX with Lovenox   # DISPO - PT rec home no needs. however pt condition has declined and is full carePatient's son and daughter-in-law traveled to [place] to see patient. And DNR/DNI  paperwork  completed  Son would like pt to go to [facility] for hospice care.  His grandmother is already in the facility  Palliative Care following. Final dispo TBD. 70 YO F with PMH of HTN, HFrEF 20-25 (4/2019) s/p ICD, CAD, COPD on 3L O2 and nocturnal BiPAP and pHTN, p/w SOB x 1 day found to have acute on chronic hypercarbic respiratory failure in setting of advanced COPD vs HF? Admitted to [facility] for further management.     # AHHRF second to COPD vs HF Exacerbation   - CXR and RVP/ COVID PCR negative on admission   - s/p Solumedrol 40mg BID (6/7 - 6/9) and 40mg QD (6/10 - 6/14)   - s/p Zithromax x 5d- completed on 6/12- now on Zithromax 3x/week ppx  - S/P Prednisone 30mg QD (6/15- 6/18)- switch to IV solumedrol 40mg QD today  - c/w Spriva BID and Duonebs PRN  - Continue on NC QD and BIPAP QHS/ PRN   - Will trial Life 2000 positive pressure system prior to discharge (once repaired)  - Palliative Care following  - Lethargic today- ABG with PaCO2 93, placed back on BiPAP - pt using intermittently  - Son HCP visited pt and made pt DNR /DNI and is aware of her grave condition  - Morphine prn for dyspnea /sob   - son agreeable to hospice care - SW /hospice eval in am      # HFrEF 20-25 s/p ACID  - ECHO 4/2019 with EF 20-25% and severe global LVSD. Mild LAD, mild pHTN and moderate tricuspid regurgitation.   - c/w Hydralazine 75mg TID (home dose) for better BP control. - c/w ASA 81mg  - c/w Imdur 30mg QD   - BP well controlled - c/w Coreg 25mg q 12hr  - No Diuretics - 2D- Echo pending     # DVT PPX with Lovenox   # DISPO - PT rec home no needs. however pt condition has declined and is full carePatient's son and daughter-in-law traveled to [place] to see patient. And DNR/DNI  paperwork  completed  Son would like pt to go to [facility] for hospice care.  His grandmother is already in the facility  Palliative Care following.  Will ask for SW/hospice eval COVID PCR sent

## 2021-06-20 NOTE — PROVIDER CONTACT NOTE (OTHER) - REASON
Pt removing pulse oximetry probe.
Pt unable to tolerate po intake at this time
Pt had a BP of 170/90. Pt complaining of difficulty breathing.
Pt refusing Bipap
/95
Pt has bp of 177/99
Pt restless pulling on BIPAP and lines, self removed IV twice overnight.

## 2021-06-20 NOTE — PROGRESS NOTE ADULT - ATTENDING COMMENTS
71 F with a h/o chronic severe HFrEF, VF s/p ICD, chronic hypoxemic and hypercapnic respiratory failure secondary to COPD on 3L NC ATC and nocturnal BiPAP who presented with acute on chronic hypoxemic and hypercapnic respiratory failure in setting of COPD exacerbation.     She remains markedly dyspneic, some relief with morphine   Receiving IV Solumedrol.   Continue azithromycin TIW, Symbicort and Spiriva, Duonebs PRN.   c/w BiPAP qHS and intermittently during daytime. Awaiting trial with [id number] if possible (awaiting repair).   Her baseline functional status is poor and her current clinical state may represent a general worsening of her severe cardiopulmonary disease. Prognosis is guarded.   Per discussion with son yesterday, patient is DNR and evaluate for hospice  Appreciate Palliative care input.     Time-based billing (NON-critical care).     35 minutes spent on total encounter; more than 50% of the visit was spent counseling and / or coordinating care by the attending physician.  The necessity of the time spent during the encounter on this date of service was due to:     Medical record review, clinical assessment, discussing plan with team.

## 2021-06-21 NOTE — PROGRESS NOTE ADULT - PROBLEM SELECTOR PLAN 5
Hold meds for now given hypotension in ED.
hold meds for now given hypotension in ED.
ongoing d/w held with pt's dil and son who are her hcp  primary team spoke with them yesterday and would want to continue full code for now  they are coming up from [place] this weekend and reassess their decision as pt with significant decline and are aware if pt gets intubated, she has very low chance of coming off vent
goc document noted  family came from out of town and agree to focus on comfort  pt is dnr/di  hospice referral made but family asked for snf?  will assess if able to accommodate her needs

## 2021-06-21 NOTE — PROGRESS NOTE ADULT - PROBLEM SELECTOR PROBLEM 4
CAD (coronary artery disease)
CAD (coronary artery disease)
Chronic systolic CHF (congestive heart failure)
Chronic systolic CHF (congestive heart failure)

## 2021-06-21 NOTE — PROCEDURE NOTE - ADDITIONAL PROCEDURE DETAILS
Patient requiring PIV access for medical management. Prior to procedure, patient was properly identified using name and . Site prepped using chlorhexidine and tourniquet applied. Ultrasound was used to identify a RUE, easily compressible, no thrombus, non-pulsatile. A  20G x 6cm Arrow Extended Dwelling Angiocath was introduced into the skin and inserted into the identified vessel. Flash seen in needle housing and placement confirmed on US. Catheter advanced, needle withdrawn, and placement again confirmed w/ US visualization. Clave with saline flush attached and placement again confirmed with positive blood return/ flash. IV flushes easily without resistance, and no swelling appreciated at insertion site. Site cleaned with alcohol, Cavilon skin prep applied, and cath secured with central line dressing. Patient tolerated procedure well with no complications and no blood loss. Nursing staff informed and dressing labeled with initials, date and time. Dressing to be changed Qweekly.     Alea Trejo PA-C  Department of Medicine/ [facility]   In House Pager #[phone]
Patient requiring PIV access for medical management. Prior to procedure, patient was properly identified using name and . Site prepped using chlorhexidine and tourniquet applied. Ultrasound was used to identify a vein in the LUE, easily compressible, no thrombus, non-pulsatile. A  20G x 6cm Arrow Extended Dwelling Angiocath was introduced into the skin and inserted into the identified vessel. Flash seen in needle housing and placement confirmed on US. Catheter advanced, needle withdrawn, and placement again confirmed w/ US visualization. Clave with saline flush attached and placement again confirmed with positive blood return/ flash. IV flushes easily without resistance, and no swelling appreciated at insertion site. Site cleaned with alcohol, Cavilon skin prep applied, and cath secured with central line dressing. Patient tolerated procedure well with no complications and no blood loss. Nursing staff informed and dressing labeled with initials, date and time. Dressing to be changed Qweekly.
Device sensing and pacing appropriately.  Capture thresholds evaluated via iterative testing.   Patient with episode of VF on 5/29/21, successfully terminated with 35J shock.   No episodes since.   On Coreg 25mg bid.   Patient does not recall the shock. Says she was hospitalized on 5/29/21 but poor historian.   Patient admitted for hypercapnic respiratory failure, on BIPAP.

## 2021-06-21 NOTE — PROGRESS NOTE ADULT - PROBLEM SELECTOR PROBLEM 2
Functional quadriplegia
COPD (chronic obstructive pulmonary disease)
COPD (chronic obstructive pulmonary disease)
Functional quadriplegia

## 2021-06-21 NOTE — PROGRESS NOTE ADULT - PROBLEM SELECTOR PLAN 4
Continue ASA, Coreg
ES- pt with AICD- please call EP to turn off
cw asa, cw coreg
ES- pt with AICD  continue medical management

## 2021-06-21 NOTE — PROCEDURE NOTE - NSPROCDETAILS_GEN_ALL_CORE
location identified, draped/prepped, sterile technique used/blood seen on insertion/dressing applied/flushes easily/secured in place

## 2021-06-21 NOTE — PROGRESS NOTE ADULT - PROBLEM SELECTOR PROBLEM 6
Palliative care encounter
Discharge planning issues
Discharge planning issues
Palliative care encounter

## 2021-06-21 NOTE — PROGRESS NOTE ADULT - PROBLEM SELECTOR PROBLEM 5
HTN (hypertension)
Advanced care planning/counseling discussion
HTN (hypertension)
Advanced care planning/counseling discussion

## 2021-06-21 NOTE — PROGRESS NOTE ADULT - ASSESSMENT
70 YO F with PMH of HTN, HFrEF 20-25 (4/2019) s/p ICD, CAD, COPD on 3L O2 and nocturnal BiPAP and pHTN, p/w SOB x 1 day found to have acute on chronic hypercarbic respiratory failure in setting of advanced COPD vs HF? Admitted to RCU for further management.     # AHHRF second to COPD vs HF Exacerbation   - CXR and RVP/ COVID PCR negative on admission   - s/p Solumedrol 40mg BID (6/7 - 6/9) and 40mg QD (6/10 - 6/14)   - s/p Zithromax x 5d- completed on 6/12- now on Zithromax 3x/week ppx  - S/P Prednisone 30mg QD (6/15- 6/18)- switch to IV solumedrol 40mg QD today  - c/w Spriva BID and Duonebs PRN  - Continue on NC QD and BIPAP QHS/ PRN   - Will trial Life 2000 positive pressure system prior to discharge (once repaired)  - Palliative Care following  - Lethargic today- ABG with PaCO2 93, placed back on BiPAP - pt using intermittently  - Son HCP visited pt and made pt DNR /DNI and is aware of her grave condition  - Morphine prn for dyspnea /sob   - son agreeable to hospice care - SW /hospice eval in am      # HFrEF 20-25 s/p ACID  - ECHO 4/2019 with EF 20-25% and severe global LVSD. Mild LAD, mild pHTN and moderate tricuspid regurgitation.   - c/w Hydralazine 75mg TID (home dose) for better BP control. - c/w ASA 81mg  - c/w Imdur 30mg QD   - BP well controlled - c/w Coreg 25mg q 12hr  - No Diuretics - 2D- Echo pending     # DVT PPX with Lovenox   # DISPO - PT rec home no needs. however pt condition has declined and is full carePatient's son and daughter-in-law traveled to [place] to see patient. And DNR/DNI  paperwork  completed  Son would like pt to go to [facility] for hospice care.  His grandmother is already in the facility  Palliative Care following.  Will ask for SW/hospice eval COVID PCR sent  72 YO F with PMH of HTN, HFrEF 20-25 (4/2019) s/p ICD, CAD, COPD on 3L O2 and nocturnal BiPAP and pHTN, p/w SOB x 1 day found to have acute on chronic hypercarbic respiratory failure in setting of advanced COPD vs HF? Admitted to RCU for further management.     # AHHRF second to COPD vs HF Exacerbation   - CXR and RVP/ COVID PCR negative on admission   - s/p Solumedrol 40mg BID (6/7 - 6/9) and 40mg QD (6/10 - 6/14)   - s/p Zithromax x 5d- completed on 6/12- now on Zithromax 3x/week ppx  - S/P Prednisone 30mg QD (6/15- 6/18)- switch to IV solumedrol 40mg QD today  - Spiriva stopped today- not following commands required for timed inhalation of medication  - c/w Duonebs  - Continue on NC QD and BIPAP QHS/ PRN   - Will trial Life 2000 positive pressure system prior to discharge (once repaired)  - Palliative Care following  - Son HCP visited pt and made pt DNR /DNI and is aware of her grave condition  - Morphine prn for dyspnea /sob   - son agreeable to hospice care - SW /hospice assisting with this    # HFrEF 20-25 s/p ACID  - ECHO 4/2019 with EF 20-25% and severe global LVSD. Mild LAD, mild pHTN and moderate tricuspid regurgitation.   - c/w Hydralazine 75mg TID (home dose) for better BP control- hold d/t soft BPs  - c/w ASA 81mg  - c/w Imdur 30mg QD - hold d/t soft BPs  - c/w Coreg 25mg q 12hr- hold d/t soft BPs  - No Diuretics - Will d/c Echo given family now wants to focus on comfort care    # DVT PPX with Lovenox   # DISPO - PT rec home no needs. however pt condition has declined and is full carePatient's son and daughter-in-law traveled to [place] to see patient. And DNR/DNI  paperwork  completed  Son would like pt to go to [facility] for hospice care.  His grandmother is already in the facility  Palliative Care following.  CM/SW assisting with hospice eval. COVID PCR 6/20 negative. 72 YO F with PMH of HTN, HFrEF 20-25 (4/2019) s/p ICD, CAD, COPD on 3L O2 and nocturnal BiPAP and pHTN, p/w SOB x 1 day found to have acute on chronic hypercarbic respiratory failure in setting of advanced COPD vs HF? Admitted to RCU for further management.     # AHHRF second to COPD vs HF Exacerbation   - CXR and RVP/ COVID PCR negative on admission   - s/p Solumedrol 40mg BID (6/7 - 6/9) and 40mg QD (6/10 - 6/14)   - s/p Zithromax x 5d- completed on 6/12- now on Zithromax 3x/week ppx  - S/P Prednisone 30mg QD (6/15- 6/18)- switch to IV solumedrol 40mg QD today  - Spiriva/Symbicort stopped today- not following commands required for timed inhalation of medication  - c/w Duonebs  - Continue on NC QD and BIPAP QHS/ PRN   - Will trial Life 2000 positive pressure system prior to discharge (once repaired)  - Palliative Care following  - Son HCP visited pt and made pt DNR /DNI and is aware of her grave condition  - Morphine prn for dyspnea /sob   - son agreeable to hospice care - SW /hospice assisting with this    # HFrEF 20-25 s/p ACID  - ECHO 4/2019 with EF 20-25% and severe global LVSD. Mild LAD, mild pHTN and moderate tricuspid regurgitation.   - c/w Hydralazine 75mg TID (home dose) for better BP control- hold d/t soft BPs  - c/w ASA 81mg  - c/w Imdur 30mg QD - hold d/t soft BPs  - c/w Coreg 25mg q 12hr- hold d/t soft BPs  - No Diuretics - Will d/c Echo given family now wants to focus on comfort care    # DVT PPX with Lovenox   # DISPO - PT rec home no needs. however pt condition has declined and is full carePatient's son and daughter-in-law traveled to [place] to see patient. And DNR/DNI  paperwork  completed  Son would like pt to go to [facility] for hospice care.  His grandmother is already in the facility  Palliative Care following.  CM/SW assisting with hospice eval. COVID PCR 6/20 negative.

## 2021-06-21 NOTE — PROGRESS NOTE ADULT - ATTENDING COMMENTS
71 F with a h/o chronic severe HFrEF, VF s/p ICD, chronic hypoxemic and hypercapnic respiratory failure secondary to COPD on 3L NC ATC and nocturnal BiPAP who preented with acute on chronic hypoxemic and hypercapnic respiratory failure in setting of COPD exacerbation. Presentation consistent with progression of severe underlying cardiopulmonary disease.    After discussion with family, plan for inpatient hospice. Continue morphine for dyspnea. Remains on IV Solumedrol, Azithromycin, Symbicort, Spiriva, and Duonebs PRN. BiPAP for comfort. Follow-up palliative care. Goal is to optimize patient comfort in this dying process.

## 2021-06-21 NOTE — PROCEDURE NOTE - NSPROCNAME_GEN_A_CORE
Peripheral Line Insertion
Peripheral Line Insertion
CRT-D (Cardiac Resynchronization Therapy with Defibrillation Capabilities) Interrogation Note

## 2021-06-21 NOTE — PROGRESS NOTE ADULT - PROBLEM SELECTOR PLAN 6
DVT: Lovenox  Diet: NPO for now  Dispo: Pending clinical improvement.   Continue care under the RCU team.
DVT: Lovenox  Diet: NPO for now  Dispo: Pending clinical improvement.
pt remains full code for now  would rec hospice if family agreeable once they see her  please call [phone] over weekend with any questions
goal is for comfort  if unable to take po, would attempt to convert to iv  pt is dnr/dni  inpatient hospice vs snf with hospice

## 2021-06-21 NOTE — PROGRESS NOTE ADULT - PROBLEM SELECTOR PROBLEM 3
Chronic systolic CHF (congestive heart failure)
COPD (chronic obstructive pulmonary disease)
COPD (chronic obstructive pulmonary disease)
Chronic systolic CHF (congestive heart failure)

## 2021-06-21 NOTE — PROGRESS NOTE ADULT - PROBLEM SELECTOR PLAN 1
nasal bipap as tolerated  continue o2 support  morphne 1-2mg iv q 2 hours prn
Acute on chronic hypercapnia, tolerating bipap and mentating well.  Pulmonary/RCU team at bedside during AM rounds, p transferred to RCU team.  C/w AVAPS, settings per Pulmonary team, plan for REPEAT ABG.   Continue to trend pCO2  Continue nebulizer and steroids as below.   MICU consulted overnight, not candidate for MICU level care.   Low threshold for escalation to MICU if decompensates.
ISO acute on chronic hypercapnia, tolerating bipap and mentating well  - cw AVAPS  - trend pCO2  - nebulizer as below  -steroids as below
o2 support as tolerated  would not restart bipap as it will not change outcome  continue o2 support  morphne 3 mg sl q 2 hours prn   if unable to tolerate and pt is able to have iv access would start morphine 2mg Iv q 6 hours atc and 2mg iv q 2 hours prn

## 2021-06-21 NOTE — PROGRESS NOTE ADULT - SUBJECTIVE AND OBJECTIVE BOX
CHIEF COMPLAINT: Patient is a 71y old  Female who presents with a chief complaint of Dyspnea (20 Jun 2021 06:35)    INTERVAL EVENTS: No acute events overnight.    ROS: Seen by bedside during AM rounds     OBJECTIVE:  ICU Vital Signs Last 24 Hrs  T(C): 36.3 (21 Jun 2021 04:48), Max: 36.6 (20 Jun 2021 14:36)  T(F): 97.3 (21 Jun 2021 04:48), Max: 97.9 (20 Jun 2021 14:36)  HR: 82 (21 Jun 2021 04:48) (77 - 104)  BP: 122/68 (21 Jun 2021 04:48) (108/56 - 153/63)  BP(mean): --  ABP: --  ABP(mean): --  RR: 18 (21 Jun 2021 04:48) (15 - 18)  SpO2: 100% (21 Jun 2021 04:48) (99% - 100%)        CAPILLARY BLOOD GLUCOSE          PHYSICAL EXAM:  General: NAD   Cards: S1/S2, no murmurs   Pulm: CTA bilaterally. No wheezes.   Abdomen: Soft, NTND. BS (+)   Extremities: No pedal edema. ELIZABETH of BL upper and lower extremities.  Neurology: AOx3 with no focal neurological deficits       HOSPITAL MEDICATIONS:  MEDICATIONS  (STANDING):  aspirin enteric coated 81 milliGRAM(s) Oral daily  azithromycin   Tablet 500 milliGRAM(s) Oral <User Schedule>  budesonide 160 MICROgram(s)/formoterol 4.5 MICROgram(s) Inhaler 2 Puff(s) Inhalation two times a day  carvedilol 25 milliGRAM(s) Oral every 12 hours  chlorhexidine 4% Liquid 1 Application(s) Topical daily  enoxaparin Injectable 30 milliGRAM(s) SubCutaneous daily  fluticasone propionate 50 MICROgram(s)/spray Nasal Spray 1 Spray(s) Both Nostrils two times a day  hydrALAZINE 75 milliGRAM(s) Oral every 8 hours  isosorbide   mononitrate ER Tablet (IMDUR) 30 milliGRAM(s) Oral daily  methylPREDNISolone sodium succinate Injectable 40 milliGRAM(s) IV Push daily  polyethylene glycol 3350 17 Gram(s) Oral daily  senna 2 Tablet(s) Oral at bedtime  simvastatin 20 milliGRAM(s) Oral at bedtime  tiotropium 18 MICROgram(s) Capsule 1 Capsule(s) Inhalation daily    MEDICATIONS  (PRN):  acetaminophen   Tablet .. 650 milliGRAM(s) Oral every 6 hours PRN Mild Pain (1 - 3), Moderate Pain (4 - 6)  albuterol/ipratropium for Nebulization 3 milliLiter(s) Nebulizer every 6 hours PRN Shortness of Breath and/or Wheezing  morphine Concentrate 3 milliGRAM(s) SubLingual every 4 hours PRN dyspnea/sob      LABS:                 CHIEF COMPLAINT: Patient is a 71y old  Female who presents with a chief complaint of Dyspnea (20 Jun 2021 06:35)    INTERVAL EVENTS: No acute events overnight.    ROS: See above. Remaining ROS negative.    OBJECTIVE:  ICU Vital Signs Last 24 Hrs  T(C): 36.3 (21 Jun 2021 04:48), Max: 36.6 (20 Jun 2021 14:36)  T(F): 97.3 (21 Jun 2021 04:48), Max: 97.9 (20 Jun 2021 14:36)  HR: 82 (21 Jun 2021 04:48) (77 - 104)  BP: 122/68 (21 Jun 2021 04:48) (108/56 - 153/63)  BP(mean): --  ABP: --  ABP(mean): --  RR: 18 (21 Jun 2021 04:48) (15 - 18)  SpO2: 100% (21 Jun 2021 04:48) (99% - 100%)        CAPILLARY BLOOD GLUCOSE          PHYSICAL EXAM:  General: NAD. Lethargic, opens eyes to verbal stimuli and stares at examiner, but does not respond to questioning.  Cards: S1/S2, no murmurs   Pulm: Diminished BS b/l. Increased effort.  Abdomen: Soft, NTND  Extremities: No pedal edema  Neurology: moves all extremities.      HOSPITAL MEDICATIONS:  MEDICATIONS  (STANDING):  aspirin enteric coated 81 milliGRAM(s) Oral daily  azithromycin   Tablet 500 milliGRAM(s) Oral <User Schedule>  budesonide 160 MICROgram(s)/formoterol 4.5 MICROgram(s) Inhaler 2 Puff(s) Inhalation two times a day  carvedilol 25 milliGRAM(s) Oral every 12 hours  chlorhexidine 4% Liquid 1 Application(s) Topical daily  enoxaparin Injectable 30 milliGRAM(s) SubCutaneous daily  fluticasone propionate 50 MICROgram(s)/spray Nasal Spray 1 Spray(s) Both Nostrils two times a day  hydrALAZINE 75 milliGRAM(s) Oral every 8 hours  isosorbide   mononitrate ER Tablet (IMDUR) 30 milliGRAM(s) Oral daily  methylPREDNISolone sodium succinate Injectable 40 milliGRAM(s) IV Push daily  polyethylene glycol 3350 17 Gram(s) Oral daily  senna 2 Tablet(s) Oral at bedtime  simvastatin 20 milliGRAM(s) Oral at bedtime  tiotropium 18 MICROgram(s) Capsule 1 Capsule(s) Inhalation daily    MEDICATIONS  (PRN):  acetaminophen   Tablet .. 650 milliGRAM(s) Oral every 6 hours PRN Mild Pain (1 - 3), Moderate Pain (4 - 6)  albuterol/ipratropium for Nebulization 3 milliLiter(s) Nebulizer every 6 hours PRN Shortness of Breath and/or Wheezing  morphine Concentrate 3 milliGRAM(s) SubLingual every 4 hours PRN dyspnea/sob      LABS:

## 2021-06-21 NOTE — PROGRESS NOTE ADULT - SUBJECTIVE AND OBJECTIVE BOX
SUBJECTIVE AND OBJECTIVE:  pt min responsive.  Sob at rest, requiring morpine sl as pt's lost iv access  INTERVAL HPI/OVERNIGHT EVENTS:    DNR on chart: Yes      Allergies    No Known Allergies    Intolerances    MEDICATIONS  (STANDING):  aspirin enteric coated 81 milliGRAM(s) Oral daily  azithromycin   Tablet 500 milliGRAM(s) Oral <User Schedule>  carvedilol 25 milliGRAM(s) Oral every 12 hours  chlorhexidine 4% Liquid 1 Application(s) Topical daily  enoxaparin Injectable 30 milliGRAM(s) SubCutaneous daily  fluticasone propionate 50 MICROgram(s)/spray Nasal Spray 1 Spray(s) Both Nostrils two times a day  hydrALAZINE 75 milliGRAM(s) Oral every 8 hours  isosorbide   mononitrate ER Tablet (IMDUR) 30 milliGRAM(s) Oral daily  methylPREDNISolone sodium succinate Injectable 40 milliGRAM(s) IV Push daily  polyethylene glycol 3350 17 Gram(s) Oral daily  senna 2 Tablet(s) Oral at bedtime  simvastatin 20 milliGRAM(s) Oral at bedtime    MEDICATIONS  (PRN):  acetaminophen   Tablet .. 650 milliGRAM(s) Oral every 6 hours PRN Mild Pain (1 - 3), Moderate Pain (4 - 6)  albuterol/ipratropium for Nebulization 3 milliLiter(s) Nebulizer every 6 hours PRN Shortness of Breath and/or Wheezing  morphine Concentrate 3 milliGRAM(s) SubLingual every 4 hours PRN dyspnea/sob      ITEMS UNCHECKED ARE NOT PRESENT    PRESENT SYMPTOMS: [x ]Unable to obtain due to poor mentation   Source if other than patient:  [ ]Family   [ ]Team     Pain (Impact on QOL):    Location:  Minimal acceptable level (0-10 scale):            Aggravating factors:  Quality:  Radiation:  Severity (0-10 scale):    Timing:    Dyspnea:                           [ ]Mild [ ]Moderate [ ]Severe  Anxiety:                             [ ]Mild [ ]Moderate [ ]Severe  Fatigue:                             [ ]Mild [ ]Moderate [ ]Severe  Nausea:                             [ ]Mild [ ]Moderate [ ]Severe  Loss of appetite:              [ ]Mild [ ]Moderate [ ]Severe  Constipation:                    [ ]Mild [ ]Moderate [ ]Severe    PAIN AD Score:	  http://geriatrictoolkit.[facility]/cog/painad.pdf (Ctrl + left click to view)    Other Symptoms:  [ ]All other review of systems negative     Karnofsky Performance Score/Palliative Performance Status Version 2:  20      %    http://palliative.info/resource_material/[email or web address].pdf  PHYSICAL EXAM:  Vital Signs Last 24 Hrs  T(C): 36.3 (21 Jun 2021 04:48), Max: 36.3 (21 Jun 2021 04:48)  T(F): 97.3 (21 Jun 2021 04:48), Max: 97.3 (21 Jun 2021 04:48)  HR: 86 (21 Jun 2021 15:00) (82 - 104)  BP: 103/66 (21 Jun 2021 11:31) (103/66 - 122/68)  BP(mean): --  RR: 18 (21 Jun 2021 04:48) (15 - 18)  SpO2: 98% (21 Jun 2021 15:00) (98% - 100%) I&O's Summary   GENERAL:  [ ]Alert  [ ]Oriented x   [ x]Lethargic  [ ]Cachexia  [ ]Unarousable  [ ]Verbal  [ ]Non-Verbal    Behavioral:   [ ] Anxiety  [ ] Delirium [ ] Agitation [ ] Other    HEENT:  [ ]Normal   [ x]Dry mouth   [ ]ET Tube/Trach  [ ]Oral lesions    PULMONARY:   [ ]Clear [ x]Tachypnea  [ ]Audible excessive secretions   [ ]Rhonchi        [ ]Right [ ]Left [ ]Bilateral  [ ]Crackles        [ ]Right [ ]Left [ ]Bilateral  [x ]Wheezing     [ ]Right [ ]Left [x ]Bilateral    CARDIOVASCULAR:    [x ]Regular [ ]Irregular [ ]Tachy  [ ]Dg [ ]Murmur [ ]Other    GASTROINTESTINAL:  [ x]Soft  [ ]Distended   [x ]+BS  [x ]Non tender [ ]Tender  [ ]PEG [ ]OGT/ NGT   Last BM:    GENITOURINARY:  [ ]Normal [x ] Incontinent   [ ]Oliguria/Anuria   [ ]Meredith    MUSCULOSKELETAL:   [ ]Normal   [x ]Weakness  [x ]Bed/Wheelchair bound [ ]Edema    NEUROLOGIC:   [ ]No focal deficits  [ x] Cognitive impairment  [ ] Dysphagia [ ]Dysarthria [ ] Paresis [ ]Other     SKIN:   [x ]Normal   [ ]Pressure ulcer(s)  [ ]Rash    CRITICAL CARE:  [ ] Shock Present  [ ]Septic [ ]Cardiogenic [ ]Neurologic [ ]Hypovolemic  [ ]  Vasopressors [ ]  Inotropes   [ ] Respiratory failure present  [ ] Acute  [ ] Chronic [ ] Hypoxic  [ ] Hypercarbic [ ] Other  [ ] Other organ failure     LABS:            RADIOLOGY & ADDITIONAL STUDIES:    Protein Calorie Malnutrition Present: [ ] yes [ ] no  [ ] PPSV2 < or = 30%  [ ] significant weight loss [ ] poor nutritional intake [ ] anasarca [ ] catabolic state Albumin, Serum: 3.7 g/dL (06-18-21 @ 13:35)  Artificial Nutrition [ ]     REFERRALS:   [ ]Chaplaincy  [ ] Hospice  [ ]Child Life  [ ]Social Work  [ ]Case management [ ]Holistic Therapy   Goals of Care Document: FANI Britt (06-16-21 @ 14:14)  Goals of Care Conversation:   Participants:  · Participants  Family  · Relative  Hannah Jalloh    Advance Directives:  · Does patient have Advance Directive  Yes  · Indicate Type  Health Care Proxy (HCP)  · Agent's Name  Hannah Jalloh  · Phone #  [phone]  · If HCP is not on chart, identify the persons name and phone number contacted to bring in document  No paperwork to confirm. Hannah states that patient has paperwork. Will inquire.  · Are any of the items on the chart  No  · Does Patient Have a Surrogate  No  · Does the Patient have a Court Appointed Guardian ([address])  No  · Caregiver:  no    Conversation Discussion:  · Conversation  Diagnosis; Prognosis; Treatment Options  · Conversation Details  Updated Hannah (daughter in law) on plan of care. Discussed overall prognosis and treatment options. Conveyed that Mr. Jalloh has severe pulmonary disease and given her concomitant severe chronic heart failure her overall prognosis is guarded. We discussed code status and the option to look into hospice. For now Hannah would like Ms. Jalloh to remain full code and to continue current management. She will discuss the case further with her  (patient's son Genaro Jalloh) and be in touch with us should they make any further decisions.    Personal Advance Directives Treatment Guidelines:   Treatment Guidelines:  · Decision Maker  Patient  · Treatment Guideline Comments  Patient is alert and oriented. She states she defers medical decision making to her daughter in law (Hannah Jalloh) and her son (Genaro Jalloh).    MOLST:  · Completed  11-Nov-2020  · Updated  16-Feb-2021    Location of Discussion:   Duration of Advanced Care Planning Meeting:  · Time spent (in minutes)  30    Location of Discussion:  · Location of discussion  Telephone      Electronic Signatures:  Rigo Britt)  (Signed 16-Jun-2021 14:19)  	Authored: Goals of Care Conversation, Personal Advance Directives Treatment Guidelines, Location of Discussion      Last Updated: 16-Jun-2021 14:19 by Rigo Britt)

## 2021-06-21 NOTE — PROGRESS NOTE ADULT - PROBLEM SELECTOR PLAN 2
On home Oxygen 3L. On BIPAP in ED for hypercapnic resp failure.  s/p 125mg solumedrol in the ED  Continue Oxygen supplementation as needed.   Continue BIPAP FOr now, repeat ABG. Pulmonary/RCU following.   Currently on chronic prednisone; will c/w 40mg PO   Continue Spiriva and Pulmicort  Continue duonebs prn for SOB and/or wheezing
[id number]%  overall needs assistance with all ADLs  family aware of decline
On home oxygen 3L. On bipap in the ED for hypercapnic resp failure   -s/p 125mg solumedrol in the ED  - currently on chronic prednisone; will cw 40mg PO   -Spiriva and Pulmicort  -duonebs prn for SOB and/or wheezing   -bipap at night   -On 3L (home o2 settings), however saturating 100%. Oxygen weaned to 2L NC on 5/9. Wean as tolerated
kps 20%  overall needs assistance with all ADLs  family aware of decline

## 2021-06-21 NOTE — PROGRESS NOTE ADULT - PROBLEM SELECTOR PLAN 3
ES- continue nebulizers.  iv steroids
Chronic systolic CHF (congestive heart failure) s/p AICD. Last TTE 2019 w/ Right/left systolic dysfunction, EF: 20- 25%. Patient appears euvolemic though mildly elevated pro BNP in ED. s/p Lasix though then became hypotensive and given 500cc LR. No pulm edema on CXR. EKG reviewed no signs of ischemia.   c/w Coreg 25mg bid when bp tolerates.  c/w Imdur 30mg qd when bp tolerates.  c/w Hydralazine 75mg tid when bp tolerated.  Will consider repeating TTE.
Chronic systolic CHF (congestive heart failure) s/p AICD. Last TTE 2019 w/ Right/left systolic dysfunction, EF: 20- 25%. Patient appears euvolemic though mildly elevated pro BNP in ED. s/p lasix though then became hypotensive and given 500cc LR. No pulm edema on CXR.EKG reviewed no signs of ischemia.   -c/w coreg 25mg bid when bp tolerates  -c/w Imdur 30mg qd when bp tolerates  -c/w hydralazine 75mg tid when bp tolerated
ES- continue nebulizers. steroids

## 2021-06-22 NOTE — CHART NOTE - NSCHARTNOTESELECT_GEN_ALL_CORE
Event Note
Medicine acp/Event Note
Event Note
GOC Conversation/Event Note
MICU Reject/Event Note

## 2021-06-22 NOTE — PROGRESS NOTE ADULT - NUTRITIONAL ASSESSMENT
This patient has been assessed with a concern for Malnutrition and has been determined to have a diagnosis/diagnoses of Severe protein-calorie malnutrition and Underweight (BMI < 19).    This patient is being managed with:   Diet Regular-  DASH/TLC {Sodium & Cholesterol Restricted} (DASH)  Supplement Feeding Modality:  Oral  Ensure Enlive Cans or Servings Per Day:  1       Frequency:  Three Times a day  Entered: Jun 8 2021 10:36AM    

## 2021-06-22 NOTE — DISCHARGE NOTE PROVIDER - NSDCMRMEDTOKEN_GEN_ALL_CORE_FT
enoxaparin: 30 milligram(s) subcutaneous once a day  fluticasone 50 mcg/inh nasal spray: 1 spray(s) nasal 2 times a day  ipratropium-albuterol 0.5 mg-2.5 mg/3 mL inhalation solution: 3 milliliter(s) inhaled every 6 hours  methylPREDNISolone: 40 milligram(s) intravenously once a day  morphine: 2 milligram(s) intravenous every 2 hours, As Needed for discomfort  morphine: 2 milligram(s) intravenously every 4 hours  petrolatum topical ointment: 1 application topically 2 times a day

## 2021-06-22 NOTE — DISCHARGE NOTE NURSING/CASE MANAGEMENT/SOCIAL WORK - NSDCPEPT PROEDHF_GEN_ALL_CORE
Call primary care provider for follow up after discharge/Activities as tolerated/Low salt diet/Report signs and symptoms to primary care provider

## 2021-06-22 NOTE — DISCHARGE NOTE PROVIDER - HOSPITAL COURSE
AHHRF second to COPD vs HF Exacerbation   - CXR and RVP/ COVID PCR negative on admission   - s/p Solumedrol 40mg BID (6/7 - 6/9)   - s/p Prednisone 40mg QD (6/10 - 6/14 )   - c/w Prednisone 30mg QD (6/15- )  - s/p Zithromax  - Continue on Symbicort BID and Duonebs Q6H   - Hold Spiriva while on standing Duonebs  - Continue on NC QD and BIPAP QHS/ PRN   - Will trial Life 2000 positive pressure system  - Hospice referral for end stage heart failure  -Son and daughter in law agree to inpatient hospice  -pt being transferred to [facility] with inpatient hospice    # HFrEF 20-25 s/p ACID  - ECHO 4/2019 with EF 20-25% and severe global LVSD. Mild LAD, mild pHTN and moderate tricuspid regurgitation.   - c/w ASA 81mg, Coreg 25 BID and Hydral 25 BID.   - Imdur 30mg QD restarted and will monitor BP     # DVT PPX with Lovenox   # DISPO - inpt hospice   70 YO F with PMH of HTN, HFrEF 20-25 (4/2019) s/p ICD, CAD, COPD on 3L O2 and nocturnal BiPAP and pHTN, p/w SOB x 1 day found to have acute on chronic hypercarbic respiratory failure in setting of advanced COPD vs HF. patient was treated for  D exacerbation but had little improvement. Deemed the patient was not having an exacerbation, but progression of her COPD, and was now in the end stage of her disease. Given the patient's difficulty with tolerating BiPAP and following discussion with the patient's son and daughter-in-law the patient was made comfort care and transferred to inpatient Hospice.

## 2021-06-22 NOTE — PROGRESS NOTE ADULT - GASTROINTESTINAL DETAILS
soft/nontender/no distention
soft/no distention

## 2021-06-22 NOTE — DISCHARGE NOTE NURSING/CASE MANAGEMENT/SOCIAL WORK - PATIENT PORTAL LINK FT
You can access the FollowMyHealth Patient Portal offered by [facility] by registering at the following website: [email or web address]. By joining [facility] FollowMyHealth portal, you will also be able to view your health information using other applications (apps) compatible with our system.

## 2021-06-22 NOTE — PROGRESS NOTE ADULT - ATTENDING COMMENTS
71 F with a h/o chronic severe HFrEF, VF s/p ICD, chronic hypoxemic and hypercapnic respiratory failure secondary to COPD on 3L NC ATC and nocturnal BiPAP who presented with acute on chronic hypoxemic and hypercapnic respiratory failure in setting of COPD exacerbation. Presentation consistent with progression of severe underlying cardiopulmonary disease.    After discussion with family, plan for inpatient hospice. Increase morphine to every 4 hours. Continue BiPAP for comfort. Follow-up palliative care. Goal is to optimize patient comfort in this dying process.

## 2021-06-22 NOTE — PROGRESS NOTE ADULT - RS GEN PE MLT RESP DETAILS PC
diminished breath sounds, L/diminished breath sounds, R
diminished at bases/airway patent/breath sounds equal/clear to auscultation bilaterally
Diminished breath sounds bilat/breath sounds equal
breath sounds equal/respirations non-labored/diminished breath sounds, L/diminished breath sounds, R
airway patent/breath sounds equal/good air movement
diminished breath sounds, L/diminished breath sounds, R
airway patent/breath sounds equal
airway patent/breath sounds equal

## 2021-06-22 NOTE — PROGRESS NOTE ADULT - NSICDXPILOT_GEN_ALL_CORE
[place]

## 2021-06-22 NOTE — DISCHARGE NOTE PROVIDER - CARE PROVIDER_API CALL
PCP at receiving facility,   Phone: (   )    -  Fax: (   )    -  Follow Up Time:     Hospice care network,   Phone: (   )    -  Fax: (   )    -  Follow Up Time:

## 2021-06-22 NOTE — PROGRESS NOTE ADULT - REASON FOR ADMISSION
Dyspnea

## 2021-06-22 NOTE — PROGRESS NOTE ADULT - NEUROLOGICAL
Alert & oriented; no sensory, motor or coordination deficits, normal reflexes
detailed exam

## 2021-06-22 NOTE — DISCHARGE NOTE PROVIDER - PROVIDER TOKENS
FREE:[LAST:[PCP at receiving facility],PHONE:[(   )    -],FAX:[(   )    -]],FREE:[LAST:[Hospice care network],PHONE:[(   )    -],FAX:[(   )    -]]

## 2021-06-22 NOTE — PROGRESS NOTE ADULT - SUBJECTIVE AND OBJECTIVE BOX
CHIEF COMPLAINT:    Interval Events:    REVIEW OF SYSTEMS:  Constitutional:   Eyes:  ENT:  CV:  Resp:  GI:  :  MSK:  Integumentary:  Neurological:  Psychiatric:  Endocrine:  Hematologic/Lymphatic:  Allergic/Immunologic:  [ ] All other systems negative  [ ] Unable to assess ROS because ________    OBJECTIVE:  ICU Vital Signs Last 24 Hrs  T(C): 36.2 (21 Jun 2021 22:19), Max: 36.2 (21 Jun 2021 22:19)  T(F): 97.2 (21 Jun 2021 22:19), Max: 97.2 (21 Jun 2021 22:19)  HR: 82 (22 Jun 2021 09:17) (78 - 90)  BP: 111/65 (21 Jun 2021 22:19) (96/56 - 111/65)  BP(mean): --  ABP: --  ABP(mean): --  RR: 21 (21 Jun 2021 22:19) (19 - 21)  SpO2: 96% (22 Jun 2021 09:17) (96% - 100%)        CAPILLARY BLOOD GLUCOSE          PHYSICAL EXAM:  General:   HEENT:   Lymph Nodes:  Neck:   Respiratory:   Cardiovascular:   Abdomen:   Extremities:   Skin:   Neurological:  Psychiatry:    HOSPITAL MEDICATIONS:  MEDICATIONS  (STANDING):  albuterol/ipratropium for Nebulization 3 milliLiter(s) Nebulizer every 6 hours  chlorhexidine 4% Liquid 1 Application(s) Topical daily  enoxaparin Injectable 30 milliGRAM(s) SubCutaneous daily  fluticasone propionate 50 MICROgram(s)/spray Nasal Spray 1 Spray(s) Both Nostrils two times a day  methylPREDNISolone sodium succinate Injectable 40 milliGRAM(s) IV Push daily  morphine  - Injectable 2 milliGRAM(s) IV Push every 6 hours  petrolatum white Ointment 1 Application(s) Topical two times a day    MEDICATIONS  (PRN):  morphine  - Injectable 2 milliGRAM(s) IV Push every 2 hours PRN discomfort      LABS:                    MICROBIOLOGY:     RADIOLOGY:  [ ] Reviewed and interpreted by me    PULMONARY FUNCTION TESTS:    EKG: CHIEF COMPLAINT: unable to express due to deteriorating mental status    Interval Events: no events overnight    REVIEW OF SYSTEMS:  Constitutional:   Eyes:  ENT:  CV:  Resp:  GI:  :  MSK:  Integumentary:  Neurological:  Psychiatric:  Endocrine:  Hematologic/Lymphatic:  Allergic/Immunologic:  [ ] All other systems negative  [x ] Unable to assess ROS because deteriorating mental status    OBJECTIVE:  ICU Vital Signs Last 24 Hrs  T(C): 36.2 (21 Jun 2021 22:19), Max: 36.2 (21 Jun 2021 22:19)  T(F): 97.2 (21 Jun 2021 22:19), Max: 97.2 (21 Jun 2021 22:19)  HR: 82 (22 Jun 2021 09:17) (78 - 90)  BP: 111/65 (21 Jun 2021 22:19) (96/56 - 111/65)  BP(mean): --  ABP: --  ABP(mean): --  RR: 21 (21 Jun 2021 22:19) (19 - 21)  SpO2: 96% (22 Jun 2021 09:17) (96% - 100%)      HOSPITAL MEDICATIONS:  MEDICATIONS  (STANDING):  albuterol/ipratropium for Nebulization 3 milliLiter(s) Nebulizer every 6 hours  chlorhexidine 4% Liquid 1 Application(s) Topical daily  enoxaparin Injectable 30 milliGRAM(s) SubCutaneous daily  fluticasone propionate 50 MICROgram(s)/spray Nasal Spray 1 Spray(s) Both Nostrils two times a day  methylPREDNISolone sodium succinate Injectable 40 milliGRAM(s) IV Push daily  morphine  - Injectable 2 milliGRAM(s) IV Push every 6 hours  petrolatum white Ointment 1 Application(s) Topical two times a day    MEDICATIONS  (PRN):  morphine  - Injectable 2 milliGRAM(s) IV Push every 2 hours PRN discomfort      LABS:                    MICROBIOLOGY:     RADIOLOGY:  [ ] Reviewed and interpreted by me    PULMONARY FUNCTION TESTS:    EKG:

## 2021-06-22 NOTE — PROGRESS NOTE ADULT - PROVIDER SPECIALTY LIST ADULT
Pulmonology
Palliative Care
Hospitalist
Pulmonology
Palliative Care

## 2021-06-22 NOTE — HOSPICE CARE NOTE - CONVESATION DETAILS
Pt approved for in-pt hospice care at [facility] with agreement from tulio Maribel and SILVIA Young. HCN consents emailed to family as requested. Sergei Evans made aware-- ICD needs to be deactivated prior to discharge, pt should be transferred with IV access, and a nurse to nurse report to [facility] should be provided. Transfer scheduled for 4pm today.

## 2021-06-22 NOTE — PROGRESS NOTE ADULT - ASSESSMENT
70 YO F with PMH of HTN, HFrEF 20-25 (4/2019) s/p ICD, CAD, COPD on 3L O2 and nocturnal BiPAP and pHTN, p/w SOB x 1 day found to have acute on chronic hypercarbic respiratory failure in setting of advanced COPD vs HF? Admitted to RCU for further management.     # AHHRF second to COPD vs HF Exacerbation   - CXR and RVP/ COVID PCR negative on admission   - s/p Solumedrol 40mg BID (6/7 - 6/9) and 40mg QD (6/10 - 6/14)   - s/p Zithromax x 5d- completed on 6/12- now on Zithromax 3x/week ppx  - S/P Prednisone 30mg QD (6/15- 6/18)- switch to IV solumedrol 40mg QD today  - Spiriva/Symbicort stopped today- not following commands required for timed inhalation of medication  - c/w Duonebs  - Continue on NC QD and BIPAP QHS/ PRN   - Will trial Life 2000 positive pressure system prior to discharge (once repaired)  - Palliative Care following  - Son HCP visited pt and made pt DNR /DNI and is aware of her grave condition  - Morphine prn for dyspnea /sob   - son agreeable to hospice care -  /hospice assisting with this  -transfer to [facility] today with hospice services    # HFrEF 20-25 s/p ACID  - ECHO 4/2019 with EF 20-25% and severe global LVSD. Mild LAD, mild pHTN and moderate tricuspid regurgitation.   - c/w Hydralazine 75mg TID (home dose) for better BP control- hold d/t soft BPs  - c/w ASA 81mg  - c/w Imdur 30mg QD - hold d/t soft BPs  - c/w Coreg 25mg q 12hr- hold d/t soft BPs  - No Diuretics - Will d/c Echo given family now wants to focus on comfort care  -ICD turned off by EP    # DVT PPX with Lovenox   # DISPO - inpatient hospice- [facility]

## 2021-06-22 NOTE — DISCHARGE NOTE PROVIDER - NSDCCPCAREPLAN_GEN_ALL_CORE_FT
PRINCIPAL DISCHARGE DIAGNOSIS  Diagnosis: Hypercapnic respiratory failure  Assessment and Plan of Treatment: Continue with Solumedrol daily and duonebs. Focus is comfort care. Continue nasal canula during the day and Bipap at night. Continue morphine for discomfort or dyspnea. FOllow up with PCP at receiving facility      SECONDARY DISCHARGE DIAGNOSES  Diagnosis: Chronic systolic CHF (congestive heart failure)  Assessment and Plan of Treatment: ICD turned off by Electrophysiology team. Comfort care. Follow up with PCP at receiving facility     PRINCIPAL DISCHARGE DIAGNOSIS  Diagnosis: Hypercapnic respiratory failure  Assessment and Plan of Treatment: Continue with Solumedrol daily and duonebs. Focus is comfort care. Continue nasal canula during the day and Bipap at night. BiPAP settings of 16/6 with a backup respiratory rate of 16 and FiO2 of 40%. Continue morphine for discomfort or dyspnea. Follow up with PCP at receiving facility      SECONDARY DISCHARGE DIAGNOSES  Diagnosis: Chronic systolic CHF (congestive heart failure)  Assessment and Plan of Treatment: ICD turned off by Electrophysiology team. Comfort care. Follow up with PCP at receiving facility

## 2021-06-22 NOTE — CHART NOTE - NSCHARTNOTEFT_GEN_A_CORE
[facility] MEDICINE NIGHT COVERAGE    ABG required to determine respiratory status.  Patient's radial artery was palpated and cleaned with alcohol pad.   23g x 3/4" x 12" butterfly needle was inserted into the right radial artery with pulsatile flash.  One attempt was performed.  3cc of blood was obtained from patient.  Gauze pad was placed over site, needle withdrawn and firm pressure was held until complete hemostasis was achieved and band-aid was applied.  Patient tolerated procedure well with no complications.      Anabella Worthington PA-C  Medicine [id number]
Notified by RN that patient removed BiPAP. Patient seen at bedside by provider. Patient very tearful and upset stating the BiPAP mask is overwhelming and she needs a break. P Patient wore BiPAP for aprox 4.5 hours over night, currently satting 100% on NC. Provider explained risks of not wearing BiPAP. Patient began to cry and states that she understands and will wear the mask again tomorrow night but cannot wear it right now. Will continue to support patient and monitor closely on continuos pulse ox.    Hannah Hampton PA-C  Department of Medicine  [facility]   In House Pager #[phone]
RCU PA NOTE     70 YO F with PMH of HTN, HFrEF 20-25 (4/2019) s/p ICD, CAD, COPD on 3L O2 and nocturnal BiPAP and pHTN, p/w SOB x 1 day found to have acute on chronic hypercarbic respiratory failure in setting of advanced COPD vs HF? Admitted to RCU for further management and s/p Solumedrol and now on Prednisone. Patient with poor tolerance with BIPAP full mask and started on Nasal BIPAP. With Nasal BiPAP patient noted with poor TVs and return of hypercarbia/ lethargy.     Patient with advanced end stage COPD and noted with poor weaning/ tolerance with BIPAP. Case discussed with Son, Genaro Jalloh ([phone]) and explained current medical conditions and poor prognosis at length. Son notes he would not want his Mother to suffer or live on a machine life long, however wishes for her to remain full code at this time as he further discusses GOC with his Wife, Hannah Jalloh ([phone]). Patient to remain full code, however if need for intubation or CPR would arise, case to be discussed with Son a that time. Will continue to discuss GOC and Palliative following.     Alea Trejo PA-C  Department of Medicine/ RCU   In House Pager #[phone]
Repeat ABG this afternoon after patient placed on BiPAP with PaCo2 77. Mental status improved, patient more alert and active.
Repeat VBG reviewed. pH is improving, CO2 less so. Likely needs more time on AVAPS. Continue to trend. Not a MICU candidate at this time. Please reconsult if change in clinical status. Discussed with Dr. Cavazos.
Notified by RN that patient refusing to wear BiPAP. Patient seen at bedside by provider. Patient in NAD, satting 100% on 6L NC. Patient has been refusing BIPAP through out admission. Patient states she needs a break and needs to use the restroom. Provider educated patient on importance of BIPAP. Patient affirms understanding and states she will try again in 2 hours. WIll continue to monitor closely on continuos pulse ox.    Hannah Hampton PA-C  Department of Medicine  [facility]   In House Pager #[phone]
[facility] MEDICINE NIGHT COVERAGE - Medicine Subsequent Hospital Care Note    CC: Patient complaining of difficulty breathing  HPI/Subjective: 72 Y/O F with PMH of HTN, HFrEF 20-25 (4/2019) s/p ICD, CAD, COPD on 3L O2 and nocturnal BiPAP and pHTN, p/w SOB x 1 day found to have acute on chronic hypercarbic respiratory failure in setting of advanced COPD vs HF? Admitted to RCU for further management. Overnight patient complaining of difficulty breathing. Patient originally placed on BIPAP for acute on chronic hypoxemic and hypercapnic respiratory failure in setting of COPD exacerbation. Patient anxious and now states that she can't breathe.    ROS:   Admits Anxiety  Denies fever, chills, diaphoresis, malaise, night sweats, generalized weakness, headache, changes in vision, dizziness, cough, sputum production, wheezing, hemoptysis, chest pain, palpitations, dyspnea on exertion, PND, dysphagia, new abdominal pain, nausea, vomiting, diarrhea, constipation, melena, hematochezia, dysuria, increased urinary frequency/urgency, hematuria, nocturia, numbness/weakness/tingling, any other complaints.    Vital Signs Last 24 Hrs  T(C): 36.7 (06-19-21 @ 03:53), Max: 36.8 (06-18-21 @ 22:11)  T(F): 98.1 (06-19-21 @ 03:53), Max: 98.2 (06-18-21 @ 22:11)  HR: 93 (06-19-21 @ 04:02) (74 - 100)  BP: 175/95 (06-19-21 @ 05:40) (100/87 - 177/99)  RR: 22 (06-19-21 @ 03:53) (20 - 22)  SpO2: 97% (06-19-21 @ 04:02) (93% - 100%) on (O2)    PHYSICAL EXAM:  Constitutional: Thin elderly female. In mild respiratory distress   Respiratory: Diminished breath sounds. On BIPAP  Cardiovascular: S1 and S2 present  Gastrointestinal: soft, nontender, nondistended, +bowel sounds, no hernia    LABS:                        10.0   6.45  )-----------( 198      ( 18 Jun 2021 13:35 )             33.2     06-18    144  |  98  |  32<H>  ----------------------------<  123<H>  4.4   |  39<H>  |  0.47<L>    Ca    9.3      18 Jun 2021 13:35    TPro  5.7<L>  /  Alb  3.7  /  TBili  0.6  /  DBili  x   /  AST  13  /  ALT  22  /  AlkPhos  45  06-18      CARDIAC ENZYMES    Serum Pro-Brain Natriuretic Peptide: 1248 pg/mL (06-06-21 @ 17:34)    D-Dimer Assay:     Urinanalysis Basic (06-19-21 @ 05:49):      Blood Gas Venous (06-19-21 @ 05:49):  pH: 7.42 | HCO3: 41 | pCO2: 69 | pO2: 59 | Lactate: 1.9  pH: 7.20 | HCO3: 32 | pCO2: 106 | pO2: 29 | Lactate: --  pH: 7.20 | HCO3: 33 | pCO2: >110 | pO2: 51 | Lactate: 1.2      Blood Gas Arterial (06-19-21 @ 05:49):  pH: 7.38 | HCO3: 40 | pCO2: 77 | pO2: 176 | Lactate: --  pH: 7.30 | HCO3: 39 | pCO2: 93 | pO2: 76 | Lactate: --  pH: 7.36 | HCO3: 39 | pCO2: 77 | pO2: 179 | Lactate: --      CAPILLARY BLOOD GLUCOSE:  POCT Blood Glucose: 102 mg/dL (06-07-21 @ 03:21)      COVID PCR:  NotDetec (06-06-21 @ 17:56)  NotDetec (05-04-21 @ 00:04)  NotDetec (03-28-21 @ 22:54)      RADIOLOGY:    MEDICATIONS  (STANDING):  aspirin enteric coated 81 milliGRAM(s) Oral daily  azithromycin   Tablet 500 milliGRAM(s) Oral <User Schedule>  budesonide 160 MICROgram(s)/formoterol 4.5 MICROgram(s) Inhaler 2 Puff(s) Inhalation two times a day  carvedilol 25 milliGRAM(s) Oral every 12 hours  chlorhexidine 4% Liquid 1 Application(s) Topical daily  enoxaparin Injectable 30 milliGRAM(s) SubCutaneous daily  fluticasone propionate 50 MICROgram(s)/spray Nasal Spray 1 Spray(s) Both Nostrils two times a day  hydrALAZINE 75 milliGRAM(s) Oral every 8 hours  isosorbide   mononitrate ER Tablet (IMDUR) 30 milliGRAM(s) Oral daily  LORazepam   Injectable 0.25 milliGRAM(s) IV Push once  methylPREDNISolone sodium succinate Injectable 40 milliGRAM(s) IV Push daily  polyethylene glycol 3350 17 Gram(s) Oral daily  senna 2 Tablet(s) Oral at bedtime  simvastatin 20 milliGRAM(s) Oral at bedtime  tiotropium 18 MICROgram(s) Capsule 1 Capsule(s) Inhalation daily    MEDICATIONS  (PRN):  acetaminophen   Tablet .. 650 milliGRAM(s) Oral every 6 hours PRN Mild Pain (1 - 3), Moderate Pain (4 - 6)  albuterol/ipratropium for Nebulization 3 milliLiter(s) Nebulizer every 6 hours PRN Shortness of Breath and/or Wheezing  morphine  - Injectable 1 milliGRAM(s) IV Push every 6 hours PRN SOB    I&O's Summary    17 Jun 2021 07:01  -  18 Jun 2021 07:00  --------------------------------------------------------  IN: 930 mL / OUT: 850 mL / NET: 80 mL    18 Jun 2021 07:01  -  19 Jun 2021 05:49  --------------------------------------------------------  IN: 240 mL / OUT: 300 mL / NET: -60 mL      I reviewed the above labs, radiology, medications, tests, telemetry, and EKG interpretation.    ASSESSMENT/PLAN:  72 Y/O F with PMH of HTN, HFrEF 20-25 (4/2019) s/p ICD, CAD, COPD on 3L O2 and nocturnal BiPAP and pHTN, p/w SOB x 1 day found to have acute on chronic hypercarbic respiratory failure in setting of advanced COPD vs HF? Admitted to RCU for further management. Overnight patient complaining of difficulty breathing. Patient originally placed on BIPAP for acute on chronic hypoxemic and hypercapnic respiratory failure in setting of COPD exacerbation. Patient now anxious and states that she can't breathe and needs oxygen.    Shortness of breath  -STAT ABG  -BIPAP setting increased to 40% FIO2  -PRN Morphine given    Anxiety  -0.25 IV Ativan given       - Clinical findings, labs, tests, telemetry, and ekg reviewed with attending. Will monitor patient closely.     Anabella Worthington PA-C  Medicine [id number]  [ ] Medium complexity/risk (Time < 30min)
ELECTROPHYSIOLOGY    Patient's health care team called to turn off this patients ICD tachy therapies  Patient with Medtronic Claria MRI Quad CRT-D.   Patient now DNR/DNI, on palliative care  Being transferred to hospice facility this afternoon.  VT/VF therapies turned off to avoid ICD shock.   No changes to pacing parameters.

## 2021-06-22 NOTE — PROGRESS NOTE ADULT - CVS HE PE MLT D E PC
regular rate and rhythm
regular rate and rhythm/no rub
regular rate and rhythm

## 2021-06-22 NOTE — PROGRESS NOTE ADULT - CONSTITUTIONAL DETAILS
no distress
respiratory distress
no distress
well-groomed/no distress
respiratory distress
respiratory distress

## 2021-06-23 NOTE — PROVIDER CONTACT NOTE (CRITICAL VALUE NOTIFICATION) - NS PROVIDER READ BACK
yes
Pco2 77/yes
yes
Birth Control Pills Counseling: Birth Control Pill Counseling: I discussed with the patient the potential side effects of OCPs including but not limited to increased risk of stroke, heart attack, thrombophlebitis, deep venous thrombosis, hepatic adenomas, breast changes, GI upset, headaches, and depression.  The patient verbalized understanding of the proper use and possible adverse effects of OCPs. All of the patient's questions and concerns were addressed.

## 2021-07-06 ENCOUNTER — APPOINTMENT (OUTPATIENT)
Dept: ELECTROPHYSIOLOGY | Facility: CLINIC | Age: 71
End: 2021-07-06

## 2021-10-08 ENCOUNTER — APPOINTMENT (OUTPATIENT)
Dept: ELECTROPHYSIOLOGY | Facility: CLINIC | Age: 71
End: 2021-10-08

## 2021-10-15 NOTE — PROGRESS NOTE ADULT - ASSESSMENT
I will call her.
69F hx of CHF (EF 22% in 8/2018) s/p PPM/ICD (Medtronic 9/2018), R-sided heart failure, HTN, CARLOTA not able to tolerate CPAP, severe COPD (not on home O2), moderate pHTN presenting with shortness of breath and acute hypoxic respiratory failure likely multifactorial 2/2 CHF exacerbation, progression of mod pHTN, progression of COPD.

## 2021-10-18 NOTE — H&P ADULT - NSICDXFAMILYHX_GEN_ALL_CORE_FT
Digestive FAMILY HISTORY:  Family history of colon cancer  No significant family history FAMILY HISTORY:  Family history of colon cancer

## 2021-10-27 NOTE — PROGRESS NOTE ADULT - PROBLEM SELECTOR PLAN 5
PSYCH CONSULT    Date of Admission: 10/23/21  Date of Consult: 10/27/21  Reason for Consultation: Assess decision making capacity, suicidal comments    Impression:  Psychiatric Diagnoses:  Acute delirium/encephalopathy due to recent RAVI and pain and meds (
Patient fearful of dying and currently states she would want everything done. Pt is aware of poor lung capacity. Will need to f/u regarding continued goals of care. However, at this time, patient wants to be FULL CODE

## 2021-11-30 NOTE — DISCHARGE NOTE PROVIDER - NSDCCAREPROVSEEN_GEN_ALL_CORE_FT
Carmela Reynolds FOR MD VISIT & TX  DR HERNANDEZ IN TO SEE PATIENT  ORDERS RECEIVED  ELIGARD TODAY  RV 3 MONTHS WITH PSA & TEAGAN AT RV  LABS PSA 03/22/22  MD VISIT 03/22/22 @11:15AM [email or web address]  AVS PRINTED AND GIVEN TO PATIENT WITH INSTRUCTIONS  PATIENT DISCHARGED TO [address] [place] [address] Lisker, Gita N

## 2021-12-10 NOTE — DISCHARGE NOTE NURSING/CASE MANAGEMENT/SOCIAL WORK - NSDCFUADDAPPT_GEN_ALL_CORE_FT
Please follow up with your primary care provider in 1 to 2 weeks for further care. If you don't have a primary care provider please follow up at our Medicine Clinic at  [facility] [place] [address] [phone] or [phone]  (please call to make appointment) 
Patient seen, evaluated and chart reviewed. Patient presents with improved mood, and states that he slept a little better, and his pain has decreased. Deneis symptoms of psychosis, nina or depression.

## 2022-02-10 NOTE — PROGRESS NOTE ADULT - PROBLEM SELECTOR PROBLEM 1
Acute respiratory failure with hypoxia and hypercapnia
Dr. Oreilly
Acute respiratory failure with hypoxia and hypercapnia

## 2022-08-12 NOTE — ED ADULT NURSE NOTE - NS ED NURSE RECORD ANOTHER VITAL SIGN
Yes, record another set of vital signs US did not visualize appendix. Will get CT with PO and IV contrast. Mari Mendez, PGY-2 CT shows acute uncomplicated appendicitis. Will give CTX and Flagyl and c/s surgery. Mari Mendez, PGY-2

## 2022-09-21 NOTE — ED PROVIDER NOTE - ADMIT DISPOSITION PRESENT ON ADMISSION SEPSIS
H&P Exam - Cardiology   Nigel Braswell 63 y o  male MRN: [id number]  Unit/Bed#: BE CATH LAB ROOM Encounter: [id number]    Assessment/Plan   1  Cryptogenic CVA    * plan for ILR for cryptogenic CVA         Imaging: I have personally reviewed pertinent reports  No results found for this or any previous visit  EKG:   None     History of Present Illness   HPI:  Nigel Braswell is a 63y o  year old male with a history as above who presents to [facility] for ILR implant  Review of Systems  ROS as noted above, otherwise 12 point review of systems was performed and is negative  Historical Information   Past Medical History:   Diagnosis Date    Diverticulosis     Hypertension     Mononucleosis     Prediabetes      No past surgical history on file  Family History: No family history on file  Social History   Social History     Substance and Sexual Activity   Alcohol Use Yes    Alcohol/week: 14 0 standard drinks    Types: 14 Cans of beer per week    Comment: socially     Social History     Substance and Sexual Activity   Drug Use Never     Social History     Tobacco Use   Smoking Status Never Smoker   Smokeless Tobacco Never Used         Meds/Allergies   all medications and allergies reviewed  Home Medications:   Medications Prior to Admission   Medication    amLODIPine (NORVASC) 5 mg tablet    aspirin 81 mg chewable tablet    atorvastatin (LIPITOR) 80 mg tablet       No Known Allergies    Objective   Vitals: There were no vitals taken for this visit  No intake or output data in the 24 hours ending 09/21/22 1309    Invasive Devices  Report    None                 Physical Exam  Constitutional:       Appearance: He is well-developed  HENT:      Head: Normocephalic and atraumatic  Eyes:      Pupils: Pupils are equal, round, and reactive to light  Cardiovascular:      Rate and Rhythm: Normal rate and regular rhythm     Pulmonary:      Effort: Pulmonary effort is normal       Breath sounds: Normal breath sounds  Abdominal:      General: Bowel sounds are normal       Palpations: Abdomen is soft  Musculoskeletal:         General: Normal range of motion  Cervical back: Normal range of motion and neck supple  Skin:     General: Skin is warm and dry  Neurological:      Mental Status: He is alert and oriented to person, place, and time  Lab Results: I have personally reviewed pertinent lab results                Invalid input(s): LABGLOM                Code Status: Prior No

## 2022-10-14 NOTE — ED PROVIDER NOTE - NS ED MD DISPO DISCHARGE CCDA
[FreeTextEntry1] : Impression: Diabetes with neuropathy (E11.42).  Diabetic foot infection, left (S91).  2nd metatarsal osteomyelitis, left.  \par \par Treatment: Discussed etiology and treatment options.  Discussed the importance of glycemic control.  Patient has an appointment with the endocrinologist this week.  He has been following the diabetic diet.\par The left foot wound was sharply debrided to the level of muscle with removal of all nonviable fibrotic tissue with a sterile nipper.  Betadine dressing applied.  Advised patient to continue with wound V.A.C. dressings with home nursing care.  Continue IV antibiotics as per his Infectious Disease doctor.  \par Return: one week. \par Continue heel weight bearing in the surgical shoe for essential ambulation. 
Patient/Caregiver provided printed discharge information.

## 2023-02-22 NOTE — ED PROVIDER NOTE - CONSTITUTIONAL [-], MLM
"Cuauhtemoc "Gavin Ann was seen and treated in our emergency department on 2/22/2023.     COVID-19 is present in our communities across the state. There is limited testing for COVID at this time, so not all patients can be tested. In this situation, your employee meets the following criteria:    Cuauhtemoc Ann has met the criteria for COVID-19 testing and has a POSITIVE result. She can return to work once they are asymptomatic for 24 hours without the use of fever reducing medications AND at least five days from the first positive result. A mask is recommended for 5 days post quarantine.     If you have any questions or concerns, or if I can be of further assistance, please do not hesitate to contact me.    Sincerely,             Tami Gomez NP" no chills/no fever

## 2023-08-12 NOTE — PROVIDER CONTACT NOTE (CRITICAL VALUE NOTIFICATION) - PERSON GIVING RESULT:
Bedside swallow evaluation completed. Michelle Morse M.A.  [address] [place] [address]  Speech Language Pathologist
Increase function to baseline.
Increase patients ADLs/functional status to baseline.
Problem: Discharge Planning  Goal: Discharge to home or other facility with appropriate resources  8/11/2023 0945 by Carmel Silvestre  Outcome: Progressing  8/11/2023 0537 by Gela Arcos RN  Outcome: Progressing  8/11/2023 0536 by Gela Arcos RN  Outcome: Progressing     Problem: Pain  Goal: Verbalizes/displays adequate comfort level or baseline comfort level  8/11/2023 0537 by Gela Arcos RN  Outcome: Progressing  8/11/2023 0536 by Gela Arcos RN  Outcome: Progressing     Problem: Safety - Adult  Goal: Free from fall injury  8/11/2023 0537 by Gela Arcos RN  Outcome: Progressing  8/11/2023 0536 by Gela Arcos RN  Outcome: Progressing     Problem: ABCDS Injury Assessment  Goal: Absence of physical injury  8/11/2023 0537 by Gela Arcos RN  Outcome: Progressing  8/11/2023 0536 by Gela Arcos RN  Outcome: Progressing
Problem: Discharge Planning  Goal: Discharge to home or other facility with appropriate resources  8/12/2023 1207 by Sudheer Andino RN  Outcome: Adequate for Discharge  8/12/2023 0155 by Gutierrez Teran RN  Outcome: Progressing     Problem: Pain  Goal: Verbalizes/displays adequate comfort level or baseline comfort level  8/12/2023 1207 by Sudheer Andino RN  Outcome: Adequate for Discharge  8/12/2023 0155 by Gutierrez Teran RN  Outcome: Progressing     Problem: Safety - Adult  Goal: Free from fall injury  8/12/2023 1207 by Sudheer Andino RN  Outcome: Adequate for Discharge  8/12/2023 0155 by Gutierrez Teran RN  Outcome: Progressing     Problem: ABCDS Injury Assessment  Goal: Absence of physical injury  8/12/2023 1207 by Sudheer Andino RN  Outcome: Adequate for Discharge  8/12/2023 0155 by Gutierrez Teran RN  Outcome: Progressing     Problem: Chronic Conditions and Co-morbidities  Goal: Patient's chronic conditions and co-morbidity symptoms are monitored and maintained or improved  8/12/2023 1207 by Sudheer Andino RN  Outcome: Adequate for Discharge  8/12/2023 0155 by Gutierrez Teran RN  Outcome: Progressing
Received ED page for admission.   Sent to Dr. Carmelina Frias admitting hospitalist.
[facility]/Laboratory
[facility]
maninder cooper /Laboratory
[facility]-Lab
[facility]

## 2024-03-06 NOTE — PROGRESS NOTE ADULT - PROBLEM SELECTOR PLAN 7
Is This A New Presentation, Or A Follow-Up?: Skin Lesions What Type Of Note Output Would You Prefer (Optional)?: Standard Output How Severe Is Your Skin Lesion?: mild Has Your Skin Lesion Been Treated?: not been treated Pt developed transient axis shift in her EKG and new RBBB, trop neg after, electrolytes wnl, and pt was asymptomatic except dyspnea from exertion. Low suspicion ACS. Likely pt with multiple cardiac issues including LBBB and severe CHF  -On tele has been V-paced rate 90s, no ectopy  -monitor on tele  -Maintain K>4, Mg>2

## 2024-09-04 NOTE — ED ADULT NURSE NOTE - IN THE PAST 12 MONTHS HAVE YOU USED DRUGS OTHER THAN THOSE REQUIRED FOR MEDICAL REASON?
Action Requested: Summary for Provider     []  Critical Lab, Recommendations Needed  [x] Need Additional Advice  []   FYI    []   Need Orders  [x] Need Medications Sent to Pharmacy  []  Other     SUMMARY: Per protocol, patient should be able to manage symptoms with home care. Requesting prescription for gout flareup.     VEDA Henriquez please advise on refill, or other recommendation.    Reason for call: Gout  Onset: 9/3    Patient reports of getting a gout flareup yestesday morning, pain peaked last night, 9/10. Pain better today, 6/10, but still cannot walk normally. Left foot - area under the left big toe red and swollen. See Media [facility] He has taken ibuprofen for pain. States VEDA Henriquez saw him last year and prescribed him medication before he went to [facility]. He wants to know if he can be prescribed of the same.    Reason for Disposition   Foot pain    Protocols used: Foot Pain-A-[place]     No

## 2024-10-25 NOTE — REVIEW OF SYSTEMS
Group Therapy Note    Date: 10/25/2024    Group Start Time: 1100  Group End Time: 1145  Group Topic: Psychoeducation    [facility]    Group Therapy Note    Attendees: 9/14       Patient's Goal:  Patient will identify benefits of self-care and leisure for coping and stress management    Notes:  Patient attended group and participated    Status After Intervention:  Improved    Participation Level: Active Listener and Interactive    Participation Quality: Appropriate, Attentive, Sharing      Speech:  normal      Thought Process/Content: Logical  Linear      Affective Functioning: Constricted/Restricted      Mood: euthymic      Level of consciousness:  Alert, Oriented x4, and Attentive      Response to Learning: Able to verbalize current knowledge/experience, Able to verbalize/acknowledge new learning, Able to retain information, Able to change behavior, and Progressing to goal      Endings: None Reported    Modes of Intervention: Education, Support, Socialization, and Exploration      Discipline Responsible: Psychoeducational Specialist      Signature:  FRANKY Aviles     [Lower Ext Edema] : lower extremity edema [Shortness Of Breath] : shortness of breath [Negative] : Heme/Lymph

## 2024-12-16 NOTE — PROGRESS NOTE ADULT - ATTENDING COMMENTS
Patient able to void without difficulty throughout the day    PE:  Vitals stable  : left labial hematoma stable, non tender, mild bruising noted    A/P: PPD#2 s/p  with pre-eclampsia w/o SF, left vaginal hematoma.  -discharge home this evening  -motrin and tylenol for pain  -continue lasix for until 24  -follow up in clinic for bp check with nurse in 72hrs.  Has bp cuff at home, discussed bp parameters   Agree with plan as outlined above. Patient seen and examined at bedside. Patient history, laboratory data, and imaging personally reviewed.    Pt is a 71F with PMHx HFrEF with AICD and COPD c/b chronic hypoxemic and hypercapnic respiratory failure (3L NC O2 ATC and nocturnal NIV) p/w acute worsening of hypercapnic respiratory failure likely 2/2 COPD exacerbation vs progression of disease.    Pt clinically improved, remains at neurologic baseline with resolution of acute hypercapnia. Tolerating AVAPS prn and qhs. Pt feels better on nasal pillow NIV interface. Discussed with RT, will trial [id number] as that may benefit pt, were unable to do it yesterday. Will continue to trend daily VBGs. Transitioned to Prednisone 40mg QD with DuoNebs ATC and Pulmicort BID. Azithromycin x3 day course followed by initiation of ppx for anti-inflammatory properties. Last PFTs performed 12/2019 showed FEV1/FVC 0.27L (13%)/0.96L (36%) with DLCO 40%, declined from 2018. Last sleep study in 2018 with AHI ~4 and no central apneas. Pt a/w known CHFrEF (EF 20-25%, Medtronic CRT-D, last interrogated 8/20) with episode of VF s/p shock on 6/1/21. Restarted home carvedilol, tolerating well. Restarted hydralazine and Imdur, will uptitrated as tolerated for hemodynamics. Pt clinically euvolemic. Off diuretics. Will c/w home ASA and statin.     Dispo pending medical optimization. Pt full code, would like trial of intubation if clinically worsens.

## 2025-01-08 NOTE — H&P ADULT - HISTORY OF PRESENT ILLNESS
Occupational Therapy    Visit Type: initial evaluation and treatment  Co-treat with: Physical therapist    Relevant History/Co-morbidities: LLE WBAT    SUBJECTIVE  Patient verbally agrees to allow the following to be present during session: spouse  \"I have to go to the bathroom\"  Patient / Family Goal: return to previous functional status, maximize function and return home    Pain   Patient denies pain.    OBJECTIVE     Cognitive Status   Level of Consciousness   - alert  Arousal Alertness   - appropriate responses to stimuli  Affect/Behavior    - calm and cooperative  Orientation    - Oriented to: person, place, time and situation  Functional Communication   - Overall Communication Status: within functional limits   - Forms of Communication: verbal  Attention Span    - Attention: - attends with cues to redirect   - Attention impairment: distractibility and fatigue  Following Direction   - follows multistep commands with increased time and follows multistep commands with repetition  Transition Between Tasks   - transitions with cues  Executive Function    - Sequencing: impaired   - Problem Solving: impaired   - Initiation: intact  Safety Awareness/Insight   - decreased awareness of need for safety and decreased awareness of need for assistance  Verbal Expression   - intact    Patient Activity Tolerance: 2 to 1 activity to rest      Range of Motion (ROM)   (degrees unless noted; active unless noted; norms in ( ); negative=lacking to 0, positive=beyond 0)  WFL: LUE, RUE    Strength  (out of 5 unless noted, standard test position unless noted)   WFL: LUE, RUE  Gross :  strength grossly equal bilateral      Sitting Balance  (JUANJO = base of support)  Static      - Trial 1 details: modified independent  Dynamic      - Trial 1 details: modified independent    Standing Balance  (JUANJO = base of support)  Firm Surface: Double Leg      - Static, Eyes Open       - Trial 1 details: contact guard and with double UE  support     - Dynamic, Eyes Open       - Trial 1 details: minimal assist and with double UE support       Bed Mobility  - Supine to sit: minimal assist, with verbal cues, with tactile cues  - Sit to supine: minimal assist, with verbal cues, with tactile cues  Transfers  Assistive devices: 2-wheeled walker, gait belt  - Sit to stand: minimal assist, with verbal cues, with tactile cues  - Stand to sit: minimal assist, with verbal cues, with tactile cues      Functional Ambulation  - Assistance: minimal assist, with verbal cues and with tactile cues  - Assistive device: gait belt and 2-wheeled walker  - Distance (ft):10; 5  - Surface: even  Activities of Daily Living (ADLs)  Grooming/Oral Hygiene:   - Grooming assist: modified independent  - Position: edge of bed  Upper Body Dressing:  - Assist: minimal assist  - Position: edge of bed  Lower Body Dressing:   - Footwear:       - Assistance: maximal assist       - Position: edge of bed       - Type: socks  Toileting:   - Toilet transfer:        - Assist: minimal assist, with verbal cues and with tactile cues       - Equipment: bedside commode  - Assist: minimal assist and with verbal cues  - Position: standing  - Assist needed for: clothing management up and clothing management down  Discussed DME recommendations with pt's spouse- shower chair, grab bars   Interventions    Treatment provided: ADL training, balance retraining, activity tolerance, bed mobility training, transfer training, body mechanics, functional ambulation and energy conservation  Skilled input: verbal instruction/cues, tactile instruction/cues, posture correction and facilitation  Verbal Consent: Writer verbally educated and received verbal consent for hand placement, positioning of patient, and techniques to be performed today from patient for clothing adjustments for techniques, therapist position for techniques and hand placement and palpation for techniques as described above and how they are  pertinent to the patient's plan of care.         Education:   - Present and ready to learn: patient  Education provided during session:  - Results of above outlined education: Verbalizes understanding and Demonstrates understanding    ASSESSMENT   Patient will benefit from inpatient skilled therapy to address current assessed functional limitations and impairments.  Interfering components: decreased activity tolerance    Discharge needs based on today's assessment:  - Current level of function: slightly below baseline level of function  - Therapy needs at discharge: therapy 1-3 times per week  - Activities of daily living (ADLs) requiring support at discharge: bed mobility, transfers, ambulation, dressing, bathing and toileting  - Instrumental activities of daily living (IADLs) requiring support at discharge: home management, meal preparation, shopping and community mobility  - Impairments that require further therapy intervention: balance, strength and activity tolerance    Patient seen POD 0 s/p TKR. Patient reports prior level of function mod I with functional mobility and ADLs. Patient presents with decreased independence with ADLs and mobility, would continue to benefit from OT services.     AM-PAC  - Prior Level of Function: IND/MOD I ([facility] 22-24)       Key: MOD A=moderate assistance, IND/MOD I=independent/modified independent  - Generalized Current Level of Function     - Current Self-Cares: 17       Scoring Key= >21 Modified Independent; 20-21 Supervision; 18-19 Minimal assist; 13-17 Moderate assist; 9-12 Max assist; <9 Total assist        Personal Occupations Profile Affected: bathing/showering, lower body dressing, upper body dressing, functional mobility/transfers, toileting/toilet hygiene, home establishment/managements, meal preparation/cleanup, community mobility, shopping      Clinical decision making: Low - Patient has few limitations (1-3), comorbidities and/or complexities, as noted in problem  focused assessment noted above, that impact their occupational profile.  Resulting in few treatment options and no task modification consistent with low clinical decision making complexity.    PLAN (while hospitalized)  Suggestions for next session as indicated:     OT Frequency: 3-5 x per week         Interventions: ADL retraining, functional transfer training, activity tolerance training, balance, energy conservation, safety training, transfer training and therapeutic activity  Agreement to plan and goals: patient agrees with goals and treatment plan      GOALS  Long Term Goals: (to be met by time of discharge from hospital)  Upper body dressing: Patient will complete upper body dressing in sitting modified independent.  Lower body dressing: Patient will complete lower body dressing in sitting modified independent.  Toilet transfer: Patient will complete toilet transfer with modified independent.     Documented in the chart in the following areas: Assessment/Plan.    Patient at End of Session:   Location: in bed  Safety measures: alarm system in place/re-engaged, call light within reach, lines intact and bed rails x3  Handoff to: nurse and family/caregiver      Therapy procedure time and total treatment time can be found documented on the Time Entry flowsheet   69F hx of CHF (EF 25% in 4/27/19 ) s/p PPM/ICD (Medtronic 9/2018), R-sided heart failure, HTN, CARLOTA not able to tolerate CPAP, COPD (not on home O2), moderate pHTN p/w SOB for 2 days. Pt reported that she recently went to [place] to visit family. Pt reported that she returned 4 days prior to admission and noticed that the changed in weather made her "uncomfortable," however she did not feel short of breath. Pt reported she was feeling well uptill 2 days (Sunday) prior to admission when she started to feel SOB. Pt reported that she was sitting down, "relaxing" when she started to feel symptomatic. Pt reported that she felt like she had to cough "something up" however was unable to. Pt reported that the she noticed that she became more easily winded when she walked into the next room which is only about "5 steps" away. Pt reported that she is able to ambulate at baseline wihtout assistance and able to walk w/o 1 block w/o sxs. Pt reported no fevers, chills, cough, URI symptoms, sick contacts, CP, abdominal pain, or dysuria    In the ED,  T(C): 36.6), Max: 36.7 (07-29-19 @ 18:31) HR: 98  (70 - 98) BP: 185/98  (114/60 -185/98) RR: 24  (17 - 24) SpO2: 100% (99% - 100%). Pt was treated w/ solumedrol 125mg IVP, Duonebs x 3. 69F hx of CHF (EF 25% in 4/27/19 ) s/p PPM/ICD (Medtronic 9/2018), R-sided heart failure, HTN, [place] not able to tolerate CPAP, COPD (not on home O2), moderate pHTN p/w SOB for 2 days. Pt reported that she recently went to [place] to visit family. Pt reported that she returned 4 days prior to admission and noticed that the changed in weather made her "uncomfortable," however she did not feel short of breath. Pt reported she was feeling well uptill 2 days (Sunday) prior to admission when she started to feel SOB. Pt reported that she was sitting down, "relaxing" when she started to feel symptomatic. Pt reported that she felt like she had to cough "something up" however was unable to. Pt reported that the she noticed that she became more easily winded when she walked into the next room which is only about "5 steps" away. Pt reported that she is able to ambulate at baseline wihtout assistance and able to walk w/o 1 block w/o sxs. Pt reported no fevers, chills, cough, URI symptoms, sick contacts, CP, abdominal pain, or dysuria    In the ED,  T(C): 36.6), Max: 36.7 (07-29-19 @ 18:31) HR: 98  (70 - 98) BP: 185/98  (114/60 -185/98) RR: 24  (17 - 24) SpO2: 100% (99% - 100%). Pt was treated w/ solumedrol 125mg IVP, Duonebs x 3. Pt placed on O2 via NC.

## 2025-02-11 NOTE — ED ADULT TRIAGE NOTE - BP NONINVASIVE DIASTOLIC (MM HG)
57 February 11, 2025     Jamie Roche DO  [address]  [facility] [address]    Patient: Maya Dutton   YOB: 1957   Date of Visit: 2/11/2025       Dear Dr. Jamie Roche DO:    Thank you for referring Maya Dutton to me for evaluation. Below are my notes for this consultation.  If you have questions, please do not hesitate to call me. I look forward to following your patient along with you.       Sincerely,     Shane Rivas MD      CC: No Recipients  ______________________________________________________________________________________    Subjective   Maya Dutton is a 67 y.o. female       Chief Complaint    Follow-up          HPI   Patient is in the office after recent hospitalization at [facility] when I saw her for SVT.  She also had symptoms of chest pain with negative workup.  She was advised since she had noninvasive testing with nuclear stress test in 2022 with normal result that she should have coronary CT angiography with coronary calcium score which she did.  Her calcium score came back at 13 with a plaque in the LAD which was not significant.  Other coronaries were unremarkable.  She quit smoking for 3 months recently but a month ago went back to smoking.  She does have underlying COPD from long-term tobacco use along with history of asthma.  I reviewed with the patient the results of the recent cardiac investigations.  She reported no more symptoms of chest pain but since she has been back to smoking and drinking more than average coffee she has been having symptoms of palpitations.  Her examination today was only remarkable for diminished breath sounds.    Assessment/recommendations:    1-PSVT, triggered mostly by inhaler therapy that she uses for COPD and caffeine consumption.  She is on metoprolol 25 mg daily.  We discussed in the hospital and in the office today the option of the ablation as a radical treatment for SVT if this becomes recurrent and  "interfering with quality of life.  When she feels that she got to that point she is willing to go for the ablation.  Meanwhile advised patient to continue beta-blocker therapy and avoid caffeine and tobacco.  2-elevated coronary calcium score at 13 with plaque in the LAD.  The patient had normal functional studies in the past and has had no recurrent angina pectoris.  Discussed at length with the patient the need for tobacco cessation and she will be started taking baby aspirin and I suggest he go on rosuvastatin 10 mg daily.  3-COPD from heavy tobacco use.  Advised patient against tobacco and to continue to use rescue inhaler.  4-hyperlipidemia, rosuvastatin was initiated.  5-tobacco abuse, was counseled extensively on need for tobacco cessation  Review of Systems   Cardiovascular:  Positive for palpitations.   All other systems reviewed and are negative.           Vitals:    02/11/25 0919   BP: 122/70   BP Location: Left arm   Patient Position: Sitting   Pulse: 80   Weight: 56.7 kg (125 lb)   Height: 1.549 m (5' 1\")        Objective   Physical Exam  Constitutional:       Appearance: Normal appearance.   HENT:      Nose: Nose normal.   Neck:      Vascular: No carotid bruit.   Cardiovascular:      Rate and Rhythm: Normal rate.      Pulses: Normal pulses.      Heart sounds: Normal heart sounds.   Pulmonary:      Effort: Pulmonary effort is normal.      Breath sounds: Decreased air movement present.   Abdominal:      General: Bowel sounds are normal.      Palpations: Abdomen is soft.   Musculoskeletal:         General: Normal range of motion.      Cervical back: Normal range of motion.      Right lower leg: No edema.      Left lower leg: No edema.   Skin:     General: Skin is warm and dry.   Neurological:      General: No focal deficit present.      Mental Status: She is alert.   Psychiatric:         Mood and Affect: Mood normal.         Behavior: Behavior normal.         Thought Content: Thought content normal.       "   Judgment: Judgment normal.         Allergies  Patient has no known allergies.     Current Medications    Current Outpatient Medications:   •  albuterol 90 mcg/actuation inhaler, Inhale 2 puffs every 4 hours if needed for wheezing or shortness of breath., Disp: , Rfl:   •  budesonide-glycopyr-formoterol (BREZTRI) 160-9-4.8 mcg/actuation HFA aerosol inhaler, Inhale 2 puffs 2 times a day., Disp: , Rfl:   •  escitalopram (Lexapro) 10 mg tablet, Take 1 tablet (10 mg) by mouth once daily in the morning. Take before meals., Disp: , Rfl:   •  metoprolol succinate XL (Toprol-XL) 25 mg 24 hr tablet, Take 1 tablet (25 mg) by mouth once daily., Disp: , Rfl:   •  QUEtiapine (SEROquel) 100 mg tablet, Take 1 tablet (100 mg) by mouth once daily at bedtime., Disp: , Rfl:   •  aspirin 81 mg EC tablet, Take 1 tablet (81 mg) by mouth once daily., Disp: , Rfl:   •  rosuvastatin (Crestor) 10 mg tablet, Take 1 tablet (10 mg) by mouth once daily., Disp: 90 tablet, Rfl: 3                     Assessment/Plan   1. Paroxysmal supraventricular tachycardia (CMS-HCC)  Follow Up In Cardiology    aspirin 81 mg EC tablet    Follow Up In Cardiology      2. Mild CAD        3. Mixed hyperlipidemia  rosuvastatin (Crestor) 10 mg tablet    Lipid Panel    Alanine Aminotransferase    Aspartate Aminotransferase    Lipid Panel    Alanine Aminotransferase    Aspartate Aminotransferase      4. Chronic obstructive pulmonary disease, unspecified COPD type (Multi)        5. Current smoker        6. BMI 23.0-23.9, adult                 Scribe Attestation  By signing my name below, Lucia VALENTIN LPN, Scribe   attest that this documentation has been prepared under the direction and in the presence of Shane Rivas MD.     Provider Attestation - Scribe documentation    All medical record entries made by the Scribe were at my direction and personally dictated by me. I have reviewed the chart and agree that the record accurately reflects my personal performance of  the history, physical exam, discussion and plan.

## 2025-03-03 NOTE — GOALS OF CARE CONVERSATION - ADVANCED CARE PLANNING - NS PRO AD PATIENT TYPE
Medical Orders for Life-Sustaining Treatment (MOLST)
at 8:23 PM  [facility]  [facility] [place]  [address] [facility] [address]  Dept: [phone]

## 2025-04-09 NOTE — H&P ADULT - PROBLEM SELECTOR PROBLEM 5
no HTN (hypertension) Chronic systolic CHF (congestive heart failure) COPD (chronic obstructive pulmonary disease)

## 2025-07-14 NOTE — PATIENT PROFILE ADULT - NSPROGENPREVTRANSF_GEN_A_NUR
Writer returned pt call asking when his appt is, called back and left message, also will mail appt itinerary.  
no